# Patient Record
Sex: FEMALE | Race: AMERICAN INDIAN OR ALASKA NATIVE
[De-identification: names, ages, dates, MRNs, and addresses within clinical notes are randomized per-mention and may not be internally consistent; named-entity substitution may affect disease eponyms.]

---

## 2019-02-06 ENCOUNTER — HOSPITAL ENCOUNTER (INPATIENT)
Dept: HOSPITAL 14 - H.ER | Age: 59
LOS: 2 days | Discharge: HOME HEALTH | DRG: 194 | End: 2019-02-08
Attending: INTERNAL MEDICINE | Admitting: INTERNAL MEDICINE
Payer: COMMERCIAL

## 2019-02-06 DIAGNOSIS — Z95.1: ICD-10-CM

## 2019-02-06 DIAGNOSIS — E11.9: ICD-10-CM

## 2019-02-06 DIAGNOSIS — J45.901: ICD-10-CM

## 2019-02-06 DIAGNOSIS — D64.9: ICD-10-CM

## 2019-02-06 DIAGNOSIS — M19.90: ICD-10-CM

## 2019-02-06 DIAGNOSIS — J18.9: Primary | ICD-10-CM

## 2019-02-06 DIAGNOSIS — I69.351: ICD-10-CM

## 2019-02-06 DIAGNOSIS — Z79.82: ICD-10-CM

## 2019-02-06 DIAGNOSIS — G89.29: ICD-10-CM

## 2019-02-06 DIAGNOSIS — I10: ICD-10-CM

## 2019-02-06 DIAGNOSIS — Z79.899: ICD-10-CM

## 2019-02-06 LAB
ALBUMIN SERPL-MCNC: 4 G/DL (ref 3.5–5)
ALBUMIN/GLOB SERPL: 0.9 {RATIO} (ref 1–2.1)
ALT SERPL-CCNC: < 6 U/L (ref 9–52)
ANISOCYTOSIS BLD QL SMEAR: (no result)
AST SERPL-CCNC: 26 U/L (ref 14–36)
BASE EXCESS BLDV CALC-SCNC: -1.4 MMOL/L (ref 0–2)
BASE EXCESS BLDV CALC-SCNC: 0.8 MMOL/L (ref 0–2)
BASOPHILS # BLD AUTO: 0 K/UL (ref 0–0.2)
BASOPHILS NFR BLD: 0.1 % (ref 0–2)
BUN SERPL-MCNC: 12 MG/DL (ref 7–17)
CALCIUM SERPL-MCNC: 8.6 MG/DL (ref 8.4–10.2)
EOSINOPHIL # BLD AUTO: 0 K/UL (ref 0–0.7)
EOSINOPHIL NFR BLD: 0.1 % (ref 0–4)
ERYTHROCYTE [DISTWIDTH] IN BLOOD BY AUTOMATED COUNT: 19.2 % (ref 11.5–14.5)
GFR NON-AFRICAN AMERICAN: > 60
HGB BLD-MCNC: 10.5 G/DL (ref 12–16)
HYPOCHROMIC: SLIGHT
LYMPHOCYTE: 4 % (ref 20–50)
LYMPHOCYTES # BLD AUTO: 0.3 K/UL (ref 1–4.3)
LYMPHOCYTES NFR BLD AUTO: 2 % (ref 20–40)
MCH RBC QN AUTO: 24.1 PG (ref 27–31)
MCHC RBC AUTO-ENTMCNC: 31.5 G/DL (ref 33–37)
MCV RBC AUTO: 76.6 FL (ref 81–99)
MICROCYTES BLD QL SMEAR: (no result)
MONOCYTE: 3 % (ref 0–10)
MONOCYTES # BLD: 0.4 K/UL (ref 0–0.8)
MONOCYTES NFR BLD: 2.2 % (ref 0–10)
NEUTROPHILS # BLD: 15.7 K/UL (ref 1.8–7)
NEUTROPHILS NFR BLD AUTO: 91 % (ref 42–75)
NEUTROPHILS NFR BLD AUTO: 95.6 % (ref 50–75)
NEUTS BAND NFR BLD: 2 % (ref 0–2)
NRBC BLD AUTO-RTO: 0 % (ref 0–0)
OVALOCYTES BLD QL SMEAR: SLIGHT
PCO2 BLDV: 49 MMHG (ref 40–60)
PCO2 BLDV: 58 MMHG (ref 40–60)
PH BLDV: 7.3 [PH] (ref 7.32–7.43)
PH BLDV: 7.32 [PH] (ref 7.32–7.43)
PLATELET # BLD EST: NORMAL 10*3/UL
PLATELET # BLD: 221 K/UL (ref 130–400)
PMV BLD AUTO: 9.5 FL (ref 7.2–11.7)
POIKILOCYTOSIS BLD QL SMEAR: SLIGHT
RBC # BLD AUTO: 4.37 MIL/UL (ref 3.8–5.2)
TOTAL CELLS COUNTED BLD: 100
VENOUS BLOOD FIO2: 21 %
VENOUS BLOOD FIO2: 21 %
VENOUS BLOOD GAS PO2: 47 MM/HG (ref 30–55)
VENOUS BLOOD GAS PO2: 50 MM/HG (ref 30–55)
WBC # BLD AUTO: 16.5 K/UL (ref 4.8–10.8)

## 2019-02-06 NOTE — ED PDOC
HPI: General Adult


Time Seen by Provider: 19 13:59


Chief Complaint (Nursing): Shortness Of Breath


Chief Complaint (Provider): Medication Refill


History Per: EMS


Onset/Duration Of Symptoms: Days (x  1)


Current Symptoms Are (Timing): Still Present


Additional Complaint(s): 


58 year old female with a history of CVA, hypertension and anemia presents to 

the ED via EMS. As per EMS, patient ran out of her albuterol prompting call to 

EMS. In the ED, patient is arousable to verbal stimuli, but is refusing to 

answer any questions. Offers no complaints.  Pt administered nebulizer 

treatments and Solumedrol via EMS en route. 





PMD: none provided


e





Past Medical History


Reviewed: Historical Data, Nursing Documentation, Vital Signs


Vital Signs: 





                                Last Vital Signs











Temp  100.7 F H  19 14:20


 


Pulse  96 H  19 13:53


 


Resp  16   19 14:25


 


BP  129/74   19 13:53


 


Pulse Ox  95   19 14:25














- Medical History


PMH: Anemia, Arthritis, Asthma, HTN


   Denies: Chronic Kidney Disease





- Surgical History


Surgical History: CABG, Cholecystectomy





- Family History


Family History: States: Unknown Family Hx





- Home Medications


Home Medications: 


                                Ambulatory Orders











 Medication  Instructions  Recorded


 


Aspirin [Aspirin Chewable] 81 mg PO DAILY #0 chew 16


 


Baclofen [Lioresal] 20 mg PO TID #0 tab 16


 


Gabapentin [Neurontin] 300 mg PO TID #0 cap 16


 


Losartan [Cozaar] 25 mg PO DAILY #0 tab 16


 


Rosuvastatin Calcium [Crestor] 20 mg PO HS #0 tab 16


 


fentaNYL 75 mcg/hr [Duragesic 1 patch TD Q72H #0 patch 16





Patch 75 mcg/hr]  


 


oxyCODONE/Acetaminophen [Percocet 1 tab PO Q8H PRN #0 tab 16





5/325 mg Tab]  














- Allergies


Allergies/Adverse Reactions: 


                                    Allergies











Allergy/AdvReac Type Severity Reaction Status Date / Time


 


Penicillins Allergy  RASH Verified 19 13:53


 


latex AdvReac  RASH Verified 19 13:53














Review of Systems


Review Of Systems: ROS cannot be obtained secondary to pt's inabilty to answer 

questions. (patient is uncooperative)





Physical Exam





- Reviewed


Nursing Documentation Reviewed: Yes





- Physical Exam


Appears: Positive for: No Acute Distress (febrile)


Head Exam: Positive for: ATRAUMATIC, NORMAL INSPECTION, NORMOCEPHALIC


Skin: Positive for: Normal Color, Warm, Dry.  Negative for: Rash


Eye Exam: Positive for: EOMI, Normal appearance, PERRL


Cardiovascular/Chest: Positive for: Murmur (otherwise regular rate)


Respiratory: Positive for: Wheezing (bilateral).  Negative for: Respiratory 

Distress


Gastrointestinal/Abdominal: Positive for: Normal Exam, Soft.  Negative for: 

Tenderness


Back: Positive for: Normal Inspection.  Negative for: L CVA Tenderness, R CVA 

Tenderness


Extremity: Positive for: Normal ROM (upper and lower extremities).  Negative 

for: Deformity, Swelling


Neurologic/Psych: Positive for: Alert, Oriented.  Negative for: Motor/Sensory 

Deficits





- Laboratory Results


Result Diagrams: 


                                 19 15:10





                                 19 15:10





- ECG


ECG Rhythm: Positive for: Atrial Flutter


Rate: 91


O2 Sat by Pulse Oximetry: 95 (RA)


Pulse Ox Interpretation: Normal





Medical Decision Making


Medical Decision Makin:59 


Impression: fever, possible asthma exacerbation, pneumonia


Initial Plan: 


--VBG 


--CMP 


--CBC 


--CXR


--EKG 


--Urine dip 


--Duoneb 3 ml INH 


--Peak flow pre/post


--Acetaminophen 650 mg PO 


--Blood cx


--Urine cx 


--UA 





Accession No. : [id number]


Patient Name / ID : TRENA MONTANO  / [id number]


Exam Date : 2019 13:52:33 ( Approved )


Study Comment : 


Sex / Age : F  / 58Y





Creator : Noe Serra MD


Dictator : Noe Serra MD


 : 


Approver : Noe Serra MD


Approver2 : 





Report Date : 2019 14:58:29


My Comment : 


 

********************************************************************************


***





Date of service: 





2019





PROCEDURE:  CHEST RADIOGRAPH, 1 VIEW





HISTORY:


SOB, fever





COMPARISON:


None available.





FINDINGS:





LUNGS:


Patchy diffuse bilateral pulmonary opacities suspicious for bilateral pneumonia 

versus pulmonary edema no perihilar distribution.





PLEURA:


Small right pleural effusion.  No evidence of left pleural effusion.





CARDIOVASCULAR:


 Cardiac valvular replacement noted.  Normal heart size.  There is 

atherosclerotic calcification of the thoracic aorta.  Mild congestive change.





OSSEOUS STRUCTURES:


No significant abnormalities.





VISUALIZED UPPER ABDOMEN:


Normal.





OTHER FINDINGS:


None. 





IMPRESSION:


Patchy diffuse bilateral pulmonary opacity.  Possible bilateral pneumonia versus

pulmonary edema.  Small right pleural effusion.  Cardiac valvular replacement.





-----------------------

--------------------------------------------------------------------------


Scribe Attestation:


Documented by Gracie Campos, acting as a scribe for Laura Camacho MD 





Provider Scribe Attestation:


All medical record entries made by the Scribe were at my direction and 

personally dictated by me. I have reviewed the chart and agree that the record 

accurately reflects my personal performance of the history, physical exam, 

medical decision making, and the department course for this patient. I have also

personally directed, reviewed, and agree with the discharge instructions and 

disposition.








Disposition





- Clinical Impression


Clinical Impression: 


 Pneumonia, Asthma exacerbation








- Patient ED Disposition


Is Patient to be Admitted: Yes





- Disposition


Disposition Time: 16:29


Condition: STABLE


Forms:  Sportsy (English)





- Pt Status Changed To:


Hospital Disposition Of: Inpatient





- Admit Certification


Admit to Inpatient:: After my assessment, the patient will require 

hospitalization for at least two midnights.  This is because of the severity of 

symptoms shown, intensity of services needed, and/or the medical risk in this 

patient being treated as an outpatient.





- POA


Present On Arrival: None

## 2019-02-06 NOTE — RAD
Date of service: 



02/06/2019



PROCEDURE:  CHEST RADIOGRAPH, 1 VIEW



HISTORY:

SOB, fever



COMPARISON:

None available.



FINDINGS:



LUNGS:

Patchy diffuse bilateral pulmonary opacities suspicious for bilateral 

pneumonia versus pulmonary edema no perihilar distribution.



PLEURA:

Small right pleural effusion.  No evidence of left pleural effusion.



CARDIOVASCULAR:

 Cardiac valvular replacement noted.  Normal heart size.  There is 

atherosclerotic calcification of the thoracic aorta.  Mild congestive 

change.



OSSEOUS STRUCTURES:

No significant abnormalities.



VISUALIZED UPPER ABDOMEN:

Normal.



OTHER FINDINGS:

None. 



IMPRESSION:

Patchy diffuse bilateral pulmonary opacity.  Possible bilateral 

pneumonia versus pulmonary edema.  Small right pleural effusion.  

Cardiac valvular replacement.

## 2019-02-07 LAB
BACTERIA #/AREA URNS HPF: (no result) /[HPF]
BILIRUB UR-MCNC: NEGATIVE MG/DL
BUN SERPL-MCNC: 13 MG/DL (ref 7–17)
CALCIUM SERPL-MCNC: 9.1 MG/DL (ref 8.4–10.2)
COLOR UR: YELLOW
ERYTHROCYTE [DISTWIDTH] IN BLOOD BY AUTOMATED COUNT: 19.7 % (ref 11.5–14.5)
GFR NON-AFRICAN AMERICAN: > 60
GLUCOSE UR STRIP-MCNC: 50 MG/DL
HGB BLD-MCNC: 10.1 G/DL (ref 12–16)
LEUKOCYTE ESTERASE UR-ACNC: (no result) LEU/UL
MCH RBC QN AUTO: 24.4 PG (ref 27–31)
MCHC RBC AUTO-ENTMCNC: 31.7 G/DL (ref 33–37)
MCV RBC AUTO: 77 FL (ref 81–99)
PH UR STRIP: 5 [PH] (ref 5–8)
PLATELET # BLD: 198 K/UL (ref 130–400)
PROT UR STRIP-MCNC: 30 MG/DL
RBC # BLD AUTO: 4.12 MIL/UL (ref 3.8–5.2)
RBC # UR STRIP: NEGATIVE /UL
SP GR UR STRIP: 1.02 (ref 1–1.03)
SQUAMOUS EPITHIAL: 3 /HPF (ref 0–5)
URINE CLARITY: (no result)
UROBILINOGEN UR-MCNC: 2 MG/DL (ref 0.2–1)
WBC # BLD AUTO: 10.9 K/UL (ref 4.8–10.8)

## 2019-02-07 PROCEDURE — 3E0F7GC INTRODUCTION OF OTHER THERAPEUTIC SUBSTANCE INTO RESPIRATORY TRACT, VIA NATURAL OR ARTIFICIAL OPENING: ICD-10-PCS | Performed by: INTERNAL MEDICINE

## 2019-02-07 RX ADMIN — IPRATROPIUM BROMIDE AND ALBUTEROL SULFATE SCH ML: .5; 3 SOLUTION RESPIRATORY (INHALATION) at 01:00

## 2019-02-07 RX ADMIN — LEVOFLOXACIN SCH MLS/HR: 5 INJECTION, SOLUTION INTRAVENOUS at 10:39

## 2019-02-07 RX ADMIN — ENOXAPARIN SODIUM SCH MG: 40 INJECTION SUBCUTANEOUS at 10:03

## 2019-02-07 RX ADMIN — IPRATROPIUM BROMIDE AND ALBUTEROL SULFATE SCH ML: .5; 3 SOLUTION RESPIRATORY (INHALATION) at 07:40

## 2019-02-07 RX ADMIN — IPRATROPIUM BROMIDE AND ALBUTEROL SULFATE SCH ML: .5; 3 SOLUTION RESPIRATORY (INHALATION) at 13:22

## 2019-02-07 RX ADMIN — IPRATROPIUM BROMIDE AND ALBUTEROL SULFATE SCH ML: .5; 3 SOLUTION RESPIRATORY (INHALATION) at 19:35

## 2019-02-07 RX ADMIN — POTASSIUM CHLORIDE SCH MEQ: 10 TABLET, FILM COATED, EXTENDED RELEASE ORAL at 10:00

## 2019-02-07 NOTE — CARD
--------------- APPROVED REPORT --------------





Date of service: 02/06/2019



EKG Measurement

Heart [id number]

[id number]

[id number]

[id number]



<Conclusion>

Atrial flutter with variable AV block

Rightward axis

Nonspecific ST and T wave abnormality

Abnormal ECG

## 2019-02-07 NOTE — HP
ADMITTING HISTORY AND PHYSICAL



HISTORY OF PRESENT ILLNESS:  Ms. Omalley is a 58-year-old female who was

admitted via the emergency room.  The patient was brought in by EMS.  She

was complaining of some shortness of breath and chest tightness.  She

indicates that she has been sick for few days prior to presentation.  Lives

at home with her son, but did not want to tell her son that she was sick. 

Also had visited her primary care physician recently in [place], but

then was not ill.



PAST MEDICAL HISTORY:  She has a past medical history of anemia, arthritis,

asthma, hypertension, and status post cerebrovascular accident with

right-sided hemiparesis.  She also has history of coronary artery bypass

graft and cholecystectomy.



FAMILY HISTORY:  Unremarkable.



PHYSICAL EXAMINATION:

GENERAL:  The patient is alert, oriented to person, place, and time.

VITAL SIGNS:  Blood pressure 129/74, pulse of 96, respiratory rate 16.  She

is afebrile at present but temperature maximum on admission was 100.7

degrees Fahrenheit.  O2 saturation is 95% on nasal cannula oxygen.

SKIN:  Shows fair turgor.

HEENT:  Pupils are equal and reactive to light and accommodation.  Mouth

shows fair hygiene.

LUNGS:  Bilateral mild wheezing and basilar rales.  There is scar present

of coronary artery bypass graft to the anterior chest wall.

HEART:  Regular.

BREASTS:  Normal.

ABDOMEN:  Soft, nontender.  No organomegaly.

EXTREMITIES:  Show no edema or cyanosis.  Has complete right-sided

hemiparesis.

CENTRAL NERVOUS SYSTEM:  The patient is alert and oriented x3.  Except for

the right hemiparesis, no gross deficits.



LABORATORY DATA:  Remarkable for chest x-ray showing bilateral _____

pulmonary infiltrates.  WBC 16.5, hemoglobin 10.5, platelet count 221,000. 

Sodium 135, potassium 4.2, BUN 12, creatinine 0.6, serum glucose 293. 

Arterial blood gas; pH 7.30, pCO2 of 47, O2 saturation 92.6.  Lactate 2.5,

repeat 2.2.



IMPRESSION:  Bilateral pneumonia, probably community acquired; history of

cerebrovascular accident; history of diabetes mellitus, poorly controlled,

type 2; history of hypertension; history of asthma.



PLAN:  Intravenous antibiotics, septic workup.  We will repeat the lactate

level to evaluate for sepsis.  Physical therapy evaluation for discharge

planning and physical therapy.  We will continue therapy as ordered. 

Monitor blood sugar closely.  Further therapy will depend on findings.







__________________________________________

Librado Romero MD





DD:  02/07/2019 9:45:20

DT:  02/07/2019 13:13:23

[facility] # [id number]

## 2019-02-08 VITALS
OXYGEN SATURATION: 100 % | HEART RATE: 62 BPM | TEMPERATURE: 98.1 F | DIASTOLIC BLOOD PRESSURE: 67 MMHG | SYSTOLIC BLOOD PRESSURE: 107 MMHG

## 2019-02-08 VITALS — RESPIRATION RATE: 18 BRPM

## 2019-02-08 RX ADMIN — ENOXAPARIN SODIUM SCH MG: 40 INJECTION SUBCUTANEOUS at 09:44

## 2019-02-08 RX ADMIN — IPRATROPIUM BROMIDE AND ALBUTEROL SULFATE SCH ML: .5; 3 SOLUTION RESPIRATORY (INHALATION) at 02:09

## 2019-02-08 RX ADMIN — LEVOFLOXACIN SCH MLS/HR: 5 INJECTION, SOLUTION INTRAVENOUS at 09:49

## 2019-02-08 RX ADMIN — POTASSIUM CHLORIDE SCH MEQ: 10 TABLET, FILM COATED, EXTENDED RELEASE ORAL at 09:47

## 2019-02-08 RX ADMIN — IPRATROPIUM BROMIDE AND ALBUTEROL SULFATE SCH ML: .5; 3 SOLUTION RESPIRATORY (INHALATION) at 07:54

## 2019-02-08 RX ADMIN — IPRATROPIUM BROMIDE AND ALBUTEROL SULFATE SCH: .5; 3 SOLUTION RESPIRATORY (INHALATION) at 13:33

## 2019-02-08 NOTE — CP.PCM.DIS
Provider





- Provider


Date of Admission: 


02/06/19 16:24





Attending physician: 


Librado Romero MD





Time Spent in preparation of Discharge (in minutes): 35





Diagnosis





- Discharge Diagnosis


(1) History of CVA (cerebrovascular accident)


Status: Chronic   


Comment: R HEMIPLEGIA PERSISTS.  WILL NEED OUT PT PHYSICAL AND OCCUPATIONAL 

THERAPY.  REFUSES SUBACUTE CARE   





(2) History of CVA with residual deficit


Status: Acute   





(3) Asthma exacerbation


Status: Acute   


Comment: RESOLVED WITH BRONCHODILATOR RX   





(4) Pneumonia


Status: Acute   


Comment: NO COUGH/CHEST PAINS OR SOB.  CXR--IMPROVEMENT OF PULMONARY 

INFILTERATES.  WILL D/C HOME ON PO ANTIBIOTICS X 1 WEEK.  FOLLOW UP WITH PMD AND

TO HAVE CXR REPEATED IN 2 WEEKS AFTER RX   





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


Status: Chronic   Priority: Low   





(6) Chronic pain


Status: Chronic   Priority: Low   





Hospital Course





- Lab Results


Lab Results: 


                                  Micro Results





02/06/19 15:00   Blood-Venous   Blood Culture - Preliminary


                            NO GROWTH AFTER 24 HOURS


02/06/19 14:30   Blood-Venous   Blood Culture - Preliminary


                            NO GROWTH AFTER 24 HOURS





                             Most Recent Lab Values











WBC  10.9 K/uL (4.8-10.8)  H  02/07/19  04:20    


 


RBC  4.12 Mil/uL (3.80-5.20)   02/07/19  04:20    


 


Hgb  10.1 g/dL (12.0-16.0)  L  02/07/19  04:20    


 


Hct  31.8 % (34.0-47.0)  L  02/07/19  04:20    


 


MCV  77.0 fl (81.0-99.0)  L  02/07/19  04:20    


 


MCH  24.4 pg (27.0-31.0)  L  02/07/19  04:20    


 


MCHC  31.7 g/dL (33.0-37.0)  L  02/07/19  04:20    


 


RDW  19.7 % (11.5-14.5)  H  02/07/19  04:20    


 


Plt Count  198 K/uL (130-400)   02/07/19  04:20    


 


MPV  9.5 fl (7.2-11.7)   02/06/19  15:10    


 


Neut % (Auto)  95.6 % (50.0-75.0)  H  02/06/19  15:10    


 


Lymph % (Auto)  2.0 % (20.0-40.0)  L  02/06/19  15:10    


 


Mono % (Auto)  2.2 % (0.0-10.0)   02/06/19  15:10    


 


Eos % (Auto)  0.1 % (0.0-4.0)   02/06/19  15:10    


 


Baso % (Auto)  0.1 % (0.0-2.0)   02/06/19  15:10    


 


Neut # (Auto)  15.7 K/uL (1.8-7.0)  H  02/06/19  15:10    


 


Lymph # (Auto)  0.3 K/uL (1.0-4.3)  L  02/06/19  15:10    


 


Mono # (Auto)  0.4 K/uL (0.0-0.8)   02/06/19  15:10    


 


Eos # (Auto)  0.0 K/uL (0.0-0.7)   02/06/19  15:10    


 


Baso # (Auto)  0.0 K/uL (0.0-0.2)   02/06/19  15:10    


 


Neutrophils % (Manual)  91 % (42-75)  H  02/06/19  15:10    


 


Band Neutrophils %  2 % (0-2)   02/06/19  15:10    


 


Lymphocytes % (Manual)  4 % (20-50)  L  02/06/19  15:10    


 


Monocytes % (Manual)  3 % (0-10)   02/06/19  15:10    


 


Platelet Estimate  Normal  (NORMAL)   02/06/19  15:10    


 


Hypochromasia (manual)  Slight   02/06/19  15:10    


 


Poikilocytosis (manual  Slight   02/06/19  15:10    


 


Anisocytosis (manual)  Moderate   02/06/19  15:10    


 


Microcytosis (manual)  Moderate   02/06/19  15:10    


 


Ovalocytes  Slight   02/06/19  15:10    


 


pO2  50 mm/Hg (30-55)   02/06/19  18:30    


 


VBG pH  7.32  (7.32-7.43)   02/06/19  18:30    


 


VBG pCO2  49 mmHg (40-60)   02/06/19  18:30    


 


VBG HCO3  23.4 mmol/L  02/06/19  18:30    


 


VBG Total CO2  26.7 mmol/L (22-28)   02/06/19  18:30    


 


VBG O2 Sat (Calc)  88.2 % (40-65)  H  02/06/19  18:30    


 


VBG Base Excess  -1.4 mmol/L (0.0-2.0)  L  02/06/19  18:30    


 


VBG Potassium  3.7 mmol/L (3.6-5.2)   02/06/19  18:30    


 


Sodium  125.0 mmol/L (132-148)  L  02/06/19  18:30    


 


Chloride  95.0 mmol/L ()  L  02/06/19  18:30    


 


Glucose  647 mg/dL ()  H* D 02/06/19  18:30    


 


Lactate  2.2 mmol/L (0.7-2.1)  H  02/06/19  18:30    


 


FiO2  21.0 %  02/06/19  18:30    


 


Crit Value Called To  sofi Camacho   02/06/19  18:30    


 


Crit Value Called By  SCAR brown   02/06/19  18:30    


 


Crit Value Read Back  Y   02/06/19  18:30    


 


Blood Gas Notified Time  1840   02/06/19  18:30    


 


Sodium  140 mmol/l (132-148)   02/07/19  04:20    


 


Potassium  4.5 MMOL/L (3.6-5.0)   02/07/19  04:20    


 


Chloride  98 mmol/L ()   02/07/19  04:20    


 


Carbon Dioxide  30 mmol/L (22-30)   02/07/19  04:20    


 


Anion Gap  17  (10-20)   02/07/19  04:20    


 


BUN  13 mg/dl (7-17)   02/07/19  04:20    


 


Creatinine  0.7 mg/dl (0.7-1.2)   02/07/19  04:20    


 


Est GFR ( Amer)  > 60   02/07/19  04:20    


 


Est GFR (Non-Af Amer)  > 60   02/07/19  04:20    


 


POC Glucose (mg/dL)  137 mg/dL ()  H  02/08/19  11:06    


 


Random Glucose  195 mg/dL ()  H  02/07/19  04:20    


 


Calcium  9.1 mg/dL (8.4-10.2)   02/07/19  04:20    


 


Total Bilirubin  1.9 mg/dl (0.2-1.3)  H  02/06/19  15:10    


 


AST  26 U/L (14-36)   02/06/19  15:10    


 


ALT  < 6 U/L (9-52)  L  02/06/19  15:10    


 


Alkaline Phosphatase  102 U/L ()   02/06/19  15:10    


 


Total Protein  8.6 G/DL (6.3-8.2)  H  02/06/19  15:10    


 


Albumin  4.0 g/dL (3.5-5.0)   02/06/19  15:10    


 


Globulin  4.6 gm/dL (2.2-3.9)  H  02/06/19  15:10    


 


Albumin/Globulin Ratio  0.9  (1.0-2.1)  L  02/06/19  15:10    


 


Venous Blood Potassium  3.7 mmol/L (3.6-5.2)   02/06/19  18:30    


 


Urine Color  Yellow  (YELLOW)   02/07/19  14:50    


 


Urine Clarity  Slighty-cloudy  (Clear)   02/07/19  14:50    


 


Urine pH  5.0  (5.0-8.0)   02/07/19  14:50    


 


Ur Specific Gravity  1.018  (1.003-1.030)   02/07/19  14:50    


 


Urine Protein  30 mg/dL (NEGATIVE)   02/07/19  14:50    


 


Urine Glucose (UA)  50 mg/dL (NEGATIVE)   02/07/19  14:50    


 


Urine Ketones  Negative mg/dL (NEGATIVE)   02/07/19  14:50    


 


Urine Blood  Negative  (NEGATIVE)   02/07/19  14:50    


 


Urine Nitrate  Negative  (NEGATIVE)   02/07/19  14:50    


 


Urine Bilirubin  Negative  (NEGATIVE)   02/07/19  14:50    


 


Urine Urobilinogen  2.0 mg/dL (0.2-1.0)  H  02/07/19  14:50    


 


Ur Leukocyte Esterase  Neg Tabitha/uL (Negative)   02/07/19  14:50    


 


Urine RBC (Auto)  2 /hpf (0-3)   02/07/19  14:50    


 


Urine Microscopic WBC  5 /hpf (0-5)   02/07/19  14:50    


 


Ur Squamous Epith Cells  3 /hpf (0-5)   02/07/19  14:50    


 


Urine Bacteria  Few  (<OCC)  H  02/07/19  14:50    














- Hospital Course


Hospital Course: 





FEELS BETTER


SOB/COUGH RESOLVED


WILL D/C HOME TODAY





Discharge Exam





- Head Exam


Head Exam: ATRAUMATIC, NORMAL INSPECTION, NORMOCEPHALIC





- Eye Exam


Eye Exam: EOMI, Normal appearance, PERRL


Pupil Exam: NORMAL ACCOMODATION, PERRL





- GI/Abdominal Exam


GI & Abdominal Exam: Normal Bowel Sounds





- Rectal Exam


Rectal Exam: NORMAL INSPECTION





- Neurological Exam


Neurological exam: Alert, CN II-XII Intact, Oriented x3, Reflexes Normal


Additional comments: 





R HEMIPLEGIA





- Psychiatric Exam


Psychiatric exam: Normal Affect, Normal Mood





- Skin


Skin Exam: Dry, Intact, Normal Color, Warm





Discharge Plan





- Follow Up Plan


Condition: STABLE


Disposition: HOME/ ROUTINE


Additional Instructions: 


D/C HOME TODAY ON ORAL LEVAQUIN X 1 WEEK

## 2019-02-08 NOTE — RAD
Date of service: 



02/08/2019



PROCEDURE:  CHEST RADIOGRAPH, 1 VIEW



HISTORY:

PNEUMONIA



COMPARISON:

2/6/2019



FINDINGS:



LUNGS:

Patchy diffuse right pulmonary opacity unchanged from previous.  

Left-sided opacity appears to have markedly resolved.



PLEURA:

No pneumothorax or pleural fluid seen.



CARDIOVASCULAR:

 No aortic atherosclerotic calcification present. Sternotomy wires.  

Normal heart size.



OSSEOUS STRUCTURES:

No significant abnormalities.



VISUALIZED UPPER ABDOMEN:

Normal.



OTHER FINDINGS:

None. 



IMPRESSION:

Persistent patchy right-sided pulmonary opacity.  Resolved left-sided 

pulmonary opacity.  No pleural effusion.

## 2019-02-23 ENCOUNTER — HOSPITAL ENCOUNTER (INPATIENT)
Dept: HOSPITAL 14 - H.ER | Age: 59
LOS: 25 days | Discharge: NURSING, REHAB OR LONG-TERM CARE | DRG: 870 | End: 2019-03-20
Attending: FAMILY MEDICINE | Admitting: FAMILY MEDICINE
Payer: COMMERCIAL

## 2019-02-23 VITALS — BODY MASS INDEX: 37.3 KG/M2

## 2019-02-23 DIAGNOSIS — Z79.84: ICD-10-CM

## 2019-02-23 DIAGNOSIS — E11.649: ICD-10-CM

## 2019-02-23 DIAGNOSIS — I48.91: ICD-10-CM

## 2019-02-23 DIAGNOSIS — Z79.82: ICD-10-CM

## 2019-02-23 DIAGNOSIS — I69.398: ICD-10-CM

## 2019-02-23 DIAGNOSIS — J45.909: ICD-10-CM

## 2019-02-23 DIAGNOSIS — R19.7: ICD-10-CM

## 2019-02-23 DIAGNOSIS — K56.7: ICD-10-CM

## 2019-02-23 DIAGNOSIS — I69.351: ICD-10-CM

## 2019-02-23 DIAGNOSIS — E83.39: ICD-10-CM

## 2019-02-23 DIAGNOSIS — J32.4: ICD-10-CM

## 2019-02-23 DIAGNOSIS — Z99.11: ICD-10-CM

## 2019-02-23 DIAGNOSIS — E79.0: ICD-10-CM

## 2019-02-23 DIAGNOSIS — E83.42: ICD-10-CM

## 2019-02-23 DIAGNOSIS — G93.1: ICD-10-CM

## 2019-02-23 DIAGNOSIS — E87.0: ICD-10-CM

## 2019-02-23 DIAGNOSIS — A41.9: Primary | ICD-10-CM

## 2019-02-23 DIAGNOSIS — I21.4: ICD-10-CM

## 2019-02-23 DIAGNOSIS — Z23: ICD-10-CM

## 2019-02-23 DIAGNOSIS — J69.0: ICD-10-CM

## 2019-02-23 DIAGNOSIS — I25.10: ICD-10-CM

## 2019-02-23 DIAGNOSIS — G92: ICD-10-CM

## 2019-02-23 DIAGNOSIS — D64.9: ICD-10-CM

## 2019-02-23 DIAGNOSIS — I48.92: ICD-10-CM

## 2019-02-23 DIAGNOSIS — J96.01: ICD-10-CM

## 2019-02-23 DIAGNOSIS — I25.2: ICD-10-CM

## 2019-02-23 DIAGNOSIS — Z88.0: ICD-10-CM

## 2019-02-23 DIAGNOSIS — G47.33: ICD-10-CM

## 2019-02-23 DIAGNOSIS — Z87.891: ICD-10-CM

## 2019-02-23 DIAGNOSIS — G93.89: ICD-10-CM

## 2019-02-23 DIAGNOSIS — Z78.1: ICD-10-CM

## 2019-02-23 DIAGNOSIS — E11.21: ICD-10-CM

## 2019-02-23 DIAGNOSIS — E87.6: ICD-10-CM

## 2019-02-23 DIAGNOSIS — N17.0: ICD-10-CM

## 2019-02-23 DIAGNOSIS — K72.00: ICD-10-CM

## 2019-02-23 DIAGNOSIS — E66.9: ICD-10-CM

## 2019-02-23 DIAGNOSIS — R65.20: ICD-10-CM

## 2019-02-23 DIAGNOSIS — Z91.040: ICD-10-CM

## 2019-02-23 DIAGNOSIS — E11.65: ICD-10-CM

## 2019-02-23 DIAGNOSIS — Z95.1: ICD-10-CM

## 2019-02-23 LAB
ALBUMIN SERPL-MCNC: 3.8 G/DL (ref 3.5–5)
ALBUMIN/GLOB SERPL: 1 {RATIO} (ref 1–2.1)
ALT SERPL-CCNC: 1303 U/L (ref 9–52)
ANISOCYTOSIS BLD QL SMEAR: (no result)
APTT BLD: 44.5 SECONDS (ref 25.6–37.1)
ARTERIAL BLOOD GAS HEMOGLOBIN: 11.8 G/DL (ref 11.7–17.4)
ARTERIAL BLOOD GAS O2 SAT: 100.2 % (ref 95–98)
ARTERIAL BLOOD GAS O2 SAT: 88.8 % (ref 95–98)
ARTERIAL BLOOD GAS PCO2: 39 MM/HG (ref 35–45)
ARTERIAL BLOOD GAS PCO2: 48 MM/HG (ref 35–45)
ARTERIAL BLOOD GAS TCO2: 19.4 MMOL/L (ref 22–28)
ARTERIAL BLOOD GAS TCO2: 20 MMOL/L (ref 22–28)
ARTERIAL PATENCY WRIST A: YES
ARTERIAL PATENCY WRIST A: YES
AST SERPL-CCNC: 1555 U/L (ref 14–36)
BACTERIA #/AREA URNS HPF: (no result) /[HPF]
BASE EXCESS BLDV CALC-SCNC: -6 MMOL/L (ref 0–2)
BASE EXCESS BLDV CALC-SCNC: -8.7 MMOL/L (ref 0–2)
BASOPHILS # BLD AUTO: 0.1 K/UL (ref 0–0.2)
BASOPHILS NFR BLD: 0.2 % (ref 0–2)
BILIRUB UR-MCNC: NEGATIVE MG/DL
BNP SERPL-MCNC: 2820 PG/ML (ref 0–900)
BUN SERPL-MCNC: 22 MG/DL (ref 7–17)
CALCIUM SERPL-MCNC: 8.7 MG/DL (ref 8.4–10.2)
COLOR UR: (no result)
EOSINOPHIL # BLD AUTO: 0 K/UL (ref 0–0.7)
EOSINOPHIL NFR BLD: 0 % (ref 0–4)
ERYTHROCYTE [DISTWIDTH] IN BLOOD BY AUTOMATED COUNT: 20.1 % (ref 11.5–14.5)
GFR NON-AFRICAN AMERICAN: 23
GLUCOSE UR STRIP-MCNC: (no result) MG/DL
HCO3 BLDA-SCNC: 16.9 MMOL/L (ref 21–28)
HCO3 BLDA-SCNC: 18.9 MMOL/L (ref 21–28)
HGB BLD-MCNC: 10.5 G/DL (ref 12–16)
HYPOCHROMIC: SLIGHT
INHALED O2 CONCENTRATION: 21 %
INHALED O2 CONCENTRATION: 28 %
INR PPP: 1.9
LEUKOCYTE ESTERASE UR-ACNC: (no result) LEU/UL
LG PLATELETS BLD QL SMEAR: PRESENT
LYMPHOCYTE: 10 % (ref 20–50)
LYMPHOCYTES # BLD AUTO: 1.1 K/UL (ref 1–4.3)
LYMPHOCYTES NFR BLD AUTO: 4.4 % (ref 20–40)
MACROCYTES BLD QL SMEAR: SLIGHT
MCH RBC QN AUTO: 24.2 PG (ref 27–31)
MCHC RBC AUTO-ENTMCNC: 30.1 G/DL (ref 33–37)
MCV RBC AUTO: 80.4 FL (ref 81–99)
MICROCYTES BLD QL SMEAR: SLIGHT
MONOCYTE: 7 % (ref 0–10)
MONOCYTES # BLD: 1.8 K/UL (ref 0–0.8)
MONOCYTES NFR BLD: 7.4 % (ref 0–10)
NEUTROPHILS # BLD: 21.8 K/UL (ref 1.8–7)
NEUTROPHILS NFR BLD AUTO: 83 % (ref 42–75)
NEUTROPHILS NFR BLD AUTO: 88 % (ref 50–75)
NRBC BLD AUTO-RTO: 0.1 % (ref 0–0)
O2 CAP BLDA-SCNC: 16.3 ML/DL (ref 16–24)
O2 CT BLDA-SCNC: 16.3 ML/DL (ref 15–23)
OVALOCYTES BLD QL SMEAR: (no result)
PCO2 BLDV: 60 MMHG (ref 40–60)
PCO2 BLDV: 80 MMHG (ref 40–60)
PH BLDA: 7.18 [PH] (ref 7.35–7.45)
PH BLDA: 7.29 [PH] (ref 7.35–7.45)
PH BLDV: 7.1 [PH] (ref 7.32–7.43)
PH BLDV: 7.15 [PH] (ref 7.32–7.43)
PH UR STRIP: 6 [PH] (ref 5–8)
PLATELET # BLD EST: (no result) 10*3/UL
PLATELET # BLD: 140 K/UL (ref 130–400)
PMV BLD AUTO: 8.2 FL (ref 7.2–11.7)
PO2 BLDA: 154 MM/HG (ref 80–100)
PO2 BLDA: 50 MM/HG (ref 80–100)
POIKILOCYTOSIS BLD QL SMEAR: SLIGHT
PROT UR STRIP-MCNC: 100 MG/DL
PROTHROMBIN TIME: 21.5 SECONDS (ref 9.8–13.1)
RBC # BLD AUTO: 4.31 MIL/UL (ref 3.8–5.2)
RBC # UR STRIP: (no result) /UL
SP GR UR STRIP: 1.01 (ref 1–1.03)
SQUAMOUS EPITHIAL: 2 /HPF (ref 0–5)
TEARDROP CELLS: SLIGHT
TOTAL CELLS COUNTED BLD: 100
URINE AMORPHOUS SEDIMENT: (no result) /UL
URINE CLARITY: (no result)
UROBILINOGEN UR-MCNC: 4 MG/DL (ref 0.2–1)
VENOUS BLOOD FIO2: 21 %
VENOUS BLOOD FIO2: 28 %
VENOUS BLOOD GAS PO2: 21 MM/HG (ref 30–55)
VENOUS BLOOD GAS PO2: 31 MM/HG (ref 30–55)
WBC # BLD AUTO: 24.8 K/UL (ref 4.8–10.8)

## 2019-02-23 NOTE — RAD
Date of service: 



02/23/2019



HISTORY:

 cough 



COMPARISON:

02/08/2019. 



FINDINGS:



LUNGS:

Interval improvement in right lung infiltrate.



PLEURA:

No significant pleural effusion identified, no pneumothorax apparent.



CARDIOVASCULAR:

No atherosclerotic calcification present



No radiographic findings to suggest acute or significant 

cardiovascular disease. Incidental Finding(s): Postoperative changes 

related to sternotomy. 



OSSEOUS STRUCTURES:

No significant abnormalities.



VISUALIZED UPPER ABDOMEN:

Normal.



OTHER FINDINGS:

None.



IMPRESSION:

No active disease. Resolved right lower lobe infiltrate.

## 2019-02-23 NOTE — ED PDOC
HPI: Altered Mental Status


Time Seen by Provider: 02/23/19 13:36


Chief Complaint (Nursing): Altered Mental Status


Chief Complaint (Provider): Altered Mental Status


History Per: EMS


History/Exam Limitations: Clinical Condition


Onset Of Symptoms: <3 Hours


Additional Complaint(s): 





Savanah Omalley is a 58 year old female with a past medical history of HTN, 

stroke, CABG, MI, diabetes and anemia, who presents to the emergency department 

accompanied with EMS after patient was found unconscious at home. Patient used 

heroine and was found unresponsive by family members, who called 911. En route, 

EMS administered 2mg of Narcan nasally and 0.4 mg intravenously. Patient was 

also given 2 AMPs of D50. Her initial sugar was 29 and her accucheck in the ER 

room was 138. 





Of note, patient was admitted on February 6th for asthma exacerbation. 





PMD: no provider 





Past Medical History


Reviewed: Historical Data, Nursing Documentation, Vital Signs


Vital Signs: 





                                Last Vital Signs











Temp      


 


Pulse  94 H  02/23/19 13:38


 


Resp  22   02/23/19 13:38


 


BP  109/48 L  02/23/19 13:38


 


Pulse Ox      














- Medical History


PMH: Anemia, Arthritis, Asthma, CVA, Diabetes, HTN


   Denies: Chronic Kidney Disease


Other PMH: MI





- Surgical History


Surgical History: CABG, Cholecystectomy





- Family History


Family History: States: Unknown Family Hx





- Home Medications


Home Medications: 


                                Ambulatory Orders











 Medication  Instructions  Recorded


 


Albuterol Sulfate [Proair Hfa] 2 puff IH Q6 PRN 02/06/19


 


Aspirin [Ecotrin] 81 mg PO DAILY 02/06/19


 


Atorvastatin [Lipitor] 40 mg PO DAILY 02/06/19


 


Baclofen [Lioresal] 20 mg PO Q8 02/06/19


 


DULoxetine [Cymbalta] 60 mg PO DAILY 02/06/19


 


Docusate [Colace] 200 mg PO HS 02/06/19


 


Ergocalciferol (Vitamin D2) 50,000 unit PO QWK 02/06/19





[Vitamin D2]  


 


Furosemide [Lasix] 10 mg PO DAILY 02/06/19


 


MetFORMIN ER [Glucophage XR] 500 mg PO DAILY 02/06/19


 


Potassium Chloride [Klor-Con 10] 10 meq PO DAILY 02/06/19


 


Pregabalin [Lyrica] 200 mg PO Q8 02/06/19


 


fentaNYL 100mcg/hr [Duragesic 1 patch TD Q72H 02/06/19





Patch 100mcg/hr]  


 


Levofloxacin [Levaquin] 500 mg PO DAILY #7 tablet 02/08/19














- Allergies


Allergies/Adverse Reactions: 


                                    Allergies











Allergy/AdvReac Type Severity Reaction Status Date / Time


 


Penicillins Allergy  RASH Verified 02/06/19 13:53


 


latex AdvReac  RASH Verified 02/06/19 13:53














Review of Systems


ROS Statement: Except As Marked, All Systems Reviewed And Found Negative


Neurological: Positive for: Altered Mental Status, Other (Unconscious; 

unresponsive )





Physical Exam





- Reviewed


Nursing Documentation Reviewed: Yes


Vital Signs Reviewed: Yes





- Physical Exam


Head Exam: Positive for: ATRAUMATIC, NORMOCEPHALIC


Skin: Positive for: Diaphoresis


Eye Exam: Positive for: PERRL, Other (dilated and equal bilaterally )


ENT: Positive for: Other (yellow frothy sputum discharge from mouth)


Cardiovascular/Chest: Positive for: Other (midline CABG scar)


Respiratory: Positive for: Rhonchi, Respiratory Distress (mild).  Negative for: 

Normal Breath Sounds (coarse breath sounds bilaterally )


Gastrointestinal/Abdominal: Positive for: Normal Exam.  Negative for: Tenderness


Extremity: Negative for: Deformity, Swelling, Other (peripheral edema)


Neurologic/Psych: Positive for: Alert (awake), Other (responsive to stimuli )





- Laboratory Results


Result Diagrams: 


                                 02/23/19 13:48





                                 02/23/19 13:48


Lab Results: 





                                        











pCO2  39 mm/Hg (35-45)   02/23/19  13:36    


 


pO2  50 mm/Hg ()  L  02/23/19  13:36    


 


HCO3  18.9 mmol/L (21-28)  L  02/23/19  13:36    


 


ABG pH  7.29  (7.35-7.45)  L  02/23/19  13:36    


 


ABG Total CO2  20.0 mmol/L (22-28)  L  02/23/19  13:36    


 


ABG O2 Saturation  88.8 % (95-98)  L  02/23/19  13:36    


 


ABG Base Excess  -7.3 mmol/L (-2.0-3.0)  L  02/23/19  13:36    


 


Efrem Test  Yes   02/23/19  13:36    


 


ABG Potassium  4.1 mmol/L (3.6-5.2)   02/23/19  13:36    


 


A-a O2 Difference  51.0 mm/Hg  02/23/19  13:36    


 


Sodium  137.0 mmol/L (132-148)   02/23/19  13:36    


 


Chloride  105.0 mmol/L ()   02/23/19  13:36    


 


Glucose  210 mg/dL ()  H  02/23/19  13:36    


 


Lactate  6.8 mmol/L (0.7-2.1)  H*  02/23/19  13:36    


 


FiO2  21.0 %  02/23/19  13:36    


 


Blood Gas Comments  Ra 21   02/23/19  13:36    


 


Crit Value Called To  Dr sandi barker m.d.   02/23/19  13:36    


 


Crit Value Called By  Iram   02/23/19  13:36    


 


Crit Value Read Back  Y   02/23/19  13:36    


 


Blood Gas Notified Time  1350   02/23/19  13:36    














Medical Decision Making


Medical Decision Making: 





Time: 1334


Impression: Altered mental status due to drug overdose, possible aspiration, 

pneumonia 


Plan: 


--CT head without contrast


--Chest xray portable 


--EKG


--Alcohol serum


--ABG shock panel 


--CMP


--ProBNP 


--Troponin I 


--Urine drug screen 


--CBC with differential


--Narcan 0.4 mg IVP








---------------------------------

----------------------------------------------------------------





Scribe Attestation:


Documented by Todd Jno, acting as a scribe for Sandi Barker MD. 





Provider Scribe Attestation:


All medical record entries made by the Scribe were at my direction and 

personally dictated by me. I have reviewed the chart and agree that the record 

accurately reflects my personal performance of the history, physical exam, 

medical decision making, and the department course for this patient. I have also

 personally directed, reviewed, and agree with the discharge instructions and 

disposition.








case d/w Dr. Montes De Oca for ICU bed consult. Case d/w Dr. SHERYL Duran for medical 

service admission. Patient accepted. Bed disposition pending Dr. Montes De Oca's 

evaluation.





Disposition





- Clinical Impression


Clinical Impression: 


 Sepsis








- Patient ED Disposition


Is Patient to be Admitted: Transfer of Care


Doctor Will See Patient In The: Hospital





- Disposition


Disposition: Transfer of Care


Disposition Time: 14:51


Condition: CRITICAL


Instructions:  Sepsis in Adults


Forms:  [facility] (English)


Patient Signed Over To: Ap Barrett


Present On Arrival: None

## 2019-02-23 NOTE — CT
Date of service: 



02/23/2019



PROCEDURE:  CT HEAD WITHOUT CONTRAST.



HISTORY:

Possible stroke 



COMPARISON:

None available.



TECHNIQUE:

Axial computed tomography images were obtained through the head/brain 

without intravenous contrast.  



Supplemental Coronal and Sagittal projections created and reviewed.



Radiation dose:



Total exam DLP = 914.72 mGy-cm.



This CT exam was performed using one or more of the following dose 

reduction techniques: Automated exposure control, adjustment of the 

mA and/or kV according to patient size, and/or use of iterative 

reconstruction technique.



FINDINGS:



HEMORRHAGE:

No intracranial hemorrhage. 



BRAIN:

No mass effect or edema.  Encephalomalacia changes in particular left 

hemisphere with ipsilateral ventricular dilatation and areas 

encephalomalacia/postoperative change.



VENTRICLES:

Unremarkable. No hydrocephalus. 



CALVARIUM:

Postoperative Mary variable changes bilaterally.



PARANASAL SINUSES:

Incompletely visualized air-fluid level right maxillary sinus 

consistent with acute sinusitis. More chronic and the less extensive 

changes identified in semi noise and ethmoid air cells. 



MASTOID AIR CELLS:

Unremarkable as visualized. No inflammatory changes.



OTHER FINDINGS:

None.



IMPRESSION:

No acute intracranial abnormalities. No significant findings to 

account for the clinical presentation.



Extensive postoperative changes described above.

## 2019-02-23 NOTE — CP.PCM.CON
History of Present Illness





- History of Present Illness


History of Present Illness: 





58 YOF was brought to ER as she was unresponsive at home. Her son called EMT and

pt had BS 29,, she was given D50, blood sugar improved and pt was more awake but

still lethargic. As per ER documents, pt told EMT she used heroin and she was 

given IV narcain also. In Er pt had fever 10o., she had diarrhea , BP was not 

low and she was not tachy and was not in any resp distress. SHe has 

leukocytosis, and Lactic acis was 6.4 and then increased to 8. 


      


This patient has h/o CVA, CAD and asthma and was discharged from [facility] on 2/7 a

fter treated  for PNA and exacerbation of Asthma. In ER pt was sleepy but would 

response by opening her eyes on verbal command. She has two lose BM in ER, but 

as per family hs no diarrhea at home. 





Review of Systems





- Review of Systems


Systems not reviewed;Unavailable: Unstable Vital Signs, Altered Mental Status





- Constitutional


Constitutional: Lethargy, Malaise





- Cardiovascular


Cardiovascular: As Per HPI





- Respiratory


Respiratory: As Per HPI





- Gastrointestinal


Gastrointestinal: Diarrhea





Past Patient History





- Past Medical History & Family History


Past Medical History?: Yes





- Past Social History


Smoking Status: Never Smoked





- CARDIAC


Hx Cardiac Disorders: Yes


Hx Hypertension: Yes





- PULMONARY


Hx Asthma: Yes





- NEUROLOGICAL


Hx Neurological Disorder: Yes


HX Cerebrovascular Accident: Yes





- HEENT


Hx HEENT Problems: Yes


Hx Cataracts: Yes





- RENAL


Hx Chronic Kidney Disease: No





- ENDOCRINE/METABOLIC


Hx Endocrine Disorders: No





- HEMATOLOGICAL/ONCOLOGICAL


Hx Anemia: Yes





- INTEGUMENTARY


Hx Dermatological Problems: No





- MUSCULOSKELETAL/RHEUMATOLOGICAL


Hx Arthritis: Yes





- GASTROINTESTINAL


Hx Constipation: Yes





- GENITOURINARY/GYNECOLOGICAL


Hx Genitourinary Disorders: No





- PSYCHIATRIC


Hx Substance Use: Yes





- SURGICAL HISTORY


Hx Cholecystectomy: Yes


Hx Coronary Artery Bypass Graft: Yes





- ANESTHESIA


Hx Anesthesia: Yes


Hx Anesthesia Reactions: No





Meds


Allergies/Adverse Reactions: 


                                    Allergies











Allergy/AdvReac Type Severity Reaction Status Date / Time


 


Penicillins Allergy  RASH Verified 02/06/19 13:53


 


latex AdvReac  RASH Verified 02/06/19 13:53














Physical Exam





- Constitutional


Appears: Combative





- Head Exam


Head Exam: ATRAUMATIC





- Eye Exam


Pupil Exam: PERRL





- ENT Exam


ENT Exam: Normal Exam





- Neck Exam


Neck exam: Positive for: Normal Inspection





- Respiratory Exam


Respiratory Exam: Rales





- Cardiovascular Exam


Cardiovascular Exam: REGULAR RHYTHM





- GI/Abdominal Exam


GI & Abdominal Exam: Soft





- Extremities Exam


Extremities exam: Positive for: normal inspection





- Back Exam


Back exam: NORMAL INSPECTION





Results





- Vital Signs


Recent Vital Signs: 


                                Last Vital Signs











Temp  101.3 F H  02/23/19 14:50


 


Pulse  83   02/23/19 15:49


 


Resp  22   02/23/19 15:49


 


BP  102/71   02/23/19 15:49


 


Pulse Ox  100   02/23/19 15:49














- Labs


Result Diagrams: 


                                 02/23/19 13:48





                                 02/23/19 13:48


Labs: 


                         Laboratory Results - last 24 hr











  02/23/19 02/23/19 02/23/19





  13:36 13:48 13:48


 


WBC    24.8 H D


 


RBC    4.31


 


Hgb    10.5 L


 


Hct    34.7


 


MCV    80.4 L D


 


MCH    24.2 L


 


MCHC    30.1 L


 


RDW    20.1 H


 


Plt Count    140


 


MPV    8.2


 


Neut % (Auto)    88.0 H


 


Lymph % (Auto)    4.4 L


 


Mono % (Auto)    7.4


 


Eos % (Auto)    0.0


 


Baso % (Auto)    0.2


 


Neut # (Auto)    21.8 H


 


Lymph # (Auto)    1.1


 


Mono # (Auto)    1.8 H


 


Eos # (Auto)    0.0


 


Baso # (Auto)    0.1


 


Neutrophils % (Manual)    83 H


 


Lymphocytes % (Manual)    10 L


 


Monocytes % (Manual)    7


 


Platelet Estimate    Slightly decreased L


 


Large Platelets    Present


 


Hypochromasia (manual)    Slight


 


Poikilocytosis (manual    Slight


 


Anisocytosis (manual)    Moderate


 


Microcytosis (manual)    Slight


 


Macrocytosis (manual)    Slight


 


Tear Drop Cells    Slight


 


Ovalocytes    Moderate


 


PT   


 


INR   


 


APTT   


 


pCO2  39  


 


pO2  50 L  


 


HCO3  18.9 L  


 


ABG pH  7.29 L  


 


ABG Total CO2  20.0 L  


 


ABG O2 Saturation  88.8 L  


 


ABG Base Excess  -7.3 L  


 


Efrem Test  Yes  


 


ABG Potassium  4.1  


 


VBG pH   


 


VBG pCO2   


 


VBG HCO3   


 


VBG Total CO2   


 


VBG O2 Sat (Calc)   


 


VBG Base Excess   


 


VBG Potassium   


 


A-a O2 Difference  51.0  


 


Sodium  137.0  143 


 


Chloride  105.0  101 


 


Glucose  210 H  


 


Lactate  6.8 H*  


 


FiO2  21.0  


 


Blood Gas Comments  Ra 21  


 


Crit Value Called To  Dr sunita barker m.d.  


 


Crit Value Called By  Iram  


 


Crit Value Read Back  Y  


 


Blood Gas Notified Time  1350  


 


Potassium   4.1 


 


Carbon Dioxide   22 


 


Anion Gap   24 H 


 


BUN   22 H 


 


Creatinine   2.2 H 


 


Est GFR ( Amer)   28 


 


Est GFR (Non-Af Amer)   23 


 


Random Glucose   147 H 


 


Calcium   8.7 


 


Phosphorus   


 


Magnesium   


 


Total Bilirubin   2.5 H 


 


AST   1555 H 


 


ALT   1303 H 


 


Alkaline Phosphatase   127 H D 


 


Troponin I   0.2710 H* 


 


NT-Pro-B Natriuret Pep   2820 H 


 


Total Protein   7.5 


 


Albumin   3.8 


 


Globulin   3.8 


 


Albumin/Globulin Ratio   1.0 


 


Arterial Blood Potassium  4.1  


 


Venous Blood Potassium   


 


Urine Color   


 


Urine Clarity   


 


Urine pH   


 


Ur Specific Gravity   


 


Urine Protein   


 


Urine Glucose (UA)   


 


Urine Ketones   


 


Urine Blood   


 


Urine Nitrate   


 


Urine Bilirubin   


 


Urine Urobilinogen   


 


Ur Leukocyte Esterase   


 


Urine RBC (Auto)   


 


Urine Microscopic WBC   


 


Ur Squamous Epith Cells   


 


Amorphous Sediment   


 


Urine Bacteria   


 


Hyaline Casts   


 


Urine Opiates Screen   


 


Urine Methadone Screen   


 


Ur Barbiturates Screen   


 


Ur Phencyclidine Scrn   


 


Ur Amphetamines Screen   


 


U Benzodiazepines Scrn   


 


U Oth Cocaine Metabols   


 


U Cannabinoids Screen   


 


Alcohol, Quantitative   < 10 














  02/23/19 02/23/19 02/23/19





  14:29 14:50 14:50


 


WBC   


 


RBC   


 


Hgb   


 


Hct   


 


MCV   


 


MCH   


 


MCHC   


 


RDW   


 


Plt Count   


 


MPV   


 


Neut % (Auto)   


 


Lymph % (Auto)   


 


Mono % (Auto)   


 


Eos % (Auto)   


 


Baso % (Auto)   


 


Neut # (Auto)   


 


Lymph # (Auto)   


 


Mono # (Auto)   


 


Eos # (Auto)   


 


Baso # (Auto)   


 


Neutrophils % (Manual)   


 


Lymphocytes % (Manual)   


 


Monocytes % (Manual)   


 


Platelet Estimate   


 


Large Platelets   


 


Hypochromasia (manual)   


 


Poikilocytosis (manual   


 


Anisocytosis (manual)   


 


Microcytosis (manual)   


 


Macrocytosis (manual)   


 


Tear Drop Cells   


 


Ovalocytes   


 


PT   


 


INR   


 


APTT   


 


pCO2   


 


pO2  21 L  


 


HCO3   


 


ABG pH   


 


ABG Total CO2   


 


ABG O2 Saturation   


 


ABG Base Excess   


 


Efrem Test   


 


ABG Potassium   


 


VBG pH  7.10 L*  


 


VBG pCO2  80 H*  


 


VBG HCO3  18.6  


 


VBG Total CO2  27.3  


 


VBG O2 Sat (Calc)  26.8 L  


 


VBG Base Excess  -6.0 L  


 


VBG Potassium  4.1  


 


A-a O2 Difference   


 


Sodium  142.0  


 


Chloride  105.0  


 


Glucose  141 H  


 


Lactate  8.5 H*  


 


FiO2  28.0  


 


Blood Gas Comments   


 


Crit Value Called To  Dr sunita barker m.d.  


 


Crit Value Called By  Iram  


 


Crit Value Read Back  Y  


 


Blood Gas Notified Time  1452  


 


Potassium   


 


Carbon Dioxide   


 


Anion Gap   


 


BUN   


 


Creatinine   


 


Est GFR ( Amer)   


 


Est GFR (Non-Af Amer)   


 


Random Glucose   


 


Calcium   


 


Phosphorus   


 


Magnesium   


 


Total Bilirubin   


 


AST   


 


ALT   


 


Alkaline Phosphatase   


 


Troponin I   


 


NT-Pro-B Natriuret Pep   


 


Total Protein   


 


Albumin   


 


Globulin   


 


Albumin/Globulin Ratio   


 


Arterial Blood Potassium   


 


Venous Blood Potassium  4.1  


 


Urine Color    Negrita


 


Urine Clarity    Turbid


 


Urine pH    6.0


 


Ur Specific Gravity    1.013


 


Urine Protein    100


 


Urine Glucose (UA)    Neg


 


Urine Ketones    Negative


 


Urine Blood    Moderate


 


Urine Nitrate    Negative


 


Urine Bilirubin    Negative


 


Urine Urobilinogen    4.0 H


 


Ur Leukocyte Esterase    Neg


 


Urine RBC (Auto)    6 H


 


Urine Microscopic WBC    4


 


Ur Squamous Epith Cells    2


 


Amorphous Sediment    Few H


 


Urine Bacteria    Occ H


 


Hyaline Casts    11-20 H


 


Urine Opiates Screen   Negative 


 


Urine Methadone Screen   Negative 


 


Ur Barbiturates Screen   Negative 


 


Ur Phencyclidine Scrn   Negative 


 


Ur Amphetamines Screen   Negative 


 


U Benzodiazepines Scrn   Negative 


 


U Oth Cocaine Metabols   Negative 


 


U Cannabinoids Screen   Negative 


 


Alcohol, Quantitative   














  02/23/19 02/23/19





  14:50 14:50


 


WBC  


 


RBC  


 


Hgb  


 


Hct  


 


MCV  


 


MCH  


 


MCHC  


 


RDW  


 


Plt Count  


 


MPV  


 


Neut % (Auto)  


 


Lymph % (Auto)  


 


Mono % (Auto)  


 


Eos % (Auto)  


 


Baso % (Auto)  


 


Neut # (Auto)  


 


Lymph # (Auto)  


 


Mono # (Auto)  


 


Eos # (Auto)  


 


Baso # (Auto)  


 


Neutrophils % (Manual)  


 


Lymphocytes % (Manual)  


 


Monocytes % (Manual)  


 


Platelet Estimate  


 


Large Platelets  


 


Hypochromasia (manual)  


 


Poikilocytosis (manual  


 


Anisocytosis (manual)  


 


Microcytosis (manual)  


 


Macrocytosis (manual)  


 


Tear Drop Cells  


 


Ovalocytes  


 


PT   21.5 H


 


INR   1.9


 


APTT   44.5 H


 


pCO2  


 


pO2  


 


HCO3  


 


ABG pH  


 


ABG Total CO2  


 


ABG O2 Saturation  


 


ABG Base Excess  


 


Efrem Test  


 


ABG Potassium  


 


VBG pH  


 


VBG pCO2  


 


VBG HCO3  


 


VBG Total CO2  


 


VBG O2 Sat (Calc)  


 


VBG Base Excess  


 


VBG Potassium  


 


A-a O2 Difference  


 


Sodium  


 


Chloride  


 


Glucose  


 


Lactate  


 


FiO2  


 


Blood Gas Comments  


 


Crit Value Called To  


 


Crit Value Called By  


 


Crit Value Read Back  


 


Blood Gas Notified Time  


 


Potassium  


 


Carbon Dioxide  


 


Anion Gap  


 


BUN  


 


Creatinine  


 


Est GFR ( Amer)  


 


Est GFR (Non-Af Amer)  


 


Random Glucose  


 


Calcium  


 


Phosphorus  9.3 H 


 


Magnesium  2.0 


 


Total Bilirubin  


 


AST  


 


ALT  


 


Alkaline Phosphatase  


 


Troponin I  


 


NT-Pro-B Natriuret Pep  


 


Total Protein  


 


Albumin  


 


Globulin  


 


Albumin/Globulin Ratio  


 


Arterial Blood Potassium  


 


Venous Blood Potassium  


 


Urine Color  


 


Urine Clarity  


 


Urine pH  


 


Ur Specific Gravity  


 


Urine Protein  


 


Urine Glucose (UA)  


 


Urine Ketones  


 


Urine Blood  


 


Urine Nitrate  


 


Urine Bilirubin  


 


Urine Urobilinogen  


 


Ur Leukocyte Esterase  


 


Urine RBC (Auto)  


 


Urine Microscopic WBC  


 


Ur Squamous Epith Cells  


 


Amorphous Sediment  


 


Urine Bacteria  


 


Hyaline Casts  


 


Urine Opiates Screen  


 


Urine Methadone Screen  


 


Ur Barbiturates Screen  


 


Ur Phencyclidine Scrn  


 


Ur Amphetamines Screen  


 


U Benzodiazepines Scrn  


 


U Oth Cocaine Metabols  


 


U Cannabinoids Screen  


 


Alcohol, Quantitative  














Assessment & Plan





- Assessment and Plan (Free Text)


Assessment: 








ASSESSMENT: 





Sepsis: 


SHANITA stage 3: ATN:


AMS


Aspiration PNA


Hypoglycemia 


h/o CVA


Diarrhea : r/o  c-diff


DM








PLAN:





admit to ICU for close monitoring


IVf NS 3000 cc bolus then RL at 100 cc/h


Blood culture, urine culture and stool for c -diff sent from ER


Vancomycin and azactom given in ER, will continue and will add flagyl for 

anerobic coverage , for possible c-dif colitis and aspiration


repeat lactic acid in 6 hours


Serial TNI


Duoneb q 6 H


heparin s/q DVT prophylaxis.

## 2019-02-24 LAB
ALBUMIN SERPL-MCNC: 2.7 G/DL (ref 3.5–5)
ALBUMIN/GLOB SERPL: 0.7 {RATIO} (ref 1–2.1)
ANISOCYTOSIS BLD QL SMEAR: SLIGHT
ARTERIAL BLOOD GAS HEMOGLOBIN: 11 G/DL (ref 11.7–17.4)
ARTERIAL BLOOD GAS O2 SAT: 99.1 % (ref 95–98)
ARTERIAL BLOOD GAS O2 SAT: 99.8 % (ref 95–98)
ARTERIAL BLOOD GAS PCO2: 48 MM/HG (ref 35–45)
ARTERIAL BLOOD GAS PCO2: 49 MM/HG (ref 35–45)
ARTERIAL BLOOD GAS TCO2: 22.1 MMOL/L (ref 22–28)
ARTERIAL BLOOD GAS TCO2: 24.5 MMOL/L (ref 22–28)
ARTERIAL PATENCY WRIST A: YES
ARTERIAL PATENCY WRIST A: YES
BASOPHILS # BLD AUTO: 0.1 K/UL (ref 0–0.2)
BASOPHILS NFR BLD: 0.2 % (ref 0–2)
BUN SERPL-MCNC: 31 MG/DL (ref 7–17)
BURR CELLS BLD QL SMEAR: SLIGHT
CALCIUM SERPL-MCNC: 7.3 MG/DL (ref 8.4–10.2)
EOSINOPHIL # BLD AUTO: 0 K/UL (ref 0–0.7)
EOSINOPHIL NFR BLD: 0 % (ref 0–4)
EOSINOPHIL NFR BLD: 2 % (ref 0–7)
ERYTHROCYTE [DISTWIDTH] IN BLOOD BY AUTOMATED COUNT: 19.8 % (ref 11.5–14.5)
GFR NON-AFRICAN AMERICAN: 19
HCO3 BLDA-SCNC: 19.7 MMOL/L (ref 21–28)
HCO3 BLDA-SCNC: 21.7 MMOL/L (ref 21–28)
HGB BLD-MCNC: 10.4 G/DL (ref 12–16)
HYPOCHROMIC: SLIGHT
INHALED O2 CONCENTRATION: 28 %
INHALED O2 CONCENTRATION: 28 %
INR PPP: 2.6
LYMPHOCYTE: 4 % (ref 20–50)
LYMPHOCYTES # BLD AUTO: 1.1 K/UL (ref 1–4.3)
LYMPHOCYTES NFR BLD AUTO: 3.4 % (ref 20–40)
MCH RBC QN AUTO: 24.1 PG (ref 27–31)
MCHC RBC AUTO-ENTMCNC: 30.6 G/DL (ref 33–37)
MCV RBC AUTO: 78.8 FL (ref 81–99)
MICROCYTES BLD QL SMEAR: SLIGHT
MONOCYTE: 2 % (ref 0–10)
MONOCYTES # BLD: 1.6 K/UL (ref 0–0.8)
MONOCYTES NFR BLD: 5 % (ref 0–10)
NEUTROPHILS # BLD: 28.8 K/UL (ref 1.8–7)
NEUTROPHILS NFR BLD AUTO: 91.4 % (ref 50–75)
NEUTROPHILS NFR BLD AUTO: 92 % (ref 42–75)
NRBC BLD AUTO-RTO: 0.1 % (ref 0–0)
O2 CAP BLDA-SCNC: 15 ML/DL (ref 16–24)
O2 CT BLDA-SCNC: 15 ML/DL (ref 15–23)
PH BLDA: 7.24 [PH] (ref 7.35–7.45)
PH BLDA: 7.28 [PH] (ref 7.35–7.45)
PLATELET # BLD EST: NORMAL 10*3/UL
PLATELET # BLD: 138 K/UL (ref 130–400)
PMV BLD AUTO: 9.2 FL (ref 7.2–11.7)
PO2 BLDA: 105 MM/HG (ref 80–100)
PO2 BLDA: 97 MM/HG (ref 80–100)
PROTHROMBIN TIME: 29.3 SECONDS (ref 9.8–13.1)
RBC # BLD AUTO: 4.31 MIL/UL (ref 3.8–5.2)
TARGETS BLD QL SMEAR: SLIGHT
TOTAL CELLS COUNTED BLD: 100
TROPONIN I SERPL-MCNC: 3.6 NG/ML (ref 0–0.12)
WBC # BLD AUTO: 31.5 K/UL (ref 4.8–10.8)

## 2019-02-24 PROCEDURE — 05HM33Z INSERTION OF INFUSION DEVICE INTO RIGHT INTERNAL JUGULAR VEIN, PERCUTANEOUS APPROACH: ICD-10-PCS

## 2019-02-24 RX ADMIN — ENOXAPARIN SODIUM SCH MG: 100 INJECTION SUBCUTANEOUS at 08:51

## 2019-02-24 RX ADMIN — METRONIDAZOLE SCH MLS/HR: 500 INJECTION, SOLUTION INTRAVENOUS at 00:24

## 2019-02-24 RX ADMIN — METRONIDAZOLE SCH MLS/HR: 500 INJECTION, SOLUTION INTRAVENOUS at 16:15

## 2019-02-24 RX ADMIN — METRONIDAZOLE SCH MLS/HR: 500 INJECTION, SOLUTION INTRAVENOUS at 08:50

## 2019-02-24 RX ADMIN — DEXTROSE AND SODIUM CHLORIDE SCH MLS/HR: 5; 450 INJECTION, SOLUTION INTRAVENOUS at 08:49

## 2019-02-24 NOTE — CT
Date of service: 



02/24/2019



PROCEDURE:  CT HEAD WITHOUT CONTRAST.



HISTORY:

Altered mental status. 



COMPARISON:

02/23/2019



TECHNIQUE:

Axial computed tomography images were obtained through the head/brain 

without intravenous contrast.  



Supplemental Coronal and Sagittal projections created and reviewed.



Radiation dose:



Total exam DLP = <inf_radiation_dlp> mGy-cm.



This CT exam was performed using one or more of the following dose 

reduction techniques: Automated exposure control, adjustment of the 

mA and/or kV according to patient size, and/or use of iterative 

reconstruction technique.



FINDINGS:



HEMORRHAGE:

No intracranial hemorrhage. 



BRAIN:

No mass effect or edema.  Stable postoperative/encephalomalacia 

change particularly left hemispheric at with ipsilateral dilatation 

of the left lateral ventricle.



VENTRICLES:

Unremarkable. No hydrocephalus. 



CALVARIUM:

Stable post craniotomy findings.



PARANASAL SINUSES:

Unremarkable as visualized. No significant inflammatory changes.



MASTOID AIR CELLS:

Unremarkable as visualized. No inflammatory changes.



OTHER FINDINGS:

None.



IMPRESSION:

No acute intracranial abnormalities. No significant findings to 

account for the clinical presentation. No significant interval change 

compared to the prior examination(s).

## 2019-02-24 NOTE — RAD
Date of service: 



02/24/2019



PROCEDURE:  CHEST RADIOGRAPH, 1 VIEW



HISTORY:

TLC position, r/o pneumothorax



COMPARISON:

February 24, 2019 Time of the most recent examination: 06:43.



FINDINGS:



LUNGS:

Stable infiltrates.



PLEURA:

No pneumothorax or pleural fluid seen.



CARDIOVASCULAR:

 No aortic atherosclerotic calcification present. Venous access 

catheter in satisfactory position.



OSSEOUS STRUCTURES:

No significant abnormalities.



VISUALIZED UPPER ABDOMEN:

Normal.



OTHER FINDINGS:

None. 



IMPRESSION:

Satisfactory position of recently placed triple-lumen catheter.  

Right internal jugular approach.  Catheter tip in the SVC.  No 

pneumothorax.

## 2019-02-24 NOTE — CP.PCM.CON
History of Present Illness





- History of Present Illness


History of Present Illness: 





59 yo female with Hx of HTN  Obesity  CVA with right weakness, CAD  s/p CABG  

Asthma  wass admitted via ER with altered mantal status which did not respond to

Narcan and Glucose infusion by EMS





Referred for ID eval because of possible sepsis , source unknown    Was found to

have fever and diarrhea in the ER    Family member is also a poor historian but 

denies any complaints of fever or headache





Neurology was called for evaluation 





Patient was started on empiric IV antibiotics to cover for possible CNS 

infection pending cultures and LP 





Review of Systems





- Review of Systems


Systems not reviewed;Unavailable: Altered Mental Status


All systems: reviewed and no additional remarkable complaints except





- Constitutional


Constitutional: As Per HPI





- EENT


Eyes: absent: As Per HPI, Blind Spots, Blurred Vision, Change in Vision, 

Decreased Night Vision, Diplopia, Discharge, Dry Eye, Exophthalmos, Floaters, 

Irritation, Itchy Eyes, Loss of Peripheral Vision, Pain, Photophobia, Requires 

Corrective Lenses, Sees Flashes, Spots in Vision, Tunnel Vision, Other Visual 

Disturbances, Loss of Vision, Other


Ears: absent: As Per HPI, Decreased Hearing, Ear Discharge, Ear Pain, Tinnitus, 

Abnormal Hearing, Disequilibrium, Dizziness, Other


Nose/Mouth/Throat: absent: As Per HPI, Epistaxis, Nasal Congestion, Nasal 

Discharge, Nasal Obstruction, Nasal Trauma, Nose Pain, Post Nasal Drip, Sinus 

Pain, Sinus Pressure, Bleeding Gums, Change in Voice, Dental Pain, Dry Mouth, 

Dysphagia, Halitosis, Hoarsness, Lip Swelling, Mouth Lesions, Mouth Pain, 

Odynophagia, Sore Throat, Throat Swelling, Tongue Swelling, Facial Pain, Neck 

Pain, Neck Mass, Other





- Breasts


Breasts: absent: As Per HPI, Change in Shape, Mass, Pain, Nipple Discharge, 

Nipple Inversion, Skin Changes, Swelling, Other





- Cardiovascular


Cardiovascular: absent: As Per HPI, Acrocyanosis, Chest Pain, Chest Pain at 

Rest, Chest Pain with Activity, Claudication, Diaphoresis, Dyspnea, Dyspnea on 

Exertion, Edema, Irregular Heart Rhythm, Pain Radiating to Arm/Neck/Jaw, Leg 

Edema, Leg Ulcers, Lightheadedness, Orthopnea, Palpitations, Paroxysmal 

Nocturnal Dyspnea, Pedal Edema, Radiating Pain, Rapid Heart Rate, Slow Heart 

Rate, Syncope, Other





- Respiratory


Respiratory: absent: As Per HPI, Cough, Dyspnea, Hemoptysis, Dyspnea on 

Exertion, Wheezing, Snoring, Stridor, Pain on Inspiration, Chest Congestion, 

Excessive Mucous Production, Change in Mucous Color, Pain with Coughing, Other





- Gastrointestinal


Gastrointestinal: As Per HPI, Diarrhea





- Genitourinary


Genitourinary: absent: As Per HPI, Change in Urinary Stream, Difficulty 

Urinating, Dysuria, Flank Pain, Hematuria, Pyuria, Nocturia, Urinary 

Incontinence, Urinary Frequency, Urinary Hesitance, Urinary Urgency, Voiding 

Freq/Small Amts, Freq UTI, Hx Renal/Bladder Calculi, Hx /Renal Surgery, 

Bladder Distension, Other





- Reproductive: Female


Reproductive:Female: absent: As Per HPI, Amenorrhea, Amenorrhea/Birth Control, 

Currently Menstual, Cycle <21 Days, Cycle >35 Days, Cycle Variable, Menses 1-7 

Days, Menses >/= 8 Days, Menses Variable, Cycle > 4 Weeks Between, No Menses for

6 Months, Heavy Menses, Light Menses, Normal Menses, Spotting Between Cycles, 

S/P Hysterectomy, Menopausal, Post Menopausal, Premenarche, Abnormal Vaginal 

Bleeding, Dysmenorrhea, Dyspareunia, Genital Lesions, Genital Pruritis, Pelvic 

Pain, Prolapse Symptoms, Sexual Dysfunction, Vaginal Discharge, Vaginal Dryness,

Vaginal Odor, Vaginal Pruritis, Other





- Menstruation


Menstruation: absent: As Per HPI, Amenorrhea, Amenorrhea/Birth Control, 

Currently Menstual, Cycle <21 Days, Cycle >35 Days, Cycle Variable, Menses 1-7 

Days, Menses >/= 8 Days, Menses Variable, Cycle > 4 Weeks Between, No Menses for

6 Months, Heavy Menses, Light Menses, Normal Menses, Spotting Between Cycles, 

S/P Hysterectomy, Menopausal, Post Menopausal, Premenarche, Abnormal Vaginal 

Bleeding, Dysmenorrhea, Other





- Musculoskeletal


Musculoskeletal: absent: As Per HPI, Abnormal Gait, Arthralgias, Atrophy, Back 

Pain, Deformity, Joint Swelling, Limited Range of Motion, Loss of Height, Muscle

Cramps, Muscle Weakness, Myalgias, Neck Pain, Numbness, Radiating Pain into 

Limb, Stiffness, Tingling, Other





- Integumentary


Integumentary: absent: As Per HPI, Acne, Alopecia, Bleeding Lesions, Change in 

Hair, Change in Nails, Change in Pigmentation, Changing Lesions, Dry Skin, 

Erythema, Furuncle, Hirsutism, Lesions, New Lesions, Non-Healing Lesions, 

Photosensitivity, Pruritus, Rash, Skin Pain, Skin Ulcer, Sores, Striae, 

Swelling, Unusual Bruising, Wounds, Jaundice, Other





- Neurological


Neurological: absent: As Per HPI, Abnormal Gait, Abnormal Hearing, Abnormal 

Movements, Abnormal Speech, Behavioral Changes, Burning Sensations, Confusion, 

Convulsions, Disequilibrium, Dizziness, Numbness, Focal Weakness, Frequent 

Falls, Headaches, Lack of Coordination, Loss of Vision, Memory Loss, 

Paresthesias, Radicular Pain, Restless Legs, Sensory Deficit, Syncope, Tingling,

Tremor, Vertigo, Weakness, Other Visual Disturbances, Other





- Psychiatric


Psychiatric: absent: As Per HPI, Abnormal Sleep Pattern, Anhedonia, Anxiety, 

Auditory Hallucinations, Behavioral Changes, Change in Appetite, Change in 

Libido, Confusion, Depression, Difficulty Concentrating, Hallucinations, 

Homicidal Ideation, Hopelessness, Irritability, Memory Loss, Mood Swings, Panic 

Attacks, Paranoia, Suicidal Ideation, Visual Hallucinations, Tactile 

Hallucinations, Other





- Endocrine


Endocrine: absent: As Per HPI, Change in Body Appearance, Change in Libido, Cold

Intolorance, Deepening of Voice, Excessive Sweating, Fatigue, Flushing, Heat 

Intolorance, Increase in Ring/Shoe/Hat Size, Palpitations, Polydipsia, 

Polyphagia, Polyuria, Other





- Hematologic/Lymphatic


Hematologic: absent: As Per HPI, Easy Bleeding, Easy Bruising, Lymphadenopathy, 

Other





Past Patient History





- Past Medical History & Family History


Past Medical History?: Yes





- Past Social History


Smoking Status: Former Smoker





- CARDIAC


Hx Cardiac Disorders: Yes


Other/Comment: CABG





- PULMONARY


Hx Respiratory Disorders: Yes


Hx Asthma: Yes


Hx Bronchitis: Yes


Hx Pneumonia: Yes





- NEUROLOGICAL


Hx Neurological Disorder: Yes


HX Cerebrovascular Accident: Yes





- HEENT


Hx HEENT Problems: Yes


Hx Cataracts: Yes





- RENAL


Hx Chronic Kidney Disease: No





- ENDOCRINE/METABOLIC


Hx Endocrine Disorders: No


Hx Diabetes Mellitus Type 2: Yes





- HEMATOLOGICAL/ONCOLOGICAL


Hx Blood Disorders: Yes


Hx Anemia: Yes





- INTEGUMENTARY


Hx Dermatological Problems: No





- MUSCULOSKELETAL/RHEUMATOLOGICAL


Hx Musculoskeletal Disorders: Yes


Hx Arthritis: Yes


Hx Falls: No





- GASTROINTESTINAL


Hx Gastrointestinal Disorders: Yes


Hx Constipation: Yes





- GENITOURINARY/GYNECOLOGICAL


Hx Genitourinary Disorders: No





- PSYCHIATRIC


Hx Psychophysiologic Disorder: No


Hx Substance Use: No





- SURGICAL HISTORY


Hx Surgeries: Yes


Hx Cholecystectomy: Yes


Hx Coronary Artery Bypass Graft: Yes


Other/Comment: Brain hematoma surgery x4





- ANESTHESIA


Hx Anesthesia: Yes


Hx Anesthesia Reactions: No





Meds


Allergies/Adverse Reactions: 


                                    Allergies











Allergy/AdvReac Type Severity Reaction Status Date / Time


 


Penicillins Allergy  RASH Verified 02/06/19 13:53


 


latex AdvReac  RASH Verified 02/06/19 13:53














- Medications


Medications: 


                               Current Medications





Albuterol/Ipratropium (Duoneb 3 Mg/0.5 Mg (3 Ml) Ud)  3 ml INH RQ6 PRN


   PRN Reason: Shortness of Breath


Enoxaparin Sodium (Lovenox)  100 mg SC DAILY DESIRAE; Protocol


   Last Admin: 02/24/19 08:51 Dose:  100 mg


Vancomycin HCl 1 gm/ Sodium (Chloride)  250 mls @ 166.667 mls/hr IVPB DAILY DESIRAE;

Protocol


   Last Admin: 02/24/19 08:51 Dose:  166.667 mls/hr


Aztreonam 1 gm/ Sodium (Chloride)  100 mls @ 100 mls/hr IVPB Q8 DESIRAE; Protocol


   Last Admin: 02/24/19 08:39 Dose:  100 mls/hr


Metronidazole (Flagyl 500mg/100ml Ns)  100 mls @ 100 mls/hr IVPB Q8 DESIRAE; 

Protocol


   Last Admin: 02/24/19 08:50 Dose:  100 mls/hr


Levetiracetam 500 mg/ Sodium (Chloride)  105 mls @ 210 mls/hr IVPB Q12 DESIRAE


   Last Admin: 02/24/19 08:50 Dose:  210 mls/hr


Dextrose/Sodium Chloride (Dextrose 5%/0.45% Ns 1000 Ml)  1,000 mls @ 50 mls/hr 

IV .Q20H DESIRAE


   Stop: 02/25/19 08:30


   Last Admin: 02/24/19 08:49 Dose:  50 mls/hr


Acyclovir 600 mg/ Sodium (Chloride)  100 mls @ 100 mls/hr IV Q24H DESIRAE; Protocol











Physical Exam





- Constitutional


Appears: Confused





- Head Exam


Head Exam: ATRAUMATIC, NORMAL INSPECTION, NORMOCEPHALIC





- Eye Exam


Eye Exam: PERRL.  absent: Scleral icterus





- ENT Exam


ENT Exam: Mucous Membranes Dry, Normal External Ear Exam, Normal Oropharynx





- Neck Exam


Neck exam: Negative for: Lymphadenopathy, Thyromegaly





- Respiratory Exam


Respiratory Exam: Decreased Breath Sounds, Clear to Auscultation Bilateral





- Cardiovascular Exam


Cardiovascular Exam: REGULAR RHYTHM, +S1, +S2





- GI/Abdominal Exam


GI & Abdominal Exam: Diminished Bowel Sounds, Distended, Soft.  absent: 

Tenderness





- Rectal Exam


Rectal Exam: Deferred





-  Exam


 Exam: NORMAL INSPECTION





- Extremities Exam


Extremities exam: Positive for: pedal pulses present.  Negative for: calf 

tenderness, pedal edema, tenderness





- Back Exam


Back exam: absent: CVA tenderness (L), CVA tenderness (R), paraspinal tenderness





- Neurological Exam


Neurological exam: Altered





- Psychiatric Exam


Psychiatric exam: Depressed





- Skin


Skin Exam: Dry





Results





- Vital Signs


Recent Vital Signs: 


                                Last Vital Signs











Temp  98.5 F   02/24/19 11:58


 


Pulse  75   02/24/19 15:00


 


Resp  14   02/24/19 15:00


 


BP  115/61   02/24/19 15:00


 


Pulse Ox  98   02/24/19 15:00














- Labs


Result Diagrams: 


                                 02/24/19 04:50





                                 02/24/19 04:50


Labs: 


                         Laboratory Results - last 24 hr











  02/23/19 02/23/19 02/23/19





  10:00 13:32 14:50


 


WBC   


 


RBC   


 


Hgb   


 


Hct   


 


MCV   


 


MCH   


 


MCHC   


 


RDW   


 


Plt Count   


 


MPV   


 


Neut % (Auto)   


 


Lymph % (Auto)   


 


Mono % (Auto)   


 


Eos % (Auto)   


 


Baso % (Auto)   


 


Neut # (Auto)   


 


Lymph # (Auto)   


 


Mono # (Auto)   


 


Eos # (Auto)   


 


Baso # (Auto)   


 


Neutrophils % (Manual)   


 


Lymphocytes % (Manual)   


 


Monocytes % (Manual)   


 


Eosinophils % (Manual)   


 


Platelet Estimate   


 


Hypochromasia (manual)   


 


Anisocytosis (manual)   


 


Microcytosis (manual)   


 


Target Cells   


 


[place] Cells   


 


PT   


 


INR   


 


pCO2   


 


pO2   


 


HCO3   


 


ABG pH   


 


ABG Total CO2   


 


ABG O2 Saturation   


 


ABG O2 Content   


 


ABG Base Excess   


 


ABG Hemoglobin   


 


ABG Carboxyhemoglobin   


 


POC ABG HHb (Measured)   


 


ABG Methemoglobin   


 


ABG O2 Capacity   


 


Efrem Test   


 


VBG pH   


 


VBG pCO2   


 


VBG HCO3   


 


VBG Total CO2   


 


VBG O2 Sat (Calc)   


 


VBG Base Excess   


 


VBG Potassium   


 


A-a O2 Difference   


 


Hgb O2 Saturation   


 


Sodium   


 


Chloride   


 


Glucose   


 


Lactate   


 


FiO2   


 


Crit Value Called To   


 


Crit Value Called By   


 


Crit Value Read Back   


 


Blood Gas Notified Time   


 


Potassium   


 


Carbon Dioxide   


 


Anion Gap   


 


BUN   


 


Creatinine   


 


Est GFR ( Amer)   


 


Est GFR (Non-Af Amer)   


 


POC Glucose (mg/dL)   138 H 


 


Random Glucose   


 


Hemoglobin A1c   


 


Lactic Acid   


 


Calcium   


 


Phosphorus   


 


Magnesium   


 


Total Bilirubin   


 


AST   


 


ALT   


 


Alkaline Phosphatase   


 


Ammonia   


 


Total Creatine Kinase   


 


Troponin I   


 


Total Protein   


 


Albumin   


 


Globulin   


 


Albumin/Globulin Ratio   


 


Venous Blood Potassium   


 


Urine Opiates Screen    Negative


 


Urine Methadone Screen    Negative


 


Ur Barbiturates Screen    Negative


 


Ur Phencyclidine Scrn    Negative


 


Ur Amphetamines Screen    Negative


 


U Benzodiazepines Scrn    Negative


 


U Oth Cocaine Metabols    Negative


 


U Cannabinoids Screen    Negative


 


C. difficile Ag & Toxin  Negative  














  02/23/19 02/23/19 02/23/19





  14:50 16:48 18:54


 


WBC   


 


RBC   


 


Hgb   


 


Hct   


 


MCV   


 


MCH   


 


MCHC   


 


RDW   


 


Plt Count   


 


MPV   


 


Neut % (Auto)   


 


Lymph % (Auto)   


 


Mono % (Auto)   


 


Eos % (Auto)   


 


Baso % (Auto)   


 


Neut # (Auto)   


 


Lymph # (Auto)   


 


Mono # (Auto)   


 


Eos # (Auto)   


 


Baso # (Auto)   


 


Neutrophils % (Manual)   


 


Lymphocytes % (Manual)   


 


Monocytes % (Manual)   


 


Eosinophils % (Manual)   


 


Platelet Estimate   


 


Hypochromasia (manual)   


 


Anisocytosis (manual)   


 


Microcytosis (manual)   


 


Target Cells   


 


[place] Cells   


 


PT   


 


INR   


 


pCO2   


 


pO2   31 


 


HCO3   


 


ABG pH   


 


ABG Total CO2   


 


ABG O2 Saturation   


 


ABG O2 Content   


 


ABG Base Excess   


 


ABG Hemoglobin   


 


ABG Carboxyhemoglobin   


 


POC ABG HHb (Measured)   


 


ABG Methemoglobin   


 


ABG O2 Capacity   


 


Efrem Test   


 


VBG pH   7.15 L* 


 


VBG pCO2   60 


 


VBG HCO3   16.6 


 


VBG Total CO2   22.7 


 


VBG O2 Sat (Calc)   54.4 


 


VBG Base Excess   -8.7 L 


 


VBG Potassium   4.2 


 


A-a O2 Difference   


 


Hgb O2 Saturation   


 


Sodium   138.0 


 


Chloride   105.0 


 


Glucose   105 


 


Lactate   7.8 H* 


 


FiO2   21.0 


 


Crit Value Called To   Arnold daily md 


 


Crit Value Called By   Mayra casal ry 


 


Crit Value Read Back   Y 


 


Blood Gas Notified Time   1655 


 


Potassium   


 


Carbon Dioxide   


 


Anion Gap   


 


BUN   


 


Creatinine   


 


Est GFR ( Amer)   


 


Est GFR (Non-Af Amer)   


 


POC Glucose (mg/dL)    70


 


Random Glucose   


 


Hemoglobin A1c   


 


Lactic Acid   


 


Calcium   


 


Phosphorus  9.3 H  


 


Magnesium  2.0  


 


Total Bilirubin   


 


AST   


 


ALT   


 


Alkaline Phosphatase   


 


Ammonia   


 


Total Creatine Kinase   


 


Troponin I   


 


Total Protein   


 


Albumin   


 


Globulin   


 


Albumin/Globulin Ratio   


 


Venous Blood Potassium   4.2 


 


Urine Opiates Screen   


 


Urine Methadone Screen   


 


Ur Barbiturates Screen   


 


Ur Phencyclidine Scrn   


 


Ur Amphetamines Screen   


 


U Benzodiazepines Scrn   


 


U Oth Cocaine Metabols   


 


U Cannabinoids Screen   


 


C. difficile Ag & Toxin   














  02/23/19 02/23/19 02/23/19





  19:48 19:48 20:18


 


WBC   


 


RBC   


 


Hgb   


 


Hct   


 


MCV   


 


MCH   


 


MCHC   


 


RDW   


 


Plt Count   


 


MPV   


 


Neut % (Auto)   


 


Lymph % (Auto)   


 


Mono % (Auto)   


 


Eos % (Auto)   


 


Baso % (Auto)   


 


Neut # (Auto)   


 


Lymph # (Auto)   


 


Mono # (Auto)   


 


Eos # (Auto)   


 


Baso # (Auto)   


 


Neutrophils % (Manual)   


 


Lymphocytes % (Manual)   


 


Monocytes % (Manual)   


 


Eosinophils % (Manual)   


 


Platelet Estimate   


 


Hypochromasia (manual)   


 


Anisocytosis (manual)   


 


Microcytosis (manual)   


 


Target Cells   


 


[place] Cells   


 


PT   


 


INR   


 


pCO2   


 


pO2   


 


HCO3   


 


ABG pH   


 


ABG Total CO2   


 


ABG O2 Saturation   


 


ABG O2 Content   


 


ABG Base Excess   


 


ABG Hemoglobin   


 


ABG Carboxyhemoglobin   


 


POC ABG HHb (Measured)   


 


ABG Methemoglobin   


 


ABG O2 Capacity   


 


Efrem Test   


 


VBG pH   


 


VBG pCO2   


 


VBG HCO3   


 


VBG Total CO2   


 


VBG O2 Sat (Calc)   


 


VBG Base Excess   


 


VBG Potassium   


 


A-a O2 Difference   


 


Hgb O2 Saturation   


 


Sodium   


 


Chloride   


 


Glucose   


 


Lactate   


 


FiO2   


 


Crit Value Called To   


 


Crit Value Called By   


 


Crit Value Read Back   


 


Blood Gas Notified Time   


 


Potassium   


 


Carbon Dioxide   


 


Anion Gap   


 


BUN   


 


Creatinine   


 


Est GFR ( Amer)   


 


Est GFR (Non-Af Amer)   


 


POC Glucose (mg/dL)    147 H


 


Random Glucose   


 


Hemoglobin A1c   


 


Lactic Acid   


 


Calcium   


 


Phosphorus   


 


Magnesium   


 


Total Bilirubin   


 


AST   


 


ALT   


 


Alkaline Phosphatase   


 


Ammonia   36 


 


Total Creatine Kinase   


 


Troponin I  1.7800 H*  


 


Total Protein   


 


Albumin   


 


Globulin   


 


Albumin/Globulin Ratio   


 


Venous Blood Potassium   


 


Urine Opiates Screen   


 


Urine Methadone Screen   


 


Ur Barbiturates Screen   


 


Ur Phencyclidine Scrn   


 


Ur Amphetamines Screen   


 


U Benzodiazepines Scrn   


 


U Oth Cocaine Metabols   


 


U Cannabinoids Screen   


 


C. difficile Ag & Toxin   














  02/23/19 02/23/19 02/23/19





  21:00 21:58 23:08


 


WBC   


 


RBC   


 


Hgb   


 


Hct   


 


MCV   


 


MCH   


 


MCHC   


 


RDW   


 


Plt Count   


 


MPV   


 


Neut % (Auto)   


 


Lymph % (Auto)   


 


Mono % (Auto)   


 


Eos % (Auto)   


 


Baso % (Auto)   


 


Neut # (Auto)   


 


Lymph # (Auto)   


 


Mono # (Auto)   


 


Eos # (Auto)   


 


Baso # (Auto)   


 


Neutrophils % (Manual)   


 


Lymphocytes % (Manual)   


 


Monocytes % (Manual)   


 


Eosinophils % (Manual)   


 


Platelet Estimate   


 


Hypochromasia (manual)   


 


Anisocytosis (manual)   


 


Microcytosis (manual)   


 


Target Cells   


 


[place] Cells   


 


PT   


 


INR   


 


pCO2    48 H


 


pO2    154 H


 


HCO3    16.9 L


 


ABG pH    7.18 L*


 


ABG Total CO2    19.4 L


 


ABG O2 Saturation    100.2 H


 


ABG O2 Content    16.3


 


ABG Base Excess    -10.3 L


 


ABG Hemoglobin    11.8


 


ABG Carboxyhemoglobin    1.9 H


 


POC ABG HHb (Measured)    -0.2 L


 


ABG Methemoglobin    1.9


 


ABG O2 Capacity    16.3


 


Efrem Test    Yes


 


VBG pH   


 


VBG pCO2   


 


VBG HCO3   


 


VBG Total CO2   


 


VBG O2 Sat (Calc)   


 


VBG Base Excess   


 


VBG Potassium   


 


A-a O2 Difference    -14.0


 


Hgb O2 Saturation    96.4


 


Sodium   


 


Chloride   


 


Glucose   


 


Lactate   


 


FiO2    28.0


 


Crit Value Called To    Lara nelly rn


 


Crit Value Called By    333


 


Crit Value Read Back    Y


 


Blood Gas Notified Time    2320


 


Potassium   


 


Carbon Dioxide   


 


Anion Gap   


 


BUN   


 


Creatinine   


 


Est GFR ( Amer)   


 


Est GFR (Non-Af Amer)   


 


POC Glucose (mg/dL)   132 H 


 


Random Glucose   


 


Hemoglobin A1c   


 


Lactic Acid  6.0 H*  


 


Calcium   


 


Phosphorus   


 


Magnesium   


 


Total Bilirubin   


 


AST   


 


ALT   


 


Alkaline Phosphatase   


 


Ammonia   


 


Total Creatine Kinase   


 


Troponin I   


 


Total Protein   


 


Albumin   


 


Globulin   


 


Albumin/Globulin Ratio   


 


Venous Blood Potassium   


 


Urine Opiates Screen   


 


Urine Methadone Screen   


 


Ur Barbiturates Screen   


 


Ur Phencyclidine Scrn   


 


Ur Amphetamines Screen   


 


U Benzodiazepines Scrn   


 


U Oth Cocaine Metabols   


 


U Cannabinoids Screen   


 


C. difficile Ag & Toxin   














  02/24/19 02/24/19 02/24/19





  00:19 02:11 04:10


 


WBC   


 


RBC   


 


Hgb   


 


Hct   


 


MCV   


 


MCH   


 


MCHC   


 


RDW   


 


Plt Count   


 


MPV   


 


Neut % (Auto)   


 


Lymph % (Auto)   


 


Mono % (Auto)   


 


Eos % (Auto)   


 


Baso % (Auto)   


 


Neut # (Auto)   


 


Lymph # (Auto)   


 


Mono # (Auto)   


 


Eos # (Auto)   


 


Baso # (Auto)   


 


Neutrophils % (Manual)   


 


Lymphocytes % (Manual)   


 


Monocytes % (Manual)   


 


Eosinophils % (Manual)   


 


Platelet Estimate   


 


Hypochromasia (manual)   


 


Anisocytosis (manual)   


 


Microcytosis (manual)   


 


Target Cells   


 


[place]ster Cells   


 


PT   


 


INR   


 


pCO2   


 


pO2   


 


HCO3   


 


ABG pH   


 


ABG Total CO2   


 


ABG O2 Saturation   


 


ABG O2 Content   


 


ABG Base Excess   


 


ABG Hemoglobin   


 


ABG Carboxyhemoglobin   


 


POC ABG HHb (Measured)   


 


ABG Methemoglobin   


 


ABG O2 Capacity   


 


Efrem Test   


 


VBG pH   


 


VBG pCO2   


 


VBG HCO3   


 


VBG Total CO2   


 


VBG O2 Sat (Calc)   


 


VBG Base Excess   


 


VBG Potassium   


 


A-a O2 Difference   


 


Hgb O2 Saturation   


 


Sodium   


 


Chloride   


 


Glucose   


 


Lactate   


 


FiO2   


 


Crit Value Called To   


 


Crit Value Called By   


 


Crit Value Read Back   


 


Blood Gas Notified Time   


 


Potassium   


 


Carbon Dioxide   


 


Anion Gap   


 


BUN   


 


Creatinine   


 


Est GFR ( Amer)   


 


Est GFR (Non-Af Amer)   


 


POC Glucose (mg/dL)  151 H  188 H  148 H


 


Random Glucose   


 


Hemoglobin A1c   


 


Lactic Acid   


 


Calcium   


 


Phosphorus   


 


Magnesium   


 


Total Bilirubin   


 


AST   


 


ALT   


 


Alkaline Phosphatase   


 


Ammonia   


 


Total Creatine Kinase   


 


Troponin I   


 


Total Protein   


 


Albumin   


 


Globulin   


 


Albumin/Globulin Ratio   


 


Venous Blood Potassium   


 


Urine Opiates Screen   


 


Urine Methadone Screen   


 


Ur Barbiturates Screen   


 


Ur Phencyclidine Scrn   


 


Ur Amphetamines Screen   


 


U Benzodiazepines Scrn   


 


U Oth Cocaine Metabols   


 


U Cannabinoids Screen   


 


C. difficile Ag & Toxin   














  02/24/19 02/24/19 02/24/19





  04:50 04:50 04:50


 


WBC    31.5 H


 


RBC    4.31


 


Hgb    10.4 L


 


Hct    34.0


 


MCV    78.8 L


 


MCH    24.1 L


 


MCHC    30.6 L


 


RDW    19.8 H


 


Plt Count    138


 


MPV    9.2


 


Neut % (Auto)    91.4 H


 


Lymph % (Auto)    3.4 L


 


Mono % (Auto)    5.0


 


Eos % (Auto)    0.0


 


Baso % (Auto)    0.2


 


Neut # (Auto)    28.8 H


 


Lymph # (Auto)    1.1


 


Mono # (Auto)    1.6 H


 


Eos # (Auto)    0.0


 


Baso # (Auto)    0.1


 


Neutrophils % (Manual)    92 H


 


Lymphocytes % (Manual)    4 L


 


Monocytes % (Manual)    2


 


Eosinophils % (Manual)    2


 


Platelet Estimate    Normal


 


Hypochromasia (manual)    Slight


 


Anisocytosis (manual)    Slight


 


Microcytosis (manual)    Slight


 


Target Cells    Slight


 


Ester Cells    Slight


 


PT   


 


INR   


 


pCO2   


 


pO2   


 


HCO3   


 


ABG pH   


 


ABG Total CO2   


 


ABG O2 Saturation   


 


ABG O2 Content   


 


ABG Base Excess   


 


ABG Hemoglobin   


 


ABG Carboxyhemoglobin   


 


POC ABG HHb (Measured)   


 


ABG Methemoglobin   


 


ABG O2 Capacity   


 


Efrem Test   


 


VBG pH   


 


VBG pCO2   


 


VBG HCO3   


 


VBG Total CO2   


 


VBG O2 Sat (Calc)   


 


VBG Base Excess   


 


VBG Potassium   


 


A-a O2 Difference   


 


Hgb O2 Saturation   


 


Sodium   138 


 


Chloride   101 


 


Glucose   


 


Lactate   


 


FiO2   


 


Crit Value Called To   


 


Crit Value Called By   


 


Crit Value Read Back   


 


Blood Gas Notified Time   


 


Potassium   4.8 


 


Carbon Dioxide   21 L 


 


Anion Gap   21 H 


 


BUN   31 H 


 


Creatinine   2.6 H 


 


Est GFR ( Amer)   23 


 


Est GFR (Non-Af Amer)   19 


 


POC Glucose (mg/dL)   


 


Random Glucose   231 H 


 


Hemoglobin A1c   


 


Lactic Acid  3.9 H  


 


Calcium   7.3 L 


 


Phosphorus   


 


Magnesium   


 


Total Bilirubin   2.3 H 


 


AST   6852 H 


 


ALT   3395 H 


 


Alkaline Phosphatase   124 


 


Ammonia   


 


Total Creatine Kinase   


 


Troponin I   3.6000 H* 


 


Total Protein   6.4 


 


Albumin   2.7 L D 


 


Globulin   3.6 


 


Albumin/Globulin Ratio   0.7 L 


 


Venous Blood Potassium   


 


Urine Opiates Screen   


 


Urine Methadone Screen   


 


Ur Barbiturates Screen   


 


Ur Phencyclidine Scrn   


 


Ur Amphetamines Screen   


 


U Benzodiazepines Scrn   


 


U Oth Cocaine Metabols   


 


U Cannabinoids Screen   


 


C. difficile Ag & Toxin   














  02/24/19 02/24/19 02/24/19





  04:50 05:12 05:46


 


WBC   


 


RBC   


 


Hgb   


 


Hct   


 


MCV   


 


MCH   


 


MCHC   


 


RDW   


 


Plt Count   


 


MPV   


 


Neut % (Auto)   


 


Lymph % (Auto)   


 


Mono % (Auto)   


 


Eos % (Auto)   


 


Baso % (Auto)   


 


Neut # (Auto)   


 


Lymph # (Auto)   


 


Mono # (Auto)   


 


Eos # (Auto)   


 


Baso # (Auto)   


 


Neutrophils % (Manual)   


 


Lymphocytes % (Manual)   


 


Monocytes % (Manual)   


 


Eosinophils % (Manual)   


 


Platelet Estimate   


 


Hypochromasia (manual)   


 


Anisocytosis (manual)   


 


Microcytosis (manual)   


 


Target Cells   


 


[place] Cells   


 


PT   


 


INR   


 


pCO2   48 H 


 


pO2   105 H 


 


HCO3   19.7 L 


 


ABG pH   7.24 L 


 


ABG Total CO2   22.1 


 


ABG O2 Saturation   99.8 H 


 


ABG O2 Content   15.0 


 


ABG Base Excess   -6.7 L 


 


ABG Hemoglobin   11.0 L 


 


ABG Carboxyhemoglobin   2.1 H 


 


POC ABG HHb (Measured)   0.2 


 


ABG Methemoglobin   1.7 


 


ABG O2 Capacity   15.0 L 


 


Efrem Test   Yes 


 


VBG pH   


 


VBG pCO2   


 


VBG HCO3   


 


VBG Total CO2   


 


VBG O2 Sat (Calc)   


 


VBG Base Excess   


 


VBG Potassium   


 


A-a O2 Difference   35.0 


 


Hgb O2 Saturation   96.0 


 


Sodium   


 


Chloride   


 


Glucose   


 


Lactate   


 


FiO2   28.0 


 


Crit Value Called To   


 


Crit Value Called By   


 


Crit Value Read Back   


 


Blood Gas Notified Time   


 


Potassium   


 


Carbon Dioxide   


 


Anion Gap   


 


BUN   


 


Creatinine   


 


Est GFR ( Amer)   


 


Est GFR (Non-Af Amer)   


 


POC Glucose (mg/dL)    331 H


 


Random Glucose   


 


Hemoglobin A1c  6.2  


 


Lactic Acid   


 


Calcium   


 


Phosphorus   


 


Magnesium   


 


Total Bilirubin   


 


AST   


 


ALT   


 


Alkaline Phosphatase   


 


Ammonia   


 


Total Creatine Kinase   


 


Troponin I   


 


Total Protein   


 


Albumin   


 


Globulin   


 


Albumin/Globulin Ratio   


 


Venous Blood Potassium   


 


Urine Opiates Screen   


 


Urine Methadone Screen   


 


Ur Barbiturates Screen   


 


Ur Phencyclidine Scrn   


 


Ur Amphetamines Screen   


 


U Benzodiazepines Scrn   


 


U Oth Cocaine Metabols   


 


U Cannabinoids Screen   


 


C. difficile Ag & Toxin   














  02/24/19 02/24/19 02/24/19





  07:53 07:54 09:54


 


WBC   


 


RBC   


 


Hgb   


 


Hct   


 


MCV   


 


MCH   


 


MCHC   


 


RDW   


 


Plt Count   


 


MPV   


 


Neut % (Auto)   


 


Lymph % (Auto)   


 


Mono % (Auto)   


 


Eos % (Auto)   


 


Baso % (Auto)   


 


Neut # (Auto)   


 


Lymph # (Auto)   


 


Mono # (Auto)   


 


Eos # (Auto)   


 


Baso # (Auto)   


 


Neutrophils % (Manual)   


 


Lymphocytes % (Manual)   


 


Monocytes % (Manual)   


 


Eosinophils % (Manual)   


 


Platelet Estimate   


 


Hypochromasia (manual)   


 


Anisocytosis (manual)   


 


Microcytosis (manual)   


 


Target Cells   


 


[place] Cells   


 


PT   


 


INR   


 


pCO2   


 


pO2   


 


HCO3   


 


ABG pH   


 


ABG Total CO2   


 


ABG O2 Saturation   


 


ABG O2 Content   


 


ABG Base Excess   


 


ABG Hemoglobin   


 


ABG Carboxyhemoglobin   


 


POC ABG HHb (Measured)   


 


ABG Methemoglobin   


 


ABG O2 Capacity   


 


Efrem Test   


 


VBG pH   


 


VBG pCO2   


 


VBG HCO3   


 


VBG Total CO2   


 


VBG O2 Sat (Calc)   


 


VBG Base Excess   


 


VBG Potassium   


 


A-a O2 Difference   


 


Hgb O2 Saturation   


 


Sodium   


 


Chloride   


 


Glucose   


 


Lactate   


 


FiO2   


 


Crit Value Called To   


 


Crit Value Called By   


 


Crit Value Read Back   


 


Blood Gas Notified Time   


 


Potassium   


 


Carbon Dioxide   


 


Anion Gap   


 


BUN   


 


Creatinine   


 


Est GFR ( Amer)   


 


Est GFR (Non-Af Amer)   


 


POC Glucose (mg/dL)   160 H  134 H


 


Random Glucose   


 


Hemoglobin A1c   


 


Lactic Acid   


 


Calcium   


 


Phosphorus   


 


Magnesium   


 


Total Bilirubin   


 


AST   


 


ALT   


 


Alkaline Phosphatase   


 


Ammonia   


 


Total Creatine Kinase  1116 H  


 


Troponin I   


 


Total Protein   


 


Albumin   


 


Globulin   


 


Albumin/Globulin Ratio   


 


Venous Blood Potassium   


 


Urine Opiates Screen   


 


Urine Methadone Screen   


 


Ur Barbiturates Screen   


 


Ur Phencyclidine Scrn   


 


Ur Amphetamines Screen   


 


U Benzodiazepines Scrn   


 


U Oth Cocaine Metabols   


 


U Cannabinoids Screen   


 


C. difficile Ag & Toxin   














  02/24/19 02/24/19 02/24/19





  11:11 13:56 14:45


 


WBC   


 


RBC   


 


Hgb   


 


Hct   


 


MCV   


 


MCH   


 


MCHC   


 


RDW   


 


Plt Count   


 


MPV   


 


Neut % (Auto)   


 


Lymph % (Auto)   


 


Mono % (Auto)   


 


Eos % (Auto)   


 


Baso % (Auto)   


 


Neut # (Auto)   


 


Lymph # (Auto)   


 


Mono # (Auto)   


 


Eos # (Auto)   


 


Baso # (Auto)   


 


Neutrophils % (Manual)   


 


Lymphocytes % (Manual)   


 


Monocytes % (Manual)   


 


Eosinophils % (Manual)   


 


Platelet Estimate   


 


Hypochromasia (manual)   


 


Anisocytosis (manual)   


 


Microcytosis (manual)   


 


Target Cells   


 


[place] Cells   


 


PT    29.3 H D


 


INR    2.6


 


pCO2   


 


pO2   


 


HCO3   


 


ABG pH   


 


ABG Total CO2   


 


ABG O2 Saturation   


 


ABG O2 Content   


 


ABG Base Excess   


 


ABG Hemoglobin   


 


ABG Carboxyhemoglobin   


 


POC ABG HHb (Measured)   


 


ABG Methemoglobin   


 


ABG O2 Capacity   


 


Efrem Test   


 


VBG pH   


 


VBG pCO2   


 


VBG HCO3   


 


VBG Total CO2   


 


VBG O2 Sat (Calc)   


 


VBG Base Excess   


 


VBG Potassium   


 


A-a O2 Difference   


 


Hgb O2 Saturation   


 


Sodium   


 


Chloride   


 


Glucose   


 


Lactate   


 


FiO2   


 


Crit Value Called To   


 


Crit Value Called By   


 


Crit Value Read Back   


 


Blood Gas Notified Time   


 


Potassium   


 


Carbon Dioxide   


 


Anion Gap   


 


BUN   


 


Creatinine   


 


Est GFR ( Amer)   


 


Est GFR (Non-Af Amer)   


 


POC Glucose (mg/dL)  137 H  118 H 


 


Random Glucose   


 


Hemoglobin A1c   


 


Lactic Acid   


 


Calcium   


 


Phosphorus   


 


Magnesium   


 


Total Bilirubin   


 


AST   


 


ALT   


 


Alkaline Phosphatase   


 


Ammonia   


 


Total Creatine Kinase   


 


Troponin I   


 


Total Protein   


 


Albumin   


 


Globulin   


 


Albumin/Globulin Ratio   


 


Venous Blood Potassium   


 


Urine Opiates Screen   


 


Urine Methadone Screen   


 


Ur Barbiturates Screen   


 


Ur Phencyclidine Scrn   


 


Ur Amphetamines Screen   


 


U Benzodiazepines Scrn   


 


U Oth Cocaine Metabols   


 


U Cannabinoids Screen   


 


C. difficile Ag & Toxin   














Assessment & Plan


(1) Sepsis


Status: Acute   





(2) Change in mental state


Status: Acute   





(3) History of CVA with residual deficit


Status: Acute   





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


Status: Chronic   Priority: Low   





(5) History of CVA (cerebrovascular accident)


Status: Chronic   





(6) NSTEMI (non-ST elevated myocardial infarction)


Status: Acute   





(7) SHANITA (acute kidney injury)


Status: Acute   





- Assessment and Plan (Free Text)


Assessment: 





59 yo female with Hx of HTN  Obesity  CVA with right weakness, CAD  s/p CABG  

Asthma  wass admitted via ER with altered mental status which did not respond to

Narcan and Glucose infusion by EMS 





Referred for ID eval because of possible sepsis ,  source unknown    Was found 

to have fever and diarrhea in the ER    Troponins significantly elevated  as 

well 


Family member is also a poor historian but denies any complaints of fever or 

headache  or seizures at home 








Patient was started on empiric IV antibiotics to cover for possible CNS 

infection pending cultures and LP

## 2019-02-24 NOTE — CP.CCUPN
CCU Subjective





- Physician Review


Events Since Last Encounter (Free Text): 





02/24/19 08:23





Was admitted yesterday, found unresponsive at home and had hypoglycemia, she was

awake briefly in ER, there was resport of her used heroin and was given narcan 

by EMT and in ER. Over night she remained unresponsive most of the time. Neuro 

ordered EEG, bieng done, suspecting she might be in seizure. Has received almost

5 liters of fluid since admission and has been oliguric. Now liver enzymes are 

high , could be ischemic liver or is these AST and ALT of muscles origin and 

whether she had rhabdomyolysis cause by seizures, CKK just ordered. ABG did not 

show hypercapnea as the cause of AMS. She has SHANITA and AMS but no signs of 

hemolysis and thromocytopenia, so not a case of TTP. Again had lose BM last nigh

and stool for c-diff sent from ER, report still pending and all cultures result 

is pending. Has not been hypotensive and Lactic acid is improving. TNI is 

rising, she is in a fib, cardiology consult has been called for. 





CCU Objective





- Vital Signs / Intake & Output


Vital Signs (Last 4 hours): 


Vital Signs











  Pulse Resp BP Pulse Ox


 


 02/24/19 06:00  67  13  94/53 L  99


 


 02/24/19 05:00  65  8 L  98/53 L  100











Intake and Output (Last 8hrs): 


                                 Intake & Output











 02/23/19 02/24/19 02/24/19





 22:59 06:59 14:59


 


Intake Total 320 2950 


 


Output Total 325 109 


 


Balance -5 2841 


 


Weight  217 lb 


 


Intake:   


 


   2140 


 


  Intake, Piggyback  810 


 


Output:   


 


  Urine 325 109 


 


    Urethral (Segura)  50 


 


Other:   


 


  # Bowel Movements 1  














- Physical Exam


Narrative Physical Exam (Free Text): 





02/24/19 08:33


P/E


Neck: No JVD


Lungs: No ronchi, crackles


Abdomen: soft. BS +ve


Ex: NO edema


heart: s1 s2 irregular, a fib





- Medications


Active Medications: 


Active Medications











Generic Name Dose Route Start Last Admin





  Trade Name Freq  PRN Reason Stop Dose Admin


 


Albuterol/Ipratropium  3 ml  02/23/19 19:39  





  Duoneb 3 Mg/0.5 Mg (3 Ml) Ud  INH   





  RQ6 PRN   





  Shortness of Breath   





     





     





     


 


Enoxaparin Sodium  100 mg  02/24/19 09:00  





  Lovenox  SC   





  DAILY DESIRAE   





     





     





  Protocol   





     


 


Vancomycin HCl 1 gm/ Sodium  250 mls @ 166.667 mls/hr  02/24/19 09:00  





  Chloride  IVPB   





  DAILY DESIRAE   





     





     





  Protocol   





     


 


Dextrose/Sodium Chloride  1,000 mls @ 80 mls/hr  02/23/19 19:00  02/23/19 19:30





  Dextrose 5%/0.45% Ns 1000 Ml  IV  02/24/19 18:57  80 mls/hr





  .[id number] DESIRAE   Administration





     





     





     





     


 


Aztreonam 1 gm/ Sodium  100 mls @ 100 mls/hr  02/24/19 01:00  02/24/19 00:25





  Chloride  IVPB   100 mls/hr





  Q8 DESIRAE   Administration





     





     





  Protocol   





     


 


Metronidazole  100 mls @ 100 mls/hr  02/24/19 01:00  02/24/19 00:24





  Flagyl 500mg/100ml Ns  IVPB   100 mls/hr





  Q8 DESIRAE   Administration





     





     





  Protocol   





     


 


Levetiracetam 500 mg/ Sodium  105 mls @ 210 mls/hr  02/24/19 09:00  





  Chloride  IVPB   





  Q12 DESIRAE   





     





     





     





     


 


Sodium Chloride  500 mls @ 1,000 mls/hr  02/23/19 21:45  02/23/19 21:45





  Sodium Chloride 0.45%  IV  02/24/19 21:43  1,000 mls/hr





  .Q30M DESIRAE   Administration





     





     





     





     


 


Sodium Chloride  500 mls @ 1,000 mls/hr  02/23/19 23:45  02/24/19 00:23





  Sodium Chloride 0.45%  IV  02/24/19 23:56  1,000 mls/hr





  .Q30M DESIRAE   Administration





     





     





     





     


 


Sodium Chloride  500 mls @ 1,000 mls/hr  02/24/19 01:30  02/24/19 01:30





  Sodium Chloride 0.45%  IV  02/25/19 01:30  1,000 mls/hr





  .Q30M DESIRAE   Administration





     





     





     





     


 


Sodium Chloride  500 mls @ 1,000 mls/hr  02/24/19 02:45  02/24/19 02:55





  Sodium Chloride 0.45%  IV  02/25/19 02:42  1,000 mls/hr





  .Q30M DESIRAE   Administration





     





     





     





     














- Patient Studies


Lab Studies: 


                                   Lab Studies











  02/24/19 02/24/19 02/24/19 Range/Units





  07:54 05:46 05:12 


 


WBC     (4.8-10.8)  K/uL


 


RBC     (3.80-5.20)  Mil/uL


 


Hgb     (12.0-16.0)  g/dL


 


Hct     (34.0-47.0)  %


 


MCV     (81.0-99.0)  fl


 


MCH     (27.0-31.0)  pg


 


MCHC     (33.0-37.0)  g/dL


 


RDW     (11.5-14.5)  %


 


Plt Count     (130-400)  K/uL


 


MPV     (7.2-11.7)  fl


 


Neut % (Auto)     (50.0-75.0)  %


 


Lymph % (Auto)     (20.0-40.0)  %


 


Mono % (Auto)     (0.0-10.0)  %


 


Eos % (Auto)     (0.0-4.0)  %


 


Baso % (Auto)     (0.0-2.0)  %


 


Neut # (Auto)     (1.8-7.0)  K/uL


 


Lymph # (Auto)     (1.0-4.3)  K/uL


 


Mono # (Auto)     (0.0-0.8)  K/uL


 


Eos # (Auto)     (0.0-0.7)  K/uL


 


Baso # (Auto)     (0.0-0.2)  K/uL


 


Neutrophils % (Manual)     (42-75)  %


 


Lymphocytes % (Manual)     (20-50)  %


 


Monocytes % (Manual)     (0-10)  %


 


Eosinophils % (Manual)     (0-7)  %


 


Platelet Estimate     (NORMAL)  


 


Large Platelets     


 


Hypochromasia (manual)     


 


Poikilocytosis (manual     


 


Anisocytosis (manual)     


 


Microcytosis (manual)     


 


Macrocytosis (manual)     


 


Target Cells     


 


Tear Drop Cells     


 


Ovalocytes     


 


Ester Cells     


 


PT     (9.8-13.1)  Seconds


 


INR     


 


APTT     (25.6-37.1)  Seconds


 


pCO2    48 H  (35-45)  mm/Hg


 


pO2    105 H  ()  mm/Hg


 


HCO3    19.7 L  (21-28)  mmol/L


 


ABG pH    7.24 L  (7.35-7.45)  


 


ABG Total CO2    22.1  (22-28)  mmol/L


 


ABG O2 Saturation    99.8 H  (95-98)  %


 


ABG O2 Content    15.0  (15-23)  ML/dL


 


ABG Base Excess    -6.7 L  (-2.0-3.0)  mmol/L


 


ABG Hemoglobin    11.0 L  (11.7-17.4)  g/dL


 


ABG Carboxyhemoglobin    2.1 H  (0.5-1.5)  %


 


POC ABG HHb (Measured)    0.2  (0.0-5.0)  %


 


ABG Methemoglobin    1.7  (0.0-3.0)  %


 


ABG O2 Capacity    15.0 L  (16-24)  mL/dL


 


Efrem Test    Yes  


 


ABG Potassium     (3.6-5.2)  mmol/L


 


VBG pH     (7.32-7.43)  


 


VBG pCO2     (40-60)  mmHg


 


VBG HCO3     mmol/L


 


VBG Total CO2     (22-28)  mmol/L


 


VBG O2 Sat (Calc)     (40-65)  %


 


VBG Base Excess     (0.0-2.0)  mmol/L


 


VBG Potassium     (3.6-5.2)  mmol/L


 


A-a O2 Difference    35.0  mm/Hg


 


Hgb O2 Saturation    96.0  (95.0-98.0)  %


 


Sodium     (132-148)  mmol/L


 


Chloride     ()  mmol/L


 


Glucose     ()  mg/dL


 


Lactate     (0.7-2.1)  mmol/L


 


FiO2    28.0  %


 


Blood Gas Comments     


 


Crit Value Called To     


 


Crit Value Called By     


 


Crit Value Read Back     


 


Blood Gas Notified Time     


 


Potassium     (3.6-5.0)  MMOL/L


 


Carbon Dioxide     (22-30)  mmol/L


 


Anion Gap     (10-20)  


 


BUN     (7-17)  mg/dl


 


Creatinine     (0.7-1.2)  mg/dl


 


Est GFR (African Amer)     


 


Est GFR (Non-Af Amer)     


 


POC Glucose (mg/dL)  160 H  331 H   ()  mg/dL


 


Random Glucose     ()  mg/dL


 


Lactic Acid     (0.7-2.1)  mmol/L


 


Calcium     (8.4-10.2)  mg/dL


 


Phosphorus     (2.5-4.5)  mg/dl


 


Magnesium     (1.6-2.3)  MG/DL


 


Total Bilirubin     (0.2-1.3)  mg/dl


 


AST     (14-36)  U/L


 


ALT     (9-52)  U/L


 


Alkaline Phosphatase     ()  U/L


 


Ammonia     (11-51)  umo/L


 


Troponin I     (0.00-0.120)  ng/mL


 


NT-Pro-B Natriuret Pep     (0-900)  pg/ml


 


Total Protein     (6.3-8.2)  G/DL


 


Albumin     (3.5-5.0)  g/dL


 


Globulin     (2.2-3.9)  gm/dL


 


Albumin/Globulin Ratio     (1.0-2.1)  


 


Arterial Blood Potassium     (3.6-5.2)  mmol/L


 


Venous Blood Potassium     (3.6-5.2)  mmol/L


 


Urine Color     (YELLOW)  


 


Urine Clarity     (Clear)  


 


Urine pH     (5.0-8.0)  


 


Ur Specific Gravity     (1.003-1.030)  


 


Urine Protein     (NEGATIVE)  mg/dL


 


Urine Glucose (UA)     (NEGATIVE)  mg/dL


 


Urine Ketones     (NEGATIVE)  mg/dL


 


Urine Blood     (NEGATIVE)  


 


Urine Nitrate     (NEGATIVE)  


 


Urine Bilirubin     (NEGATIVE)  


 


Urine Urobilinogen     (0.2-1.0)  mg/dL


 


Ur Leukocyte Esterase     (Negative)  Tabitha/uL


 


Urine RBC (Auto)     (0-3)  /hpf


 


Urine Microscopic WBC     (0-5)  /hpf


 


Ur Squamous Epith Cells     (0-5)  /hpf


 


Amorphous Sediment     (<OCC)  /ul


 


Urine Bacteria     (<OCC)  


 


Hyaline Casts     (0-2)  /hpf


 


Urine Opiates Screen     (NEGATIVE)  


 


Urine Methadone Screen     (NEGATIVE)  


 


Ur Barbiturates Screen     (NEGATIVE)  


 


Ur Phencyclidine Scrn     (NEGATIVE)  


 


Ur Amphetamines Screen     (NEGATIVE)  


 


U Benzodiazepines Scrn     (NEGATIVE)  


 


U Oth Cocaine Metabols     (NEGATIVE)  


 


U Cannabinoids Screen     (NEGATIVE)  


 


Alcohol, Quantitative     (0-10)  mg/dl














  02/24/19 02/24/19 02/24/19 Range/Units





  04:50 04:50 04:50 


 


WBC  31.5 H    (4.8-10.8)  K/uL


 


RBC  4.31    (3.80-5.20)  Mil/uL


 


Hgb  10.4 L    (12.0-16.0)  g/dL


 


Hct  34.0    (34.0-47.0)  %


 


MCV  78.8 L    (81.0-99.0)  fl


 


MCH  24.1 L    (27.0-31.0)  pg


 


MCHC  30.6 L    (33.0-37.0)  g/dL


 


RDW  19.8 H    (11.5-14.5)  %


 


Plt Count  138    (130-400)  K/uL


 


MPV  9.2    (7.2-11.7)  fl


 


Neut % (Auto)  91.4 H    (50.0-75.0)  %


 


Lymph % (Auto)  3.4 L    (20.0-40.0)  %


 


Mono % (Auto)  5.0    (0.0-10.0)  %


 


Eos % (Auto)  0.0    (0.0-4.0)  %


 


Baso % (Auto)  0.2    (0.0-2.0)  %


 


Neut # (Auto)  28.8 H    (1.8-7.0)  K/uL


 


Lymph # (Auto)  1.1    (1.0-4.3)  K/uL


 


Mono # (Auto)  1.6 H    (0.0-0.8)  K/uL


 


Eos # (Auto)  0.0    (0.0-0.7)  K/uL


 


Baso # (Auto)  0.1    (0.0-0.2)  K/uL


 


Neutrophils % (Manual)  92 H    (42-75)  %


 


Lymphocytes % (Manual)  4 L    (20-50)  %


 


Monocytes % (Manual)  2    (0-10)  %


 


Eosinophils % (Manual)  2    (0-7)  %


 


Platelet Estimate  Normal    (NORMAL)  


 


Large Platelets     


 


Hypochromasia (manual)  Slight    


 


Poikilocytosis (manual     


 


Anisocytosis (manual)  Slight    


 


Microcytosis (manual)  Slight    


 


Macrocytosis (manual)     


 


Target Cells  Slight    


 


Tear Drop Cells     


 


Ovalocytes     


 


Fillmore Cells  Slight    


 


PT     (9.8-13.1)  Seconds


 


INR     


 


APTT     (25.6-37.1)  Seconds


 


pCO2     (35-45)  mm/Hg


 


pO2     ()  mm/Hg


 


HCO3     (21-28)  mmol/L


 


ABG pH     (7.35-7.45)  


 


ABG Total CO2     (22-28)  mmol/L


 


ABG O2 Saturation     (95-98)  %


 


ABG O2 Content     (15-23)  ML/dL


 


ABG Base Excess     (-2.0-3.0)  mmol/L


 


ABG Hemoglobin     (11.7-17.4)  g/dL


 


ABG Carboxyhemoglobin     (0.5-1.5)  %


 


POC ABG HHb (Measured)     (0.0-5.0)  %


 


ABG Methemoglobin     (0.0-3.0)  %


 


ABG O2 Capacity     (16-24)  mL/dL


 


Efrem Test     


 


ABG Potassium     (3.6-5.2)  mmol/L


 


VBG pH     (7.32-7.43)  


 


VBG pCO2     (40-60)  mmHg


 


VBG HCO3     mmol/L


 


VBG Total CO2     (22-28)  mmol/L


 


VBG O2 Sat (Calc)     (40-65)  %


 


VBG Base Excess     (0.0-2.0)  mmol/L


 


VBG Potassium     (3.6-5.2)  mmol/L


 


A-a O2 Difference     mm/Hg


 


Hgb O2 Saturation     (95.0-98.0)  %


 


Sodium   138   (132-148)  mmol/L


 


Chloride   101   ()  mmol/L


 


Glucose     ()  mg/dL


 


Lactate     (0.7-2.1)  mmol/L


 


FiO2     %


 


Blood Gas Comments     


 


Crit Value Called To     


 


Crit Value Called By     


 


Crit Value Read Back     


 


Blood Gas Notified Time     


 


Potassium   4.8   (3.6-5.0)  MMOL/L


 


Carbon Dioxide   21 L   (22-30)  mmol/L


 


Anion Gap   21 H   (10-20)  


 


BUN   31 H   (7-17)  mg/dl


 


Creatinine   2.6 H   (0.7-1.2)  mg/dl


 


Est GFR (African Amer)   23   


 


Est GFR (Non-Af Amer)   19   


 


POC Glucose (mg/dL)     ()  mg/dL


 


Random Glucose   231 H   ()  mg/dL


 


Lactic Acid    3.9 H  (0.7-2.1)  mmol/L


 


Calcium   7.3 L   (8.4-10.2)  mg/dL


 


Phosphorus     (2.5-4.5)  mg/dl


 


Magnesium     (1.6-2.3)  MG/DL


 


Total Bilirubin   2.3 H   (0.2-1.3)  mg/dl


 


AST   6852 H   (14-36)  U/L


 


ALT   3395 H   (9-52)  U/L


 


Alkaline Phosphatase   124   ()  U/L


 


Ammonia     (11-51)  umo/L


 


Troponin I   3.6000 H*   (0.00-0.120)  ng/mL


 


NT-Pro-B Natriuret Pep     (0-900)  pg/ml


 


Total Protein   6.4   (6.3-8.2)  G/DL


 


Albumin   2.7 L D   (3.5-5.0)  g/dL


 


Globulin   3.6   (2.2-3.9)  gm/dL


 


Albumin/Globulin Ratio   0.7 L   (1.0-2.1)  


 


Arterial Blood Potassium     (3.6-5.2)  mmol/L


 


Venous Blood Potassium     (3.6-5.2)  mmol/L


 


Urine Color     (YELLOW)  


 


Urine Clarity     (Clear)  


 


Urine pH     (5.0-8.0)  


 


Ur Specific Gravity     (1.003-1.030)  


 


Urine Protein     (NEGATIVE)  mg/dL


 


Urine Glucose (UA)     (NEGATIVE)  mg/dL


 


Urine Ketones     (NEGATIVE)  mg/dL


 


Urine Blood     (NEGATIVE)  


 


Urine Nitrate     (NEGATIVE)  


 


Urine Bilirubin     (NEGATIVE)  


 


Urine Urobilinogen     (0.2-1.0)  mg/dL


 


Ur Leukocyte Esterase     (Negative)  Tabitha/uL


 


Urine RBC (Auto)     (0-3)  /hpf


 


Urine Microscopic WBC     (0-5)  /hpf


 


Ur Squamous Epith Cells     (0-5)  /hpf


 


Amorphous Sediment     (<OCC)  /ul


 


Urine Bacteria     (<OCC)  


 


Hyaline Casts     (0-2)  /hpf


 


Urine Opiates Screen     (NEGATIVE)  


 


Urine Methadone Screen     (NEGATIVE)  


 


Ur Barbiturates Screen     (NEGATIVE)  


 


Ur Phencyclidine Scrn     (NEGATIVE)  


 


Ur Amphetamines Screen     (NEGATIVE)  


 


U Benzodiazepines Scrn     (NEGATIVE)  


 


U Oth Cocaine Metabols     (NEGATIVE)  


 


U Cannabinoids Screen     (NEGATIVE)  


 


Alcohol, Quantitative     (0-10)  mg/dl














  02/24/19 02/24/19 02/24/19 Range/Units





  04:10 02:11 00:19 


 


WBC     (4.8-10.8)  K/uL


 


RBC     (3.80-5.20)  Mil/uL


 


Hgb     (12.0-16.0)  g/dL


 


Hct     (34.0-47.0)  %


 


MCV     (81.0-99.0)  fl


 


MCH     (27.0-31.0)  pg


 


MCHC     (33.0-37.0)  g/dL


 


RDW     (11.5-14.5)  %


 


Plt Count     (130-400)  K/uL


 


MPV     (7.2-11.7)  fl


 


Neut % (Auto)     (50.0-75.0)  %


 


Lymph % (Auto)     (20.0-40.0)  %


 


Mono % (Auto)     (0.0-10.0)  %


 


Eos % (Auto)     (0.0-4.0)  %


 


Baso % (Auto)     (0.0-2.0)  %


 


Neut # (Auto)     (1.8-7.0)  K/uL


 


Lymph # (Auto)     (1.0-4.3)  K/uL


 


Mono # (Auto)     (0.0-0.8)  K/uL


 


Eos # (Auto)     (0.0-0.7)  K/uL


 


Baso # (Auto)     (0.0-0.2)  K/uL


 


Neutrophils % (Manual)     (42-75)  %


 


Lymphocytes % (Manual)     (20-50)  %


 


Monocytes % (Manual)     (0-10)  %


 


Eosinophils % (Manual)     (0-7)  %


 


Platelet Estimate     (NORMAL)  


 


Large Platelets     


 


Hypochromasia (manual)     


 


Poikilocytosis (manual     


 


Anisocytosis (manual)     


 


Microcytosis (manual)     


 


Macrocytosis (manual)     


 


Target Cells     


 


Tear Drop Cells     


 


Ovalocytes     


 


Fillmore Cells     


 


PT     (9.8-13.1)  Seconds


 


INR     


 


APTT     (25.6-37.1)  Seconds


 


pCO2     (35-45)  mm/Hg


 


pO2     ()  mm/Hg


 


HCO3     (21-28)  mmol/L


 


ABG pH     (7.35-7.45)  


 


ABG Total CO2     (22-28)  mmol/L


 


ABG O2 Saturation     (95-98)  %


 


ABG O2 Content     (15-23)  ML/dL


 


ABG Base Excess     (-2.0-3.0)  mmol/L


 


ABG Hemoglobin     (11.7-17.4)  g/dL


 


ABG Carboxyhemoglobin     (0.5-1.5)  %


 


POC ABG HHb (Measured)     (0.0-5.0)  %


 


ABG Methemoglobin     (0.0-3.0)  %


 


ABG O2 Capacity     (16-24)  mL/dL


 


Efrem Test     


 


ABG Potassium     (3.6-5.2)  mmol/L


 


VBG pH     (7.32-7.43)  


 


VBG pCO2     (40-60)  mmHg


 


VBG HCO3     mmol/L


 


VBG Total CO2     (22-28)  mmol/L


 


VBG O2 Sat (Calc)     (40-65)  %


 


VBG Base Excess     (0.0-2.0)  mmol/L


 


VBG Potassium     (3.6-5.2)  mmol/L


 


A-a O2 Difference     mm/Hg


 


Hgb O2 Saturation     (95.0-98.0)  %


 


Sodium     (132-148)  mmol/L


 


Chloride     ()  mmol/L


 


Glucose     ()  mg/dL


 


Lactate     (0.7-2.1)  mmol/L


 


FiO2     %


 


Blood Gas Comments     


 


Crit Value Called To     


 


Crit Value Called By     


 


Crit Value Read Back     


 


Blood Gas Notified Time     


 


Potassium     (3.6-5.0)  MMOL/L


 


Carbon Dioxide     (22-30)  mmol/L


 


Anion Gap     (10-20)  


 


BUN     (7-17)  mg/dl


 


Creatinine     (0.7-1.2)  mg/dl


 


Est GFR (African Amer)     


 


Est GFR (Non-Af Amer)     


 


POC Glucose (mg/dL)  148 H  188 H  151 H  ()  mg/dL


 


Random Glucose     ()  mg/dL


 


Lactic Acid     (0.7-2.1)  mmol/L


 


Calcium     (8.4-10.2)  mg/dL


 


Phosphorus     (2.5-4.5)  mg/dl


 


Magnesium     (1.6-2.3)  MG/DL


 


Total Bilirubin     (0.2-1.3)  mg/dl


 


AST     (14-36)  U/L


 


ALT     (9-52)  U/L


 


Alkaline Phosphatase     ()  U/L


 


Ammonia     (11-51)  umo/L


 


Troponin I     (0.00-0.120)  ng/mL


 


NT-Pro-B Natriuret Pep     (0-900)  pg/ml


 


Total Protein     (6.3-8.2)  G/DL


 


Albumin     (3.5-5.0)  g/dL


 


Globulin     (2.2-3.9)  gm/dL


 


Albumin/Globulin Ratio     (1.0-2.1)  


 


Arterial Blood Potassium     (3.6-5.2)  mmol/L


 


Venous Blood Potassium     (3.6-5.2)  mmol/L


 


Urine Color     (YELLOW)  


 


Urine Clarity     (Clear)  


 


Urine pH     (5.0-8.0)  


 


Ur Specific Gravity     (1.003-1.030)  


 


Urine Protein     (NEGATIVE)  mg/dL


 


Urine Glucose (UA)     (NEGATIVE)  mg/dL


 


Urine Ketones     (NEGATIVE)  mg/dL


 


Urine Blood     (NEGATIVE)  


 


Urine Nitrate     (NEGATIVE)  


 


Urine Bilirubin     (NEGATIVE)  


 


Urine Urobilinogen     (0.2-1.0)  mg/dL


 


Ur Leukocyte Esterase     (Negative)  Tabitha/uL


 


Urine RBC (Auto)     (0-3)  /hpf


 


Urine Microscopic WBC     (0-5)  /hpf


 


Ur Squamous Epith Cells     (0-5)  /hpf


 


Amorphous Sediment     (<OCC)  /ul


 


Urine Bacteria     (<OCC)  


 


Hyaline Casts     (0-2)  /hpf


 


Urine Opiates Screen     (NEGATIVE)  


 


Urine Methadone Screen     (NEGATIVE)  


 


Ur Barbiturates Screen     (NEGATIVE)  


 


Ur Phencyclidine Scrn     (NEGATIVE)  


 


Ur Amphetamines Screen     (NEGATIVE)  


 


U Benzodiazepines Scrn     (NEGATIVE)  


 


U Oth Cocaine Metabols     (NEGATIVE)  


 


U Cannabinoids Screen     (NEGATIVE)  


 


Alcohol, Quantitative     (0-10)  mg/dl














  02/23/19 02/23/19 02/23/19 Range/Units





  23:08 21:58 21:00 


 


WBC     (4.8-10.8)  K/uL


 


RBC     (3.80-5.20)  Mil/uL


 


Hgb     (12.0-16.0)  g/dL


 


Hct     (34.0-47.0)  %


 


MCV     (81.0-99.0)  fl


 


MCH     (27.0-31.0)  pg


 


MCHC     (33.0-37.0)  g/dL


 


RDW     (11.5-14.5)  %


 


Plt Count     (130-400)  K/uL


 


MPV     (7.2-11.7)  fl


 


Neut % (Auto)     (50.0-75.0)  %


 


Lymph % (Auto)     (20.0-40.0)  %


 


Mono % (Auto)     (0.0-10.0)  %


 


Eos % (Auto)     (0.0-4.0)  %


 


Baso % (Auto)     (0.0-2.0)  %


 


Neut # (Auto)     (1.8-7.0)  K/uL


 


Lymph # (Auto)     (1.0-4.3)  K/uL


 


Mono # (Auto)     (0.0-0.8)  K/uL


 


Eos # (Auto)     (0.0-0.7)  K/uL


 


Baso # (Auto)     (0.0-0.2)  K/uL


 


Neutrophils % (Manual)     (42-75)  %


 


Lymphocytes % (Manual)     (20-50)  %


 


Monocytes % (Manual)     (0-10)  %


 


Eosinophils % (Manual)     (0-7)  %


 


Platelet Estimate     (NORMAL)  


 


Large Platelets     


 


Hypochromasia (manual)     


 


Poikilocytosis (manual     


 


Anisocytosis (manual)     


 


Microcytosis (manual)     


 


Macrocytosis (manual)     


 


Target Cells     


 


Tear Drop Cells     


 


Ovalocytes     


 


Fillmore Cells     


 


PT     (9.8-13.1)  Seconds


 


INR     


 


APTT     (25.6-37.1)  Seconds


 


pCO2  48 H    (35-45)  mm/Hg


 


pO2  154 H    ()  mm/Hg


 


HCO3  16.9 L    (21-28)  mmol/L


 


ABG pH  7.18 L*    (7.35-7.45)  


 


ABG Total CO2  19.4 L    (22-28)  mmol/L


 


ABG O2 Saturation  100.2 H    (95-98)  %


 


ABG O2 Content  16.3    (15-23)  ML/dL


 


ABG Base Excess  -10.3 L    (-2.0-3.0)  mmol/L


 


ABG Hemoglobin  11.8    (11.7-17.4)  g/dL


 


ABG Carboxyhemoglobin  1.9 H    (0.5-1.5)  %


 


POC ABG HHb (Measured)  -0.2 L    (0.0-5.0)  %


 


ABG Methemoglobin  1.9    (0.0-3.0)  %


 


ABG O2 Capacity  16.3    (16-24)  mL/dL


 


Efrem Test  Yes    


 


ABG Potassium     (3.6-5.2)  mmol/L


 


VBG pH     (7.32-7.43)  


 


VBG pCO2     (40-60)  mmHg


 


VBG HCO3     mmol/L


 


VBG Total CO2     (22-28)  mmol/L


 


VBG O2 Sat (Calc)     (40-65)  %


 


VBG Base Excess     (0.0-2.0)  mmol/L


 


VBG Potassium     (3.6-5.2)  mmol/L


 


A-a O2 Difference  -14.0    mm/Hg


 


Hgb O2 Saturation  96.4    (95.0-98.0)  %


 


Sodium     (132-148)  mmol/L


 


Chloride     ()  mmol/L


 


Glucose     ()  mg/dL


 


Lactate     (0.7-2.1)  mmol/L


 


FiO2  28.0    %


 


Blood Gas Comments     


 


Crit Value Called To  Laradeepali villegas rn    


 


Crit Value Called By  333    


 


Crit Value Read Back  Y    


 


Blood Gas Notified Time  2320    


 


Potassium     (3.6-5.0)  MMOL/L


 


Carbon Dioxide     (22-30)  mmol/L


 


Anion Gap     (10-20)  


 


BUN     (7-17)  mg/dl


 


Creatinine     (0.7-1.2)  mg/dl


 


Est GFR (African Amer)     


 


Est GFR (Non-Af Amer)     


 


POC Glucose (mg/dL)   132 H   ()  mg/dL


 


Random Glucose     ()  mg/dL


 


Lactic Acid    6.0 H*  (0.7-2.1)  mmol/L


 


Calcium     (8.4-10.2)  mg/dL


 


Phosphorus     (2.5-4.5)  mg/dl


 


Magnesium     (1.6-2.3)  MG/DL


 


Total Bilirubin     (0.2-1.3)  mg/dl


 


AST     (14-36)  U/L


 


ALT     (9-52)  U/L


 


Alkaline Phosphatase     ()  U/L


 


Ammonia     (11-51)  umo/L


 


Troponin I     (0.00-0.120)  ng/mL


 


NT-Pro-B Natriuret Pep     (0-900)  pg/ml


 


Total Protein     (6.3-8.2)  G/DL


 


Albumin     (3.5-5.0)  g/dL


 


Globulin     (2.2-3.9)  gm/dL


 


Albumin/Globulin Ratio     (1.0-2.1)  


 


Arterial Blood Potassium     (3.6-5.2)  mmol/L


 


Venous Blood Potassium     (3.6-5.2)  mmol/L


 


Urine Color     (YELLOW)  


 


Urine Clarity     (Clear)  


 


Urine pH     (5.0-8.0)  


 


Ur Specific Gravity     (1.003-1.030)  


 


Urine Protein     (NEGATIVE)  mg/dL


 


Urine Glucose (UA)     (NEGATIVE)  mg/dL


 


Urine Ketones     (NEGATIVE)  mg/dL


 


Urine Blood     (NEGATIVE)  


 


Urine Nitrate     (NEGATIVE)  


 


Urine Bilirubin     (NEGATIVE)  


 


Urine Urobilinogen     (0.2-1.0)  mg/dL


 


Ur Leukocyte Esterase     (Negative)  Tabitha/uL


 


Urine RBC (Auto)     (0-3)  /hpf


 


Urine Microscopic WBC     (0-5)  /hpf


 


Ur Squamous Epith Cells     (0-5)  /hpf


 


Amorphous Sediment     (<OCC)  /ul


 


Urine Bacteria     (<OCC)  


 


Hyaline Casts     (0-2)  /hpf


 


Urine Opiates Screen     (NEGATIVE)  


 


Urine Methadone Screen     (NEGATIVE)  


 


Ur Barbiturates Screen     (NEGATIVE)  


 


Ur Phencyclidine Scrn     (NEGATIVE)  


 


Ur Amphetamines Screen     (NEGATIVE)  


 


U Benzodiazepines Scrn     (NEGATIVE)  


 


U Oth Cocaine Metabols     (NEGATIVE)  


 


U Cannabinoids Screen     (NEGATIVE)  


 


Alcohol, Quantitative     (0-10)  mg/dl














  02/23/19 02/23/19 02/23/19 Range/Units





  20:18 19:48 19:48 


 


WBC     (4.8-10.8)  K/uL


 


RBC     (3.80-5.20)  Mil/uL


 


Hgb     (12.0-16.0)  g/dL


 


Hct     (34.0-47.0)  %


 


MCV     (81.0-99.0)  fl


 


MCH     (27.0-31.0)  pg


 


MCHC     (33.0-37.0)  g/dL


 


RDW     (11.5-14.5)  %


 


Plt Count     (130-400)  K/uL


 


MPV     (7.2-11.7)  fl


 


Neut % (Auto)     (50.0-75.0)  %


 


Lymph % (Auto)     (20.0-40.0)  %


 


Mono % (Auto)     (0.0-10.0)  %


 


Eos % (Auto)     (0.0-4.0)  %


 


Baso % (Auto)     (0.0-2.0)  %


 


Neut # (Auto)     (1.8-7.0)  K/uL


 


Lymph # (Auto)     (1.0-4.3)  K/uL


 


Mono # (Auto)     (0.0-0.8)  K/uL


 


Eos # (Auto)     (0.0-0.7)  K/uL


 


Baso # (Auto)     (0.0-0.2)  K/uL


 


Neutrophils % (Manual)     (42-75)  %


 


Lymphocytes % (Manual)     (20-50)  %


 


Monocytes % (Manual)     (0-10)  %


 


Eosinophils % (Manual)     (0-7)  %


 


Platelet Estimate     (NORMAL)  


 


Large Platelets     


 


Hypochromasia (manual)     


 


Poikilocytosis (manual     


 


Anisocytosis (manual)     


 


Microcytosis (manual)     


 


Macrocytosis (manual)     


 


Target Cells     


 


Tear Drop Cells     


 


Ovalocytes     


 


Fillmore Cells     


 


PT     (9.8-13.1)  Seconds


 


INR     


 


APTT     (25.6-37.1)  Seconds


 


pCO2     (35-45)  mm/Hg


 


pO2     ()  mm/Hg


 


HCO3     (21-28)  mmol/L


 


ABG pH     (7.35-7.45)  


 


ABG Total CO2     (22-28)  mmol/L


 


ABG O2 Saturation     (95-98)  %


 


ABG O2 Content     (15-23)  ML/dL


 


ABG Base Excess     (-2.0-3.0)  mmol/L


 


ABG Hemoglobin     (11.7-17.4)  g/dL


 


ABG Carboxyhemoglobin     (0.5-1.5)  %


 


POC ABG HHb (Measured)     (0.0-5.0)  %


 


ABG Methemoglobin     (0.0-3.0)  %


 


ABG O2 Capacity     (16-24)  mL/dL


 


Efrem Test     


 


ABG Potassium     (3.6-5.2)  mmol/L


 


VBG pH     (7.32-7.43)  


 


VBG pCO2     (40-60)  mmHg


 


VBG HCO3     mmol/L


 


VBG Total CO2     (22-28)  mmol/L


 


VBG O2 Sat (Calc)     (40-65)  %


 


VBG Base Excess     (0.0-2.0)  mmol/L


 


VBG Potassium     (3.6-5.2)  mmol/L


 


A-a O2 Difference     mm/Hg


 


Hgb O2 Saturation     (95.0-98.0)  %


 


Sodium     (132-148)  mmol/L


 


Chloride     ()  mmol/L


 


Glucose     ()  mg/dL


 


Lactate     (0.7-2.1)  mmol/L


 


FiO2     %


 


Blood Gas Comments     


 


Crit Value Called To     


 


Crit Value Called By     


 


Crit Value Read Back     


 


Blood Gas Notified Time     


 


Potassium     (3.6-5.0)  MMOL/L


 


Carbon Dioxide     (22-30)  mmol/L


 


Anion Gap     (10-20)  


 


BUN     (7-17)  mg/dl


 


Creatinine     (0.7-1.2)  mg/dl


 


Est GFR (African Amer)     


 


Est GFR (Non-Af Amer)     


 


POC Glucose (mg/dL)  147 H    ()  mg/dL


 


Random Glucose     ()  mg/dL


 


Lactic Acid     (0.7-2.1)  mmol/L


 


Calcium     (8.4-10.2)  mg/dL


 


Phosphorus     (2.5-4.5)  mg/dl


 


Magnesium     (1.6-2.3)  MG/DL


 


Total Bilirubin     (0.2-1.3)  mg/dl


 


AST     (14-36)  U/L


 


ALT     (9-52)  U/L


 


Alkaline Phosphatase     ()  U/L


 


Ammonia   36   (11-51)  umo/L


 


Troponin I    1.7800 H*  (0.00-0.120)  ng/mL


 


NT-Pro-B Natriuret Pep     (0-900)  pg/ml


 


Total Protein     (6.3-8.2)  G/DL


 


Albumin     (3.5-5.0)  g/dL


 


Globulin     (2.2-3.9)  gm/dL


 


Albumin/Globulin Ratio     (1.0-2.1)  


 


Arterial Blood Potassium     (3.6-5.2)  mmol/L


 


Venous Blood Potassium     (3.6-5.2)  mmol/L


 


Urine Color     (YELLOW)  


 


Urine Clarity     (Clear)  


 


Urine pH     (5.0-8.0)  


 


Ur Specific Gravity     (1.003-1.030)  


 


Urine Protein     (NEGATIVE)  mg/dL


 


Urine Glucose (UA)     (NEGATIVE)  mg/dL


 


Urine Ketones     (NEGATIVE)  mg/dL


 


Urine Blood     (NEGATIVE)  


 


Urine Nitrate     (NEGATIVE)  


 


Urine Bilirubin     (NEGATIVE)  


 


Urine Urobilinogen     (0.2-1.0)  mg/dL


 


Ur Leukocyte Esterase     (Negative)  Tabitha/uL


 


Urine RBC (Auto)     (0-3)  /hpf


 


Urine Microscopic WBC     (0-5)  /hpf


 


Ur Squamous Epith Cells     (0-5)  /hpf


 


Amorphous Sediment     (<OCC)  /ul


 


Urine Bacteria     (<OCC)  


 


Hyaline Casts     (0-2)  /hpf


 


Urine Opiates Screen     (NEGATIVE)  


 


Urine Methadone Screen     (NEGATIVE)  


 


Ur Barbiturates Screen     (NEGATIVE)  


 


Ur Phencyclidine Scrn     (NEGATIVE)  


 


Ur Amphetamines Screen     (NEGATIVE)  


 


U Benzodiazepines Scrn     (NEGATIVE)  


 


U Oth Cocaine Metabols     (NEGATIVE)  


 


U Cannabinoids Screen     (NEGATIVE)  


 


Alcohol, Quantitative     (0-10)  mg/dl














  02/23/19 02/23/19 02/23/19 Range/Units





  18:54 16:48 14:50 


 


WBC     (4.8-10.8)  K/uL


 


RBC     (3.80-5.20)  Mil/uL


 


Hgb     (12.0-16.0)  g/dL


 


Hct     (34.0-47.0)  %


 


MCV     (81.0-99.0)  fl


 


MCH     (27.0-31.0)  pg


 


MCHC     (33.0-37.0)  g/dL


 


RDW     (11.5-14.5)  %


 


Plt Count     (130-400)  K/uL


 


MPV     (7.2-11.7)  fl


 


Neut % (Auto)     (50.0-75.0)  %


 


Lymph % (Auto)     (20.0-40.0)  %


 


Mono % (Auto)     (0.0-10.0)  %


 


Eos % (Auto)     (0.0-4.0)  %


 


Baso % (Auto)     (0.0-2.0)  %


 


Neut # (Auto)     (1.8-7.0)  K/uL


 


Lymph # (Auto)     (1.0-4.3)  K/uL


 


Mono # (Auto)     (0.0-0.8)  K/uL


 


Eos # (Auto)     (0.0-0.7)  K/uL


 


Baso # (Auto)     (0.0-0.2)  K/uL


 


Neutrophils % (Manual)     (42-75)  %


 


Lymphocytes % (Manual)     (20-50)  %


 


Monocytes % (Manual)     (0-10)  %


 


Eosinophils % (Manual)     (0-7)  %


 


Platelet Estimate     (NORMAL)  


 


Large Platelets     


 


Hypochromasia (manual)     


 


Poikilocytosis (manual     


 


Anisocytosis (manual)     


 


Microcytosis (manual)     


 


Macrocytosis (manual)     


 


Target Cells     


 


Tear Drop Cells     


 


Ovalocytes     


 


Ester Cells     


 


PT    21.5 H  (9.8-13.1)  Seconds


 


INR    1.9  


 


APTT    44.5 H  (25.6-37.1)  Seconds


 


pCO2     (35-45)  mm/Hg


 


pO2   31   ()  mm/Hg


 


HCO3     (21-28)  mmol/L


 


ABG pH     (7.35-7.45)  


 


ABG Total CO2     (22-28)  mmol/L


 


ABG O2 Saturation     (95-98)  %


 


ABG O2 Content     (15-23)  ML/dL


 


ABG Base Excess     (-2.0-3.0)  mmol/L


 


ABG Hemoglobin     (11.7-17.4)  g/dL


 


ABG Carboxyhemoglobin     (0.5-1.5)  %


 


POC ABG HHb (Measured)     (0.0-5.0)  %


 


ABG Methemoglobin     (0.0-3.0)  %


 


ABG O2 Capacity     (16-24)  mL/dL


 


Efrem Test     


 


ABG Potassium     (3.6-5.2)  mmol/L


 


VBG pH   7.15 L*   (7.32-7.43)  


 


VBG pCO2   60   (40-60)  mmHg


 


VBG HCO3   16.6   mmol/L


 


VBG Total CO2   22.7   (22-28)  mmol/L


 


VBG O2 Sat (Calc)   54.4   (40-65)  %


 


VBG Base Excess   -8.7 L   (0.0-2.0)  mmol/L


 


VBG Potassium   4.2   (3.6-5.2)  mmol/L


 


A-a O2 Difference     mm/Hg


 


Hgb O2 Saturation     (95.0-98.0)  %


 


Sodium   138.0   (132-148)  mmol/L


 


Chloride   105.0   ()  mmol/L


 


Glucose   105   ()  mg/dL


 


Lactate   7.8 H*   (0.7-2.1)  mmol/L


 


FiO2   21.0   %


 


Blood Gas Comments     


 


Crit Value Called To   Arnold daily md   


 


Crit Value Called By   Mayra casal ry   


 


Crit Value Read Back   Y   


 


Blood Gas Notified Time   1655   


 


Potassium     (3.6-5.0)  MMOL/L


 


Carbon Dioxide     (22-30)  mmol/L


 


Anion Gap     (10-20)  


 


BUN     (7-17)  mg/dl


 


Creatinine     (0.7-1.2)  mg/dl


 


Est GFR (African Amer)     


 


Est GFR (Non-Af Amer)     


 


POC Glucose (mg/dL)  70    ()  mg/dL


 


Random Glucose     ()  mg/dL


 


Lactic Acid     (0.7-2.1)  mmol/L


 


Calcium     (8.4-10.2)  mg/dL


 


Phosphorus     (2.5-4.5)  mg/dl


 


Magnesium     (1.6-2.3)  MG/DL


 


Total Bilirubin     (0.2-1.3)  mg/dl


 


AST     (14-36)  U/L


 


ALT     (9-52)  U/L


 


Alkaline Phosphatase     ()  U/L


 


Ammonia     (11-51)  umo/L


 


Troponin I     (0.00-0.120)  ng/mL


 


NT-Pro-B Natriuret Pep     (0-900)  pg/ml


 


Total Protein     (6.3-8.2)  G/DL


 


Albumin     (3.5-5.0)  g/dL


 


Globulin     (2.2-3.9)  gm/dL


 


Albumin/Globulin Ratio     (1.0-2.1)  


 


Arterial Blood Potassium     (3.6-5.2)  mmol/L


 


Venous Blood Potassium   4.2   (3.6-5.2)  mmol/L


 


Urine Color     (YELLOW)  


 


Urine Clarity     (Clear)  


 


Urine pH     (5.0-8.0)  


 


Ur Specific Gravity     (1.003-1.030)  


 


Urine Protein     (NEGATIVE)  mg/dL


 


Urine Glucose (UA)     (NEGATIVE)  mg/dL


 


Urine Ketones     (NEGATIVE)  mg/dL


 


Urine Blood     (NEGATIVE)  


 


Urine Nitrate     (NEGATIVE)  


 


Urine Bilirubin     (NEGATIVE)  


 


Urine Urobilinogen     (0.2-1.0)  mg/dL


 


Ur Leukocyte Esterase     (Negative)  Tabitha/uL


 


Urine RBC (Auto)     (0-3)  /hpf


 


Urine Microscopic WBC     (0-5)  /hpf


 


Ur Squamous Epith Cells     (0-5)  /hpf


 


Amorphous Sediment     (<OCC)  /ul


 


Urine Bacteria     (<OCC)  


 


Hyaline Casts     (0-2)  /hpf


 


Urine Opiates Screen     (NEGATIVE)  


 


Urine Methadone Screen     (NEGATIVE)  


 


Ur Barbiturates Screen     (NEGATIVE)  


 


Ur Phencyclidine Scrn     (NEGATIVE)  


 


Ur Amphetamines Screen     (NEGATIVE)  


 


U Benzodiazepines Scrn     (NEGATIVE)  


 


U Oth Cocaine Metabols     (NEGATIVE)  


 


U Cannabinoids Screen     (NEGATIVE)  


 


Alcohol, Quantitative     (0-10)  mg/dl














  02/23/19 02/23/19 02/23/19 Range/Units





  14:50 14:50 14:50 


 


WBC     (4.8-10.8)  K/uL


 


RBC     (3.80-5.20)  Mil/uL


 


Hgb     (12.0-16.0)  g/dL


 


Hct     (34.0-47.0)  %


 


MCV     (81.0-99.0)  fl


 


MCH     (27.0-31.0)  pg


 


MCHC     (33.0-37.0)  g/dL


 


RDW     (11.5-14.5)  %


 


Plt Count     (130-400)  K/uL


 


MPV     (7.2-11.7)  fl


 


Neut % (Auto)     (50.0-75.0)  %


 


Lymph % (Auto)     (20.0-40.0)  %


 


Mono % (Auto)     (0.0-10.0)  %


 


Eos % (Auto)     (0.0-4.0)  %


 


Baso % (Auto)     (0.0-2.0)  %


 


Neut # (Auto)     (1.8-7.0)  K/uL


 


Lymph # (Auto)     (1.0-4.3)  K/uL


 


Mono # (Auto)     (0.0-0.8)  K/uL


 


Eos # (Auto)     (0.0-0.7)  K/uL


 


Baso # (Auto)     (0.0-0.2)  K/uL


 


Neutrophils % (Manual)     (42-75)  %


 


Lymphocytes % (Manual)     (20-50)  %


 


Monocytes % (Manual)     (0-10)  %


 


Eosinophils % (Manual)     (0-7)  %


 


Platelet Estimate     (NORMAL)  


 


Large Platelets     


 


Hypochromasia (manual)     


 


Poikilocytosis (manual     


 


Anisocytosis (manual)     


 


Microcytosis (manual)     


 


Macrocytosis (manual)     


 


Target Cells     


 


Tear Drop Cells     


 


Ovalocytes     


 


Ester Cells     


 


PT     (9.8-13.1)  Seconds


 


INR     


 


APTT     (25.6-37.1)  Seconds


 


pCO2     (35-45)  mm/Hg


 


pO2     ()  mm/Hg


 


HCO3     (21-28)  mmol/L


 


ABG pH     (7.35-7.45)  


 


ABG Total CO2     (22-28)  mmol/L


 


ABG O2 Saturation     (95-98)  %


 


ABG O2 Content     (15-23)  ML/dL


 


ABG Base Excess     (-2.0-3.0)  mmol/L


 


ABG Hemoglobin     (11.7-17.4)  g/dL


 


ABG Carboxyhemoglobin     (0.5-1.5)  %


 


POC ABG HHb (Measured)     (0.0-5.0)  %


 


ABG Methemoglobin     (0.0-3.0)  %


 


ABG O2 Capacity     (16-24)  mL/dL


 


Efrem Test     


 


ABG Potassium     (3.6-5.2)  mmol/L


 


VBG pH     (7.32-7.43)  


 


VBG pCO2     (40-60)  mmHg


 


VBG HCO3     mmol/L


 


VBG Total CO2     (22-28)  mmol/L


 


VBG O2 Sat (Calc)     (40-65)  %


 


VBG Base Excess     (0.0-2.0)  mmol/L


 


VBG Potassium     (3.6-5.2)  mmol/L


 


A-a O2 Difference     mm/Hg


 


Hgb O2 Saturation     (95.0-98.0)  %


 


Sodium     (132-148)  mmol/L


 


Chloride     ()  mmol/L


 


Glucose     ()  mg/dL


 


Lactate     (0.7-2.1)  mmol/L


 


FiO2     %


 


Blood Gas Comments     


 


Crit Value Called To     


 


Crit Value Called By     


 


Crit Value Read Back     


 


Blood Gas Notified Time     


 


Potassium     (3.6-5.0)  MMOL/L


 


Carbon Dioxide     (22-30)  mmol/L


 


Anion Gap     (10-20)  


 


BUN     (7-17)  mg/dl


 


Creatinine     (0.7-1.2)  mg/dl


 


Est GFR (African Amer)     


 


Est GFR (Non-Af Amer)     


 


POC Glucose (mg/dL)     ()  mg/dL


 


Random Glucose     ()  mg/dL


 


Lactic Acid     (0.7-2.1)  mmol/L


 


Calcium     (8.4-10.2)  mg/dL


 


Phosphorus  9.3 H    (2.5-4.5)  mg/dl


 


Magnesium  2.0    (1.6-2.3)  MG/DL


 


Total Bilirubin     (0.2-1.3)  mg/dl


 


AST     (14-36)  U/L


 


ALT     (9-52)  U/L


 


Alkaline Phosphatase     ()  U/L


 


Ammonia     (11-51)  umo/L


 


Troponin I     (0.00-0.120)  ng/mL


 


NT-Pro-B Natriuret Pep     (0-900)  pg/ml


 


Total Protein     (6.3-8.2)  G/DL


 


Albumin     (3.5-5.0)  g/dL


 


Globulin     (2.2-3.9)  gm/dL


 


Albumin/Globulin Ratio     (1.0-2.1)  


 


Arterial Blood Potassium     (3.6-5.2)  mmol/L


 


Venous Blood Potassium     (3.6-5.2)  mmol/L


 


Urine Color   Negrita   (YELLOW)  


 


Urine Clarity   Turbid   (Clear)  


 


Urine pH   6.0   (5.0-8.0)  


 


Ur Specific Gravity   1.013   (1.003-1.030)  


 


Urine Protein   100   (NEGATIVE)  mg/dL


 


Urine Glucose (UA)   Neg   (NEGATIVE)  mg/dL


 


Urine Ketones   Negative   (NEGATIVE)  mg/dL


 


Urine Blood   Moderate   (NEGATIVE)  


 


Urine Nitrate   Negative   (NEGATIVE)  


 


Urine Bilirubin   Negative   (NEGATIVE)  


 


Urine Urobilinogen   4.0 H   (0.2-1.0)  mg/dL


 


Ur Leukocyte Esterase   Neg   (Negative)  Tabitha/uL


 


Urine RBC (Auto)   6 H   (0-3)  /hpf


 


Urine Microscopic WBC   4   (0-5)  /hpf


 


Ur Squamous Epith Cells   2   (0-5)  /hpf


 


Amorphous Sediment   Few H   (<OCC)  /ul


 


Urine Bacteria   Occ H   (<OCC)  


 


Hyaline Casts   11-20 H   (0-2)  /hpf


 


Urine Opiates Screen    Negative  (NEGATIVE)  


 


Urine Methadone Screen    Negative  (NEGATIVE)  


 


Ur Barbiturates Screen    Negative  (NEGATIVE)  


 


Ur Phencyclidine Scrn    Negative  (NEGATIVE)  


 


Ur Amphetamines Screen    Negative  (NEGATIVE)  


 


U Benzodiazepines Scrn    Negative  (NEGATIVE)  


 


U Oth Cocaine Metabols    Negative  (NEGATIVE)  


 


U Cannabinoids Screen    Negative  (NEGATIVE)  


 


Alcohol, Quantitative     (0-10)  mg/dl














  02/23/19 02/23/19 02/23/19 Range/Units





  14:29 13:48 13:48 


 


WBC   24.8 H D   (4.8-10.8)  K/uL


 


RBC   4.31   (3.80-5.20)  Mil/uL


 


Hgb   10.5 L   (12.0-16.0)  g/dL


 


Hct   34.7   (34.0-47.0)  %


 


MCV   80.4 L D   (81.0-99.0)  fl


 


MCH   24.2 L   (27.0-31.0)  pg


 


MCHC   30.1 L   (33.0-37.0)  g/dL


 


RDW   20.1 H   (11.5-14.5)  %


 


Plt Count   140   (130-400)  K/uL


 


MPV   8.2   (7.2-11.7)  fl


 


Neut % (Auto)   88.0 H   (50.0-75.0)  %


 


Lymph % (Auto)   4.4 L   (20.0-40.0)  %


 


Mono % (Auto)   7.4   (0.0-10.0)  %


 


Eos % (Auto)   0.0   (0.0-4.0)  %


 


Baso % (Auto)   0.2   (0.0-2.0)  %


 


Neut # (Auto)   21.8 H   (1.8-7.0)  K/uL


 


Lymph # (Auto)   1.1   (1.0-4.3)  K/uL


 


Mono # (Auto)   1.8 H   (0.0-0.8)  K/uL


 


Eos # (Auto)   0.0   (0.0-0.7)  K/uL


 


Baso # (Auto)   0.1   (0.0-0.2)  K/uL


 


Neutrophils % (Manual)   83 H   (42-75)  %


 


Lymphocytes % (Manual)   10 L   (20-50)  %


 


Monocytes % (Manual)   7   (0-10)  %


 


Eosinophils % (Manual)     (0-7)  %


 


Platelet Estimate   Slightly decreased L   (NORMAL)  


 


Large Platelets   Present   


 


Hypochromasia (manual)   Slight   


 


Poikilocytosis (manual   Slight   


 


Anisocytosis (manual)   Moderate   


 


Microcytosis (manual)   Slight   


 


Macrocytosis (manual)   Slight   


 


Target Cells     


 


Tear Drop Cells   Slight   


 


Ovalocytes   Moderate   


 


Ester Cells     


 


PT     (9.8-13.1)  Seconds


 


INR     


 


APTT     (25.6-37.1)  Seconds


 


pCO2     (35-45)  mm/Hg


 


pO2  21 L    ()  mm/Hg


 


HCO3     (21-28)  mmol/L


 


ABG pH     (7.35-7.45)  


 


ABG Total CO2     (22-28)  mmol/L


 


ABG O2 Saturation     (95-98)  %


 


ABG O2 Content     (15-23)  ML/dL


 


ABG Base Excess     (-2.0-3.0)  mmol/L


 


ABG Hemoglobin     (11.7-17.4)  g/dL


 


ABG Carboxyhemoglobin     (0.5-1.5)  %


 


POC ABG HHb (Measured)     (0.0-5.0)  %


 


ABG Methemoglobin     (0.0-3.0)  %


 


ABG O2 Capacity     (16-24)  mL/dL


 


Efrem Test     


 


ABG Potassium     (3.6-5.2)  mmol/L


 


VBG pH  7.10 L*    (7.32-7.43)  


 


VBG pCO2  80 H*    (40-60)  mmHg


 


VBG HCO3  18.6    mmol/L


 


VBG Total CO2  27.3    (22-28)  mmol/L


 


VBG O2 Sat (Calc)  26.8 L    (40-65)  %


 


VBG Base Excess  -6.0 L    (0.0-2.0)  mmol/L


 


VBG Potassium  4.1    (3.6-5.2)  mmol/L


 


A-a O2 Difference     mm/Hg


 


Hgb O2 Saturation     (95.0-98.0)  %


 


Sodium  142.0   143  (132-148)  mmol/L


 


Chloride  105.0   101  ()  mmol/L


 


Glucose  141 H    ()  mg/dL


 


Lactate  8.5 H*    (0.7-2.1)  mmol/L


 


FiO2  28.0    %


 


Blood Gas Comments     


 


Crit Value Called To  Dr sunita barker m.d.    


 


Crit Value Called By  Iram    


 


Crit Value Read Back  Y    


 


Blood Gas Notified Time  1452    


 


Potassium    4.1  (3.6-5.0)  MMOL/L


 


Carbon Dioxide    22  (22-30)  mmol/L


 


Anion Gap    24 H  (10-20)  


 


BUN    22 H  (7-17)  mg/dl


 


Creatinine    2.2 H  (0.7-1.2)  mg/dl


 


Est GFR (African Amer)    28  


 


Est GFR (Non-Af Amer)    23  


 


POC Glucose (mg/dL)     ()  mg/dL


 


Random Glucose    147 H  ()  mg/dL


 


Lactic Acid     (0.7-2.1)  mmol/L


 


Calcium    8.7  (8.4-10.2)  mg/dL


 


Phosphorus     (2.5-4.5)  mg/dl


 


Magnesium     (1.6-2.3)  MG/DL


 


Total Bilirubin    2.5 H  (0.2-1.3)  mg/dl


 


AST    1555 H  (14-36)  U/L


 


ALT    1303 H  (9-52)  U/L


 


Alkaline Phosphatase    127 H D  ()  U/L


 


Ammonia     (11-51)  umo/L


 


Troponin I    0.2710 H*  (0.00-0.120)  ng/mL


 


NT-Pro-B Natriuret Pep    2820 H  (0-900)  pg/ml


 


Total Protein    7.5  (6.3-8.2)  G/DL


 


Albumin    3.8  (3.5-5.0)  g/dL


 


Globulin    3.8  (2.2-3.9)  gm/dL


 


Albumin/Globulin Ratio    1.0  (1.0-2.1)  


 


Arterial Blood Potassium     (3.6-5.2)  mmol/L


 


Venous Blood Potassium  4.1    (3.6-5.2)  mmol/L


 


Urine Color     (YELLOW)  


 


Urine Clarity     (Clear)  


 


Urine pH     (5.0-8.0)  


 


Ur Specific Gravity     (1.003-1.030)  


 


Urine Protein     (NEGATIVE)  mg/dL


 


Urine Glucose (UA)     (NEGATIVE)  mg/dL


 


Urine Ketones     (NEGATIVE)  mg/dL


 


Urine Blood     (NEGATIVE)  


 


Urine Nitrate     (NEGATIVE)  


 


Urine Bilirubin     (NEGATIVE)  


 


Urine Urobilinogen     (0.2-1.0)  mg/dL


 


Ur Leukocyte Esterase     (Negative)  Tabitha/uL


 


Urine RBC (Auto)     (0-3)  /hpf


 


Urine Microscopic WBC     (0-5)  /hpf


 


Ur Squamous Epith Cells     (0-5)  /hpf


 


Amorphous Sediment     (<OCC)  /ul


 


Urine Bacteria     (<OCC)  


 


Hyaline Casts     (0-2)  /hpf


 


Urine Opiates Screen     (NEGATIVE)  


 


Urine Methadone Screen     (NEGATIVE)  


 


Ur Barbiturates Screen     (NEGATIVE)  


 


Ur Phencyclidine Scrn     (NEGATIVE)  


 


Ur Amphetamines Screen     (NEGATIVE)  


 


U Benzodiazepines Scrn     (NEGATIVE)  


 


U Oth Cocaine Metabols     (NEGATIVE)  


 


U Cannabinoids Screen     (NEGATIVE)  


 


Alcohol, Quantitative    < 10  (0-10)  mg/dl














  02/23/19 02/23/19 Range/Units





  13:36 13:32 


 


WBC    (4.8-10.8)  K/uL


 


RBC    (3.80-5.20)  Mil/uL


 


Hgb    (12.0-16.0)  g/dL


 


Hct    (34.0-47.0)  %


 


MCV    (81.0-99.0)  fl


 


MCH    (27.0-31.0)  pg


 


MCHC    (33.0-37.0)  g/dL


 


RDW    (11.5-14.5)  %


 


Plt Count    (130-400)  K/uL


 


MPV    (7.2-11.7)  fl


 


Neut % (Auto)    (50.0-75.0)  %


 


Lymph % (Auto)    (20.0-40.0)  %


 


Mono % (Auto)    (0.0-10.0)  %


 


Eos % (Auto)    (0.0-4.0)  %


 


Baso % (Auto)    (0.0-2.0)  %


 


Neut # (Auto)    (1.8-7.0)  K/uL


 


Lymph # (Auto)    (1.0-4.3)  K/uL


 


Mono # (Auto)    (0.0-0.8)  K/uL


 


Eos # (Auto)    (0.0-0.7)  K/uL


 


Baso # (Auto)    (0.0-0.2)  K/uL


 


Neutrophils % (Manual)    (42-75)  %


 


Lymphocytes % (Manual)    (20-50)  %


 


Monocytes % (Manual)    (0-10)  %


 


Eosinophils % (Manual)    (0-7)  %


 


Platelet Estimate    (NORMAL)  


 


Large Platelets    


 


Hypochromasia (manual)    


 


Poikilocytosis (manual    


 


Anisocytosis (manual)    


 


Microcytosis (manual)    


 


Macrocytosis (manual)    


 


Target Cells    


 


Tear Drop Cells    


 


Ovalocytes    


 


Ester Cells    


 


PT    (9.8-13.1)  Seconds


 


INR    


 


APTT    (25.6-37.1)  Seconds


 


pCO2  39   (35-45)  mm/Hg


 


pO2  50 L   ()  mm/Hg


 


HCO3  18.9 L   (21-28)  mmol/L


 


ABG pH  7.29 L   (7.35-7.45)  


 


ABG Total CO2  20.0 L   (22-28)  mmol/L


 


ABG O2 Saturation  88.8 L   (95-98)  %


 


ABG O2 Content    (15-23)  ML/dL


 


ABG Base Excess  -7.3 L   (-2.0-3.0)  mmol/L


 


ABG Hemoglobin    (11.7-17.4)  g/dL


 


ABG Carboxyhemoglobin    (0.5-1.5)  %


 


POC ABG HHb (Measured)    (0.0-5.0)  %


 


ABG Methemoglobin    (0.0-3.0)  %


 


ABG O2 Capacity    (16-24)  mL/dL


 


Efrem Test  Yes   


 


ABG Potassium  4.1   (3.6-5.2)  mmol/L


 


VBG pH    (7.32-7.43)  


 


VBG pCO2    (40-60)  mmHg


 


VBG HCO3    mmol/L


 


VBG Total CO2    (22-28)  mmol/L


 


VBG O2 Sat (Calc)    (40-65)  %


 


VBG Base Excess    (0.0-2.0)  mmol/L


 


VBG Potassium    (3.6-5.2)  mmol/L


 


A-a O2 Difference  51.0   mm/Hg


 


Hgb O2 Saturation    (95.0-98.0)  %


 


Sodium  137.0   (132-148)  mmol/L


 


Chloride  105.0   ()  mmol/L


 


Glucose  210 H   ()  mg/dL


 


Lactate  6.8 H*   (0.7-2.1)  mmol/L


 


FiO2  21.0   %


 


Blood Gas Comments  Ra 21   


 


Crit Value Called To  Dr sunita barker m.d.   


 


Crit Value Called By  Iram   


 


Crit Value Read Back  Y   


 


Blood Gas Notified Time  1350   


 


Potassium    (3.6-5.0)  MMOL/L


 


Carbon Dioxide    (22-30)  mmol/L


 


Anion Gap    (10-20)  


 


BUN    (7-17)  mg/dl


 


Creatinine    (0.7-1.2)  mg/dl


 


Est GFR (African Amer)    


 


Est GFR (Non-Af Amer)    


 


POC Glucose (mg/dL)   138 H  ()  mg/dL


 


Random Glucose    ()  mg/dL


 


Lactic Acid    (0.7-2.1)  mmol/L


 


Calcium    (8.4-10.2)  mg/dL


 


Phosphorus    (2.5-4.5)  mg/dl


 


Magnesium    (1.6-2.3)  MG/DL


 


Total Bilirubin    (0.2-1.3)  mg/dl


 


AST    (14-36)  U/L


 


ALT    (9-52)  U/L


 


Alkaline Phosphatase    ()  U/L


 


Ammonia    (11-51)  umo/L


 


Troponin I    (0.00-0.120)  ng/mL


 


NT-Pro-B Natriuret Pep    (0-900)  pg/ml


 


Total Protein    (6.3-8.2)  G/DL


 


Albumin    (3.5-5.0)  g/dL


 


Globulin    (2.2-3.9)  gm/dL


 


Albumin/Globulin Ratio    (1.0-2.1)  


 


Arterial Blood Potassium  4.1   (3.6-5.2)  mmol/L


 


Venous Blood Potassium    (3.6-5.2)  mmol/L


 


Urine Color    (YELLOW)  


 


Urine Clarity    (Clear)  


 


Urine pH    (5.0-8.0)  


 


Ur Specific Gravity    (1.003-1.030)  


 


Urine Protein    (NEGATIVE)  mg/dL


 


Urine Glucose (UA)    (NEGATIVE)  mg/dL


 


Urine Ketones    (NEGATIVE)  mg/dL


 


Urine Blood    (NEGATIVE)  


 


Urine Nitrate    (NEGATIVE)  


 


Urine Bilirubin    (NEGATIVE)  


 


Urine Urobilinogen    (0.2-1.0)  mg/dL


 


Ur Leukocyte Esterase    (Negative)  Tabitha/uL


 


Urine RBC (Auto)    (0-3)  /hpf


 


Urine Microscopic WBC    (0-5)  /hpf


 


Ur Squamous Epith Cells    (0-5)  /hpf


 


Amorphous Sediment    (<OCC)  /ul


 


Urine Bacteria    (<OCC)  


 


Hyaline Casts    (0-2)  /hpf


 


Urine Opiates Screen    (NEGATIVE)  


 


Urine Methadone Screen    (NEGATIVE)  


 


Ur Barbiturates Screen    (NEGATIVE)  


 


Ur Phencyclidine Scrn    (NEGATIVE)  


 


Ur Amphetamines Screen    (NEGATIVE)  


 


U Benzodiazepines Scrn    (NEGATIVE)  


 


U Oth Cocaine Metabols    (NEGATIVE)  


 


U Cannabinoids Screen    (NEGATIVE)  


 


Alcohol, Quantitative    (0-10)  mg/dl








                         Laboratory Results - last 24 hr











  02/23/19 02/23/19 02/23/19





  13:32 13:36 13:48


 


WBC   


 


RBC   


 


Hgb   


 


Hct   


 


MCV   


 


MCH   


 


MCHC   


 


RDW   


 


Plt Count   


 


MPV   


 


Neut % (Auto)   


 


Lymph % (Auto)   


 


Mono % (Auto)   


 


Eos % (Auto)   


 


Baso % (Auto)   


 


Neut # (Auto)   


 


Lymph # (Auto)   


 


Mono # (Auto)   


 


Eos # (Auto)   


 


Baso # (Auto)   


 


Neutrophils % (Manual)   


 


Lymphocytes % (Manual)   


 


Monocytes % (Manual)   


 


Eosinophils % (Manual)   


 


Platelet Estimate   


 


Large Platelets   


 


Hypochromasia (manual)   


 


Poikilocytosis (manual   


 


Anisocytosis (manual)   


 


Microcytosis (manual)   


 


Macrocytosis (manual)   


 


Target Cells   


 


Tear Drop Cells   


 


Ovalocytes   


 


Fillmore Cells   


 


PT   


 


INR   


 


APTT   


 


pCO2   39 


 


pO2   50 L 


 


HCO3   18.9 L 


 


ABG pH   7.29 L 


 


ABG Total CO2   20.0 L 


 


ABG O2 Saturation   88.8 L 


 


ABG O2 Content   


 


ABG Base Excess   -7.3 L 


 


ABG Hemoglobin   


 


ABG Carboxyhemoglobin   


 


POC ABG HHb (Measured)   


 


ABG Methemoglobin   


 


ABG O2 Capacity   


 


Efrem Test   Yes 


 


ABG Potassium   4.1 


 


VBG pH   


 


VBG pCO2   


 


VBG HCO3   


 


VBG Total CO2   


 


VBG O2 Sat (Calc)   


 


VBG Base Excess   


 


VBG Potassium   


 


A-a O2 Difference   51.0 


 


Hgb O2 Saturation   


 


Sodium   137.0  143


 


Chloride   105.0  101


 


Glucose   210 H 


 


Lactate   6.8 H* 


 


FiO2   21.0 


 


Blood Gas Comments   Ra 21 


 


Crit Value Called To   Dr sunita barker m.d. 


 


Crit Value Called By   Iram 


 


Crimagda Value Read Back   Y 


 


Blood Gas Notified Time   1350 


 


Potassium    4.1


 


Carbon Dioxide    22


 


Anion Gap    24 H


 


BUN    22 H


 


Creatinine    2.2 H


 


Est GFR ( Amer)    28


 


Est GFR (Non-Af Amer)    23


 


POC Glucose (mg/dL)  138 H  


 


Random Glucose    147 H


 


Lactic Acid   


 


Calcium    8.7


 


Phosphorus   


 


Magnesium   


 


Total Bilirubin    2.5 H


 


AST    1555 H


 


ALT    1303 H


 


Alkaline Phosphatase    127 H D


 


Ammonia   


 


Troponin I    0.2710 H*


 


NT-Pro-B Natriuret Pep    2820 H


 


Total Protein    7.5


 


Albumin    3.8


 


Globulin    3.8


 


Albumin/Globulin Ratio    1.0


 


Arterial Blood Potassium   4.1 


 


Venous Blood Potassium   


 


Urine Color   


 


Urine Clarity   


 


Urine pH   


 


Ur Specific Gravity   


 


Urine Protein   


 


Urine Glucose (UA)   


 


Urine Ketones   


 


Urine Blood   


 


Urine Nitrate   


 


Urine Bilirubin   


 


Urine Urobilinogen   


 


Ur Leukocyte Esterase   


 


Urine RBC (Auto)   


 


Urine Microscopic WBC   


 


Ur Squamous Epith Cells   


 


Amorphous Sediment   


 


Urine Bacteria   


 


Hyaline Casts   


 


Urine Opiates Screen   


 


Urine Methadone Screen   


 


Ur Barbiturates Screen   


 


Ur Phencyclidine Scrn   


 


Ur Amphetamines Screen   


 


U Benzodiazepines Scrn   


 


U Oth Cocaine Metabols   


 


U Cannabinoids Screen   


 


Alcohol, Quantitative    < 10














  02/23/19 02/23/19 02/23/19





  13:48 14:29 14:50


 


WBC  24.8 H D  


 


RBC  4.31  


 


Hgb  10.5 L  


 


Hct  34.7  


 


MCV  80.4 L D  


 


MCH  24.2 L  


 


MCHC  30.1 L  


 


RDW  20.1 H  


 


Plt Count  140  


 


MPV  8.2  


 


Neut % (Auto)  88.0 H  


 


Lymph % (Auto)  4.4 L  


 


Mono % (Auto)  7.4  


 


Eos % (Auto)  0.0  


 


Baso % (Auto)  0.2  


 


Neut # (Auto)  21.8 H  


 


Lymph # (Auto)  1.1  


 


Mono # (Auto)  1.8 H  


 


Eos # (Auto)  0.0  


 


Baso # (Auto)  0.1  


 


Neutrophils % (Manual)  83 H  


 


Lymphocytes % (Manual)  10 L  


 


Monocytes % (Manual)  7  


 


Eosinophils % (Manual)   


 


Platelet Estimate  Slightly decreased L  


 


Large Platelets  Present  


 


Hypochromasia (manual)  Slight  


 


Poikilocytosis (manual  Slight  


 


Anisocytosis (manual)  Moderate  


 


Microcytosis (manual)  Slight  


 


Macrocytosis (manual)  Slight  


 


Target Cells   


 


Tear Drop Cells  Slight  


 


Ovalocytes  Moderate  


 


Ester Cells   


 


PT   


 


INR   


 


APTT   


 


pCO2   


 


pO2   21 L 


 


HCO3   


 


ABG pH   


 


ABG Total CO2   


 


ABG O2 Saturation   


 


ABG O2 Content   


 


ABG Base Excess   


 


ABG Hemoglobin   


 


ABG Carboxyhemoglobin   


 


POC ABG HHb (Measured)   


 


ABG Methemoglobin   


 


ABG O2 Capacity   


 


Efrem Test   


 


ABG Potassium   


 


VBG pH   7.10 L* 


 


VBG pCO2   80 H* 


 


VBG HCO3   18.6 


 


VBG Total CO2   27.3 


 


VBG O2 Sat (Calc)   26.8 L 


 


VBG Base Excess   -6.0 L 


 


VBG Potassium   4.1 


 


A-a O2 Difference   


 


Hgb O2 Saturation   


 


Sodium   142.0 


 


Chloride   105.0 


 


Glucose   141 H 


 


Lactate   8.5 H* 


 


FiO2   28.0 


 


Blood Gas Comments   


 


Crit Value Called To   Dr sunita barker m.d. 


 


Crit Value Called By   Iram 


 


Crit Value Read Back   Y 


 


Blood Gas Notified Time   1452 


 


Potassium   


 


Carbon Dioxide   


 


Anion Gap   


 


BUN   


 


Creatinine   


 


Est GFR ( Amer)   


 


Est GFR (Non-Af Amer)   


 


POC Glucose (mg/dL)   


 


Random Glucose   


 


Lactic Acid   


 


Calcium   


 


Phosphorus   


 


Magnesium   


 


Total Bilirubin   


 


AST   


 


ALT   


 


Alkaline Phosphatase   


 


Ammonia   


 


Troponin I   


 


NT-Pro-B Natriuret Pep   


 


Total Protein   


 


Albumin   


 


Globulin   


 


Albumin/Globulin Ratio   


 


Arterial Blood Potassium   


 


Venous Blood Potassium   4.1 


 


Urine Color   


 


Urine Clarity   


 


Urine pH   


 


Ur Specific Gravity   


 


Urine Protein   


 


Urine Glucose (UA)   


 


Urine Ketones   


 


Urine Blood   


 


Urine Nitrate   


 


Urine Bilirubin   


 


Urine Urobilinogen   


 


Ur Leukocyte Esterase   


 


Urine RBC (Auto)   


 


Urine Microscopic WBC   


 


Ur Squamous Epith Cells   


 


Amorphous Sediment   


 


Urine Bacteria   


 


Hyaline Casts   


 


Urine Opiates Screen    Negative


 


Urine Methadone Screen    Negative


 


Ur Barbiturates Screen    Negative


 


Ur Phencyclidine Scrn    Negative


 


Ur Amphetamines Screen    Negative


 


U Benzodiazepines Scrn    Negative


 


U Oth Cocaine Metabols    Negative


 


U Cannabinoids Screen    Negative


 


Alcohol, Quantitative   














  02/23/19 02/23/19 02/23/19





  14:50 14:50 14:50


 


WBC   


 


RBC   


 


Hgb   


 


Hct   


 


MCV   


 


MCH   


 


MCHC   


 


RDW   


 


Plt Count   


 


MPV   


 


Neut % (Auto)   


 


Lymph % (Auto)   


 


Mono % (Auto)   


 


Eos % (Auto)   


 


Baso % (Auto)   


 


Neut # (Auto)   


 


Lymph # (Auto)   


 


Mono # (Auto)   


 


Eos # (Auto)   


 


Baso # (Auto)   


 


Neutrophils % (Manual)   


 


Lymphocytes % (Manual)   


 


Monocytes % (Manual)   


 


Eosinophils % (Manual)   


 


Platelet Estimate   


 


Large Platelets   


 


Hypochromasia (manual)   


 


Poikilocytosis (manual   


 


Anisocytosis (manual)   


 


Microcytosis (manual)   


 


Macrocytosis (manual)   


 


Target Cells   


 


Tear Drop Cells   


 


Ovalocytes   


 


Ester Cells   


 


PT    21.5 H


 


INR    1.9


 


APTT    44.5 H


 


pCO2   


 


pO2   


 


HCO3   


 


ABG pH   


 


ABG Total CO2   


 


ABG O2 Saturation   


 


ABG O2 Content   


 


ABG Base Excess   


 


ABG Hemoglobin   


 


ABG Carboxyhemoglobin   


 


POC ABG HHb (Measured)   


 


ABG Methemoglobin   


 


ABG O2 Capacity   


 


Efrem Test   


 


ABG Potassium   


 


VBG pH   


 


VBG pCO2   


 


VBG HCO3   


 


VBG Total CO2   


 


VBG O2 Sat (Calc)   


 


VBG Base Excess   


 


VBG Potassium   


 


A-a O2 Difference   


 


Hgb O2 Saturation   


 


Sodium   


 


Chloride   


 


Glucose   


 


Lactate   


 


FiO2   


 


Blood Gas Comments   


 


Crit Value Called To   


 


Crit Value Called By   


 


Crit Value Read Back   


 


Blood Gas Notified Time   


 


Potassium   


 


Carbon Dioxide   


 


Anion Gap   


 


BUN   


 


Creatinine   


 


Est GFR ( Amer)   


 


Est GFR (Non-Af Amer)   


 


POC Glucose (mg/dL)   


 


Random Glucose   


 


Lactic Acid   


 


Calcium   


 


Phosphorus   9.3 H 


 


Magnesium   2.0 


 


Total Bilirubin   


 


AST   


 


ALT   


 


Alkaline Phosphatase   


 


Ammonia   


 


Troponin I   


 


NT-Pro-B Natriuret Pep   


 


Total Protein   


 


Albumin   


 


Globulin   


 


Albumin/Globulin Ratio   


 


Arterial Blood Potassium   


 


Venous Blood Potassium   


 


Urine Color  Negrita  


 


Urine Clarity  Turbid  


 


Urine pH  6.0  


 


Ur Specific Gravity  1.013  


 


Urine Protein  100  


 


Urine Glucose (UA)  Neg  


 


Urine Ketones  Negative  


 


Urine Blood  Moderate  


 


Urine Nitrate  Negative  


 


Urine Bilirubin  Negative  


 


Urine Urobilinogen  4.0 H  


 


Ur Leukocyte Esterase  Neg  


 


Urine RBC (Auto)  6 H  


 


Urine Microscopic WBC  4  


 


Ur Squamous Epith Cells  2  


 


Amorphous Sediment  Few H  


 


Urine Bacteria  Occ H  


 


Hyaline Casts  11-20 H  


 


Urine Opiates Screen   


 


Urine Methadone Screen   


 


Ur Barbiturates Screen   


 


Ur Phencyclidine Scrn   


 


Ur Amphetamines Screen   


 


U Benzodiazepines Scrn   


 


U Oth Cocaine Metabols   


 


U Cannabinoids Screen   


 


Alcohol, Quantitative   














  02/23/19 02/23/19 02/23/19





  16:48 18:54 19:48


 


WBC   


 


RBC   


 


Hgb   


 


Hct   


 


MCV   


 


MCH   


 


MCHC   


 


RDW   


 


Plt Count   


 


MPV   


 


Neut % (Auto)   


 


Lymph % (Auto)   


 


Mono % (Auto)   


 


Eos % (Auto)   


 


Baso % (Auto)   


 


Neut # (Auto)   


 


Lymph # (Auto)   


 


Mono # (Auto)   


 


Eos # (Auto)   


 


Baso # (Auto)   


 


Neutrophils % (Manual)   


 


Lymphocytes % (Manual)   


 


Monocytes % (Manual)   


 


Eosinophils % (Manual)   


 


Platelet Estimate   


 


Large Platelets   


 


Hypochromasia (manual)   


 


Poikilocytosis (manual   


 


Anisocytosis (manual)   


 


Microcytosis (manual)   


 


Macrocytosis (manual)   


 


Target Cells   


 


Tear Drop Cells   


 


Ovalocytes   


 


Ester Cells   


 


PT   


 


INR   


 


APTT   


 


pCO2   


 


pO2  31  


 


HCO3   


 


ABG pH   


 


ABG Total CO2   


 


ABG O2 Saturation   


 


ABG O2 Content   


 


ABG Base Excess   


 


ABG Hemoglobin   


 


ABG Carboxyhemoglobin   


 


POC ABG HHb (Measured)   


 


ABG Methemoglobin   


 


ABG O2 Capacity   


 


Efrem Test   


 


ABG Potassium   


 


VBG pH  7.15 L*  


 


VBG pCO2  60  


 


VBG HCO3  16.6  


 


VBG Total CO2  22.7  


 


VBG O2 Sat (Calc)  54.4  


 


VBG Base Excess  -8.7 L  


 


VBG Potassium  4.2  


 


A-a O2 Difference   


 


Hgb O2 Saturation   


 


Sodium  138.0  


 


Chloride  105.0  


 


Glucose  105  


 


Lactate  7.8 H*  


 


FiO2  21.0  


 


Blood Gas Comments   


 


Crit Value Called To  Arnold daily md  


 


Crit Value Called By  Mayra casal ry  


 


Crit Value Read Back  Y  


 


Blood Gas Notified Time  1655  


 


Potassium   


 


Carbon Dioxide   


 


Anion Gap   


 


BUN   


 


Creatinine   


 


Est GFR ( Amer)   


 


Est GFR (Non-Af Amer)   


 


POC Glucose (mg/dL)   70 


 


Random Glucose   


 


Lactic Acid   


 


Calcium   


 


Phosphorus   


 


Magnesium   


 


Total Bilirubin   


 


AST   


 


ALT   


 


Alkaline Phosphatase   


 


Ammonia   


 


Troponin I    1.7800 H*


 


NT-Pro-B Natriuret Pep   


 


Total Protein   


 


Albumin   


 


Globulin   


 


Albumin/Globulin Ratio   


 


Arterial Blood Potassium   


 


Venous Blood Potassium  4.2  


 


Urine Color   


 


Urine Clarity   


 


Urine pH   


 


Ur Specific Gravity   


 


Urine Protein   


 


Urine Glucose (UA)   


 


Urine Ketones   


 


Urine Blood   


 


Urine Nitrate   


 


Urine Bilirubin   


 


Urine Urobilinogen   


 


Ur Leukocyte Esterase   


 


Urine RBC (Auto)   


 


Urine Microscopic WBC   


 


Ur Squamous Epith Cells   


 


Amorphous Sediment   


 


Urine Bacteria   


 


Hyaline Casts   


 


Urine Opiates Screen   


 


Urine Methadone Screen   


 


Ur Barbiturates Screen   


 


Ur Phencyclidine Scrn   


 


Ur Amphetamines Screen   


 


U Benzodiazepines Scrn   


 


U Oth Cocaine Metabols   


 


U Cannabinoids Screen   


 


Alcohol, Quantitative   














  02/23/19 02/23/19 02/23/19





  19:48 20:18 21:00


 


WBC   


 


RBC   


 


Hgb   


 


Hct   


 


MCV   


 


MCH   


 


MCHC   


 


RDW   


 


Plt Count   


 


MPV   


 


Neut % (Auto)   


 


Lymph % (Auto)   


 


Mono % (Auto)   


 


Eos % (Auto)   


 


Baso % (Auto)   


 


Neut # (Auto)   


 


Lymph # (Auto)   


 


Mono # (Auto)   


 


Eos # (Auto)   


 


Baso # (Auto)   


 


Neutrophils % (Manual)   


 


Lymphocytes % (Manual)   


 


Monocytes % (Manual)   


 


Eosinophils % (Manual)   


 


Platelet Estimate   


 


Large Platelets   


 


Hypochromasia (manual)   


 


Poikilocytosis (manual   


 


Anisocytosis (manual)   


 


Microcytosis (manual)   


 


Macrocytosis (manual)   


 


Target Cells   


 


Tear Drop Cells   


 


Ovalocytes   


 


Ester Cells   


 


PT   


 


INR   


 


APTT   


 


pCO2   


 


pO2   


 


HCO3   


 


ABG pH   


 


ABG Total CO2   


 


ABG O2 Saturation   


 


ABG O2 Content   


 


ABG Base Excess   


 


ABG Hemoglobin   


 


ABG Carboxyhemoglobin   


 


POC ABG HHb (Measured)   


 


ABG Methemoglobin   


 


ABG O2 Capacity   


 


Efrem Test   


 


ABG Potassium   


 


VBG pH   


 


VBG pCO2   


 


VBG HCO3   


 


VBG Total CO2   


 


VBG O2 Sat (Calc)   


 


VBG Base Excess   


 


VBG Potassium   


 


A-a O2 Difference   


 


Hgb O2 Saturation   


 


Sodium   


 


Chloride   


 


Glucose   


 


Lactate   


 


FiO2   


 


Blood Gas Comments   


 


Crit Value Called To   


 


Crit Value Called By   


 


Crit Value Read Back   


 


Blood Gas Notified Time   


 


Potassium   


 


Carbon Dioxide   


 


Anion Gap   


 


BUN   


 


Creatinine   


 


Est GFR ( Amer)   


 


Est GFR (Non-Af Amer)   


 


POC Glucose (mg/dL)   147 H 


 


Random Glucose   


 


Lactic Acid    6.0 H*


 


Calcium   


 


Phosphorus   


 


Magnesium   


 


Total Bilirubin   


 


AST   


 


ALT   


 


Alkaline Phosphatase   


 


Ammonia  36  


 


Troponin I   


 


NT-Pro-B Natriuret Pep   


 


Total Protein   


 


Albumin   


 


Globulin   


 


Albumin/Globulin Ratio   


 


Arterial Blood Potassium   


 


Venous Blood Potassium   


 


Urine Color   


 


Urine Clarity   


 


Urine pH   


 


Ur Specific Gravity   


 


Urine Protein   


 


Urine Glucose (UA)   


 


Urine Ketones   


 


Urine Blood   


 


Urine Nitrate   


 


Urine Bilirubin   


 


Urine Urobilinogen   


 


Ur Leukocyte Esterase   


 


Urine RBC (Auto)   


 


Urine Microscopic WBC   


 


Ur Squamous Epith Cells   


 


Amorphous Sediment   


 


Urine Bacteria   


 


Hyaline Casts   


 


Urine Opiates Screen   


 


Urine Methadone Screen   


 


Ur Barbiturates Screen   


 


Ur Phencyclidine Scrn   


 


Ur Amphetamines Screen   


 


U Benzodiazepines Scrn   


 


U Oth Cocaine Metabols   


 


U Cannabinoids Screen   


 


Alcohol, Quantitative   














  02/23/19 02/23/19 02/24/19





  21:58 23:08 00:19


 


WBC   


 


RBC   


 


Hgb   


 


Hct   


 


MCV   


 


MCH   


 


MCHC   


 


RDW   


 


Plt Count   


 


MPV   


 


Neut % (Auto)   


 


Lymph % (Auto)   


 


Mono % (Auto)   


 


Eos % (Auto)   


 


Baso % (Auto)   


 


Neut # (Auto)   


 


Lymph # (Auto)   


 


Mono # (Auto)   


 


Eos # (Auto)   


 


Baso # (Auto)   


 


Neutrophils % (Manual)   


 


Lymphocytes % (Manual)   


 


Monocytes % (Manual)   


 


Eosinophils % (Manual)   


 


Platelet Estimate   


 


Large Platelets   


 


Hypochromasia (manual)   


 


Poikilocytosis (manual   


 


Anisocytosis (manual)   


 


Microcytosis (manual)   


 


Macrocytosis (manual)   


 


Target Cells   


 


Tear Drop Cells   


 


Ovalocytes   


 


Ester Cells   


 


PT   


 


INR   


 


APTT   


 


pCO2   48 H 


 


pO2   154 H 


 


HCO3   16.9 L 


 


ABG pH   7.18 L* 


 


ABG Total CO2   19.4 L 


 


ABG O2 Saturation   100.2 H 


 


ABG O2 Content   16.3 


 


ABG Base Excess   -10.3 L 


 


ABG Hemoglobin   11.8 


 


ABG Carboxyhemoglobin   1.9 H 


 


POC ABG HHb (Measured)   -0.2 L 


 


ABG Methemoglobin   1.9 


 


ABG O2 Capacity   16.3 


 


Efrem Test   Yes 


 


ABG Potassium   


 


VBG pH   


 


VBG pCO2   


 


VBG HCO3   


 


VBG Total CO2   


 


VBG O2 Sat (Calc)   


 


VBG Base Excess   


 


VBG Potassium   


 


A-a O2 Difference   -14.0 


 


Hgb O2 Saturation   96.4 


 


Sodium   


 


Chloride   


 


Glucose   


 


Lactate   


 


FiO2   28.0 


 


Blood Gas Comments   


 


Crit Value Called To   Lara nelly rn 


 


Crit Value Called By   333 


 


Crit Value Read Back   Y 


 


Blood Gas Notified Time   2320 


 


Potassium   


 


Carbon Dioxide   


 


Anion Gap   


 


BUN   


 


Creatinine   


 


Est GFR ( Amer)   


 


Est GFR (Non-Af Amer)   


 


POC Glucose (mg/dL)  132 H   151 H


 


Random Glucose   


 


Lactic Acid   


 


Calcium   


 


Phosphorus   


 


Magnesium   


 


Total Bilirubin   


 


AST   


 


ALT   


 


Alkaline Phosphatase   


 


Ammonia   


 


Troponin I   


 


NT-Pro-B Natriuret Pep   


 


Total Protein   


 


Albumin   


 


Globulin   


 


Albumin/Globulin Ratio   


 


Arterial Blood Potassium   


 


Venous Blood Potassium   


 


Urine Color   


 


Urine Clarity   


 


Urine pH   


 


Ur Specific Gravity   


 


Urine Protein   


 


Urine Glucose (UA)   


 


Urine Ketones   


 


Urine Blood   


 


Urine Nitrate   


 


Urine Bilirubin   


 


Urine Urobilinogen   


 


Ur Leukocyte Esterase   


 


Urine RBC (Auto)   


 


Urine Microscopic WBC   


 


Ur Squamous Epith Cells   


 


Amorphous Sediment   


 


Urine Bacteria   


 


Hyaline Casts   


 


Urine Opiates Screen   


 


Urine Methadone Screen   


 


Ur Barbiturates Screen   


 


Ur Phencyclidine Scrn   


 


Ur Amphetamines Screen   


 


U Benzodiazepines Scrn   


 


U Oth Cocaine Metabols   


 


U Cannabinoids Screen   


 


Alcohol, Quantitative   














  02/24/19 02/24/19 02/24/19





  02:11 04:10 04:50


 


WBC   


 


RBC   


 


Hgb   


 


Hct   


 


MCV   


 


MCH   


 


MCHC   


 


RDW   


 


Plt Count   


 


MPV   


 


Neut % (Auto)   


 


Lymph % (Auto)   


 


Mono % (Auto)   


 


Eos % (Auto)   


 


Baso % (Auto)   


 


Neut # (Auto)   


 


Lymph # (Auto)   


 


Mono # (Auto)   


 


Eos # (Auto)   


 


Baso # (Auto)   


 


Neutrophils % (Manual)   


 


Lymphocytes % (Manual)   


 


Monocytes % (Manual)   


 


Eosinophils % (Manual)   


 


Platelet Estimate   


 


Large Platelets   


 


Hypochromasia (manual)   


 


Poikilocytosis (manual   


 


Anisocytosis (manual)   


 


Microcytosis (manual)   


 


Macrocytosis (manual)   


 


Target Cells   


 


Tear Drop Cells   


 


Ovalocytes   


 


Ester Cells   


 


PT   


 


INR   


 


APTT   


 


pCO2   


 


pO2   


 


HCO3   


 


ABG pH   


 


ABG Total CO2   


 


ABG O2 Saturation   


 


ABG O2 Content   


 


ABG Base Excess   


 


ABG Hemoglobin   


 


ABG Carboxyhemoglobin   


 


POC ABG HHb (Measured)   


 


ABG Methemoglobin   


 


ABG O2 Capacity   


 


Efrem Test   


 


ABG Potassium   


 


VBG pH   


 


VBG pCO2   


 


VBG HCO3   


 


VBG Total CO2   


 


VBG O2 Sat (Calc)   


 


VBG Base Excess   


 


VBG Potassium   


 


A-a O2 Difference   


 


Hgb O2 Saturation   


 


Sodium   


 


Chloride   


 


Glucose   


 


Lactate   


 


FiO2   


 


Blood Gas Comments   


 


Crit Value Called To   


 


Crit Value Called By   


 


Crit Value Read Back   


 


Blood Gas Notified Time   


 


Potassium   


 


Carbon Dioxide   


 


Anion Gap   


 


BUN   


 


Creatinine   


 


Est GFR ( Amer)   


 


Est GFR (Non-Af Amer)   


 


POC Glucose (mg/dL)  188 H  148 H 


 


Random Glucose   


 


Lactic Acid    3.9 H


 


Calcium   


 


Phosphorus   


 


Magnesium   


 


Total Bilirubin   


 


AST   


 


ALT   


 


Alkaline Phosphatase   


 


Ammonia   


 


Troponin I   


 


NT-Pro-B Natriuret Pep   


 


Total Protein   


 


Albumin   


 


Globulin   


 


Albumin/Globulin Ratio   


 


Arterial Blood Potassium   


 


Venous Blood Potassium   


 


Urine Color   


 


Urine Clarity   


 


Urine pH   


 


Ur Specific Gravity   


 


Urine Protein   


 


Urine Glucose (UA)   


 


Urine Ketones   


 


Urine Blood   


 


Urine Nitrate   


 


Urine Bilirubin   


 


Urine Urobilinogen   


 


Ur Leukocyte Esterase   


 


Urine RBC (Auto)   


 


Urine Microscopic WBC   


 


Ur Squamous Epith Cells   


 


Amorphous Sediment   


 


Urine Bacteria   


 


Hyaline Casts   


 


Urine Opiates Screen   


 


Urine Methadone Screen   


 


Ur Barbiturates Screen   


 


Ur Phencyclidine Scrn   


 


Ur Amphetamines Screen   


 


U Benzodiazepines Scrn   


 


U Oth Cocaine Metabols   


 


U Cannabinoids Screen   


 


Alcohol, Quantitative   














  02/24/19 02/24/19 02/24/19





  04:50 04:50 05:12


 


WBC   31.5 H 


 


RBC   4.31 


 


Hgb   10.4 L 


 


Hct   34.0 


 


MCV   78.8 L 


 


MCH   24.1 L 


 


MCHC   30.6 L 


 


RDW   19.8 H 


 


Plt Count   138 


 


MPV   9.2 


 


Neut % (Auto)   91.4 H 


 


Lymph % (Auto)   3.4 L 


 


Mono % (Auto)   5.0 


 


Eos % (Auto)   0.0 


 


Baso % (Auto)   0.2 


 


Neut # (Auto)   28.8 H 


 


Lymph # (Auto)   1.1 


 


Mono # (Auto)   1.6 H 


 


Eos # (Auto)   0.0 


 


Baso # (Auto)   0.1 


 


Neutrophils % (Manual)   92 H 


 


Lymphocytes % (Manual)   4 L 


 


Monocytes % (Manual)   2 


 


Eosinophils % (Manual)   2 


 


Platelet Estimate   Normal 


 


Large Platelets   


 


Hypochromasia (manual)   Slight 


 


Poikilocytosis (manual   


 


Anisocytosis (manual)   Slight 


 


Microcytosis (manual)   Slight 


 


Macrocytosis (manual)   


 


Target Cells   Slight 


 


Tear Drop Cells   


 


Ovalocytes   


 


Ester Cells   Slight 


 


PT   


 


INR   


 


APTT   


 


pCO2    48 H


 


pO2    105 H


 


HCO3    19.7 L


 


ABG pH    7.24 L


 


ABG Total CO2    22.1


 


ABG O2 Saturation    99.8 H


 


ABG O2 Content    15.0


 


ABG Base Excess    -6.7 L


 


ABG Hemoglobin    11.0 L


 


ABG Carboxyhemoglobin    2.1 H


 


POC ABG HHb (Measured)    0.2


 


ABG Methemoglobin    1.7


 


ABG O2 Capacity    15.0 L


 


Efrem Test    Yes


 


ABG Potassium   


 


VBG pH   


 


VBG pCO2   


 


VBG HCO3   


 


VBG Total CO2   


 


VBG O2 Sat (Calc)   


 


VBG Base Excess   


 


VBG Potassium   


 


A-a O2 Difference    35.0


 


Hgb O2 Saturation    96.0


 


Sodium  138  


 


Chloride  101  


 


Glucose   


 


Lactate   


 


FiO2    28.0


 


Blood Gas Comments   


 


Crit Value Called To   


 


Crit Value Called By   


 


Crit Value Read Back   


 


Blood Gas Notified Time   


 


Potassium  4.8  


 


Carbon Dioxide  21 L  


 


Anion Gap  21 H  


 


BUN  31 H  


 


Creatinine  2.6 H  


 


Est GFR ( Amer)  23  


 


Est GFR (Non-Af Amer)  19  


 


POC Glucose (mg/dL)   


 


Random Glucose  231 H  


 


Lactic Acid   


 


Calcium  7.3 L  


 


Phosphorus   


 


Magnesium   


 


Total Bilirubin  2.3 H  


 


AST  6852 H  


 


ALT  3395 H  


 


Alkaline Phosphatase  124  


 


Ammonia   


 


Troponin I  3.6000 H*  


 


NT-Pro-B Natriuret Pep   


 


Total Protein  6.4  


 


Albumin  2.7 L D  


 


Globulin  3.6  


 


Albumin/Globulin Ratio  0.7 L  


 


Arterial Blood Potassium   


 


Venous Blood Potassium   


 


Urine Color   


 


Urine Clarity   


 


Urine pH   


 


Ur Specific Gravity   


 


Urine Protein   


 


Urine Glucose (UA)   


 


Urine Ketones   


 


Urine Blood   


 


Urine Nitrate   


 


Urine Bilirubin   


 


Urine Urobilinogen   


 


Ur Leukocyte Esterase   


 


Urine RBC (Auto)   


 


Urine Microscopic WBC   


 


Ur Squamous Epith Cells   


 


Amorphous Sediment   


 


Urine Bacteria   


 


Hyaline Casts   


 


Urine Opiates Screen   


 


Urine Methadone Screen   


 


Ur Barbiturates Screen   


 


Ur Phencyclidine Scrn   


 


Ur Amphetamines Screen   


 


U Benzodiazepines Scrn   


 


U Oth Cocaine Metabols   


 


U Cannabinoids Screen   


 


Alcohol, Quantitative   














  02/24/19 02/24/19





  05:46 07:54


 


WBC  


 


RBC  


 


Hgb  


 


Hct  


 


MCV  


 


MCH  


 


MCHC  


 


RDW  


 


Plt Count  


 


MPV  


 


Neut % (Auto)  


 


Lymph % (Auto)  


 


Mono % (Auto)  


 


Eos % (Auto)  


 


Baso % (Auto)  


 


Neut # (Auto)  


 


Lymph # (Auto)  


 


Mono # (Auto)  


 


Eos # (Auto)  


 


Baso # (Auto)  


 


Neutrophils % (Manual)  


 


Lymphocytes % (Manual)  


 


Monocytes % (Manual)  


 


Eosinophils % (Manual)  


 


Platelet Estimate  


 


Large Platelets  


 


Hypochromasia (manual)  


 


Poikilocytosis (manual  


 


Anisocytosis (manual)  


 


Microcytosis (manual)  


 


Macrocytosis (manual)  


 


Target Cells  


 


Tear Drop Cells  


 


Ovalocytes  


 


Ester Cells  


 


PT  


 


INR  


 


APTT  


 


pCO2  


 


pO2  


 


HCO3  


 


ABG pH  


 


ABG Total CO2  


 


ABG O2 Saturation  


 


ABG O2 Content  


 


ABG Base Excess  


 


ABG Hemoglobin  


 


ABG Carboxyhemoglobin  


 


POC ABG HHb (Measured)  


 


ABG Methemoglobin  


 


ABG O2 Capacity  


 


Efrem Test  


 


ABG Potassium  


 


VBG pH  


 


VBG pCO2  


 


VBG HCO3  


 


VBG Total CO2  


 


VBG O2 Sat (Calc)  


 


VBG Base Excess  


 


VBG Potassium  


 


A-a O2 Difference  


 


Hgb O2 Saturation  


 


Sodium  


 


Chloride  


 


Glucose  


 


Lactate  


 


FiO2  


 


Blood Gas Comments  


 


Crit Value Called To  


 


Crit Value Called By  


 


Crit Value Read Back  


 


Blood Gas Notified Time  


 


Potassium  


 


Carbon Dioxide  


 


Anion Gap  


 


BUN  


 


Creatinine  


 


Est GFR ( Amer)  


 


Est GFR (Non-Af Amer)  


 


POC Glucose (mg/dL)  331 H  160 H


 


Random Glucose  


 


Lactic Acid  


 


Calcium  


 


Phosphorus  


 


Magnesium  


 


Total Bilirubin  


 


AST  


 


ALT  


 


Alkaline Phosphatase  


 


Ammonia  


 


Troponin I  


 


NT-Pro-B Natriuret Pep  


 


Total Protein  


 


Albumin  


 


Globulin  


 


Albumin/Globulin Ratio  


 


Arterial Blood Potassium  


 


Venous Blood Potassium  


 


Urine Color  


 


Urine Clarity  


 


Urine pH  


 


Ur Specific Gravity  


 


Urine Protein  


 


Urine Glucose (UA)  


 


Urine Ketones  


 


Urine Blood  


 


Urine Nitrate  


 


Urine Bilirubin  


 


Urine Urobilinogen  


 


Ur Leukocyte Esterase  


 


Urine RBC (Auto)  


 


Urine Microscopic WBC  


 


Ur Squamous Epith Cells  


 


Amorphous Sediment  


 


Urine Bacteria  


 


Hyaline Casts  


 


Urine Opiates Screen  


 


Urine Methadone Screen  


 


Ur Barbiturates Screen  


 


Ur Phencyclidine Scrn  


 


Ur Amphetamines Screen  


 


U Benzodiazepines Scrn  


 


U Oth Cocaine Metabols  


 


U Cannabinoids Screen  


 


Alcohol, Quantitative  











Radiology Impressions: 


                              Radiology Impressions





Chest X-Ray  02/23/19 13:34


IMPRESSION:


No active disease. Resolved right lower lobe infiltrate.


 


 








Head CT  02/23/19 13:34


IMPRESSION:


No acute intracranial abnormalities. No significant findings to 


account for the clinical presentation.


 


Extensive postoperative changes described above. 


 


 











EKG/Cardiology Studies: 


Cardiology / EKG Studies





02/23/19 13:34


ELECTROCARDIOGRAM Stat 


   Comment: 


   Mode Of Transportation: 


   Reason For Exam: SOB





02/24/19


EKG [ELECTROCARDIOGRAM] Stat 


   Comment: 


   Mode Of Transportation: PORTABLE


   Reason For Exam: elevated trop's











Fingerstick Blood Sugar Results: 331





Assessment/Plan





- Assessment and Plan (Free Text)


Assessment: 





ASSESSMENT:





Altered Mental status 





    Possible causes are seizure, CVA, sepsis: work up being done. No hyercapnea,

 ammonia level is not high, drug screen is negative, had hypoglycemia, improved 

now 





Sepsis : So far no obvious source of infection. Possible aspiration PNA. C-diff 

colitis is another possibility,she has diarrhea, leukocytosis and was on 

antibiotics recently during her hospitalization. Cultures pending. 





SHANITA stage 3: Sepsis induced SHANITA/ATN : r/o obstruction, still oliguric after 5 

liters of IVF, r/o rhabdomylysis as seizure is a possibility and AST, ALT hare 

high too





ACS: Positive TNI and rising





A fib: Rate controlled, on Lovenox





Hypoglycemia : h/o DM , low BS , could be from sepsis or acute liver injury





h/o CVA





Drug abuse heroin ? As per ER record , pt told EMT she had consumed heroin but 

family does not know about it. 





Acute Liver Injury: Ischemic liver. 





Plan: 








PLAN:





1-Check CPK to r/o rhabdomyolysis


2-Blood culture, urine culture and stooll for  c-diff, result pending


3-EEG being done, neurology on board


4-Continue Vancomycin, Azactam and Flagyl, ID consult


5-On Lovenox, treatment dose, due to a fib and rising TNI


6- Cardiology consult


7-CT abdomen, to r/o hydronephrosis as cause of SHANITA and to r/o any other obvious

 abdominal pathology, in view of high liver enzymes and leukocytosi. GI         

 consult.


8-Continue slow IVF D5 1/2 NS

## 2019-02-24 NOTE — CP.PCM.CON
History of Present Illness





- History of Present Illness


History of Present Illness: 





58 year old female with a past medical history of HTN, stroke, CABG, MI, 

diabetes and anemia, who presents to the emergency department accompanied with 

EMS after patient was found unconscious at home. Patient used heroine and was 

found unresponsive by family members, who called 911. En route, EMS administered

2mg of Narcan nasally and 0.4 mg intravenously. Patient was also given 2 AMPs of

D50. Her initial sugar was 29 and her accucheck in the ER room was 138. 





Of note, patient was admitted on February 6th for asthma exacerbation. 





Pt remains obtunded ??








EKG: Atrial Flutter with varying block


no change from prior EKG's





Troponin: Positive


? secondary to ischemia


? secondary to total medical problems





PMH:


Asthma


CVA


CAD





?there is a midline sternal scar


suggestive of Cardiac surgery ??


? Brain surgery ??


Family does not know why???





family claims they know nothing about Heroin use














Review of Systems





- Review of Systems


Systems not reviewed;Unavailable: Altered Mental Status





Past Patient History





- Past Medical History & Family History


Past Medical History?: Yes





- Past Social History


Smoking Status: Former Smoker





- CARDIAC


Hx Cardiac Disorders: Yes


Other/Comment: CABG





- PULMONARY


Hx Respiratory Disorders: Yes


Hx Asthma: Yes


Hx Bronchitis: Yes


Hx Pneumonia: Yes





- NEUROLOGICAL


Hx Neurological Disorder: Yes


HX Cerebrovascular Accident: Yes





- HEENT


Hx HEENT Problems: Yes


Hx Cataracts: Yes





- RENAL


Hx Chronic Kidney Disease: No





- ENDOCRINE/METABOLIC


Hx Endocrine Disorders: No


Hx Diabetes Mellitus Type 2: Yes





- HEMATOLOGICAL/ONCOLOGICAL


Hx Blood Disorders: Yes


Hx Anemia: Yes





- INTEGUMENTARY


Hx Dermatological Problems: No





- MUSCULOSKELETAL/RHEUMATOLOGICAL


Hx Musculoskeletal Disorders: Yes


Hx Arthritis: Yes


Hx Falls: No





- GASTROINTESTINAL


Hx Gastrointestinal Disorders: Yes


Hx Constipation: Yes





- GENITOURINARY/GYNECOLOGICAL


Hx Genitourinary Disorders: No





- PSYCHIATRIC


Hx Psychophysiologic Disorder: No


Hx Substance Use: No





- SURGICAL HISTORY


Hx Surgeries: Yes


Hx Cholecystectomy: Yes


Hx Coronary Artery Bypass Graft: Yes


Other/Comment: Brain hematoma surgery x4





- ANESTHESIA


Hx Anesthesia: Yes


Hx Anesthesia Reactions: No





Meds


Allergies/Adverse Reactions: 


                                    Allergies











Allergy/AdvReac Type Severity Reaction Status Date / Time


 


Penicillins Allergy  RASH Verified 02/06/19 13:53


 


latex AdvReac  RASH Verified 02/06/19 13:53














- Medications


Medications: 


                               Current Medications





Albuterol/Ipratropium (Duoneb 3 Mg/0.5 Mg (3 Ml) Ud)  3 ml INH RQ6 PRN


   PRN Reason: Shortness of Breath


Enoxaparin Sodium (Lovenox)  100 mg SC DAILY DESIRAE; Protocol


Vancomycin HCl 1 gm/ Sodium (Chloride)  250 mls @ 166.667 mls/hr IVPB DAILY DESIRAE;

Protocol


Aztreonam 1 gm/ Sodium (Chloride)  100 mls @ 100 mls/hr IVPB Q8 DESIRAE; Protocol


   Last Admin: 02/24/19 00:25 Dose:  100 mls/hr


Metronidazole (Flagyl 500mg/100ml Ns)  100 mls @ 100 mls/hr IVPB Q8 DESIRAE; 

Protocol


   Last Admin: 02/24/19 00:24 Dose:  100 mls/hr


Levetiracetam 500 mg/ Sodium (Chloride)  105 mls @ 210 mls/hr IVPB Q12 DESIRAE


Dextrose/Sodium Chloride (Dextrose 5%/0.45% Ns 1000 Ml)  1,000 mls @ 50 mls/hr 

IV .Q20H DESIRAE


   Stop: 02/25/19 08:30











Physical Exam





- Constitutional


Appears: Toxic, Confused


Additional comments: 





Obtunded





- Respiratory Exam


Respiratory Exam: Decreased Breath Sounds





- Cardiovascular Exam


Cardiovascular Exam: Irregular Rhythm


Additional comments: 





Midline sternal scar 


suggestive of prior cardiac surgery





Results





- Vital Signs


Recent Vital Signs: 


                                Last Vital Signs











Temp  98.8 F   02/24/19 04:00


 


Pulse  67   02/24/19 06:00


 


Resp  13   02/24/19 06:00


 


BP  94/53 L  02/24/19 06:00


 


Pulse Ox  99   02/24/19 06:00














- Labs


Result Diagrams: 


                                 02/24/19 04:50





                                 02/24/19 04:50


Labs: 


                         Laboratory Results - last 24 hr











  02/23/19 02/23/19 02/23/19





  13:32 13:36 13:48


 


WBC   


 


RBC   


 


Hgb   


 


Hct   


 


MCV   


 


MCH   


 


MCHC   


 


RDW   


 


Plt Count   


 


MPV   


 


Neut % (Auto)   


 


Lymph % (Auto)   


 


Mono % (Auto)   


 


Eos % (Auto)   


 


Baso % (Auto)   


 


Neut # (Auto)   


 


Lymph # (Auto)   


 


Mono # (Auto)   


 


Eos # (Auto)   


 


Baso # (Auto)   


 


Neutrophils % (Manual)   


 


Lymphocytes % (Manual)   


 


Monocytes % (Manual)   


 


Eosinophils % (Manual)   


 


Platelet Estimate   


 


Large Platelets   


 


Hypochromasia (manual)   


 


Poikilocytosis (manual   


 


Anisocytosis (manual)   


 


Microcytosis (manual)   


 


Macrocytosis (manual)   


 


Target Cells   


 


Tear Drop Cells   


 


Ovalocytes   


 


Alto Cells   


 


PT   


 


INR   


 


APTT   


 


pCO2   39 


 


pO2   50 L 


 


HCO3   18.9 L 


 


ABG pH   7.29 L 


 


ABG Total CO2   20.0 L 


 


ABG O2 Saturation   88.8 L 


 


ABG O2 Content   


 


ABG Base Excess   -7.3 L 


 


ABG Hemoglobin   


 


ABG Carboxyhemoglobin   


 


POC ABG HHb (Measured)   


 


ABG Methemoglobin   


 


ABG O2 Capacity   


 


Efrem Test   Yes 


 


ABG Potassium   4.1 


 


VBG pH   


 


VBG pCO2   


 


VBG HCO3   


 


VBG Total CO2   


 


VBG O2 Sat (Calc)   


 


VBG Base Excess   


 


VBG Potassium   


 


A-a O2 Difference   51.0 


 


Hgb O2 Saturation   


 


Sodium   137.0  143


 


Chloride   105.0  101


 


Glucose   210 H 


 


Lactate   6.8 H* 


 


FiO2   21.0 


 


Blood Gas Comments   Ra 21 


 


Crit Value Called To   Dr sunita barker m.d. 


 


Crit Value Called By   Iram 


 


Crimagda Value Read Back   Y 


 


Blood Gas Notified Time   1350 


 


Potassium    4.1


 


Carbon Dioxide    22


 


Anion Gap    24 H


 


BUN    22 H


 


Creatinine    2.2 H


 


Est GFR ( Amer)    28


 


Est GFR (Non-Af Amer)    23


 


POC Glucose (mg/dL)  138 H  


 


Random Glucose    147 H


 


Lactic Acid   


 


Calcium    8.7


 


Phosphorus   


 


Magnesium   


 


Total Bilirubin    2.5 H


 


AST    1555 H


 


ALT    1303 H


 


Alkaline Phosphatase    127 H D


 


Ammonia   


 


Total Creatine Kinase   


 


Troponin I    0.2710 H*


 


NT-Pro-B Natriuret Pep    2820 H


 


Total Protein    7.5


 


Albumin    3.8


 


Globulin    3.8


 


Albumin/Globulin Ratio    1.0


 


Arterial Blood Potassium   4.1 


 


Venous Blood Potassium   


 


Urine Color   


 


Urine Clarity   


 


Urine pH   


 


Ur Specific Gravity   


 


Urine Protein   


 


Urine Glucose (UA)   


 


Urine Ketones   


 


Urine Blood   


 


Urine Nitrate   


 


Urine Bilirubin   


 


Urine Urobilinogen   


 


Ur Leukocyte Esterase   


 


Urine RBC (Auto)   


 


Urine Microscopic WBC   


 


Ur Squamous Epith Cells   


 


Amorphous Sediment   


 


Urine Bacteria   


 


Hyaline Casts   


 


Urine Opiates Screen   


 


Urine Methadone Screen   


 


Ur Barbiturates Screen   


 


Ur Phencyclidine Scrn   


 


Ur Amphetamines Screen   


 


U Benzodiazepines Scrn   


 


U Oth Cocaine Metabols   


 


U Cannabinoids Screen   


 


Alcohol, Quantitative    < 10














  02/23/19 02/23/19 02/23/19





  13:48 14:29 14:50


 


WBC  24.8 H D  


 


RBC  4.31  


 


Hgb  10.5 L  


 


Hct  34.7  


 


MCV  80.4 L D  


 


MCH  24.2 L  


 


MCHC  30.1 L  


 


RDW  20.1 H  


 


Plt Count  140  


 


MPV  8.2  


 


Neut % (Auto)  88.0 H  


 


Lymph % (Auto)  4.4 L  


 


Mono % (Auto)  7.4  


 


Eos % (Auto)  0.0  


 


Baso % (Auto)  0.2  


 


Neut # (Auto)  21.8 H  


 


Lymph # (Auto)  1.1  


 


Mono # (Auto)  1.8 H  


 


Eos # (Auto)  0.0  


 


Baso # (Auto)  0.1  


 


Neutrophils % (Manual)  83 H  


 


Lymphocytes % (Manual)  10 L  


 


Monocytes % (Manual)  7  


 


Eosinophils % (Manual)   


 


Platelet Estimate  Slightly decreased L  


 


Large Platelets  Present  


 


Hypochromasia (manual)  Slight  


 


Poikilocytosis (manual  Slight  


 


Anisocytosis (manual)  Moderate  


 


Microcytosis (manual)  Slight  


 


Macrocytosis (manual)  Slight  


 


Target Cells   


 


Tear Drop Cells  Slight  


 


Ovalocytes  Moderate  


 


Ester Cells   


 


PT   


 


INR   


 


APTT   


 


pCO2   


 


pO2   21 L 


 


HCO3   


 


ABG pH   


 


ABG Total CO2   


 


ABG O2 Saturation   


 


ABG O2 Content   


 


ABG Base Excess   


 


ABG Hemoglobin   


 


ABG Carboxyhemoglobin   


 


POC ABG HHb (Measured)   


 


ABG Methemoglobin   


 


ABG O2 Capacity   


 


Efrem Test   


 


ABG Potassium   


 


VBG pH   7.10 L* 


 


VBG pCO2   80 H* 


 


VBG HCO3   18.6 


 


VBG Total CO2   27.3 


 


VBG O2 Sat (Calc)   26.8 L 


 


VBG Base Excess   -6.0 L 


 


VBG Potassium   4.1 


 


A-a O2 Difference   


 


Hgb O2 Saturation   


 


Sodium   142.0 


 


Chloride   105.0 


 


Glucose   141 H 


 


Lactate   8.5 H* 


 


FiO2   28.0 


 


Blood Gas Comments   


 


Crit Value Called To   Dr sunita barker m.d. 


 


Crit Value Called By   Iram 


 


Crit Value Read Back   Y 


 


Blood Gas Notified Time   1452 


 


Potassium   


 


Carbon Dioxide   


 


Anion Gap   


 


BUN   


 


Creatinine   


 


Est GFR ( Amer)   


 


Est GFR (Non-Af Amer)   


 


POC Glucose (mg/dL)   


 


Random Glucose   


 


Lactic Acid   


 


Calcium   


 


Phosphorus   


 


Magnesium   


 


Total Bilirubin   


 


AST   


 


ALT   


 


Alkaline Phosphatase   


 


Ammonia   


 


Total Creatine Kinase   


 


Troponin I   


 


NT-Pro-B Natriuret Pep   


 


Total Protein   


 


Albumin   


 


Globulin   


 


Albumin/Globulin Ratio   


 


Arterial Blood Potassium   


 


Venous Blood Potassium   4.1 


 


Urine Color   


 


Urine Clarity   


 


Urine pH   


 


Ur Specific Gravity   


 


Urine Protein   


 


Urine Glucose (UA)   


 


Urine Ketones   


 


Urine Blood   


 


Urine Nitrate   


 


Urine Bilirubin   


 


Urine Urobilinogen   


 


Ur Leukocyte Esterase   


 


Urine RBC (Auto)   


 


Urine Microscopic WBC   


 


Ur Squamous Epith Cells   


 


Amorphous Sediment   


 


Urine Bacteria   


 


Hyaline Casts   


 


Urine Opiates Screen    Negative


 


Urine Methadone Screen    Negative


 


Ur Barbiturates Screen    Negative


 


Ur Phencyclidine Scrn    Negative


 


Ur Amphetamines Screen    Negative


 


U Benzodiazepines Scrn    Negative


 


U Oth Cocaine Metabols    Negative


 


U Cannabinoids Screen    Negative


 


Alcohol, Quantitative   














  02/23/19 02/23/19 02/23/19





  14:50 14:50 14:50


 


WBC   


 


RBC   


 


Hgb   


 


Hct   


 


MCV   


 


MCH   


 


MCHC   


 


RDW   


 


Plt Count   


 


MPV   


 


Neut % (Auto)   


 


Lymph % (Auto)   


 


Mono % (Auto)   


 


Eos % (Auto)   


 


Baso % (Auto)   


 


Neut # (Auto)   


 


Lymph # (Auto)   


 


Mono # (Auto)   


 


Eos # (Auto)   


 


Baso # (Auto)   


 


Neutrophils % (Manual)   


 


Lymphocytes % (Manual)   


 


Monocytes % (Manual)   


 


Eosinophils % (Manual)   


 


Platelet Estimate   


 


Large Platelets   


 


Hypochromasia (manual)   


 


Poikilocytosis (manual   


 


Anisocytosis (manual)   


 


Microcytosis (manual)   


 


Macrocytosis (manual)   


 


Target Cells   


 


Tear Drop Cells   


 


Ovalocytes   


 


Alto Cells   


 


PT    21.5 H


 


INR    1.9


 


APTT    44.5 H


 


pCO2   


 


pO2   


 


HCO3   


 


ABG pH   


 


ABG Total CO2   


 


ABG O2 Saturation   


 


ABG O2 Content   


 


ABG Base Excess   


 


ABG Hemoglobin   


 


ABG Carboxyhemoglobin   


 


POC ABG HHb (Measured)   


 


ABG Methemoglobin   


 


ABG O2 Capacity   


 


Efrem Test   


 


ABG Potassium   


 


VBG pH   


 


VBG pCO2   


 


VBG HCO3   


 


VBG Total CO2   


 


VBG O2 Sat (Calc)   


 


VBG Base Excess   


 


VBG Potassium   


 


A-a O2 Difference   


 


Hgb O2 Saturation   


 


Sodium   


 


Chloride   


 


Glucose   


 


Lactate   


 


FiO2   


 


Blood Gas Comments   


 


Crit Value Called To   


 


Crit Value Called By   


 


Crit Value Read Back   


 


Blood Gas Notified Time   


 


Potassium   


 


Carbon Dioxide   


 


Anion Gap   


 


BUN   


 


Creatinine   


 


Est GFR ( Amer)   


 


Est GFR (Non-Af Amer)   


 


POC Glucose (mg/dL)   


 


Random Glucose   


 


Lactic Acid   


 


Calcium   


 


Phosphorus   9.3 H 


 


Magnesium   2.0 


 


Total Bilirubin   


 


AST   


 


ALT   


 


Alkaline Phosphatase   


 


Ammonia   


 


Total Creatine Kinase   


 


Troponin I   


 


NT-Pro-B Natriuret Pep   


 


Total Protein   


 


Albumin   


 


Globulin   


 


Albumin/Globulin Ratio   


 


Arterial Blood Potassium   


 


Venous Blood Potassium   


 


Urine Color  Negrita  


 


Urine Clarity  Turbid  


 


Urine pH  6.0  


 


Ur Specific Gravity  1.013  


 


Urine Protein  100  


 


Urine Glucose (UA)  Neg  


 


Urine Ketones  Negative  


 


Urine Blood  Moderate  


 


Urine Nitrate  Negative  


 


Urine Bilirubin  Negative  


 


Urine Urobilinogen  4.0 H  


 


Ur Leukocyte Esterase  Neg  


 


Urine RBC (Auto)  6 H  


 


Urine Microscopic WBC  4  


 


Ur Squamous Epith Cells  2  


 


Amorphous Sediment  Few H  


 


Urine Bacteria  Occ H  


 


Hyaline Casts  11-20 H  


 


Urine Opiates Screen   


 


Urine Methadone Screen   


 


Ur Barbiturates Screen   


 


Ur Phencyclidine Scrn   


 


Ur Amphetamines Screen   


 


U Benzodiazepines Scrn   


 


U Oth Cocaine Metabols   


 


U Cannabinoids Screen   


 


Alcohol, Quantitative   














  02/23/19 02/23/19 02/23/19





  16:48 18:54 19:48


 


WBC   


 


RBC   


 


Hgb   


 


Hct   


 


MCV   


 


MCH   


 


MCHC   


 


RDW   


 


Plt Count   


 


MPV   


 


Neut % (Auto)   


 


Lymph % (Auto)   


 


Mono % (Auto)   


 


Eos % (Auto)   


 


Baso % (Auto)   


 


Neut # (Auto)   


 


Lymph # (Auto)   


 


Mono # (Auto)   


 


Eos # (Auto)   


 


Baso # (Auto)   


 


Neutrophils % (Manual)   


 


Lymphocytes % (Manual)   


 


Monocytes % (Manual)   


 


Eosinophils % (Manual)   


 


Platelet Estimate   


 


Large Platelets   


 


Hypochromasia (manual)   


 


Poikilocytosis (manual   


 


Anisocytosis (manual)   


 


Microcytosis (manual)   


 


Macrocytosis (manual)   


 


Target Cells   


 


Tear Drop Cells   


 


Ovalocytes   


 


Ester Cells   


 


PT   


 


INR   


 


APTT   


 


pCO2   


 


pO2  31  


 


HCO3   


 


ABG pH   


 


ABG Total CO2   


 


ABG O2 Saturation   


 


ABG O2 Content   


 


ABG Base Excess   


 


ABG Hemoglobin   


 


ABG Carboxyhemoglobin   


 


POC ABG HHb (Measured)   


 


ABG Methemoglobin   


 


ABG O2 Capacity   


 


Efrem Test   


 


ABG Potassium   


 


VBG pH  7.15 L*  


 


VBG pCO2  60  


 


VBG HCO3  16.6  


 


VBG Total CO2  22.7  


 


VBG O2 Sat (Calc)  54.4  


 


VBG Base Excess  -8.7 L  


 


VBG Potassium  4.2  


 


A-a O2 Difference   


 


Hgb O2 Saturation   


 


Sodium  138.0  


 


Chloride  105.0  


 


Glucose  105  


 


Lactate  7.8 H*  


 


FiO2  21.0  


 


Blood Gas Comments   


 


Crit Value Called To  Arnold daily md  


 


Crit Value Called By  Mayra casal ry  


 


Crit Value Read Back  Y  


 


Blood Gas Notified Time  1655  


 


Potassium   


 


Carbon Dioxide   


 


Anion Gap   


 


BUN   


 


Creatinine   


 


Est GFR ( Amer)   


 


Est GFR (Non-Af Amer)   


 


POC Glucose (mg/dL)   70 


 


Random Glucose   


 


Lactic Acid   


 


Calcium   


 


Phosphorus   


 


Magnesium   


 


Total Bilirubin   


 


AST   


 


ALT   


 


Alkaline Phosphatase   


 


Ammonia   


 


Total Creatine Kinase   


 


Troponin I    1.7800 H*


 


NT-Pro-B Natriuret Pep   


 


Total Protein   


 


Albumin   


 


Globulin   


 


Albumin/Globulin Ratio   


 


Arterial Blood Potassium   


 


Venous Blood Potassium  4.2  


 


Urine Color   


 


Urine Clarity   


 


Urine pH   


 


Ur Specific Gravity   


 


Urine Protein   


 


Urine Glucose (UA)   


 


Urine Ketones   


 


Urine Blood   


 


Urine Nitrate   


 


Urine Bilirubin   


 


Urine Urobilinogen   


 


Ur Leukocyte Esterase   


 


Urine RBC (Auto)   


 


Urine Microscopic WBC   


 


Ur Squamous Epith Cells   


 


Amorphous Sediment   


 


Urine Bacteria   


 


Hyaline Casts   


 


Urine Opiates Screen   


 


Urine Methadone Screen   


 


Ur Barbiturates Screen   


 


Ur Phencyclidine Scrn   


 


Ur Amphetamines Screen   


 


U Benzodiazepines Scrn   


 


U Oth Cocaine Metabols   


 


U Cannabinoids Screen   


 


Alcohol, Quantitative   














  02/23/19 02/23/19 02/23/19





  19:48 20:18 21:00


 


WBC   


 


RBC   


 


Hgb   


 


Hct   


 


MCV   


 


MCH   


 


MCHC   


 


RDW   


 


Plt Count   


 


MPV   


 


Neut % (Auto)   


 


Lymph % (Auto)   


 


Mono % (Auto)   


 


Eos % (Auto)   


 


Baso % (Auto)   


 


Neut # (Auto)   


 


Lymph # (Auto)   


 


Mono # (Auto)   


 


Eos # (Auto)   


 


Baso # (Auto)   


 


Neutrophils % (Manual)   


 


Lymphocytes % (Manual)   


 


Monocytes % (Manual)   


 


Eosinophils % (Manual)   


 


Platelet Estimate   


 


Large Platelets   


 


Hypochromasia (manual)   


 


Poikilocytosis (manual   


 


Anisocytosis (manual)   


 


Microcytosis (manual)   


 


Macrocytosis (manual)   


 


Target Cells   


 


Tear Drop Cells   


 


Ovalocytes   


 


Ester Cells   


 


PT   


 


INR   


 


APTT   


 


pCO2   


 


pO2   


 


HCO3   


 


ABG pH   


 


ABG Total CO2   


 


ABG O2 Saturation   


 


ABG O2 Content   


 


ABG Base Excess   


 


ABG Hemoglobin   


 


ABG Carboxyhemoglobin   


 


POC ABG HHb (Measured)   


 


ABG Methemoglobin   


 


ABG O2 Capacity   


 


Efrem Test   


 


ABG Potassium   


 


VBG pH   


 


VBG pCO2   


 


VBG HCO3   


 


VBG Total CO2   


 


VBG O2 Sat (Calc)   


 


VBG Base Excess   


 


VBG Potassium   


 


A-a O2 Difference   


 


Hgb O2 Saturation   


 


Sodium   


 


Chloride   


 


Glucose   


 


Lactate   


 


FiO2   


 


Blood Gas Comments   


 


Crit Value Called To   


 


Crit Value Called By   


 


Crit Value Read Back   


 


Blood Gas Notified Time   


 


Potassium   


 


Carbon Dioxide   


 


Anion Gap   


 


BUN   


 


Creatinine   


 


Est GFR ( Amer)   


 


Est GFR (Non-Af Amer)   


 


POC Glucose (mg/dL)   147 H 


 


Random Glucose   


 


Lactic Acid    6.0 H*


 


Calcium   


 


Phosphorus   


 


Magnesium   


 


Total Bilirubin   


 


AST   


 


ALT   


 


Alkaline Phosphatase   


 


Ammonia  36  


 


Total Creatine Kinase   


 


Troponin I   


 


NT-Pro-B Natriuret Pep   


 


Total Protein   


 


Albumin   


 


Globulin   


 


Albumin/Globulin Ratio   


 


Arterial Blood Potassium   


 


Venous Blood Potassium   


 


Urine Color   


 


Urine Clarity   


 


Urine pH   


 


Ur Specific Gravity   


 


Urine Protein   


 


Urine Glucose (UA)   


 


Urine Ketones   


 


Urine Blood   


 


Urine Nitrate   


 


Urine Bilirubin   


 


Urine Urobilinogen   


 


Ur Leukocyte Esterase   


 


Urine RBC (Auto)   


 


Urine Microscopic WBC   


 


Ur Squamous Epith Cells   


 


Amorphous Sediment   


 


Urine Bacteria   


 


Hyaline Casts   


 


Urine Opiates Screen   


 


Urine Methadone Screen   


 


Ur Barbiturates Screen   


 


Ur Phencyclidine Scrn   


 


Ur Amphetamines Screen   


 


U Benzodiazepines Scrn   


 


U Oth Cocaine Metabols   


 


U Cannabinoids Screen   


 


Alcohol, Quantitative   














  02/23/19 02/23/19 02/24/19





  21:58 23:08 00:19


 


WBC   


 


RBC   


 


Hgb   


 


Hct   


 


MCV   


 


MCH   


 


MCHC   


 


RDW   


 


Plt Count   


 


MPV   


 


Neut % (Auto)   


 


Lymph % (Auto)   


 


Mono % (Auto)   


 


Eos % (Auto)   


 


Baso % (Auto)   


 


Neut # (Auto)   


 


Lymph # (Auto)   


 


Mono # (Auto)   


 


Eos # (Auto)   


 


Baso # (Auto)   


 


Neutrophils % (Manual)   


 


Lymphocytes % (Manual)   


 


Monocytes % (Manual)   


 


Eosinophils % (Manual)   


 


Platelet Estimate   


 


Large Platelets   


 


Hypochromasia (manual)   


 


Poikilocytosis (manual   


 


Anisocytosis (manual)   


 


Microcytosis (manual)   


 


Macrocytosis (manual)   


 


Target Cells   


 


Tear Drop Cells   


 


Ovalocytes   


 


Ester Cells   


 


PT   


 


INR   


 


APTT   


 


pCO2   48 H 


 


pO2   154 H 


 


HCO3   16.9 L 


 


ABG pH   7.18 L* 


 


ABG Total CO2   19.4 L 


 


ABG O2 Saturation   100.2 H 


 


ABG O2 Content   16.3 


 


ABG Base Excess   -10.3 L 


 


ABG Hemoglobin   11.8 


 


ABG Carboxyhemoglobin   1.9 H 


 


POC ABG HHb (Measured)   -0.2 L 


 


ABG Methemoglobin   1.9 


 


ABG O2 Capacity   16.3 


 


Efrem Test   Yes 


 


ABG Potassium   


 


VBG pH   


 


VBG pCO2   


 


VBG HCO3   


 


VBG Total CO2   


 


VBG O2 Sat (Calc)   


 


VBG Base Excess   


 


VBG Potassium   


 


A-a O2 Difference   -14.0 


 


Hgb O2 Saturation   96.4 


 


Sodium   


 


Chloride   


 


Glucose   


 


Lactate   


 


FiO2   28.0 


 


Blood Gas Comments   


 


Crit Value Called To   Lara nelly rn 


 


Crit Value Called By   333 


 


Crit Value Read Back   Y 


 


Blood Gas Notified Time   2320 


 


Potassium   


 


Carbon Dioxide   


 


Anion Gap   


 


BUN   


 


Creatinine   


 


Est GFR ( Amer)   


 


Est GFR (Non-Af Amer)   


 


POC Glucose (mg/dL)  132 H   151 H


 


Random Glucose   


 


Lactic Acid   


 


Calcium   


 


Phosphorus   


 


Magnesium   


 


Total Bilirubin   


 


AST   


 


ALT   


 


Alkaline Phosphatase   


 


Ammonia   


 


Total Creatine Kinase   


 


Troponin I   


 


NT-Pro-B Natriuret Pep   


 


Total Protein   


 


Albumin   


 


Globulin   


 


Albumin/Globulin Ratio   


 


Arterial Blood Potassium   


 


Venous Blood Potassium   


 


Urine Color   


 


Urine Clarity   


 


Urine pH   


 


Ur Specific Gravity   


 


Urine Protein   


 


Urine Glucose (UA)   


 


Urine Ketones   


 


Urine Blood   


 


Urine Nitrate   


 


Urine Bilirubin   


 


Urine Urobilinogen   


 


Ur Leukocyte Esterase   


 


Urine RBC (Auto)   


 


Urine Microscopic WBC   


 


Ur Squamous Epith Cells   


 


Amorphous Sediment   


 


Urine Bacteria   


 


Hyaline Casts   


 


Urine Opiates Screen   


 


Urine Methadone Screen   


 


Ur Barbiturates Screen   


 


Ur Phencyclidine Scrn   


 


Ur Amphetamines Screen   


 


U Benzodiazepines Scrn   


 


U Oth Cocaine Metabols   


 


U Cannabinoids Screen   


 


Alcohol, Quantitative   














  02/24/19 02/24/19 02/24/19





  02:11 04:10 04:50


 


WBC   


 


RBC   


 


Hgb   


 


Hct   


 


MCV   


 


MCH   


 


MCHC   


 


RDW   


 


Plt Count   


 


MPV   


 


Neut % (Auto)   


 


Lymph % (Auto)   


 


Mono % (Auto)   


 


Eos % (Auto)   


 


Baso % (Auto)   


 


Neut # (Auto)   


 


Lymph # (Auto)   


 


Mono # (Auto)   


 


Eos # (Auto)   


 


Baso # (Auto)   


 


Neutrophils % (Manual)   


 


Lymphocytes % (Manual)   


 


Monocytes % (Manual)   


 


Eosinophils % (Manual)   


 


Platelet Estimate   


 


Large Platelets   


 


Hypochromasia (manual)   


 


Poikilocytosis (manual   


 


Anisocytosis (manual)   


 


Microcytosis (manual)   


 


Macrocytosis (manual)   


 


Target Cells   


 


Tear Drop Cells   


 


Ovalocytes   


 


Alto Cells   


 


PT   


 


INR   


 


APTT   


 


pCO2   


 


pO2   


 


HCO3   


 


ABG pH   


 


ABG Total CO2   


 


ABG O2 Saturation   


 


ABG O2 Content   


 


ABG Base Excess   


 


ABG Hemoglobin   


 


ABG Carboxyhemoglobin   


 


POC ABG HHb (Measured)   


 


ABG Methemoglobin   


 


ABG O2 Capacity   


 


Efrem Test   


 


ABG Potassium   


 


VBG pH   


 


VBG pCO2   


 


VBG HCO3   


 


VBG Total CO2   


 


VBG O2 Sat (Calc)   


 


VBG Base Excess   


 


VBG Potassium   


 


A-a O2 Difference   


 


Hgb O2 Saturation   


 


Sodium   


 


Chloride   


 


Glucose   


 


Lactate   


 


FiO2   


 


Blood Gas Comments   


 


Crit Value Called To   


 


Crit Value Called By   


 


Crit Value Read Back   


 


Blood Gas Notified Time   


 


Potassium   


 


Carbon Dioxide   


 


Anion Gap   


 


BUN   


 


Creatinine   


 


Est GFR ( Amer)   


 


Est GFR (Non-Af Amer)   


 


POC Glucose (mg/dL)  188 H  148 H 


 


Random Glucose   


 


Lactic Acid    3.9 H


 


Calcium   


 


Phosphorus   


 


Magnesium   


 


Total Bilirubin   


 


AST   


 


ALT   


 


Alkaline Phosphatase   


 


Ammonia   


 


Total Creatine Kinase   


 


Troponin I   


 


NT-Pro-B Natriuret Pep   


 


Total Protein   


 


Albumin   


 


Globulin   


 


Albumin/Globulin Ratio   


 


Arterial Blood Potassium   


 


Venous Blood Potassium   


 


Urine Color   


 


Urine Clarity   


 


Urine pH   


 


Ur Specific Gravity   


 


Urine Protein   


 


Urine Glucose (UA)   


 


Urine Ketones   


 


Urine Blood   


 


Urine Nitrate   


 


Urine Bilirubin   


 


Urine Urobilinogen   


 


Ur Leukocyte Esterase   


 


Urine RBC (Auto)   


 


Urine Microscopic WBC   


 


Ur Squamous Epith Cells   


 


Amorphous Sediment   


 


Urine Bacteria   


 


Hyaline Casts   


 


Urine Opiates Screen   


 


Urine Methadone Screen   


 


Ur Barbiturates Screen   


 


Ur Phencyclidine Scrn   


 


Ur Amphetamines Screen   


 


U Benzodiazepines Scrn   


 


U Oth Cocaine Metabols   


 


U Cannabinoids Screen   


 


Alcohol, Quantitative   














  02/24/19 02/24/19 02/24/19





  04:50 04:50 05:12


 


WBC   31.5 H 


 


RBC   4.31 


 


Hgb   10.4 L 


 


Hct   34.0 


 


MCV   78.8 L 


 


MCH   24.1 L 


 


MCHC   30.6 L 


 


RDW   19.8 H 


 


Plt Count   138 


 


MPV   9.2 


 


Neut % (Auto)   91.4 H 


 


Lymph % (Auto)   3.4 L 


 


Mono % (Auto)   5.0 


 


Eos % (Auto)   0.0 


 


Baso % (Auto)   0.2 


 


Neut # (Auto)   28.8 H 


 


Lymph # (Auto)   1.1 


 


Mono # (Auto)   1.6 H 


 


Eos # (Auto)   0.0 


 


Baso # (Auto)   0.1 


 


Neutrophils % (Manual)   92 H 


 


Lymphocytes % (Manual)   4 L 


 


Monocytes % (Manual)   2 


 


Eosinophils % (Manual)   2 


 


Platelet Estimate   Normal 


 


Large Platelets   


 


Hypochromasia (manual)   Slight 


 


Poikilocytosis (manual   


 


Anisocytosis (manual)   Slight 


 


Microcytosis (manual)   Slight 


 


Macrocytosis (manual)   


 


Target Cells   Slight 


 


Tear Drop Cells   


 


Ovalocytes   


 


Alto Cells   Slight 


 


PT   


 


INR   


 


APTT   


 


pCO2    48 H


 


pO2    105 H


 


HCO3    19.7 L


 


ABG pH    7.24 L


 


ABG Total CO2    22.1


 


ABG O2 Saturation    99.8 H


 


ABG O2 Content    15.0


 


ABG Base Excess    -6.7 L


 


ABG Hemoglobin    11.0 L


 


ABG Carboxyhemoglobin    2.1 H


 


POC ABG HHb (Measured)    0.2


 


ABG Methemoglobin    1.7


 


ABG O2 Capacity    15.0 L


 


Efrem Test    Yes


 


ABG Potassium   


 


VBG pH   


 


VBG pCO2   


 


VBG HCO3   


 


VBG Total CO2   


 


VBG O2 Sat (Calc)   


 


VBG Base Excess   


 


VBG Potassium   


 


A-a O2 Difference    35.0


 


Hgb O2 Saturation    96.0


 


Sodium  138  


 


Chloride  101  


 


Glucose   


 


Lactate   


 


FiO2    28.0


 


Blood Gas Comments   


 


Crit Value Called To   


 


Crit Value Called By   


 


Crit Value Read Back   


 


Blood Gas Notified Time   


 


Potassium  4.8  


 


Carbon Dioxide  21 L  


 


Anion Gap  21 H  


 


BUN  31 H  


 


Creatinine  2.6 H  


 


Est GFR ( Amer)  23  


 


Est GFR (Non-Af Amer)  19  


 


POC Glucose (mg/dL)   


 


Random Glucose  231 H  


 


Lactic Acid   


 


Calcium  7.3 L  


 


Phosphorus   


 


Magnesium   


 


Total Bilirubin  2.3 H  


 


AST  6852 H  


 


ALT  3395 H  


 


Alkaline Phosphatase  124  


 


Ammonia   


 


Total Creatine Kinase   


 


Troponin I  3.6000 H*  


 


NT-Pro-B Natriuret Pep   


 


Total Protein  6.4  


 


Albumin  2.7 L D  


 


Globulin  3.6  


 


Albumin/Globulin Ratio  0.7 L  


 


Arterial Blood Potassium   


 


Venous Blood Potassium   


 


Urine Color   


 


Urine Clarity   


 


Urine pH   


 


Ur Specific Gravity   


 


Urine Protein   


 


Urine Glucose (UA)   


 


Urine Ketones   


 


Urine Blood   


 


Urine Nitrate   


 


Urine Bilirubin   


 


Urine Urobilinogen   


 


Ur Leukocyte Esterase   


 


Urine RBC (Auto)   


 


Urine Microscopic WBC   


 


Ur Squamous Epith Cells   


 


Amorphous Sediment   


 


Urine Bacteria   


 


Hyaline Casts   


 


Urine Opiates Screen   


 


Urine Methadone Screen   


 


Ur Barbiturates Screen   


 


Ur Phencyclidine Scrn   


 


Ur Amphetamines Screen   


 


U Benzodiazepines Scrn   


 


U Oth Cocaine Metabols   


 


U Cannabinoids Screen   


 


Alcohol, Quantitative   














  02/24/19 02/24/19 02/24/19





  05:46 07:53 07:54


 


WBC   


 


RBC   


 


Hgb   


 


Hct   


 


MCV   


 


MCH   


 


MCHC   


 


RDW   


 


Plt Count   


 


MPV   


 


Neut % (Auto)   


 


Lymph % (Auto)   


 


Mono % (Auto)   


 


Eos % (Auto)   


 


Baso % (Auto)   


 


Neut # (Auto)   


 


Lymph # (Auto)   


 


Mono # (Auto)   


 


Eos # (Auto)   


 


Baso # (Auto)   


 


Neutrophils % (Manual)   


 


Lymphocytes % (Manual)   


 


Monocytes % (Manual)   


 


Eosinophils % (Manual)   


 


Platelet Estimate   


 


Large Platelets   


 


Hypochromasia (manual)   


 


Poikilocytosis (manual   


 


Anisocytosis (manual)   


 


Microcytosis (manual)   


 


Macrocytosis (manual)   


 


Target Cells   


 


Tear Drop Cells   


 


Ovalocytes   


 


Ester Cells   


 


PT   


 


INR   


 


APTT   


 


pCO2   


 


pO2   


 


HCO3   


 


ABG pH   


 


ABG Total CO2   


 


ABG O2 Saturation   


 


ABG O2 Content   


 


ABG Base Excess   


 


ABG Hemoglobin   


 


ABG Carboxyhemoglobin   


 


POC ABG HHb (Measured)   


 


ABG Methemoglobin   


 


ABG O2 Capacity   


 


Efrem Test   


 


ABG Potassium   


 


VBG pH   


 


VBG pCO2   


 


VBG HCO3   


 


VBG Total CO2   


 


VBG O2 Sat (Calc)   


 


VBG Base Excess   


 


VBG Potassium   


 


A-a O2 Difference   


 


Hgb O2 Saturation   


 


Sodium   


 


Chloride   


 


Glucose   


 


Lactate   


 


FiO2   


 


Blood Gas Comments   


 


Crit Value Called To   


 


Crit Value Called By   


 


Crit Value Read Back   


 


Blood Gas Notified Time   


 


Potassium   


 


Carbon Dioxide   


 


Anion Gap   


 


BUN   


 


Creatinine   


 


Est GFR ( Amer)   


 


Est GFR (Non-Af Amer)   


 


POC Glucose (mg/dL)  331 H   160 H


 


Random Glucose   


 


Lactic Acid   


 


Calcium   


 


Phosphorus   


 


Magnesium   


 


Total Bilirubin   


 


AST   


 


ALT   


 


Alkaline Phosphatase   


 


Ammonia   


 


Total Creatine Kinase   1116 H 


 


Troponin I   


 


NT-Pro-B Natriuret Pep   


 


Total Protein   


 


Albumin   


 


Globulin   


 


Albumin/Globulin Ratio   


 


Arterial Blood Potassium   


 


Venous Blood Potassium   


 


Urine Color   


 


Urine Clarity   


 


Urine pH   


 


Ur Specific Gravity   


 


Urine Protein   


 


Urine Glucose (UA)   


 


Urine Ketones   


 


Urine Blood   


 


Urine Nitrate   


 


Urine Bilirubin   


 


Urine Urobilinogen   


 


Ur Leukocyte Esterase   


 


Urine RBC (Auto)   


 


Urine Microscopic WBC   


 


Ur Squamous Epith Cells   


 


Amorphous Sediment   


 


Urine Bacteria   


 


Hyaline Casts   


 


Urine Opiates Screen   


 


Urine Methadone Screen   


 


Ur Barbiturates Screen   


 


Ur Phencyclidine Scrn   


 


Ur Amphetamines Screen   


 


U Benzodiazepines Scrn   


 


U Oth Cocaine Metabols   


 


U Cannabinoids Screen   


 


Alcohol, Quantitative   














Assessment & Plan


(1) Sepsis


Status: Acute   





(2) Change in mental state


Status: Acute   





(3) History of CVA with residual deficit


Status: Acute   





(4) History of CVA (cerebrovascular accident)


Status: Chronic   





(5) Elevated troponin level


Assessment and Plan: 


etiology undetermined as yet


Status: Acute   





(6) Elevated liver enzymes


Status: Acute   





(7) Leukocytosis


Status: Acute   





(8) Creatinine elevation


Status: Acute

## 2019-02-24 NOTE — CP.PCM.CON
History of Present Illness





- History of Present Illness


History of Present Illness: 





59 yo female with h/o CVA, DM, hypertension, and pneumonia found unresponsive at

home.She had been verbal and coherent prior to that. Was visited by her son 12 

hours hours earlier. Was found by EMS to be very hypoglycemic and given D50.





Review of Systems





- Review of Systems


Systems not reviewed;Unavailable: Altered Mental Status





Past Patient History





- Past Medical History & Family History


Past Medical History?: Yes





- Past Social History


Smoking Status: Former Smoker





- CARDIAC


Hx Cardiac Disorders: Yes


Other/Comment: CABG





- PULMONARY


Hx Respiratory Disorders: Yes


Hx Asthma: Yes


Hx Bronchitis: Yes


Hx Pneumonia: Yes





- NEUROLOGICAL


Hx Neurological Disorder: Yes


HX Cerebrovascular Accident: Yes





- HEENT


Hx HEENT Problems: Yes


Hx Cataracts: Yes





- RENAL


Hx Chronic Kidney Disease: No





- ENDOCRINE/METABOLIC


Hx Endocrine Disorders: No


Hx Diabetes Mellitus Type 2: Yes





- HEMATOLOGICAL/ONCOLOGICAL


Hx Blood Disorders: Yes


Hx Anemia: Yes





- INTEGUMENTARY


Hx Dermatological Problems: No





- MUSCULOSKELETAL/RHEUMATOLOGICAL


Hx Musculoskeletal Disorders: Yes


Hx Arthritis: Yes


Hx Falls: No





- GASTROINTESTINAL


Hx Gastrointestinal Disorders: Yes


Hx Constipation: Yes





- GENITOURINARY/GYNECOLOGICAL


Hx Genitourinary Disorders: No





- PSYCHIATRIC


Hx Psychophysiologic Disorder: No


Hx Substance Use: No





- SURGICAL HISTORY


Hx Surgeries: Yes


Hx Cholecystectomy: Yes


Hx Coronary Artery Bypass Graft: Yes


Other/Comment: Brain hematoma surgery x4





- ANESTHESIA


Hx Anesthesia: Yes


Hx Anesthesia Reactions: No





Meds


Allergies/Adverse Reactions: 


                                    Allergies











Allergy/AdvReac Type Severity Reaction Status Date / Time


 


Penicillins Allergy  RASH Verified 02/06/19 13:53


 


latex AdvReac  RASH Verified 02/06/19 13:53














- Medications


Medications: 


                               Current Medications





Albuterol/Ipratropium (Duoneb 3 Mg/0.5 Mg (3 Ml) Ud)  3 ml INH RQ6 PRN


   PRN Reason: Shortness of Breath


Enoxaparin Sodium (Lovenox)  100 mg SC DAILY DESIRAE; Protocol


   Last Admin: 02/24/19 08:51 Dose:  100 mg


Vancomycin HCl 1 gm/ Sodium (Chloride)  250 mls @ 166.667 mls/hr IVPB DAILY DESIRAE;

Protocol


   Last Admin: 02/24/19 08:51 Dose:  166.667 mls/hr


Metronidazole (Flagyl 500mg/100ml Ns)  100 mls @ 100 mls/hr IVPB Q8 DESIRAE; 

Protocol


   Last Admin: 02/24/19 16:15 Dose:  100 mls/hr


Levetiracetam 500 mg/ Sodium (Chloride)  105 mls @ 210 mls/hr IVPB Q12 DESIRAE


   Last Admin: 02/24/19 20:27 Dose:  210 mls/hr


Dextrose/Sodium Chloride (Dextrose 5%/0.45% Ns 1000 Ml)  1,000 mls @ 50 mls/hr 

IV .Q20H DESIRAE


   Stop: 02/25/19 08:30


   Last Admin: 02/24/19 08:49 Dose:  50 mls/hr


Acyclovir 600 mg/ Sodium (Chloride)  100 mls @ 100 mls/hr IV Q24H DESIRAE; Protocol


   Last Admin: 02/24/19 17:08 Dose:  100 mls/hr


Ceftriaxone Sodium 2 gm/ (Sodium Chloride)  100 mls @ 100 mls/hr IVPB DAILY DESIRAE;

Protocol


   Last Admin: 02/24/19 17:08 Dose:  100 mls/hr











Physical Exam





- Head Exam


Head Exam: ATRAUMATIC





- Eye Exam


Eye Exam: PERRL





- ENT Exam


ENT Exam: Normal Exam





- Neck Exam


Neck exam: Positive for: Normal Inspection





- Respiratory Exam


Respiratory Exam: Clear to Auscultation Bilateral





- Cardiovascular Exam


Cardiovascular Exam: REGULAR RHYTHM, +S1, +S2





- GI/Abdominal Exam


GI & Abdominal Exam: Distended, Hypoactive Bowel Sounds, Soft





- Rectal Exam


Rectal Exam: NORMAL INSPECTION





Results





- Vital Signs


Recent Vital Signs: 


                                Last Vital Signs











Temp  99 F   02/24/19 20:00


 


Pulse  77   02/24/19 20:00


 


Resp  13   02/24/19 20:00


 


BP  110/56 L  02/24/19 21:08


 


Pulse Ox  95   02/24/19 20:00














- Labs


Result Diagrams: 


                                 02/24/19 04:50





                                 02/24/19 04:50


Labs: 


                         Laboratory Results - last 24 hr











  02/23/19 02/23/19 02/24/19





  10:00 23:08 00:19


 


WBC   


 


RBC   


 


Hgb   


 


Hct   


 


MCV   


 


MCH   


 


MCHC   


 


RDW   


 


Plt Count   


 


MPV   


 


Neut % (Auto)   


 


Lymph % (Auto)   


 


Mono % (Auto)   


 


Eos % (Auto)   


 


Baso % (Auto)   


 


Neut # (Auto)   


 


Lymph # (Auto)   


 


Mono # (Auto)   


 


Eos # (Auto)   


 


Baso # (Auto)   


 


Neutrophils % (Manual)   


 


Lymphocytes % (Manual)   


 


Monocytes % (Manual)   


 


Eosinophils % (Manual)   


 


Platelet Estimate   


 


Hypochromasia (manual)   


 


Anisocytosis (manual)   


 


Microcytosis (manual)   


 


Target Cells   


 


[place] Cells   


 


PT   


 


INR   


 


pCO2   48 H 


 


pO2   154 H 


 


HCO3   16.9 L 


 


ABG pH   7.18 L* 


 


ABG Total CO2   19.4 L 


 


ABG O2 Saturation   100.2 H 


 


ABG O2 Content   16.3 


 


ABG Base Excess   -10.3 L 


 


ABG Hemoglobin   11.8 


 


ABG Carboxyhemoglobin   1.9 H 


 


POC ABG HHb (Measured)   -0.2 L 


 


ABG Methemoglobin   1.9 


 


ABG O2 Capacity   16.3 


 


Efrem Test   Yes 


 


ABG Potassium   


 


A-a O2 Difference   -14.0 


 


Hgb O2 Saturation   96.4 


 


Glucose   


 


Lactate   


 


FiO2   28.0 


 


Crit Value Called To   Lara nelly shanks 


 


Crit Value Called By   333 


 


Crit Value Read Back   Y 


 


Blood Gas Notified Time   2320 


 


Sodium   


 


Potassium   


 


Chloride   


 


Carbon Dioxide   


 


Anion Gap   


 


BUN   


 


Creatinine   


 


Est GFR ( Amer)   


 


Est GFR (Non-Af Amer)   


 


POC Glucose (mg/dL)    151 H


 


Random Glucose   


 


Hemoglobin A1c   


 


Lactic Acid   


 


Calcium   


 


Total Bilirubin   


 


AST   


 


ALT   


 


Alkaline Phosphatase   


 


Ammonia   


 


Total Creatine Kinase   


 


Troponin I   


 


Total Protein   


 


Albumin   


 


Globulin   


 


Albumin/Globulin Ratio   


 


Arterial Blood Potassium   


 


C. difficile Ag & Toxin  Negative  


 


Influenza Typ A,B (EIA)   














  02/24/19 02/24/19 02/24/19





  02:11 04:10 04:50


 


WBC   


 


RBC   


 


Hgb   


 


Hct   


 


MCV   


 


MCH   


 


MCHC   


 


RDW   


 


Plt Count   


 


MPV   


 


Neut % (Auto)   


 


Lymph % (Auto)   


 


Mono % (Auto)   


 


Eos % (Auto)   


 


Baso % (Auto)   


 


Neut # (Auto)   


 


Lymph # (Auto)   


 


Mono # (Auto)   


 


Eos # (Auto)   


 


Baso # (Auto)   


 


Neutrophils % (Manual)   


 


Lymphocytes % (Manual)   


 


Monocytes % (Manual)   


 


Eosinophils % (Manual)   


 


Platelet Estimate   


 


Hypochromasia (manual)   


 


Anisocytosis (manual)   


 


Microcytosis (manual)   


 


Target Cells   


 


[place] Cells   


 


PT   


 


INR   


 


pCO2   


 


pO2   


 


HCO3   


 


ABG pH   


 


ABG Total CO2   


 


ABG O2 Saturation   


 


ABG O2 Content   


 


ABG Base Excess   


 


ABG Hemoglobin   


 


ABG Carboxyhemoglobin   


 


POC ABG HHb (Measured)   


 


ABG Methemoglobin   


 


ABG O2 Capacity   


 


Efrem Test   


 


ABG Potassium   


 


A-a O2 Difference   


 


Hgb O2 Saturation   


 


Glucose   


 


Lactate   


 


FiO2   


 


Crit Value Called To   


 


Crit Value Called By   


 


Crit Value Read Back   


 


Blood Gas Notified Time   


 


Sodium   


 


Potassium   


 


Chloride   


 


Carbon Dioxide   


 


Anion Gap   


 


BUN   


 


Creatinine   


 


Est GFR ( Amer)   


 


Est GFR (Non-Af Amer)   


 


POC Glucose (mg/dL)  188 H  148 H 


 


Random Glucose   


 


Hemoglobin A1c   


 


Lactic Acid    3.9 H


 


Calcium   


 


Total Bilirubin   


 


AST   


 


ALT   


 


Alkaline Phosphatase   


 


Ammonia   


 


Total Creatine Kinase   


 


Troponin I   


 


Total Protein   


 


Albumin   


 


Globulin   


 


Albumin/Globulin Ratio   


 


Arterial Blood Potassium   


 


C. difficile Ag & Toxin   


 


Influenza Typ A,B (EIA)   














  02/24/19 02/24/19 02/24/19





  04:50 04:50 04:50


 


WBC   31.5 H 


 


RBC   4.31 


 


Hgb   10.4 L 


 


Hct   34.0 


 


MCV   78.8 L 


 


MCH   24.1 L 


 


MCHC   30.6 L 


 


RDW   19.8 H 


 


Plt Count   138 


 


MPV   9.2 


 


Neut % (Auto)   91.4 H 


 


Lymph % (Auto)   3.4 L 


 


Mono % (Auto)   5.0 


 


Eos % (Auto)   0.0 


 


Baso % (Auto)   0.2 


 


Neut # (Auto)   28.8 H 


 


Lymph # (Auto)   1.1 


 


Mono # (Auto)   1.6 H 


 


Eos # (Auto)   0.0 


 


Baso # (Auto)   0.1 


 


Neutrophils % (Manual)   92 H 


 


Lymphocytes % (Manual)   4 L 


 


Monocytes % (Manual)   2 


 


Eosinophils % (Manual)   2 


 


Platelet Estimate   Normal 


 


Hypochromasia (manual)   Slight 


 


Anisocytosis (manual)   Slight 


 


Microcytosis (manual)   Slight 


 


Target Cells   Slight 


 


[place] Cells   Slight 


 


PT   


 


INR   


 


pCO2   


 


pO2   


 


HCO3   


 


ABG pH   


 


ABG Total CO2   


 


ABG O2 Saturation   


 


ABG O2 Content   


 


ABG Base Excess   


 


ABG Hemoglobin   


 


ABG Carboxyhemoglobin   


 


POC ABG HHb (Measured)   


 


ABG Methemoglobin   


 


ABG O2 Capacity   


 


Efrem Test   


 


ABG Potassium   


 


A-a O2 Difference   


 


Hgb O2 Saturation   


 


Glucose   


 


Lactate   


 


FiO2   


 


Crit Value Called To   


 


Crit Value Called By   


 


Crit Value Read Back   


 


Blood Gas Notified Time   


 


Sodium  138  


 


Potassium  4.8  


 


Chloride  101  


 


Carbon Dioxide  21 L  


 


Anion Gap  21 H  


 


BUN  31 H  


 


Creatinine  2.6 H  


 


Est GFR ( Amer)  23  


 


Est GFR (Non-Af Amer)  19  


 


POC Glucose (mg/dL)   


 


Random Glucose  231 H  


 


Hemoglobin A1c    6.2


 


Lactic Acid   


 


Calcium  7.3 L  


 


Total Bilirubin  2.3 H  


 


AST  6852 H  


 


ALT  3395 H  


 


Alkaline Phosphatase  124  


 


Ammonia   


 


Total Creatine Kinase   


 


Troponin I  3.6000 H*  


 


Total Protein  6.4  


 


Albumin  2.7 L D  


 


Globulin  3.6  


 


Albumin/Globulin Ratio  0.7 L  


 


Arterial Blood Potassium   


 


C. difficile Ag & Toxin   


 


Influenza Typ A,B (EIA)   














  02/24/19 02/24/19 02/24/19





  05:12 05:46 07:53


 


WBC   


 


RBC   


 


Hgb   


 


Hct   


 


MCV   


 


MCH   


 


MCHC   


 


RDW   


 


Plt Count   


 


MPV   


 


Neut % (Auto)   


 


Lymph % (Auto)   


 


Mono % (Auto)   


 


Eos % (Auto)   


 


Baso % (Auto)   


 


Neut # (Auto)   


 


Lymph # (Auto)   


 


Mono # (Auto)   


 


Eos # (Auto)   


 


Baso # (Auto)   


 


Neutrophils % (Manual)   


 


Lymphocytes % (Manual)   


 


Monocytes % (Manual)   


 


Eosinophils % (Manual)   


 


Platelet Estimate   


 


Hypochromasia (manual)   


 


Anisocytosis (manual)   


 


Microcytosis (manual)   


 


Target Cells   


 


[place] Cells   


 


PT   


 


INR   


 


pCO2  48 H  


 


pO2  105 H  


 


HCO3  19.7 L  


 


ABG pH  7.24 L  


 


ABG Total CO2  22.1  


 


ABG O2 Saturation  99.8 H  


 


ABG O2 Content  15.0  


 


ABG Base Excess  -6.7 L  


 


ABG Hemoglobin  11.0 L  


 


ABG Carboxyhemoglobin  2.1 H  


 


POC ABG HHb (Measured)  0.2  


 


ABG Methemoglobin  1.7  


 


ABG O2 Capacity  15.0 L  


 


Efrem Test  Yes  


 


ABG Potassium   


 


A-a O2 Difference  35.0  


 


Hgb O2 Saturation  96.0  


 


Glucose   


 


Lactate   


 


FiO2  28.0  


 


Crit Value Called To   


 


Crit Value Called By   


 


Crit Value Read Back   


 


Blood Gas Notified Time   


 


Sodium   


 


Potassium   


 


Chloride   


 


Carbon Dioxide   


 


Anion Gap   


 


BUN   


 


Creatinine   


 


Est GFR ( Amer)   


 


Est GFR (Non-Af Amer)   


 


POC Glucose (mg/dL)   331 H 


 


Random Glucose   


 


Hemoglobin A1c   


 


Lactic Acid   


 


Calcium   


 


Total Bilirubin   


 


AST   


 


ALT   


 


Alkaline Phosphatase   


 


Ammonia   


 


Total Creatine Kinase    1116 H


 


Troponin I   


 


Total Protein   


 


Albumin   


 


Globulin   


 


Albumin/Globulin Ratio   


 


Arterial Blood Potassium   


 


C. difficile Ag & Toxin   


 


Influenza Typ A,B (EIA)   














  02/24/19 02/24/19 02/24/19





  07:54 09:54 11:11


 


WBC   


 


RBC   


 


Hgb   


 


Hct   


 


MCV   


 


MCH   


 


MCHC   


 


RDW   


 


Plt Count   


 


MPV   


 


Neut % (Auto)   


 


Lymph % (Auto)   


 


Mono % (Auto)   


 


Eos % (Auto)   


 


Baso % (Auto)   


 


Neut # (Auto)   


 


Lymph # (Auto)   


 


Mono # (Auto)   


 


Eos # (Auto)   


 


Baso # (Auto)   


 


Neutrophils % (Manual)   


 


Lymphocytes % (Manual)   


 


Monocytes % (Manual)   


 


Eosinophils % (Manual)   


 


Platelet Estimate   


 


Hypochromasia (manual)   


 


Anisocytosis (manual)   


 


Microcytosis (manual)   


 


Target Cells   


 


[place] Cells   


 


PT   


 


INR   


 


pCO2   


 


pO2   


 


HCO3   


 


ABG pH   


 


ABG Total CO2   


 


ABG O2 Saturation   


 


ABG O2 Content   


 


ABG Base Excess   


 


ABG Hemoglobin   


 


ABG Carboxyhemoglobin   


 


POC ABG HHb (Measured)   


 


ABG Methemoglobin   


 


ABG O2 Capacity   


 


Efrem Test   


 


ABG Potassium   


 


A-a O2 Difference   


 


Hgb O2 Saturation   


 


Glucose   


 


Lactate   


 


FiO2   


 


Crit Value Called To   


 


Crit Value Called By   


 


Crit Value Read Back   


 


Blood Gas Notified Time   


 


Sodium   


 


Potassium   


 


Chloride   


 


Carbon Dioxide   


 


Anion Gap   


 


BUN   


 


Creatinine   


 


Est GFR ( Amer)   


 


Est GFR (Non-Af Amer)   


 


POC Glucose (mg/dL)  160 H  134 H  137 H


 


Random Glucose   


 


Hemoglobin A1c   


 


Lactic Acid   


 


Calcium   


 


Total Bilirubin   


 


AST   


 


ALT   


 


Alkaline Phosphatase   


 


Ammonia   


 


Total Creatine Kinase   


 


Troponin I   


 


Total Protein   


 


Albumin   


 


Globulin   


 


Albumin/Globulin Ratio   


 


Arterial Blood Potassium   


 


C. difficile Ag & Toxin   


 


Influenza Typ A,B (EIA)   














  02/24/19 02/24/19 02/24/19





  13:56 14:45 15:45


 


WBC   


 


RBC   


 


Hgb   


 


Hct   


 


MCV   


 


MCH   


 


MCHC   


 


RDW   


 


Plt Count   


 


MPV   


 


Neut % (Auto)   


 


Lymph % (Auto)   


 


Mono % (Auto)   


 


Eos % (Auto)   


 


Baso % (Auto)   


 


Neut # (Auto)   


 


Lymph # (Auto)   


 


Mono # (Auto)   


 


Eos # (Auto)   


 


Baso # (Auto)   


 


Neutrophils % (Manual)   


 


Lymphocytes % (Manual)   


 


Monocytes % (Manual)   


 


Eosinophils % (Manual)   


 


Platelet Estimate   


 


Hypochromasia (manual)   


 


Anisocytosis (manual)   


 


Microcytosis (manual)   


 


Target Cells   


 


[place] Cells   


 


PT   29.3 H D 


 


INR   2.6 


 


pCO2   


 


pO2   


 


HCO3   


 


ABG pH   


 


ABG Total CO2   


 


ABG O2 Saturation   


 


ABG O2 Content   


 


ABG Base Excess   


 


ABG Hemoglobin   


 


ABG Carboxyhemoglobin   


 


POC ABG HHb (Measured)   


 


ABG Methemoglobin   


 


ABG O2 Capacity   


 


Efrem Test   


 


ABG Potassium   


 


A-a O2 Difference   


 


Hgb O2 Saturation   


 


Glucose   


 


Lactate   


 


FiO2   


 


Crit Value Called To   


 


Crit Value Called By   


 


Crit Value Read Back   


 


Blood Gas Notified Time   


 


Sodium   


 


Potassium   


 


Chloride   


 


Carbon Dioxide   


 


Anion Gap   


 


BUN   


 


Creatinine   


 


Est GFR ( Amer)   


 


Est GFR (Non-Af Amer)   


 


POC Glucose (mg/dL)  118 H   112 H


 


Random Glucose   


 


Hemoglobin A1c   


 


Lactic Acid   


 


Calcium   


 


Total Bilirubin   


 


AST   


 


ALT   


 


Alkaline Phosphatase   


 


Ammonia   


 


Total Creatine Kinase   


 


Troponin I   


 


Total Protein   


 


Albumin   


 


Globulin   


 


Albumin/Globulin Ratio   


 


Arterial Blood Potassium   


 


C. difficile Ag & Toxin   


 


Influenza Typ A,B (EIA)   














  02/24/19 02/24/19 02/24/19





  15:50 16:23 18:10


 


WBC   


 


RBC   


 


Hgb   


 


Hct   


 


MCV   


 


MCH   


 


MCHC   


 


RDW   


 


Plt Count   


 


MPV   


 


Neut % (Auto)   


 


Lymph % (Auto)   


 


Mono % (Auto)   


 


Eos % (Auto)   


 


Baso % (Auto)   


 


Neut # (Auto)   


 


Lymph # (Auto)   


 


Mono # (Auto)   


 


Eos # (Auto)   


 


Baso # (Auto)   


 


Neutrophils % (Manual)   


 


Lymphocytes % (Manual)   


 


Monocytes % (Manual)   


 


Eosinophils % (Manual)   


 


Platelet Estimate   


 


Hypochromasia (manual)   


 


Anisocytosis (manual)   


 


Microcytosis (manual)   


 


Target Cells   


 


[place]ster Cells   


 


PT   


 


INR   


 


pCO2   


 


pO2   


 


HCO3   


 


ABG pH   


 


ABG Total CO2   


 


ABG O2 Saturation   


 


ABG O2 Content   


 


ABG Base Excess   


 


ABG Hemoglobin   


 


ABG Carboxyhemoglobin   


 


POC ABG HHb (Measured)   


 


ABG Methemoglobin   


 


ABG O2 Capacity   


 


Efrem Test   


 


ABG Potassium   


 


A-a O2 Difference   


 


Hgb O2 Saturation   


 


Glucose   


 


Lactate   


 


FiO2   


 


Crit Value Called To   


 


Crit Value Called By   


 


Crit Value Read Back   


 


Blood Gas Notified Time   


 


Sodium   


 


Potassium   


 


Chloride   


 


Carbon Dioxide   


 


Anion Gap   


 


BUN   


 


Creatinine   


 


Est GFR ( Amer)   


 


Est GFR (Non-Af Amer)   


 


POC Glucose (mg/dL)    101


 


Random Glucose   


 


Hemoglobin A1c   


 


Lactic Acid   


 


Calcium   


 


Total Bilirubin   


 


AST   


 


ALT   


 


Alkaline Phosphatase   


 


Ammonia   30 


 


Total Creatine Kinase   


 


Troponin I   


 


Total Protein   


 


Albumin   


 


Globulin   


 


Albumin/Globulin Ratio   


 


Arterial Blood Potassium   


 


C. difficile Ag & Toxin   


 


Influenza Typ A,B (EIA)  Negative for flu a/b  














  02/24/19 02/24/19 02/24/19





  20:13 22:01 22:05


 


WBC   


 


RBC   


 


Hgb   


 


Hct   


 


MCV   


 


MCH   


 


MCHC   


 


RDW   


 


Plt Count   


 


MPV   


 


Neut % (Auto)   


 


Lymph % (Auto)   


 


Mono % (Auto)   


 


Eos % (Auto)   


 


Baso % (Auto)   


 


Neut # (Auto)   


 


Lymph # (Auto)   


 


Mono # (Auto)   


 


Eos # (Auto)   


 


Baso # (Auto)   


 


Neutrophils % (Manual)   


 


Lymphocytes % (Manual)   


 


Monocytes % (Manual)   


 


Eosinophils % (Manual)   


 


Platelet Estimate   


 


Hypochromasia (manual)   


 


Anisocytosis (manual)   


 


Microcytosis (manual)   


 


Target Cells   


 


[place] Cells   


 


PT   


 


INR   


 


pCO2   49 H 


 


pO2   97 


 


HCO3   21.7 


 


ABG pH   7.28 L 


 


ABG Total CO2   24.5 


 


ABG O2 Saturation   99.1 H 


 


ABG O2 Content   


 


ABG Base Excess   -4.1 L 


 


ABG Hemoglobin   


 


ABG Carboxyhemoglobin   


 


POC ABG HHb (Measured)   


 


ABG Methemoglobin   


 


ABG O2 Capacity   


 


Efrem Test   Yes 


 


ABG Potassium   4.4 


 


A-a O2 Difference   41.0 


 


Hgb O2 Saturation   


 


Glucose   120 H 


 


Lactate   2.0 


 


FiO2   28.0 


 


Crit Value Called To   


 


Crit Value Called By   


 


Crit Value Read Back   


 


Blood Gas Notified Time   


 


Sodium   137.0 


 


Potassium   


 


Chloride   105.0 


 


Carbon Dioxide   


 


Anion Gap   


 


BUN   


 


Creatinine   


 


Est GFR ( Amer)   


 


Est GFR (Non-Af Amer)   


 


POC Glucose (mg/dL)  116 H   131 H


 


Random Glucose   


 


Hemoglobin A1c   


 


Lactic Acid   


 


Calcium   


 


Total Bilirubin   


 


AST   


 


ALT   


 


Alkaline Phosphatase   


 


Ammonia   


 


Total Creatine Kinase   


 


Troponin I   


 


Total Protein   


 


Albumin   


 


Globulin   


 


Albumin/Globulin Ratio   


 


Arterial Blood Potassium   4.4 


 


C. difficile Ag & Toxin   


 


Influenza Typ A,B (EIA)   














- Imaging and Cardiology


  ** CT scan - abdomen


Status: Report reviewed by me





Assessment & Plan


(1) Elevated liver enzymes


Assessment and Plan: 


Likely due to shock liver related to several hours of unresponsiveness prior to 

being found. Also has very high CPK.  IV hydration Continue IV antibiotics as 

per ID.


Status: Acute

## 2019-02-24 NOTE — PCM.PROC
Procedures


Attestation:: I certify that I have explained the specified Operation(s) or 

Procedure(s), risks, benefits and reasonable alternatives to the Patient and/or 

other person responsible. The opportunity was given to ask questions and all 

questions answered





- Central Line Placement


  ** Right Internal Jugular Triple Lumen Catheter


Aseptic technique was employed throughout the procedure: Full sterile barriers 

(mask, hair cover, sterile gown, sterile gloves), Chloraprep Antiseptic: 30 sec

ond prep for IJ or SC sites


CVP Time Out Performed: Yes


Pt. Placed on Pulse Ox Monitor: Yes


Central Line Prep: Chlorhexidine-Alcohol Combination


Local Anesthesia Used: Lidocaine 2%


Amount of Anesthesia Used (mls): 1


Ultrasound Used for Placement: Yes


Central Line Lumen Inserted: triple


Central Line Length: 16 cm


Post Procedure: Sutured in Place, Good Blood Return, All Ports Aspirated, 

Flushed, Capped, Sterile Dressing Applied


Secured by: Suture


Post procedure dressing: Chlorhexidine disc (Biopatch)


Post Procedure X-Ray: Yes


Patient Tolerated Procedure: Well


Immediate Complications: None

## 2019-02-24 NOTE — RAD
Date of service: 



02/24/2019



HISTORY:

 Audible crackles 



COMPARISON:

Febraury 23, 2019.  



FINDINGS:



LUNGS:

Resolution right lower lobe infiltrate.



New left lower lobe infiltrate.



PLEURA:

No significant pleural effusion identified, no pneumothorax apparent.



CARDIOVASCULAR:

No atherosclerotic calcification present



No radiographic findings to suggest acute or significant 

cardiovascular disease. Incidental Finding(s): Postoperative changes 

related to sternotomy. 



OSSEOUS STRUCTURES:

No significant abnormalities.



VISUALIZED UPPER ABDOMEN:

Normal.



OTHER FINDINGS:

None.



IMPRESSION:

Resolution previously identified right lower lobe infiltrate.



New perihilar primarily left lower lobe infiltrate.

## 2019-02-24 NOTE — CARD
--------------- APPROVED REPORT --------------





Date of service: 02/23/2019



EKG Measurement

Heart [id number]

[id number]

[id number]

[id number]



<Conclusion>

Atrial flutter with variable AV block with premature ventricular or 

aberrantly conducted complexes

Rightward axis

Nonspecific ST and T wave abnormality

Abnormal ECG

## 2019-02-24 NOTE — CP.PCM.HP
History of Present Illness





- History of Present Illness


History of Present Illness: 





58 year old female with a past medical history of HTN, stroke, CABG, MI, 

diabetes and anemia, who presented to the emergency department accompanied with 

EMS after patient was found unconscious at home.





History limited due to patients obtunded status.





Patient seen and examined at bedside.





Spoke with brother. States patient was in normal health until episode of not 

waking up. Denies recent travel, illness, symptoms.





meds: as per chart


allergies: as per chart


fam hx: noncontributory





Present on Admission





- Present on Admission


Any Indicators Present on Admission: Yes


Decubitus Ulcer Present: Yes





Review of Systems





- Review of Systems


Systems not reviewed;Unavailable: Altered Mental Status





Past Patient History





- Past Medical History & Family History


Past Medical History?: Yes





- Past Social History


Smoking Status: Former Smoker





- CARDIAC


Hx Cardiac Disorders: Yes


Other/Comment: CABG





- PULMONARY


Hx Respiratory Disorders: Yes


Hx Asthma: Yes


Hx Bronchitis: Yes


Hx Pneumonia: Yes





- NEUROLOGICAL


Hx Neurological Disorder: Yes


HX Cerebrovascular Accident: Yes





- HEENT


Hx HEENT Problems: Yes


Hx Cataracts: Yes





- RENAL


Hx Chronic Kidney Disease: No





- ENDOCRINE/METABOLIC


Hx Endocrine Disorders: No


Hx Diabetes Mellitus Type 2: Yes





- HEMATOLOGICAL/ONCOLOGICAL


Hx Blood Disorders: Yes


Hx Anemia: Yes





- INTEGUMENTARY


Hx Dermatological Problems: No





- MUSCULOSKELETAL/RHEUMATOLOGICAL


Hx Musculoskeletal Disorders: Yes


Hx Arthritis: Yes


Hx Falls: No





- GASTROINTESTINAL


Hx Gastrointestinal Disorders: Yes


Hx Constipation: Yes





- GENITOURINARY/GYNECOLOGICAL


Hx Genitourinary Disorders: No





- PSYCHIATRIC


Hx Psychophysiologic Disorder: No


Hx Substance Use: No





- SURGICAL HISTORY


Hx Surgeries: Yes


Hx Cholecystectomy: Yes


Hx Coronary Artery Bypass Graft: Yes


Other/Comment: Brain hematoma surgery x4





- ANESTHESIA


Hx Anesthesia: Yes


Hx Anesthesia Reactions: No





Meds


Allergies/Adverse Reactions: 


                                    Allergies











Allergy/AdvReac Type Severity Reaction Status Date / Time


 


Penicillins Allergy  RASH Verified 02/06/19 13:53


 


latex AdvReac  RASH Verified 02/06/19 13:53














Physical Exam





- Constitutional


Appears: Chronically Ill





- Head Exam


Head Exam: NORMAL INSPECTION





- Eye Exam


Eye Exam: Normal appearance





- Respiratory Exam


Respiratory Exam: Decreased Breath Sounds, Rales





- Cardiovascular Exam


Cardiovascular Exam: +S1, +S2, Systolic Murmur





- GI/Abdominal Exam


GI & Abdominal Exam: Soft





- Extremities Exam


Additional comments: 





edema note of right arm





- Skin


Skin Exam: Normal Color, Warm





Results





- Vital Signs


Recent Vital Signs: 





                                Last Vital Signs











Temp  98.5 F   02/24/19 11:58


 


Pulse  74   02/24/19 11:58


 


Resp  11 L  02/24/19 11:58


 


BP  112/60   02/24/19 11:58


 


Pulse Ox  100   02/24/19 11:58














- Labs


Result Diagrams: 


                                 02/24/19 04:50





                                 02/24/19 04:50


Labs: 





                         Laboratory Results - last 24 hr











  02/23/19 02/23/19 02/23/19





  10:00 13:32 13:36


 


WBC   


 


RBC   


 


Hgb   


 


Hct   


 


MCV   


 


MCH   


 


MCHC   


 


RDW   


 


Plt Count   


 


MPV   


 


Neut % (Auto)   


 


Lymph % (Auto)   


 


Mono % (Auto)   


 


Eos % (Auto)   


 


Baso % (Auto)   


 


Neut # (Auto)   


 


Lymph # (Auto)   


 


Mono # (Auto)   


 


Eos # (Auto)   


 


Baso # (Auto)   


 


Neutrophils % (Manual)   


 


Lymphocytes % (Manual)   


 


Monocytes % (Manual)   


 


Eosinophils % (Manual)   


 


Platelet Estimate   


 


Large Platelets   


 


Hypochromasia (manual)   


 


Poikilocytosis (manual   


 


Anisocytosis (manual)   


 


Microcytosis (manual)   


 


Macrocytosis (manual)   


 


Target Cells   


 


Tear Drop Cells   


 


Ovalocytes   


 


Ester Cells   


 


PT   


 


INR   


 


APTT   


 


pCO2    39


 


pO2    50 L


 


HCO3    18.9 L


 


ABG pH    7.29 L


 


ABG Total CO2    20.0 L


 


ABG O2 Saturation    88.8 L


 


ABG O2 Content   


 


ABG Base Excess    -7.3 L


 


ABG Hemoglobin   


 


ABG Carboxyhemoglobin   


 


POC ABG HHb (Measured)   


 


ABG Methemoglobin   


 


ABG O2 Capacity   


 


Efrem Test    Yes


 


ABG Potassium    4.1


 


VBG pH   


 


VBG pCO2   


 


VBG HCO3   


 


VBG Total CO2   


 


VBG O2 Sat (Calc)   


 


VBG Base Excess   


 


VBG Potassium   


 


A-a O2 Difference    51.0


 


Hgb O2 Saturation   


 


Sodium    137.0


 


Chloride    105.0


 


Glucose    210 H


 


Lactate    6.8 H*


 


FiO2    21.0


 


Blood Gas Comments    Ra 21


 


Crit Value Called To    Dr sunita barker m.d.


 


Crit Value Called By    Iram


 


Crit Value Read Back    Y


 


Blood Gas Notified Time    1350


 


Potassium   


 


Carbon Dioxide   


 


Anion Gap   


 


BUN   


 


Creatinine   


 


Est GFR ( Amer)   


 


Est GFR (Non-Af Amer)   


 


POC Glucose (mg/dL)   138 H 


 


Random Glucose   


 


Lactic Acid   


 


Calcium   


 


Phosphorus   


 


Magnesium   


 


Total Bilirubin   


 


AST   


 


ALT   


 


Alkaline Phosphatase   


 


Ammonia   


 


Total Creatine Kinase   


 


Troponin I   


 


NT-Pro-B Natriuret Pep   


 


Total Protein   


 


Albumin   


 


Globulin   


 


Albumin/Globulin Ratio   


 


Arterial Blood Potassium    4.1


 


Venous Blood Potassium   


 


Urine Color   


 


Urine Clarity   


 


Urine pH   


 


Ur Specific Gravity   


 


Urine Protein   


 


Urine Glucose (UA)   


 


Urine Ketones   


 


Urine Blood   


 


Urine Nitrate   


 


Urine Bilirubin   


 


Urine Urobilinogen   


 


Ur Leukocyte Esterase   


 


Urine RBC (Auto)   


 


Urine Microscopic WBC   


 


Ur Squamous Epith Cells   


 


Amorphous Sediment   


 


Urine Bacteria   


 


Hyaline Casts   


 


Urine Opiates Screen   


 


Urine Methadone Screen   


 


Ur Barbiturates Screen   


 


Ur Phencyclidine Scrn   


 


Ur Amphetamines Screen   


 


U Benzodiazepines Scrn   


 


U Oth Cocaine Metabols   


 


U Cannabinoids Screen   


 


Alcohol, Quantitative   


 


C. difficile Ag & Toxin  Negative  














  02/23/19 02/23/19 02/23/19





  13:48 13:48 14:29


 


WBC   24.8 H D 


 


RBC   4.31 


 


Hgb   10.5 L 


 


Hct   34.7 


 


MCV   80.4 L D 


 


MCH   24.2 L 


 


MCHC   30.1 L 


 


RDW   20.1 H 


 


Plt Count   140 


 


MPV   8.2 


 


Neut % (Auto)   88.0 H 


 


Lymph % (Auto)   4.4 L 


 


Mono % (Auto)   7.4 


 


Eos % (Auto)   0.0 


 


Baso % (Auto)   0.2 


 


Neut # (Auto)   21.8 H 


 


Lymph # (Auto)   1.1 


 


Mono # (Auto)   1.8 H 


 


Eos # (Auto)   0.0 


 


Baso # (Auto)   0.1 


 


Neutrophils % (Manual)   83 H 


 


Lymphocytes % (Manual)   10 L 


 


Monocytes % (Manual)   7 


 


Eosinophils % (Manual)   


 


Platelet Estimate   Slightly decreased L 


 


Large Platelets   Present 


 


Hypochromasia (manual)   Slight 


 


Poikilocytosis (manual   Slight 


 


Anisocytosis (manual)   Moderate 


 


Microcytosis (manual)   Slight 


 


Macrocytosis (manual)   Slight 


 


Target Cells   


 


Tear Drop Cells   Slight 


 


Ovalocytes   Moderate 


 


Ester Cells   


 


PT   


 


INR   


 


APTT   


 


pCO2   


 


pO2    21 L


 


HCO3   


 


ABG pH   


 


ABG Total CO2   


 


ABG O2 Saturation   


 


ABG O2 Content   


 


ABG Base Excess   


 


ABG Hemoglobin   


 


ABG Carboxyhemoglobin   


 


POC ABG HHb (Measured)   


 


ABG Methemoglobin   


 


ABG O2 Capacity   


 


Efrem Test   


 


ABG Potassium   


 


VBG pH    7.10 L*


 


VBG pCO2    80 H*


 


VBG HCO3    18.6


 


VBG Total CO2    27.3


 


VBG O2 Sat (Calc)    26.8 L


 


VBG Base Excess    -6.0 L


 


VBG Potassium    4.1


 


A-a O2 Difference   


 


Hgb O2 Saturation   


 


Sodium  143   142.0


 


Chloride  101   105.0


 


Glucose    141 H


 


Lactate    8.5 H*


 


FiO2    28.0


 


Blood Gas Comments   


 


Crit Value Called To    Dr sunita barker m.d.


 


Crit Value Called By    Iram


 


Crit Value Read Back    Y


 


Blood Gas Notified Time    1452


 


Potassium  4.1  


 


Carbon Dioxide  22  


 


Anion Gap  24 H  


 


BUN  22 H  


 


Creatinine  2.2 H  


 


Est GFR ( Amer)  28  


 


Est GFR (Non-Af Amer)  23  


 


POC Glucose (mg/dL)   


 


Random Glucose  147 H  


 


Lactic Acid   


 


Calcium  8.7  


 


Phosphorus   


 


Magnesium   


 


Total Bilirubin  2.5 H  


 


AST  1555 H  


 


ALT  1303 H  


 


Alkaline Phosphatase  127 H D  


 


Ammonia   


 


Total Creatine Kinase   


 


Troponin I  0.2710 H*  


 


NT-Pro-B Natriuret Pep  2820 H  


 


Total Protein  7.5  


 


Albumin  3.8  


 


Globulin  3.8  


 


Albumin/Globulin Ratio  1.0  


 


Arterial Blood Potassium   


 


Venous Blood Potassium    4.1


 


Urine Color   


 


Urine Clarity   


 


Urine pH   


 


Ur Specific Gravity   


 


Urine Protein   


 


Urine Glucose (UA)   


 


Urine Ketones   


 


Urine Blood   


 


Urine Nitrate   


 


Urine Bilirubin   


 


Urine Urobilinogen   


 


Ur Leukocyte Esterase   


 


Urine RBC (Auto)   


 


Urine Microscopic WBC   


 


Ur Squamous Epith Cells   


 


Amorphous Sediment   


 


Urine Bacteria   


 


Hyaline Casts   


 


Urine Opiates Screen   


 


Urine Methadone Screen   


 


Ur Barbiturates Screen   


 


Ur Phencyclidine Scrn   


 


Ur Amphetamines Screen   


 


U Benzodiazepines Scrn   


 


U Oth Cocaine Metabols   


 


U Cannabinoids Screen   


 


Alcohol, Quantitative  < 10  


 


C. difficile Ag & Toxin   














  02/23/19 02/23/19 02/23/19





  14:50 14:50 14:50


 


WBC   


 


RBC   


 


Hgb   


 


Hct   


 


MCV   


 


MCH   


 


MCHC   


 


RDW   


 


Plt Count   


 


MPV   


 


Neut % (Auto)   


 


Lymph % (Auto)   


 


Mono % (Auto)   


 


Eos % (Auto)   


 


Baso % (Auto)   


 


Neut # (Auto)   


 


Lymph # (Auto)   


 


Mono # (Auto)   


 


Eos # (Auto)   


 


Baso # (Auto)   


 


Neutrophils % (Manual)   


 


Lymphocytes % (Manual)   


 


Monocytes % (Manual)   


 


Eosinophils % (Manual)   


 


Platelet Estimate   


 


Large Platelets   


 


Hypochromasia (manual)   


 


Poikilocytosis (manual   


 


Anisocytosis (manual)   


 


Microcytosis (manual)   


 


Macrocytosis (manual)   


 


Target Cells   


 


Tear Drop Cells   


 


Ovalocytes   


 


Argyle Cells   


 


PT   


 


INR   


 


APTT   


 


pCO2   


 


pO2   


 


HCO3   


 


ABG pH   


 


ABG Total CO2   


 


ABG O2 Saturation   


 


ABG O2 Content   


 


ABG Base Excess   


 


ABG Hemoglobin   


 


ABG Carboxyhemoglobin   


 


POC ABG HHb (Measured)   


 


ABG Methemoglobin   


 


ABG O2 Capacity   


 


Efrem Test   


 


ABG Potassium   


 


VBG pH   


 


VBG pCO2   


 


VBG HCO3   


 


VBG Total CO2   


 


VBG O2 Sat (Calc)   


 


VBG Base Excess   


 


VBG Potassium   


 


A-a O2 Difference   


 


Hgb O2 Saturation   


 


Sodium   


 


Chloride   


 


Glucose   


 


Lactate   


 


FiO2   


 


Blood Gas Comments   


 


Crit Value Called To   


 


Crit Value Called By   


 


Crit Value Read Back   


 


Blood Gas Notified Time   


 


Potassium   


 


Carbon Dioxide   


 


Anion Gap   


 


BUN   


 


Creatinine   


 


Est GFR ( Amer)   


 


Est GFR (Non-Af Amer)   


 


POC Glucose (mg/dL)   


 


Random Glucose   


 


Lactic Acid   


 


Calcium   


 


Phosphorus    9.3 H


 


Magnesium    2.0


 


Total Bilirubin   


 


AST   


 


ALT   


 


Alkaline Phosphatase   


 


Ammonia   


 


Total Creatine Kinase   


 


Troponin I   


 


NT-Pro-B Natriuret Pep   


 


Total Protein   


 


Albumin   


 


Globulin   


 


Albumin/Globulin Ratio   


 


Arterial Blood Potassium   


 


Venous Blood Potassium   


 


Urine Color   Negrita 


 


Urine Clarity   Turbid 


 


Urine pH   6.0 


 


Ur Specific Gravity   1.013 


 


Urine Protein   100 


 


Urine Glucose (UA)   Neg 


 


Urine Ketones   Negative 


 


Urine Blood   Moderate 


 


Urine Nitrate   Negative 


 


Urine Bilirubin   Negative 


 


Urine Urobilinogen   4.0 H 


 


Ur Leukocyte Esterase   Neg 


 


Urine RBC (Auto)   6 H 


 


Urine Microscopic WBC   4 


 


Ur Squamous Epith Cells   2 


 


Amorphous Sediment   Few H 


 


Urine Bacteria   Occ H 


 


Hyaline Casts   11-20 H 


 


Urine Opiates Screen  Negative  


 


Urine Methadone Screen  Negative  


 


Ur Barbiturates Screen  Negative  


 


Ur Phencyclidine Scrn  Negative  


 


Ur Amphetamines Screen  Negative  


 


U Benzodiazepines Scrn  Negative  


 


U Oth Cocaine Metabols  Negative  


 


U Cannabinoids Screen  Negative  


 


Alcohol, Quantitative   


 


C. difficile Ag & Toxin   














  02/23/19 02/23/19 02/23/19





  14:50 16:48 18:54


 


WBC   


 


RBC   


 


Hgb   


 


Hct   


 


MCV   


 


MCH   


 


MCHC   


 


RDW   


 


Plt Count   


 


MPV   


 


Neut % (Auto)   


 


Lymph % (Auto)   


 


Mono % (Auto)   


 


Eos % (Auto)   


 


Baso % (Auto)   


 


Neut # (Auto)   


 


Lymph # (Auto)   


 


Mono # (Auto)   


 


Eos # (Auto)   


 


Baso # (Auto)   


 


Neutrophils % (Manual)   


 


Lymphocytes % (Manual)   


 


Monocytes % (Manual)   


 


Eosinophils % (Manual)   


 


Platelet Estimate   


 


Large Platelets   


 


Hypochromasia (manual)   


 


Poikilocytosis (manual   


 


Anisocytosis (manual)   


 


Microcytosis (manual)   


 


Macrocytosis (manual)   


 


Target Cells   


 


Tear Drop Cells   


 


Ovalocytes   


 


Ester Cells   


 


PT  21.5 H  


 


INR  1.9  


 


APTT  44.5 H  


 


pCO2   


 


pO2   31 


 


HCO3   


 


ABG pH   


 


ABG Total CO2   


 


ABG O2 Saturation   


 


ABG O2 Content   


 


ABG Base Excess   


 


ABG Hemoglobin   


 


ABG Carboxyhemoglobin   


 


POC ABG HHb (Measured)   


 


ABG Methemoglobin   


 


ABG O2 Capacity   


 


Efrem Test   


 


ABG Potassium   


 


VBG pH   7.15 L* 


 


VBG pCO2   60 


 


VBG HCO3   16.6 


 


VBG Total CO2   22.7 


 


VBG O2 Sat (Calc)   54.4 


 


VBG Base Excess   -8.7 L 


 


VBG Potassium   4.2 


 


A-a O2 Difference   


 


Hgb O2 Saturation   


 


Sodium   138.0 


 


Chloride   105.0 


 


Glucose   105 


 


Lactate   7.8 H* 


 


FiO2   21.0 


 


Blood Gas Comments   


 


Crit Value Called To   Arnold daily md 


 


Crit Value Called By   Mayra casal ry 


 


Crit Value Read Back   Y 


 


Blood Gas Notified Time   1655 


 


Potassium   


 


Carbon Dioxide   


 


Anion Gap   


 


BUN   


 


Creatinine   


 


Est GFR ( Amer)   


 


Est GFR (Non-Af Amer)   


 


POC Glucose (mg/dL)    70


 


Random Glucose   


 


Lactic Acid   


 


Calcium   


 


Phosphorus   


 


Magnesium   


 


Total Bilirubin   


 


AST   


 


ALT   


 


Alkaline Phosphatase   


 


Ammonia   


 


Total Creatine Kinase   


 


Troponin I   


 


NT-Pro-B Natriuret Pep   


 


Total Protein   


 


Albumin   


 


Globulin   


 


Albumin/Globulin Ratio   


 


Arterial Blood Potassium   


 


Venous Blood Potassium   4.2 


 


Urine Color   


 


Urine Clarity   


 


Urine pH   


 


Ur Specific Gravity   


 


Urine Protein   


 


Urine Glucose (UA)   


 


Urine Ketones   


 


Urine Blood   


 


Urine Nitrate   


 


Urine Bilirubin   


 


Urine Urobilinogen   


 


Ur Leukocyte Esterase   


 


Urine RBC (Auto)   


 


Urine Microscopic WBC   


 


Ur Squamous Epith Cells   


 


Amorphous Sediment   


 


Urine Bacteria   


 


Hyaline Casts   


 


Urine Opiates Screen   


 


Urine Methadone Screen   


 


Ur Barbiturates Screen   


 


Ur Phencyclidine Scrn   


 


Ur Amphetamines Screen   


 


U Benzodiazepines Scrn   


 


U Oth Cocaine Metabols   


 


U Cannabinoids Screen   


 


Alcohol, Quantitative   


 


C. difficile Ag & Toxin   














  02/23/19 02/23/19 02/23/19





  19:48 19:48 20:18


 


WBC   


 


RBC   


 


Hgb   


 


Hct   


 


MCV   


 


MCH   


 


MCHC   


 


RDW   


 


Plt Count   


 


MPV   


 


Neut % (Auto)   


 


Lymph % (Auto)   


 


Mono % (Auto)   


 


Eos % (Auto)   


 


Baso % (Auto)   


 


Neut # (Auto)   


 


Lymph # (Auto)   


 


Mono # (Auto)   


 


Eos # (Auto)   


 


Baso # (Auto)   


 


Neutrophils % (Manual)   


 


Lymphocytes % (Manual)   


 


Monocytes % (Manual)   


 


Eosinophils % (Manual)   


 


Platelet Estimate   


 


Large Platelets   


 


Hypochromasia (manual)   


 


Poikilocytosis (manual   


 


Anisocytosis (manual)   


 


Microcytosis (manual)   


 


Macrocytosis (manual)   


 


Target Cells   


 


Tear Drop Cells   


 


Ovalocytes   


 


Argyle Cells   


 


PT   


 


INR   


 


APTT   


 


pCO2   


 


pO2   


 


HCO3   


 


ABG pH   


 


ABG Total CO2   


 


ABG O2 Saturation   


 


ABG O2 Content   


 


ABG Base Excess   


 


ABG Hemoglobin   


 


ABG Carboxyhemoglobin   


 


POC ABG HHb (Measured)   


 


ABG Methemoglobin   


 


ABG O2 Capacity   


 


Efrem Test   


 


ABG Potassium   


 


VBG pH   


 


VBG pCO2   


 


VBG HCO3   


 


VBG Total CO2   


 


VBG O2 Sat (Calc)   


 


VBG Base Excess   


 


VBG Potassium   


 


A-a O2 Difference   


 


Hgb O2 Saturation   


 


Sodium   


 


Chloride   


 


Glucose   


 


Lactate   


 


FiO2   


 


Blood Gas Comments   


 


Crit Value Called To   


 


Crit Value Called By   


 


Crit Value Read Back   


 


Blood Gas Notified Time   


 


Potassium   


 


Carbon Dioxide   


 


Anion Gap   


 


BUN   


 


Creatinine   


 


Est GFR ( Amer)   


 


Est GFR (Non-Af Amer)   


 


POC Glucose (mg/dL)    147 H


 


Random Glucose   


 


Lactic Acid   


 


Calcium   


 


Phosphorus   


 


Magnesium   


 


Total Bilirubin   


 


AST   


 


ALT   


 


Alkaline Phosphatase   


 


Ammonia   36 


 


Total Creatine Kinase   


 


Troponin I  1.7800 H*  


 


NT-Pro-B Natriuret Pep   


 


Total Protein   


 


Albumin   


 


Globulin   


 


Albumin/Globulin Ratio   


 


Arterial Blood Potassium   


 


Venous Blood Potassium   


 


Urine Color   


 


Urine Clarity   


 


Urine pH   


 


Ur Specific Gravity   


 


Urine Protein   


 


Urine Glucose (UA)   


 


Urine Ketones   


 


Urine Blood   


 


Urine Nitrate   


 


Urine Bilirubin   


 


Urine Urobilinogen   


 


Ur Leukocyte Esterase   


 


Urine RBC (Auto)   


 


Urine Microscopic WBC   


 


Ur Squamous Epith Cells   


 


Amorphous Sediment   


 


Urine Bacteria   


 


Hyaline Casts   


 


Urine Opiates Screen   


 


Urine Methadone Screen   


 


Ur Barbiturates Screen   


 


Ur Phencyclidine Scrn   


 


Ur Amphetamines Screen   


 


U Benzodiazepines Scrn   


 


U Oth Cocaine Metabols   


 


U Cannabinoids Screen   


 


Alcohol, Quantitative   


 


C. difficile Ag & Toxin   














  02/23/19 02/23/19 02/23/19





  21:00 21:58 23:08


 


WBC   


 


RBC   


 


Hgb   


 


Hct   


 


MCV   


 


MCH   


 


MCHC   


 


RDW   


 


Plt Count   


 


MPV   


 


Neut % (Auto)   


 


Lymph % (Auto)   


 


Mono % (Auto)   


 


Eos % (Auto)   


 


Baso % (Auto)   


 


Neut # (Auto)   


 


Lymph # (Auto)   


 


Mono # (Auto)   


 


Eos # (Auto)   


 


Baso # (Auto)   


 


Neutrophils % (Manual)   


 


Lymphocytes % (Manual)   


 


Monocytes % (Manual)   


 


Eosinophils % (Manual)   


 


Platelet Estimate   


 


Large Platelets   


 


Hypochromasia (manual)   


 


Poikilocytosis (manual   


 


Anisocytosis (manual)   


 


Microcytosis (manual)   


 


Macrocytosis (manual)   


 


Target Cells   


 


Tear Drop Cells   


 


Ovalocytes   


 


Argyle Cells   


 


PT   


 


INR   


 


APTT   


 


pCO2    48 H


 


pO2    154 H


 


HCO3    16.9 L


 


ABG pH    7.18 L*


 


ABG Total CO2    19.4 L


 


ABG O2 Saturation    100.2 H


 


ABG O2 Content    16.3


 


ABG Base Excess    -10.3 L


 


ABG Hemoglobin    11.8


 


ABG Carboxyhemoglobin    1.9 H


 


POC ABG HHb (Measured)    -0.2 L


 


ABG Methemoglobin    1.9


 


ABG O2 Capacity    16.3


 


Efrem Test    Yes


 


ABG Potassium   


 


VBG pH   


 


VBG pCO2   


 


VBG HCO3   


 


VBG Total CO2   


 


VBG O2 Sat (Calc)   


 


VBG Base Excess   


 


VBG Potassium   


 


A-a O2 Difference    -14.0


 


Hgb O2 Saturation    96.4


 


Sodium   


 


Chloride   


 


Glucose   


 


Lactate   


 


FiO2    28.0


 


Blood Gas Comments   


 


Crit Value Called To    Lara nelly shanks


 


Crit Value Called By    333


 


Crit Value Read Back    Y


 


Blood Gas Notified Time    2320


 


Potassium   


 


Carbon Dioxide   


 


Anion Gap   


 


BUN   


 


Creatinine   


 


Est GFR ( Amer)   


 


Est GFR (Non-Af Amer)   


 


POC Glucose (mg/dL)   132 H 


 


Random Glucose   


 


Lactic Acid  6.0 H*  


 


Calcium   


 


Phosphorus   


 


Magnesium   


 


Total Bilirubin   


 


AST   


 


ALT   


 


Alkaline Phosphatase   


 


Ammonia   


 


Total Creatine Kinase   


 


Troponin I   


 


NT-Pro-B Natriuret Pep   


 


Total Protein   


 


Albumin   


 


Globulin   


 


Albumin/Globulin Ratio   


 


Arterial Blood Potassium   


 


Venous Blood Potassium   


 


Urine Color   


 


Urine Clarity   


 


Urine pH   


 


Ur Specific Gravity   


 


Urine Protein   


 


Urine Glucose (UA)   


 


Urine Ketones   


 


Urine Blood   


 


Urine Nitrate   


 


Urine Bilirubin   


 


Urine Urobilinogen   


 


Ur Leukocyte Esterase   


 


Urine RBC (Auto)   


 


Urine Microscopic WBC   


 


Ur Squamous Epith Cells   


 


Amorphous Sediment   


 


Urine Bacteria   


 


Hyaline Casts   


 


Urine Opiates Screen   


 


Urine Methadone Screen   


 


Ur Barbiturates Screen   


 


Ur Phencyclidine Scrn   


 


Ur Amphetamines Screen   


 


U Benzodiazepines Scrn   


 


U Oth Cocaine Metabols   


 


U Cannabinoids Screen   


 


Alcohol, Quantitative   


 


C. difficile Ag & Toxin   














  02/24/19 02/24/19 02/24/19





  00:19 02:11 04:10


 


WBC   


 


RBC   


 


Hgb   


 


Hct   


 


MCV   


 


MCH   


 


MCHC   


 


RDW   


 


Plt Count   


 


MPV   


 


Neut % (Auto)   


 


Lymph % (Auto)   


 


Mono % (Auto)   


 


Eos % (Auto)   


 


Baso % (Auto)   


 


Neut # (Auto)   


 


Lymph # (Auto)   


 


Mono # (Auto)   


 


Eos # (Auto)   


 


Baso # (Auto)   


 


Neutrophils % (Manual)   


 


Lymphocytes % (Manual)   


 


Monocytes % (Manual)   


 


Eosinophils % (Manual)   


 


Platelet Estimate   


 


Large Platelets   


 


Hypochromasia (manual)   


 


Poikilocytosis (manual   


 


Anisocytosis (manual)   


 


Microcytosis (manual)   


 


Macrocytosis (manual)   


 


Target Cells   


 


Tear Drop Cells   


 


Ovalocytes   


 


Ester Cells   


 


PT   


 


INR   


 


APTT   


 


pCO2   


 


pO2   


 


HCO3   


 


ABG pH   


 


ABG Total CO2   


 


ABG O2 Saturation   


 


ABG O2 Content   


 


ABG Base Excess   


 


ABG Hemoglobin   


 


ABG Carboxyhemoglobin   


 


POC ABG HHb (Measured)   


 


ABG Methemoglobin   


 


ABG O2 Capacity   


 


Efrem Test   


 


ABG Potassium   


 


VBG pH   


 


VBG pCO2   


 


VBG HCO3   


 


VBG Total CO2   


 


VBG O2 Sat (Calc)   


 


VBG Base Excess   


 


VBG Potassium   


 


A-a O2 Difference   


 


Hgb O2 Saturation   


 


Sodium   


 


Chloride   


 


Glucose   


 


Lactate   


 


FiO2   


 


Blood Gas Comments   


 


Crit Value Called To   


 


Crit Value Called By   


 


Crit Value Read Back   


 


Blood Gas Notified Time   


 


Potassium   


 


Carbon Dioxide   


 


Anion Gap   


 


BUN   


 


Creatinine   


 


Est GFR ( Amer)   


 


Est GFR (Non-Af Amer)   


 


POC Glucose (mg/dL)  151 H  188 H  148 H


 


Random Glucose   


 


Lactic Acid   


 


Calcium   


 


Phosphorus   


 


Magnesium   


 


Total Bilirubin   


 


AST   


 


ALT   


 


Alkaline Phosphatase   


 


Ammonia   


 


Total Creatine Kinase   


 


Troponin I   


 


NT-Pro-B Natriuret Pep   


 


Total Protein   


 


Albumin   


 


Globulin   


 


Albumin/Globulin Ratio   


 


Arterial Blood Potassium   


 


Venous Blood Potassium   


 


Urine Color   


 


Urine Clarity   


 


Urine pH   


 


Ur Specific Gravity   


 


Urine Protein   


 


Urine Glucose (UA)   


 


Urine Ketones   


 


Urine Blood   


 


Urine Nitrate   


 


Urine Bilirubin   


 


Urine Urobilinogen   


 


Ur Leukocyte Esterase   


 


Urine RBC (Auto)   


 


Urine Microscopic WBC   


 


Ur Squamous Epith Cells   


 


Amorphous Sediment   


 


Urine Bacteria   


 


Hyaline Casts   


 


Urine Opiates Screen   


 


Urine Methadone Screen   


 


Ur Barbiturates Screen   


 


Ur Phencyclidine Scrn   


 


Ur Amphetamines Screen   


 


U Benzodiazepines Scrn   


 


U Oth Cocaine Metabols   


 


U Cannabinoids Screen   


 


Alcohol, Quantitative   


 


C. difficile Ag & Toxin   














  02/24/19 02/24/19 02/24/19





  04:50 04:50 04:50


 


WBC    31.5 H


 


RBC    4.31


 


Hgb    10.4 L


 


Hct    34.0


 


MCV    78.8 L


 


MCH    24.1 L


 


MCHC    30.6 L


 


RDW    19.8 H


 


Plt Count    138


 


MPV    9.2


 


Neut % (Auto)    91.4 H


 


Lymph % (Auto)    3.4 L


 


Mono % (Auto)    5.0


 


Eos % (Auto)    0.0


 


Baso % (Auto)    0.2


 


Neut # (Auto)    28.8 H


 


Lymph # (Auto)    1.1


 


Mono # (Auto)    1.6 H


 


Eos # (Auto)    0.0


 


Baso # (Auto)    0.1


 


Neutrophils % (Manual)    92 H


 


Lymphocytes % (Manual)    4 L


 


Monocytes % (Manual)    2


 


Eosinophils % (Manual)    2


 


Platelet Estimate    Normal


 


Large Platelets   


 


Hypochromasia (manual)    Slight


 


Poikilocytosis (manual   


 


Anisocytosis (manual)    Slight


 


Microcytosis (manual)    Slight


 


Macrocytosis (manual)   


 


Target Cells    Slight


 


Tear Drop Cells   


 


Ovalocytes   


 


Ester Cells    Slight


 


PT   


 


INR   


 


APTT   


 


pCO2   


 


pO2   


 


HCO3   


 


ABG pH   


 


ABG Total CO2   


 


ABG O2 Saturation   


 


ABG O2 Content   


 


ABG Base Excess   


 


ABG Hemoglobin   


 


ABG Carboxyhemoglobin   


 


POC ABG HHb (Measured)   


 


ABG Methemoglobin   


 


ABG O2 Capacity   


 


Efrem Test   


 


ABG Potassium   


 


VBG pH   


 


VBG pCO2   


 


VBG HCO3   


 


VBG Total CO2   


 


VBG O2 Sat (Calc)   


 


VBG Base Excess   


 


VBG Potassium   


 


A-a O2 Difference   


 


Hgb O2 Saturation   


 


Sodium   138 


 


Chloride   101 


 


Glucose   


 


Lactate   


 


FiO2   


 


Blood Gas Comments   


 


Crit Value Called To   


 


Crit Value Called By   


 


Crit Value Read Back   


 


Blood Gas Notified Time   


 


Potassium   4.8 


 


Carbon Dioxide   21 L 


 


Anion Gap   21 H 


 


BUN   31 H 


 


Creatinine   2.6 H 


 


Est GFR ( Amer)   23 


 


Est GFR (Non-Af Amer)   19 


 


POC Glucose (mg/dL)   


 


Random Glucose   231 H 


 


Lactic Acid  3.9 H  


 


Calcium   7.3 L 


 


Phosphorus   


 


Magnesium   


 


Total Bilirubin   2.3 H 


 


AST   6852 H 


 


ALT   3395 H 


 


Alkaline Phosphatase   124 


 


Ammonia   


 


Total Creatine Kinase   


 


Troponin I   3.6000 H* 


 


NT-Pro-B Natriuret Pep   


 


Total Protein   6.4 


 


Albumin   2.7 L D 


 


Globulin   3.6 


 


Albumin/Globulin Ratio   0.7 L 


 


Arterial Blood Potassium   


 


Venous Blood Potassium   


 


Urine Color   


 


Urine Clarity   


 


Urine pH   


 


Ur Specific Gravity   


 


Urine Protein   


 


Urine Glucose (UA)   


 


Urine Ketones   


 


Urine Blood   


 


Urine Nitrate   


 


Urine Bilirubin   


 


Urine Urobilinogen   


 


Ur Leukocyte Esterase   


 


Urine RBC (Auto)   


 


Urine Microscopic WBC   


 


Ur Squamous Epith Cells   


 


Amorphous Sediment   


 


Urine Bacteria   


 


Hyaline Casts   


 


Urine Opiates Screen   


 


Urine Methadone Screen   


 


Ur Barbiturates Screen   


 


Ur Phencyclidine Scrn   


 


Ur Amphetamines Screen   


 


U Benzodiazepines Scrn   


 


U Oth Cocaine Metabols   


 


U Cannabinoids Screen   


 


Alcohol, Quantitative   


 


C. difficile Ag & Toxin   














  02/24/19 02/24/19 02/24/19





  05:12 05:46 07:53


 


WBC   


 


RBC   


 


Hgb   


 


Hct   


 


MCV   


 


MCH   


 


MCHC   


 


RDW   


 


Plt Count   


 


MPV   


 


Neut % (Auto)   


 


Lymph % (Auto)   


 


Mono % (Auto)   


 


Eos % (Auto)   


 


Baso % (Auto)   


 


Neut # (Auto)   


 


Lymph # (Auto)   


 


Mono # (Auto)   


 


Eos # (Auto)   


 


Baso # (Auto)   


 


Neutrophils % (Manual)   


 


Lymphocytes % (Manual)   


 


Monocytes % (Manual)   


 


Eosinophils % (Manual)   


 


Platelet Estimate   


 


Large Platelets   


 


Hypochromasia (manual)   


 


Poikilocytosis (manual   


 


Anisocytosis (manual)   


 


Microcytosis (manual)   


 


Macrocytosis (manual)   


 


Target Cells   


 


Tear Drop Cells   


 


Ovalocytes   


 


Ester Cells   


 


PT   


 


INR   


 


APTT   


 


pCO2  48 H  


 


pO2  105 H  


 


HCO3  19.7 L  


 


ABG pH  7.24 L  


 


ABG Total CO2  22.1  


 


ABG O2 Saturation  99.8 H  


 


ABG O2 Content  15.0  


 


ABG Base Excess  -6.7 L  


 


ABG Hemoglobin  11.0 L  


 


ABG Carboxyhemoglobin  2.1 H  


 


POC ABG HHb (Measured)  0.2  


 


ABG Methemoglobin  1.7  


 


ABG O2 Capacity  15.0 L  


 


Efrem Test  Yes  


 


ABG Potassium   


 


VBG pH   


 


VBG pCO2   


 


VBG HCO3   


 


VBG Total CO2   


 


VBG O2 Sat (Calc)   


 


VBG Base Excess   


 


VBG Potassium   


 


A-a O2 Difference  35.0  


 


Hgb O2 Saturation  96.0  


 


Sodium   


 


Chloride   


 


Glucose   


 


Lactate   


 


FiO2  28.0  


 


Blood Gas Comments   


 


Crit Value Called To   


 


Crit Value Called By   


 


Crit Value Read Back   


 


Blood Gas Notified Time   


 


Potassium   


 


Carbon Dioxide   


 


Anion Gap   


 


BUN   


 


Creatinine   


 


Est GFR ( Amer)   


 


Est GFR (Non-Af Amer)   


 


POC Glucose (mg/dL)   331 H 


 


Random Glucose   


 


Lactic Acid   


 


Calcium   


 


Phosphorus   


 


Magnesium   


 


Total Bilirubin   


 


AST   


 


ALT   


 


Alkaline Phosphatase   


 


Ammonia   


 


Total Creatine Kinase    1116 H


 


Troponin I   


 


NT-Pro-B Natriuret Pep   


 


Total Protein   


 


Albumin   


 


Globulin   


 


Albumin/Globulin Ratio   


 


Arterial Blood Potassium   


 


Venous Blood Potassium   


 


Urine Color   


 


Urine Clarity   


 


Urine pH   


 


Ur Specific Gravity   


 


Urine Protein   


 


Urine Glucose (UA)   


 


Urine Ketones   


 


Urine Blood   


 


Urine Nitrate   


 


Urine Bilirubin   


 


Urine Urobilinogen   


 


Ur Leukocyte Esterase   


 


Urine RBC (Auto)   


 


Urine Microscopic WBC   


 


Ur Squamous Epith Cells   


 


Amorphous Sediment   


 


Urine Bacteria   


 


Hyaline Casts   


 


Urine Opiates Screen   


 


Urine Methadone Screen   


 


Ur Barbiturates Screen   


 


Ur Phencyclidine Scrn   


 


Ur Amphetamines Screen   


 


U Benzodiazepines Scrn   


 


U Oth Cocaine Metabols   


 


U Cannabinoids Screen   


 


Alcohol, Quantitative   


 


C. difficile Ag & Toxin   














  02/24/19 02/24/19 02/24/19





  07:54 09:54 11:11


 


WBC   


 


RBC   


 


Hgb   


 


Hct   


 


MCV   


 


MCH   


 


MCHC   


 


RDW   


 


Plt Count   


 


MPV   


 


Neut % (Auto)   


 


Lymph % (Auto)   


 


Mono % (Auto)   


 


Eos % (Auto)   


 


Baso % (Auto)   


 


Neut # (Auto)   


 


Lymph # (Auto)   


 


Mono # (Auto)   


 


Eos # (Auto)   


 


Baso # (Auto)   


 


Neutrophils % (Manual)   


 


Lymphocytes % (Manual)   


 


Monocytes % (Manual)   


 


Eosinophils % (Manual)   


 


Platelet Estimate   


 


Large Platelets   


 


Hypochromasia (manual)   


 


Poikilocytosis (manual   


 


Anisocytosis (manual)   


 


Microcytosis (manual)   


 


Macrocytosis (manual)   


 


Target Cells   


 


Tear Drop Cells   


 


Ovalocytes   


 


Argyle Cells   


 


PT   


 


INR   


 


APTT   


 


pCO2   


 


pO2   


 


HCO3   


 


ABG pH   


 


ABG Total CO2   


 


ABG O2 Saturation   


 


ABG O2 Content   


 


ABG Base Excess   


 


ABG Hemoglobin   


 


ABG Carboxyhemoglobin   


 


POC ABG HHb (Measured)   


 


ABG Methemoglobin   


 


ABG O2 Capacity   


 


Efrem Test   


 


ABG Potassium   


 


VBG pH   


 


VBG pCO2   


 


VBG HCO3   


 


VBG Total CO2   


 


VBG O2 Sat (Calc)   


 


VBG Base Excess   


 


VBG Potassium   


 


A-a O2 Difference   


 


Hgb O2 Saturation   


 


Sodium   


 


Chloride   


 


Glucose   


 


Lactate   


 


FiO2   


 


Blood Gas Comments   


 


Crit Value Called To   


 


Crit Value Called By   


 


Crit Value Read Back   


 


Blood Gas Notified Time   


 


Potassium   


 


Carbon Dioxide   


 


Anion Gap   


 


BUN   


 


Creatinine   


 


Est GFR ( Amer)   


 


Est GFR (Non-Af Amer)   


 


POC Glucose (mg/dL)  160 H  134 H  137 H


 


Random Glucose   


 


Lactic Acid   


 


Calcium   


 


Phosphorus   


 


Magnesium   


 


Total Bilirubin   


 


AST   


 


ALT   


 


Alkaline Phosphatase   


 


Ammonia   


 


Total Creatine Kinase   


 


Troponin I   


 


NT-Pro-B Natriuret Pep   


 


Total Protein   


 


Albumin   


 


Globulin   


 


Albumin/Globulin Ratio   


 


Arterial Blood Potassium   


 


Venous Blood Potassium   


 


Urine Color   


 


Urine Clarity   


 


Urine pH   


 


Ur Specific Gravity   


 


Urine Protein   


 


Urine Glucose (UA)   


 


Urine Ketones   


 


Urine Blood   


 


Urine Nitrate   


 


Urine Bilirubin   


 


Urine Urobilinogen   


 


Ur Leukocyte Esterase   


 


Urine RBC (Auto)   


 


Urine Microscopic WBC   


 


Ur Squamous Epith Cells   


 


Amorphous Sediment   


 


Urine Bacteria   


 


Hyaline Casts   


 


Urine Opiates Screen   


 


Urine Methadone Screen   


 


Ur Barbiturates Screen   


 


Ur Phencyclidine Scrn   


 


Ur Amphetamines Screen   


 


U Benzodiazepines Scrn   


 


U Oth Cocaine Metabols   


 


U Cannabinoids Screen   


 


Alcohol, Quantitative   


 


C. difficile Ag & Toxin   














Assessment & Plan


(1) Sepsis


Status: Acute   





(2) Creatinine elevation


Status: Acute   





(3) Elevated liver enzymes


Status: Acute   





(4) Elevated troponin level


Status: Acute   





- Assessment and Plan (Free Text)


Plan: 





monitor labs


monitor vitals


f/u cultures


consultants input appreciated


ICU care


follow up imaging/EEG


cont IV abx


rest of plan as ordered

## 2019-02-24 NOTE — CT
Date of service: 



02/24/2019



PROCEDURE:  CT Abdomen and Pelvis without intravenous contrast



HISTORY:

Sepsis.  R/o gallstone, hydronephrosis



COMPARISON:

None.



TECHNIQUE:

Unenhanced.  Neither IV nor oral contrast administered



Radiation dose:



Total exam DLP = 1004.09 mGy-cm.



This CT exam was performed using one or more of the following dose 

reduction techniques: Automated exposure control, adjustment of the 

mA and/or kV according to patient size, and/or use of iterative 

reconstruction technique.



FINDINGS:



LOWER THORAX:

Trace pleural effusions.



Incompletely visualize cardiomegaly.  Post sternotomy findings are 

also incompletely visualized. 



LIVER:

Unremarkable. No gross lesion or ductal dilatation.  



GALLBLADDER AND BILE DUCTS:

Status post cholecystectomy. No abnormality is seen in the 

gallbladder fossa.  



PANCREAS:

Unremarkable. No gross lesion or ductal dilatation.



SPLEEN:

Unremarkable. 



ADRENALS:

Unremarkable. No mass. 



KIDNEYS AND URETERS:

Unremarkable. No hydronephrosis. No solid mass. 



VASCULATURE:

Unremarkable. No aortic aneurysm. Atherosclerotic calcification and 

mural plaque present.  Findings are seen throughout the aorta 

extending into common iliac arteries.



BOWEL:

Low left rectus diastasis/postoperative changes containing an 

isolated loop small bowel.  The opening in the anterolateral 

abdominal wall on the left is 2.6 cm.  There is no evidence of 

incarceration.  There is disproportionate dilatation of proximal 

small bowel.  



Incidental finding in the ascending colon fatty deposits likely 

lipoma based on Hounsfield units.  The area of interest measures 1.7 

x 2.4 cm.  This is not associated with the ileocecal valve.



APPENDIX:

No abnormalities to suggest acute appendicitis. No right lower 

quadrant inflammatory processes identified. 



PERITONEUM:

Low volume pelvic ascites.  This is primarily anterior and superior 

to the bladder.  No free air. 



LYMPH NODES:

Unremarkable. No enlarged lymph nodes. 



BLADDER:

Segura catheter satisfactorily positioned within a decompressed 

urinary bladder. 



REPRODUCTIVE:

Unremarkable. 



BONES:

No acute fracture. 



OTHER FINDINGS:

Mild, symmetrical anasarca.



IMPRESSION:

Proximal small bowel dilatation likely related to lower abdominal 

wall hernia/rectus disruption on the left.  No evidence of 

incarceration or strangulation.  Normal luminal caliber beyond this 

finding..



Additional benign and/or incidental findings described above.

## 2019-02-25 LAB
ALBUMIN SERPL-MCNC: 3.2 G/DL (ref 3.5–5)
ALBUMIN/GLOB SERPL: 0.8 {RATIO} (ref 1–2.1)
BUN SERPL-MCNC: 49 MG/DL (ref 7–17)
CALCIUM SERPL-MCNC: 7.9 MG/DL (ref 8.4–10.2)
ERYTHROCYTE [DISTWIDTH] IN BLOOD BY AUTOMATED COUNT: 20.1 % (ref 11.5–14.5)
GFR NON-AFRICAN AMERICAN: 10
HEPATITIS A IGM: NEGATIVE
HEPATITIS B CORE AB: NEGATIVE
HEPATITIS C ANTIBODY: NEGATIVE
HGB BLD-MCNC: 10.4 G/DL (ref 12–16)
INR PPP: 2.4
MCH RBC QN AUTO: 24.4 PG (ref 27–31)
MCHC RBC AUTO-ENTMCNC: 31.3 G/DL (ref 33–37)
MCV RBC AUTO: 77.8 FL (ref 81–99)
N MEN SG B+E COLI K1 AG SPEC QL: NEGATIVE
N MEN SG W135 AG SPEC QL: NEGATIVE
OSMOLALITY,URINE: 308 MOSM/KG (ref 300–1000)
PLATELET # BLD: 133 K/UL (ref 130–400)
PROTHROMBIN TIME: 27.5 SECONDS (ref 9.8–13.1)
RBC # BLD AUTO: 4.26 MIL/UL (ref 3.8–5.2)
STREP PNEUMONIAE: NEGATIVE
STREPTOCOCCUS B: NEGATIVE
URATE SERPL-MCNC: 11.7 MG/DL (ref 2.2–7.5)
WBC # BLD AUTO: 18.8 K/UL (ref 4.8–10.8)

## 2019-02-25 RX ADMIN — METRONIDAZOLE SCH MLS/HR: 500 INJECTION, SOLUTION INTRAVENOUS at 17:30

## 2019-02-25 RX ADMIN — DEXTROSE AND SODIUM CHLORIDE SCH: 5; 450 INJECTION, SOLUTION INTRAVENOUS at 05:22

## 2019-02-25 RX ADMIN — ENOXAPARIN SODIUM SCH MG: 100 INJECTION SUBCUTANEOUS at 08:43

## 2019-02-25 RX ADMIN — METRONIDAZOLE SCH MLS/HR: 500 INJECTION, SOLUTION INTRAVENOUS at 08:42

## 2019-02-25 RX ADMIN — DEXTROSE AND SODIUM CHLORIDE SCH MLS/HR: 5; 450 INJECTION, SOLUTION INTRAVENOUS at 12:00

## 2019-02-25 RX ADMIN — METRONIDAZOLE SCH MLS/HR: 500 INJECTION, SOLUTION INTRAVENOUS at 00:19

## 2019-02-25 NOTE — CP.PCM.CON
History of Present Illness





- History of Present Illness


History of Present Illness: 





This 58 years of age female , I was called to see her for abnormal kidney 

function.  The history was taken from the medical record because the patient is 

not given history and not communicating.


58 year old female with a past medical history of HTN, stroke, CABG, MI, 

diabetes and anemia, who presents to the emergency department accompanied with 

EMS after patient was found unconscious at home. Patient used heroine and was 

found unresponsive by family members, who called 911. En route, EMS administered

2mg of Narcan nasally and 0.4 mg intravenously. Patient was also given 2 AMPs of

D50. Her initial sugar was 29


past medical history of HTN, stroke, CABG, MI, diabetes and anemia





Review of Systems





- Review of Systems


Systems not reviewed;Unavailable: Altered Mental Status





- Constitutional


Constitutional: Anorexia.  absent: Chills





- Cardiovascular


Cardiovascular: Edema, Pedal Edema.  absent: Chest Pain, Dyspnea





- Respiratory


Respiratory: absent: Cough, Dyspnea





- Gastrointestinal


Gastrointestinal: absent: Abdominal Pain





- Genitourinary


Genitourinary: As Per HPI





- Musculoskeletal


Musculoskeletal: absent: Atrophy





- Neurological


Neurological: As Per HPI





- Endocrine


Endocrine: As Per HPI





- Hematologic/Lymphatic


Hematologic: absent: Easy Bleeding





Past Patient History





- Past Medical History & Family History


Past Medical History?: Yes





- Past Social History


Smoking Status: Former Smoker





- CARDIAC


Hx Cardiac Disorders: Yes


Other/Comment: CABG





- PULMONARY


Hx Respiratory Disorders: Yes


Hx Asthma: Yes


Hx Bronchitis: Yes


Hx Pneumonia: Yes





- NEUROLOGICAL


Hx Neurological Disorder: Yes


HX Cerebrovascular Accident: Yes





- HEENT


Hx HEENT Problems: Yes


Hx Cataracts: Yes





- RENAL


Hx Chronic Kidney Disease: No





- ENDOCRINE/METABOLIC


Hx Endocrine Disorders: No


Hx Diabetes Mellitus Type 2: Yes





- HEMATOLOGICAL/ONCOLOGICAL


Hx Blood Disorders: Yes


Hx Anemia: Yes





- INTEGUMENTARY


Hx Dermatological Problems: No





- MUSCULOSKELETAL/RHEUMATOLOGICAL


Hx Musculoskeletal Disorders: Yes


Hx Arthritis: Yes


Hx Falls: No





- GASTROINTESTINAL


Hx Gastrointestinal Disorders: Yes


Hx Constipation: Yes





- GENITOURINARY/GYNECOLOGICAL


Hx Genitourinary Disorders: No





- PSYCHIATRIC


Hx Psychophysiologic Disorder: No


Hx Substance Use: No





- SURGICAL HISTORY


Hx Surgeries: Yes


Hx Cholecystectomy: Yes


Hx Coronary Artery Bypass Graft: Yes


Other/Comment: Brain hematoma surgery x4





- ANESTHESIA


Hx Anesthesia: Yes


Hx Anesthesia Reactions: No





Meds


Allergies/Adverse Reactions: 


                                    Allergies











Allergy/AdvReac Type Severity Reaction Status Date / Time


 


Penicillins Allergy  RASH Verified 02/06/19 13:53


 


latex AdvReac  RASH Verified 02/06/19 13:53














- Medications


Medications: 


                               Current Medications





Albuterol/Ipratropium (Duoneb 3 Mg/0.5 Mg (3 Ml) Ud)  3 ml INH RQ6 PRN


   PRN Reason: Shortness of Breath


Calcium Acetate (Phoslo)  667 mg PO TID DESIRAE


Enoxaparin Sodium (Lovenox)  100 mg SC DAILY DESIRAE; Protocol


   Last Admin: 02/24/19 08:51 Dose:  100 mg


Vancomycin HCl 1 gm/ Sodium (Chloride)  250 mls @ 166.667 mls/hr IVPB DAILY DESIRAE;

Protocol


   Last Admin: 02/24/19 08:51 Dose:  166.667 mls/hr


Metronidazole (Flagyl 500mg/100ml Ns)  100 mls @ 100 mls/hr IVPB Q8 DESIRAE; 

Protocol


   Last Admin: 02/25/19 00:19 Dose:  100 mls/hr


Levetiracetam 500 mg/ Sodium (Chloride)  105 mls @ 210 mls/hr IVPB Q12 DESIRAE


   Last Admin: 02/24/19 20:27 Dose:  210 mls/hr


Acyclovir 600 mg/ Sodium (Chloride)  100 mls @ 100 mls/hr IV Q24H DESIRAE; Protocol


   Last Admin: 02/24/19 17:08 Dose:  100 mls/hr


Ceftriaxone Sodium 2 gm/ (Sodium Chloride)  100 mls @ 100 mls/hr IVPB DAILY DEISRAE;

Protocol


   Last Admin: 02/24/19 17:08 Dose:  100 mls/hr











Physical Exam





- Constitutional


Appears: No Acute Distress, Confused





- Eye Exam


Eye Exam: Conjunctival injection





- ENT Exam


ENT Exam: Mucous Membranes Dry





- Neck Exam


Neck exam: Negative for: Lymphadenopathy





- Respiratory Exam


Respiratory Exam: NORMAL BREATHING PATTERN.  absent: Rales, Rhonchi





- Cardiovascular Exam


Cardiovascular Exam: absent: Gallop, JVD, Rubs





- GI/Abdominal Exam


GI & Abdominal Exam: Normal Bowel Sounds.  absent: Guarding





- Extremities Exam


Extremities exam: Negative for: calf tenderness





- Back Exam


Back exam: absent: CVA tenderness (L), CVA tenderness (R)





- Neurological Exam


Neurological exam: Altered





- Psychiatric Exam


Psychiatric exam: Normal Affect





Results





- Vital Signs


Recent Vital Signs: 


                                Last Vital Signs











Temp  97.5 F L  02/25/19 08:00


 


Pulse  69   02/25/19 08:00


 


Resp  10 L  02/25/19 08:00


 


BP  112/64   02/25/19 08:00


 


Pulse Ox  96   02/25/19 08:00














- Labs


Result Diagrams: 


                                 02/25/19 05:14





                                 02/25/19 05:14


Labs: 


                         Laboratory Results - last 24 hr











  02/23/19 02/24/19 02/24/19





  10:00 04:50 09:54


 


WBC   


 


RBC   


 


Hgb   


 


Hct   


 


MCV   


 


MCH   


 


MCHC   


 


RDW   


 


Plt Count   


 


PT   


 


INR   


 


pCO2   


 


pO2   


 


HCO3   


 


ABG pH   


 


ABG Total CO2   


 


ABG O2 Saturation   


 


ABG Base Excess   


 


Efrem Test   


 


ABG Potassium   


 


A-a O2 Difference   


 


Sodium   


 


Chloride   


 


Glucose   


 


Lactate   


 


FiO2   


 


Potassium   


 


Carbon Dioxide   


 


Anion Gap   


 


BUN   


 


Creatinine   


 


Est GFR ( Amer)   


 


Est GFR (Non-Af Amer)   


 


POC Glucose (mg/dL)    134 H


 


Random Glucose   


 


Hemoglobin A1c   6.2 


 


Calcium   


 


Phosphorus   


 


Magnesium   


 


Total Bilirubin   


 


AST   


 


ALT   


 


Alkaline Phosphatase   


 


Ammonia   


 


Total Protein   


 


Albumin   


 


Globulin   


 


Albumin/Globulin Ratio   


 


Arterial Blood Potassium   


 


Random Vancomycin   


 


C. difficile Ag & Toxin  Negative  


 


Influenza Typ A,B (EIA)   














  02/24/19 02/24/19 02/24/19





  11:11 13:56 14:45


 


WBC   


 


RBC   


 


Hgb   


 


Hct   


 


MCV   


 


MCH   


 


MCHC   


 


RDW   


 


Plt Count   


 


PT    29.3 H D


 


INR    2.6


 


pCO2   


 


pO2   


 


HCO3   


 


ABG pH   


 


ABG Total CO2   


 


ABG O2 Saturation   


 


ABG Base Excess   


 


Efrem Test   


 


ABG Potassium   


 


A-a O2 Difference   


 


Sodium   


 


Chloride   


 


Glucose   


 


Lactate   


 


FiO2   


 


Potassium   


 


Carbon Dioxide   


 


Anion Gap   


 


BUN   


 


Creatinine   


 


Est GFR ( Amer)   


 


Est GFR (Non-Af Amer)   


 


POC Glucose (mg/dL)  137 H  118 H 


 


Random Glucose   


 


Hemoglobin A1c   


 


Calcium   


 


Phosphorus   


 


Magnesium   


 


Total Bilirubin   


 


AST   


 


ALT   


 


Alkaline Phosphatase   


 


Ammonia   


 


Total Protein   


 


Albumin   


 


Globulin   


 


Albumin/Globulin Ratio   


 


Arterial Blood Potassium   


 


Random Vancomycin   


 


C. difficile Ag & Toxin   


 


Influenza Typ A,B (EIA)   














  02/24/19 02/24/19 02/24/19





  15:45 15:50 16:23


 


WBC   


 


RBC   


 


Hgb   


 


Hct   


 


MCV   


 


MCH   


 


MCHC   


 


RDW   


 


Plt Count   


 


PT   


 


INR   


 


pCO2   


 


pO2   


 


HCO3   


 


ABG pH   


 


ABG Total CO2   


 


ABG O2 Saturation   


 


ABG Base Excess   


 


Efrem Test   


 


ABG Potassium   


 


A-a O2 Difference   


 


Sodium   


 


Chloride   


 


Glucose   


 


Lactate   


 


FiO2   


 


Potassium   


 


Carbon Dioxide   


 


Anion Gap   


 


BUN   


 


Creatinine   


 


Est GFR ( Amer)   


 


Est GFR (Non-Af Amer)   


 


POC Glucose (mg/dL)  112 H  


 


Random Glucose   


 


Hemoglobin A1c   


 


Calcium   


 


Phosphorus   


 


Magnesium   


 


Total Bilirubin   


 


AST   


 


ALT   


 


Alkaline Phosphatase   


 


Ammonia    30


 


Total Protein   


 


Albumin   


 


Globulin   


 


Albumin/Globulin Ratio   


 


Arterial Blood Potassium   


 


Random Vancomycin   


 


C. difficile Ag & Toxin   


 


Influenza Typ A,B (EIA)   Negative for flu a/b 














  02/24/19 02/24/19 02/24/19





  18:10 20:13 22:01


 


WBC   


 


RBC   


 


Hgb   


 


Hct   


 


MCV   


 


MCH   


 


MCHC   


 


RDW   


 


Plt Count   


 


PT   


 


INR   


 


pCO2    49 H


 


pO2    97


 


HCO3    21.7


 


ABG pH    7.28 L


 


ABG Total CO2    24.5


 


ABG O2 Saturation    99.1 H


 


ABG Base Excess    -4.1 L


 


Efrem Test    Yes


 


ABG Potassium    4.4


 


A-a O2 Difference    41.0


 


Sodium    137.0


 


Chloride    105.0


 


Glucose    120 H


 


Lactate    2.0


 


FiO2    28.0


 


Potassium   


 


Carbon Dioxide   


 


Anion Gap   


 


BUN   


 


Creatinine   


 


Est GFR ( Amer)   


 


Est GFR (Non-Af Amer)   


 


POC Glucose (mg/dL)  101  116 H 


 


Random Glucose   


 


Hemoglobin A1c   


 


Calcium   


 


Phosphorus   


 


Magnesium   


 


Total Bilirubin   


 


AST   


 


ALT   


 


Alkaline Phosphatase   


 


Ammonia   


 


Total Protein   


 


Albumin   


 


Globulin   


 


Albumin/Globulin Ratio   


 


Arterial Blood Potassium    4.4


 


Random Vancomycin   


 


C. difficile Ag & Toxin   


 


Influenza Typ A,B (EIA)   














  02/24/19 02/25/19 02/25/19





  22:05 00:15 02:08


 


WBC   


 


RBC   


 


Hgb   


 


Hct   


 


MCV   


 


MCH   


 


MCHC   


 


RDW   


 


Plt Count   


 


PT   


 


INR   


 


pCO2   


 


pO2   


 


HCO3   


 


ABG pH   


 


ABG Total CO2   


 


ABG O2 Saturation   


 


ABG Base Excess   


 


Efrem Test   


 


ABG Potassium   


 


A-a O2 Difference   


 


Sodium   


 


Chloride   


 


Glucose   


 


Lactate   


 


FiO2   


 


Potassium   


 


Carbon Dioxide   


 


Anion Gap   


 


BUN   


 


Creatinine   


 


Est GFR ( Amer)   


 


Est GFR (Non-Af Amer)   


 


POC Glucose (mg/dL)  131 H  123 H  126 H


 


Random Glucose   


 


Hemoglobin A1c   


 


Calcium   


 


Phosphorus   


 


Magnesium   


 


Total Bilirubin   


 


AST   


 


ALT   


 


Alkaline Phosphatase   


 


Ammonia   


 


Total Protein   


 


Albumin   


 


Globulin   


 


Albumin/Globulin Ratio   


 


Arterial Blood Potassium   


 


Random Vancomycin   


 


C. difficile Ag & Toxin   


 


Influenza Typ A,B (EIA)   














  02/25/19 02/25/19 02/25/19





  04:03 05:14 05:14


 


WBC   18.8 H 


 


RBC   4.26 


 


Hgb   10.4 L 


 


Hct   33.1 L 


 


MCV   77.8 L 


 


MCH   24.4 L 


 


MCHC   31.3 L 


 


RDW   20.1 H 


 


Plt Count   133 


 


PT   


 


INR   


 


pCO2   


 


pO2   


 


HCO3   


 


ABG pH   


 


ABG Total CO2   


 


ABG O2 Saturation   


 


ABG Base Excess   


 


Efrem Test   


 


ABG Potassium   


 


A-a O2 Difference   


 


Sodium    140


 


Chloride    100


 


Glucose   


 


Lactate   


 


FiO2   


 


Potassium    4.5


 


Carbon Dioxide    24


 


Anion Gap    21 H


 


BUN    49 H


 


Creatinine    4.5 H


 


Est GFR ( Amer)    12


 


Est GFR (Non-Af Amer)    10


 


POC Glucose (mg/dL)  127 H  


 


Random Glucose    125 H


 


Hemoglobin A1c   


 


Calcium    7.9 L


 


Phosphorus    7.2 H


 


Magnesium    1.7


 


Total Bilirubin    2.0 H


 


AST    3479 H


 


ALT    3369 H


 


Alkaline Phosphatase    146 H


 


Ammonia   


 


Total Protein    7.1


 


Albumin    3.2 L


 


Globulin    3.9


 


Albumin/Globulin Ratio    0.8 L


 


Arterial Blood Potassium   


 


Random Vancomycin   


 


C. difficile Ag & Toxin   


 


Influenza Typ A,B (EIA)   














  02/25/19 02/25/19





  05:14 06:35


 


WBC  


 


RBC  


 


Hgb  


 


Hct  


 


MCV  


 


MCH  


 


MCHC  


 


RDW  


 


Plt Count  


 


PT  


 


INR  


 


pCO2  


 


pO2  


 


HCO3  


 


ABG pH  


 


ABG Total CO2  


 


ABG O2 Saturation  


 


ABG Base Excess  


 


Efrem Test  


 


ABG Potassium  


 


A-a O2 Difference  


 


Sodium  


 


Chloride  


 


Glucose  


 


Lactate  


 


FiO2  


 


Potassium  


 


Carbon Dioxide  


 


Anion Gap  


 


BUN  


 


Creatinine  


 


Est GFR ( Amer)  


 


Est GFR (Non-Af Amer)  


 


POC Glucose (mg/dL)   133 H


 


Random Glucose  


 


Hemoglobin A1c  


 


Calcium  


 


Phosphorus  


 


Magnesium  


 


Total Bilirubin  


 


AST  


 


ALT  


 


Alkaline Phosphatase  


 


Ammonia  


 


Total Protein  


 


Albumin  


 


Globulin  


 


Albumin/Globulin Ratio  


 


Arterial Blood Potassium  


 


Random Vancomycin  17.5 


 


C. difficile Ag & Toxin  


 


Influenza Typ A,B (EIA)  














Assessment & Plan


(1) SHANITA (acute kidney injury)


Assessment and Plan: 


Patient appears to have acute kidney injury/acute renal failure the differential

diagnosis could be related to rhabdomyolysis we do not know how long the patient

has been laying down in the floor or whether she was taking drugs?


In addition patient has coronary artery disease history diabetes mellitus 

hypertension CVA


Altered mental status patient is communicating


Hyperphosphatemia


Rule out shock liver


Leukocytosis rule out sepsis may have contributing to the acute kidney injury


Recommendation





Appears to be oliguric acute renal failure patient was given Lasix last night 

120 mg and very little urine output and we will give additional 80 mg stat dose 

now.


If serum creatinine continued to rise patient will need dialysis


IV fluid carefully 0.9 normal saline 60 cc/h however need to monitor urine 

output


Stat CPK


Phosphorus binder patient was given calcium acetate


Stat spot urine for sodium osmolarity and creatinine


Monitor liver function test


Antibiotics considering GFR patient receiving ordered vancomycin 1 g daily that 

should be changed immediately please DC Vanco and changed to 500 mg daily if not

every other day if creatinine continued to rise


Status: Acute   





(2) Elevated liver enzymes


Status: Acute   





(3) Elevated troponin level


Status: Acute   





(4) Leukocytosis


Status: Acute   





(5) Sepsis


Status: Acute

## 2019-02-25 NOTE — CARD
--------------- APPROVED REPORT --------------





Date of service: 02/24/2019



EKG Measurement

Heart [id number]

[id number]

[id number]

[id number]



<Conclusion>

Atrial fibrillation

Nonspecific ST-T changes

Prolonged QT

Abnormal ECG

## 2019-02-25 NOTE — CP.PCM.PN
Subjective





- Date & Time of Evaluation


Date of Evaluation: 02/25/19


Time of Evaluation: 14:00





- Subjective


Subjective: 





Patient remains unresponsive except to deep pain.





Objective





- Vital Signs/Intake and Output


Vital Signs (last 24 hours): 


                                        











Temp Pulse Resp BP Pulse Ox


 


 97.5 F L  75   10 L  114/62   100 


 


 02/25/19 16:00  02/25/19 18:00  02/25/19 18:00  02/25/19 18:00  02/25/19 18:00








Intake and Output: 


                                        











 02/25/19 02/26/19





 18:59 06:59


 


Intake Total 1150 


 


Output Total 150 


 


Balance 1000 














- Medications


Medications: 


                               Current Medications





Albuterol/Ipratropium (Duoneb 3 Mg/0.5 Mg (3 Ml) Ud)  3 ml INH RQ6 PRN


   PRN Reason: Shortness of Breath


Calcium Acetate (Phoslo)  667 mg PO TID DESIRAE


   Last Admin: 02/25/19 17:30 Dose:  Not Given


Enoxaparin Sodium (Lovenox)  100 mg SC DAILY DESIRAE; Protocol


   Last Admin: 02/25/19 08:43 Dose:  100 mg


Metronidazole (Flagyl 500mg/100ml Ns)  100 mls @ 100 mls/hr IVPB Q8 DESIRAE; 

Protocol


   Last Admin: 02/25/19 17:30 Dose:  100 mls/hr


Levetiracetam 500 mg/ Sodium (Chloride)  105 mls @ 210 mls/hr IVPB Q12 DESIRAE


   Last Admin: 02/25/19 08:42 Dose:  210 mls/hr


Acyclovir 600 mg/ Sodium (Chloride)  100 mls @ 100 mls/hr IV Q24H DESIRAE; Protocol


   Last Admin: 02/25/19 19:18 Dose:  100 mls/hr


Ceftriaxone Sodium 2 gm/ (Sodium Chloride)  100 mls @ 100 mls/hr IVPB DAILY DESIRAE;

Protocol


   Last Admin: 02/25/19 10:01 Dose:  100 mls/hr


Dextrose/Sodium Chloride (Dextrose 5%/0.45% Ns 1000 Ml)  1,000 mls @ 75 mls/hr 

IV .[id number] DESIRAE


   Stop: 02/26/19 11:27


   Last Admin: 02/25/19 12:00 Dose:  75 mls/hr











- Labs


Labs: 


                                        





                                 02/25/19 05:14 





                                 02/25/19 05:14 





                                        











PT  27.5 Seconds (9.8-13.1)  H  02/25/19  09:30    


 


INR  2.4   02/25/19  09:30    


 


APTT  44.5 Seconds (25.6-37.1)  H  02/23/19  14:50    














- Head Exam


Head Exam: ATRAUMATIC





- Eye Exam


Eye Exam: Normal appearance





- ENT Exam


ENT Exam: Normal Exam





- Respiratory Exam


Respiratory Exam: Clear to Ausculation Bilateral





- Cardiovascular Exam


Cardiovascular Exam: REGULAR RHYTHM





- GI/Abdominal Exam


GI & Abdominal Exam: Distended, Hypoactive Bowel Sounds





Assessment and Plan


(1) Elevated liver enzymes


Assessment & Plan: 


LFTs including Bili appear to be improving. Patient remains obtunded. 


Status: Acute

## 2019-02-25 NOTE — CP.PCM.PN
Subjective





- Date & Time of Evaluation


Date of Evaluation: 02/25/19


Time of Evaluation: 14:40





- Subjective


Subjective: 





Neuro Follow-Up Note:





Mrs. Omalley was evaluated this afternoon in the ICU.  Daughter and son at 

bedside.  ROS unobtainable from the pt 2/2 her current mental status. Chart 

reviewed; discussed with primary RN.





Objective





- Vital Signs/Intake and Output


Vital Signs (last 24 hours): 


                                        











Temp Pulse Resp BP Pulse Ox


 


 98.6 F   73   11 L  114/64   99 


 


 02/25/19 12:00  02/25/19 12:00  02/25/19 12:00  02/25/19 12:00  02/25/19 12:00








Intake and Output: 


                                        











 02/25/19 02/25/19





 06:59 18:59


 


Intake Total 805 600


 


Output Total 75 


 


Balance 730 600














- Medications


Medications: 


                               Current Medications





Albuterol/Ipratropium (Duoneb 3 Mg/0.5 Mg (3 Ml) Ud)  3 ml INH RQ6 PRN


   PRN Reason: Shortness of Breath


Calcium Acetate (Phoslo)  667 mg PO TID DESIRAE


   Last Admin: 02/25/19 13:14 Dose:  Not Given


Enoxaparin Sodium (Lovenox)  100 mg SC DAILY DESIRAE; Protocol


   Last Admin: 02/25/19 08:43 Dose:  100 mg


Metronidazole (Flagyl 500mg/100ml Ns)  100 mls @ 100 mls/hr IVPB Q8 DESIRAE; 

Protocol


   Last Admin: 02/25/19 08:42 Dose:  100 mls/hr


Levetiracetam 500 mg/ Sodium (Chloride)  105 mls @ 210 mls/hr IVPB Q12 DESIRAE


   Last Admin: 02/25/19 08:42 Dose:  210 mls/hr


Acyclovir 600 mg/ Sodium (Chloride)  100 mls @ 100 mls/hr IV Q24H DESIRAE; Protocol


   Last Admin: 02/24/19 17:08 Dose:  100 mls/hr


Ceftriaxone Sodium 2 gm/ (Sodium Chloride)  100 mls @ 100 mls/hr IVPB DAILY DESIRAE;

Protocol


   Last Admin: 02/25/19 10:01 Dose:  100 mls/hr


Dextrose/Sodium Chloride (Dextrose 5%/0.45% Ns 1000 Ml)  1,000 mls @ 75 mls/hr 

IV .[id number] DESIRAE


   Stop: 02/26/19 11:27


   Last Admin: 02/25/19 12:00 Dose:  75 mls/hr











- Labs


Labs: 


                                        





                                 02/25/19 05:14 





                                 02/25/19 05:14 





                                        











PT  27.5 Seconds (9.8-13.1)  H  02/25/19  09:30    


 


INR  2.4   02/25/19  09:30    


 


APTT  44.5 Seconds (25.6-37.1)  H  02/23/19  14:50    














- Constitutional


Appears: Other (appears somnolent; arousable to painful stimuli; opens eyes, 

moans; does not follow commands)





- Head Exam


Head Exam: NORMAL INSPECTION, NORMOCEPHALIC





- Eye Exam


Eye Exam: Normal appearance, PERRL


Pupil Exam: NORMAL ACCOMODATION, PERRL


Additional comments: 





Opens eyes spontaneously and to painful stimuli.


Pupils equal in size.


+ corneals; + dolls eyes.





- ENT Exam


ENT Exam: Mucous Membranes Moist





- Neck Exam


Neck Exam: Normal Inspection





- Respiratory Exam


Respiratory Exam: NORMAL BREATHING PATTERN





- Cardiovascular Exam


Cardiovascular Exam: REGULAR RHYTHM





- GI/Abdominal Exam


GI & Abdominal Exam: Soft





- Extremities Exam


Extremities Exam: absent: Calf Tenderness, Pedal Edema


Additional comments: 





Pt has residual right sided weakness from previous stroke (in 2004); + tone to 

RUE noted.


LUE and LLE flaccid.


All extremities fall immediately to the bed after being raised.








- Neurological Exam


Neurological Exam: Altered.  absent: Alert, Awake, Reflexes Normal


Additional comments: 


Appears somnolent; arousable to painful stimuli and sternal rub. 


Does not follow commands.


Opens eyes spontaneously and to sternal rub.


Pupils equal in size.


+ corneals; + dolls eyes.


Pt has residual right sided weakness from previous stroke (in 2004); + tone to 

RUE noted.


LUE and LLE flaccid.


All extremities fall immediately to the bed after being raised.


Unable to assess sensation.


No tremors, no clonus, no involuntary or abnormal jerky movements.


Toes downgoing on the left; unable to elicit plantar response on the right





- Psychiatric Exam


Additional comments: 


Appears somnolent; arousable to painful stimuli; opens eyes, moans; does not 

follow commands





- Skin


Skin Exam: Normal Color





Assessment and Plan





- Assessment and Plan (Free Text)


Assessment: 





A/P:





Mrs. Omalley is a 57 y/o F who was admitted over the weekend after being found 

unresponsive at home.  Per her son, Navin, who lives with her, he last saw her

at her normal state on Friday night at approximately 10:30pm.  On Saturday 

morning at approx  11:30 am him and a cousin found the pt still "asleep" but 

"she was foaming at the mouth."  Per family she was also "gasping" for air 

during this time.  Pt was noted to be hypoglycemic when EMS arrived; was treated

accordingly and brought to [facility].  She remains in a state of somnolence today; is

arousable to painful stimuli; does not follow commands.  Given the pt's medical 

history and unknown exact time of change in mental status, we will r/o any acute

stroke.  She may also have anoxic brain encephalopathy.  She is also septic at 

this time.  I had an extensive conversation with the pt's daughter and son at 

bedside regarding diagnostic findings so far and what the neuro is going forth. 

All concerns and question addressed.





Imaging reviewed:





-CT Head (2/24/19): No acute intracranial abnormalities. No significant findings

to account for the clinical presentation. No significant interval change 

compared to the prior examination(s).


-CT Head (2/22/19): No acute intracranial abnormalities. No significant findings

to account for the clinical presentation.


Extensive postoperative changes described above.


-EEG (2/24/19): shows burst suppression per Dr. Patrick.


-ECHO (2/24/19) done, results pending.





-MRI Brain without contrast to r/o acute or subacute stroke.


-MRA Head/Neck also ordered.


-ID is recommending LP---we will first have the MRI done and evaluate need for 

LP after.


-Continue current medications as ordered.


-Continue ICU management.


-Full time in reviewing chart, examining pt, and discussing with family members:

40 min.


-Notify neuro team of any acute changes to pt's condition.





Chrystal Dean, AMBAR, APN


Case discussed with Dr. Hassan

## 2019-02-25 NOTE — CP.PCM.PN
Subjective





- Date & Time of Evaluation


Date of Evaluation: 02/25/19


Time of Evaluation: 09:00





- Subjective


Subjective: 





obtunded in ICU


awaiting LP


family at bedside 





Objective





- Vital Signs/Intake and Output


Vital Signs (last 24 hours): 


                                        











Temp Pulse Resp BP Pulse Ox


 


 97.5 F L  85   11 L  108/71   99 


 


 02/25/19 08:00  02/25/19 10:00  02/25/19 10:00  02/25/19 10:00  02/25/19 10:00








Intake and Output: 


                                        











 02/25/19 02/25/19





 06:59 18:59


 


Intake Total 805 500


 


Output Total 75 


 


Balance 730 500














- Medications


Medications: 


                               Current Medications





Albuterol/Ipratropium (Duoneb 3 Mg/0.5 Mg (3 Ml) Ud)  3 ml INH RQ6 PRN


   PRN Reason: Shortness of Breath


Calcium Acetate (Phoslo)  667 mg PO TID [facility]


   Last Admin: 02/25/19 10:00 Dose:  Not Given


Enoxaparin Sodium (Lovenox)  100 mg SC DAILY [facility]; Protocol


   Last Admin: 02/25/19 08:43 Dose:  100 mg


Metronidazole (Flagyl 500mg/100ml Ns)  100 mls @ 100 mls/hr IVPB Q8 [facility]; 

Protocol


   Last Admin: 02/25/19 08:42 Dose:  100 mls/hr


Levetiracetam 500 mg/ Sodium (Chloride)  105 mls @ 210 mls/hr IVPB Q12 [facility]


   Last Admin: 02/25/19 08:42 Dose:  210 mls/hr


Acyclovir 600 mg/ Sodium (Chloride)  100 mls @ 100 mls/hr IV Q24H [facility]; Protocol


   Last Admin: 02/24/19 17:08 Dose:  100 mls/hr


Ceftriaxone Sodium 2 gm/ (Sodium Chloride)  100 mls @ 100 mls/hr IVPB DAILY [facility];

Protocol


   Last Admin: 02/25/19 10:01 Dose:  100 mls/hr











- Labs


Labs: 


                                        





                                 02/25/19 05:14 





                                 02/25/19 05:14 





                                        











PT  27.5 Seconds (9.8-13.1)  H  02/25/19  09:30    


 


INR  2.4   02/25/19  09:30    


 


APTT  44.5 Seconds (25.6-37.1)  H  02/23/19  14:50    














- Constitutional


Appears: No Acute Distress, Confused, Chronically Ill





- Head Exam


Head Exam: ATRAUMATIC, NORMOCEPHALIC





- Eye Exam


Eye Exam: absent: Scleral icterus





- ENT Exam


ENT Exam: Mucous Membranes Dry





- Neck Exam


Neck Exam: absent: Lymphadenopathy





- Respiratory Exam


Respiratory Exam: Decreased Breath Sounds, Rhonchi





- Cardiovascular Exam


Cardiovascular Exam: REGULAR RHYTHM, +S2





- GI/Abdominal Exam


GI & Abdominal Exam: Distended, Soft.  absent: Tenderness





- Rectal Exam


Rectal Exam: Deferred





-  Exam


 Exam: NORMAL INSPECTION





- Extremities Exam


Extremities Exam: Pedal Edema





- Back Exam


Back Exam: absent: CVA tenderness (L), CVA tenderness (R), paraspinal tenderness





- Neurological Exam


Neurological Exam: Altered





- Psychiatric Exam


Psychiatric exam: Depressed





- Skin


Skin Exam: Dry





Assessment and Plan


(1) Sepsis


Status: Acute   





(2) Change in mental state


Status: Acute   





(3) History of CVA with residual deficit


Status: Acute   





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


Status: Chronic   





(5) History of CVA (cerebrovascular accident)


Status: Chronic   





(6) NSTEMI (non-ST elevated myocardial infarction)


Status: Acute   





(7) SHANITA (acute kidney injury)


Status: Acute   





- Assessment and Plan (Free Text)


Assessment: 





57 yo female with Hx of HTN  Obesity  CVA with right weakness, CAD  s/p CABG  

Asthma  was admitted via ER with altered mental status which did not respond to 

Narcan and Glucose infusion by EMS 





Referred for ID eval because of possible sepsis ,  source unknown    Was found 

to have fever and diarrhea in the ER    Troponins significantly elevated  as 

well 








Patient was started on empiric IV antibiotics to cover for possible CNS 

infection pending cultures and LP 





May require dyalysis Rx   Renal on board

## 2019-02-25 NOTE — RAD
Date of service: 



02/25/2019



HISTORY:

 pulmonary congestion 



COMPARISON:

Frontal chest radiograph 02/24/2019.



FINDINGS:



LUNGS:

Right central venous catheter unchanged in position.  



Mild left perihilar/infrahilar infiltrate with none identified at the 

right once again.



PLEURA:

No pneumothorax bilaterally.  Trace left pleural effusion identified. 

 None identified at the right.



CARDIOVASCULAR:

No aortic atherosclerotic calcification present.



 Prosthetic cardiac valve and sternotomy wires reiterated.  No 

pulmonary vascular congestion.  Borderline cardiomegaly again 

evident. 



OSSEOUS STRUCTURES:

No significant abnormalities.



VISUALIZED UPPER ABDOMEN:

Normal.



OTHER FINDINGS:

None.



IMPRESSION:

Mild left infrahilar infiltrate with none identified at the right.  

Trace left pleural effusion present.  Borderline cardiomegaly.  No 

pulmonary vascular congestion.

## 2019-02-25 NOTE — CP.PCM.CON
History of Present Illness





- History of Present Illness


History of Present Illness: 





  Neurology consult called by Dr. Cordova. 





  58 yr old woman who was found unresponsive at home, by family, thought to have

taken heroine, given narcan in ER. There is no history of prior stroke or 

seizure. She was thought to be septic, was admitted to ICU and had VEEG started.




VEEG shows burst suprression off sedation. 


Neurology consult dictated. 


Report pending. Poor prognosis. 


In light of exam showing: left corneals intact, gag producing left arm and leg 

movement, as well as burst suppression, I suspect anoxic encephalopathy. 











Past Patient History





- Past Medical History & Family History


Past Medical History?: Yes





- Past Social History


Smoking Status: Former Smoker





- CARDIAC


Hx Cardiac Disorders: Yes


Other/Comment: CABG





- PULMONARY


Hx Respiratory Disorders: Yes


Hx Asthma: Yes


Hx Bronchitis: Yes


Hx Pneumonia: Yes





- NEUROLOGICAL


Hx Neurological Disorder: Yes


HX Cerebrovascular Accident: Yes





- HEENT


Hx HEENT Problems: Yes


Hx Cataracts: Yes





- RENAL


Hx Chronic Kidney Disease: No





- ENDOCRINE/METABOLIC


Hx Endocrine Disorders: No


Hx Diabetes Mellitus Type 2: Yes





- HEMATOLOGICAL/ONCOLOGICAL


Hx Blood Disorders: Yes


Hx Anemia: Yes





- INTEGUMENTARY


Hx Dermatological Problems: No





- MUSCULOSKELETAL/RHEUMATOLOGICAL


Hx Musculoskeletal Disorders: Yes


Hx Arthritis: Yes


Hx Falls: No





- GASTROINTESTINAL


Hx Gastrointestinal Disorders: Yes


Hx Constipation: Yes





- GENITOURINARY/GYNECOLOGICAL


Hx Genitourinary Disorders: No





- PSYCHIATRIC


Hx Psychophysiologic Disorder: No


Hx Substance Use: No





- SURGICAL HISTORY


Hx Surgeries: Yes


Hx Cholecystectomy: Yes


Hx Coronary Artery Bypass Graft: Yes


Other/Comment: Brain hematoma surgery x4





- ANESTHESIA


Hx Anesthesia: Yes


Hx Anesthesia Reactions: No





Meds


Allergies/Adverse Reactions: 


                                    Allergies











Allergy/AdvReac Type Severity Reaction Status Date / Time


 


Penicillins Allergy  RASH Verified 02/06/19 13:53


 


latex AdvReac  RASH Verified 02/06/19 13:53














- Medications


Medications: 


                               Current Medications





Albuterol/Ipratropium (Duoneb 3 Mg/0.5 Mg (3 Ml) Ud)  3 ml INH RQ6 PRN


   PRN Reason: Shortness of Breath


Calcium Acetate (Phoslo)  667 mg PO TID [facility]


   Last Admin: 02/25/19 10:00 Dose:  Not Given


Enoxaparin Sodium (Lovenox)  100 mg SC DAILY [facility]; Protocol


   Last Admin: 02/25/19 08:43 Dose:  100 mg


Metronidazole (Flagyl 500mg/100ml Ns)  100 mls @ 100 mls/hr IVPB Q8 [facility]; 

Protocol


   Last Admin: 02/25/19 08:42 Dose:  100 mls/hr


Levetiracetam 500 mg/ Sodium (Chloride)  105 mls @ 210 mls/hr IVPB Q12 [facility]


   Last Admin: 02/25/19 08:42 Dose:  210 mls/hr


Acyclovir 600 mg/ Sodium (Chloride)  100 mls @ 100 mls/hr IV Q24H [facility]; Protocol


   Last Admin: 02/24/19 17:08 Dose:  100 mls/hr


Ceftriaxone Sodium 2 gm/ (Sodium Chloride)  100 mls @ 100 mls/hr IVPB DAILY [facility];

Protocol


   Last Admin: 02/25/19 10:01 Dose:  100 mls/hr


Dextrose/Sodium Chloride (Dextrose 5%/0.45% Ns 1000 Ml)  1,000 mls @ 75 mls/hr 

IV .[id number] [facility]


   Stop: 02/26/19 11:27











Results





- Vital Signs


Recent Vital Signs: 


                                Last Vital Signs











Temp  97.5 F L  02/25/19 08:00


 


Pulse  85   02/25/19 10:00


 


Resp  11 L  02/25/19 10:00


 


BP  108/71   02/25/19 10:00


 


Pulse Ox  99   02/25/19 10:00














- Labs


Result Diagrams: 


                                 02/25/19 05:14





                                 02/25/19 05:14


Labs: 


                         Laboratory Results - last 24 hr











  02/24/19 02/24/19 02/24/19





  04:50 13:56 14:45


 


WBC   


 


RBC   


 


Hgb   


 


Hct   


 


MCV   


 


MCH   


 


MCHC   


 


RDW   


 


Plt Count   


 


PT    29.3 H D


 


INR    2.6


 


pCO2   


 


pO2   


 


HCO3   


 


ABG pH   


 


ABG Total CO2   


 


ABG O2 Saturation   


 


ABG Base Excess   


 


Efrem Test   


 


ABG Potassium   


 


A-a O2 Difference   


 


Sodium   


 


Chloride   


 


Glucose   


 


Lactate   


 


FiO2   


 


Potassium   


 


Carbon Dioxide   


 


Anion Gap   


 


BUN   


 


Creatinine   


 


Est GFR ( Amer)   


 


Est GFR (Non-Af Amer)   


 


POC Glucose (mg/dL)   118 H 


 


Random Glucose   


 


Hemoglobin A1c  6.2  


 


Uric Acid   


 


Calcium   


 


Phosphorus   


 


Magnesium   


 


Total Bilirubin   


 


AST   


 


ALT   


 


Alkaline Phosphatase   


 


Ammonia   


 


Total Creatine Kinase   


 


Total Protein   


 


Albumin   


 


Globulin   


 


Albumin/Globulin Ratio   


 


Arterial Blood Potassium   


 


Urine Osmolality   


 


Ur Random Creatinine   


 


Ur Random Sodium   


 


Random Vancomycin   


 


Influenza Typ A,B (EIA)   














  02/24/19 02/24/19 02/24/19





  15:45 15:50 16:23


 


WBC   


 


RBC   


 


Hgb   


 


Hct   


 


MCV   


 


MCH   


 


MCHC   


 


RDW   


 


Plt Count   


 


PT   


 


INR   


 


pCO2   


 


pO2   


 


HCO3   


 


ABG pH   


 


ABG Total CO2   


 


ABG O2 Saturation   


 


ABG Base Excess   


 


Efrem Test   


 


ABG Potassium   


 


A-a O2 Difference   


 


Sodium   


 


Chloride   


 


Glucose   


 


Lactate   


 


FiO2   


 


Potassium   


 


Carbon Dioxide   


 


Anion Gap   


 


BUN   


 


Creatinine   


 


Est GFR ( Amer)   


 


Est GFR (Non-Af Amer)   


 


POC Glucose (mg/dL)  112 H  


 


Random Glucose   


 


Hemoglobin A1c   


 


Uric Acid   


 


Calcium   


 


Phosphorus   


 


Magnesium   


 


Total Bilirubin   


 


AST   


 


ALT   


 


Alkaline Phosphatase   


 


Ammonia    30


 


Total Creatine Kinase   


 


Total Protein   


 


Albumin   


 


Globulin   


 


Albumin/Globulin Ratio   


 


Arterial Blood Potassium   


 


Urine Osmolality   


 


Ur Random Creatinine   


 


Ur Random Sodium   


 


Random Vancomycin   


 


Influenza Typ A,B (EIA)   Negative for flu a/b 














  02/24/19 02/24/19 02/24/19





  18:10 20:13 22:01


 


WBC   


 


RBC   


 


Hgb   


 


Hct   


 


MCV   


 


MCH   


 


MCHC   


 


RDW   


 


Plt Count   


 


PT   


 


INR   


 


pCO2    49 H


 


pO2    97


 


HCO3    21.7


 


ABG pH    7.28 L


 


ABG Total CO2    24.5


 


ABG O2 Saturation    99.1 H


 


ABG Base Excess    -4.1 L


 


Efrem Test    Yes


 


ABG Potassium    4.4


 


A-a O2 Difference    41.0


 


Sodium    137.0


 


Chloride    105.0


 


Glucose    120 H


 


Lactate    2.0


 


FiO2    28.0


 


Potassium   


 


Carbon Dioxide   


 


Anion Gap   


 


BUN   


 


Creatinine   


 


Est GFR ( Amer)   


 


Est GFR (Non-Af Amer)   


 


POC Glucose (mg/dL)  101  116 H 


 


Random Glucose   


 


Hemoglobin A1c   


 


Uric Acid   


 


Calcium   


 


Phosphorus   


 


Magnesium   


 


Total Bilirubin   


 


AST   


 


ALT   


 


Alkaline Phosphatase   


 


Ammonia   


 


Total Creatine Kinase   


 


Total Protein   


 


Albumin   


 


Globulin   


 


Albumin/Globulin Ratio   


 


Arterial Blood Potassium    4.4


 


Urine Osmolality   


 


Ur Random Creatinine   


 


Ur Random Sodium   


 


Random Vancomycin   


 


Influenza Typ A,B (EIA)   














  02/24/19 02/25/19 02/25/19





  22:05 00:15 02:08


 


WBC   


 


RBC   


 


Hgb   


 


Hct   


 


MCV   


 


MCH   


 


MCHC   


 


RDW   


 


Plt Count   


 


PT   


 


INR   


 


pCO2   


 


pO2   


 


HCO3   


 


ABG pH   


 


ABG Total CO2   


 


ABG O2 Saturation   


 


ABG Base Excess   


 


Efrem Test   


 


ABG Potassium   


 


A-a O2 Difference   


 


Sodium   


 


Chloride   


 


Glucose   


 


Lactate   


 


FiO2   


 


Potassium   


 


Carbon Dioxide   


 


Anion Gap   


 


BUN   


 


Creatinine   


 


Est GFR ( Amer)   


 


Est GFR (Non-Af Amer)   


 


POC Glucose (mg/dL)  131 H  123 H  126 H


 


Random Glucose   


 


Hemoglobin A1c   


 


Uric Acid   


 


Calcium   


 


Phosphorus   


 


Magnesium   


 


Total Bilirubin   


 


AST   


 


ALT   


 


Alkaline Phosphatase   


 


Ammonia   


 


Total Creatine Kinase   


 


Total Protein   


 


Albumin   


 


Globulin   


 


Albumin/Globulin Ratio   


 


Arterial Blood Potassium   


 


Urine Osmolality   


 


Ur Random Creatinine   


 


Ur Random Sodium   


 


Random Vancomycin   


 


Influenza Typ A,B (EIA)   














  02/25/19 02/25/19 02/25/19





  04:03 05:14 05:14


 


WBC   18.8 H 


 


RBC   4.26 


 


Hgb   10.4 L 


 


Hct   33.1 L 


 


MCV   77.8 L 


 


MCH   24.4 L 


 


MCHC   31.3 L 


 


RDW   20.1 H 


 


Plt Count   133 


 


PT   


 


INR   


 


pCO2   


 


pO2   


 


HCO3   


 


ABG pH   


 


ABG Total CO2   


 


ABG O2 Saturation   


 


ABG Base Excess   


 


Efrem Test   


 


ABG Potassium   


 


A-a O2 Difference   


 


Sodium    140


 


Chloride    100


 


Glucose   


 


Lactate   


 


FiO2   


 


Potassium    4.5


 


Carbon Dioxide    24


 


Anion Gap    21 H


 


BUN    49 H


 


Creatinine    4.5 H


 


Est GFR ( Amer)    12


 


Est GFR (Non-Af Amer)    10


 


POC Glucose (mg/dL)  127 H  


 


Random Glucose    125 H


 


Hemoglobin A1c   


 


Uric Acid   


 


Calcium    7.9 L


 


Phosphorus    7.2 H


 


Magnesium    1.7


 


Total Bilirubin    2.0 H


 


AST    3479 H


 


ALT    3369 H


 


Alkaline Phosphatase    146 H


 


Ammonia   


 


Total Creatine Kinase   


 


Total Protein    7.1


 


Albumin    3.2 L


 


Globulin    3.9


 


Albumin/Globulin Ratio    0.8 L


 


Arterial Blood Potassium   


 


Urine Osmolality   


 


Ur Random Creatinine   


 


Ur Random Sodium   


 


Random Vancomycin   


 


Influenza Typ A,B (EIA)   














  02/25/19 02/25/19 02/25/19





  05:14 06:35 08:38


 


WBC   


 


RBC   


 


Hgb   


 


Hct   


 


MCV   


 


MCH   


 


MCHC   


 


RDW   


 


Plt Count   


 


PT   


 


INR   


 


pCO2   


 


pO2   


 


HCO3   


 


ABG pH   


 


ABG Total CO2   


 


ABG O2 Saturation   


 


ABG Base Excess   


 


Efrem Test   


 


ABG Potassium   


 


A-a O2 Difference   


 


Sodium   


 


Chloride   


 


Glucose   


 


Lactate   


 


FiO2   


 


Potassium   


 


Carbon Dioxide   


 


Anion Gap   


 


BUN   


 


Creatinine   


 


Est GFR ( Amer)   


 


Est GFR (Non-Af Amer)   


 


POC Glucose (mg/dL)   133 H  130 H


 


Random Glucose   


 


Hemoglobin A1c   


 


Uric Acid   


 


Calcium   


 


Phosphorus   


 


Magnesium   


 


Total Bilirubin   


 


AST   


 


ALT   


 


Alkaline Phosphatase   


 


Ammonia   


 


Total Creatine Kinase   


 


Total Protein   


 


Albumin   


 


Globulin   


 


Albumin/Globulin Ratio   


 


Arterial Blood Potassium   


 


Urine Osmolality   


 


Ur Random Creatinine   


 


Ur Random Sodium   


 


Random Vancomycin  17.5  


 


Influenza Typ A,B (EIA)   














  02/25/19 02/25/19 02/25/19





  08:54 09:30 09:51


 


WBC   


 


RBC   


 


Hgb   


 


Hct   


 


MCV   


 


MCH   


 


MCHC   


 


RDW   


 


Plt Count   


 


PT   27.5 H 


 


INR   2.4 


 


pCO2   


 


pO2   


 


HCO3   


 


ABG pH   


 


ABG Total CO2   


 


ABG O2 Saturation   


 


ABG Base Excess   


 


Efrem Test   


 


ABG Potassium   


 


A-a O2 Difference   


 


Sodium   


 


Chloride   


 


Glucose   


 


Lactate   


 


FiO2   


 


Potassium   


 


Carbon Dioxide   


 


Anion Gap   


 


BUN   


 


Creatinine   


 


Est GFR ( Amer)   


 


Est GFR (Non-Af Amer)   


 


POC Glucose (mg/dL)   


 


Random Glucose   


 


Hemoglobin A1c   


 


Uric Acid  11.7 H  


 


Calcium   


 


Phosphorus   


 


Magnesium   


 


Total Bilirubin   


 


AST   


 


ALT   


 


Alkaline Phosphatase   


 


Ammonia   


 


Total Creatine Kinase  647 H  


 


Total Protein   


 


Albumin   


 


Globulin   


 


Albumin/Globulin Ratio   


 


Arterial Blood Potassium   


 


Urine Osmolality    308


 


Ur Random Creatinine    98.6


 


Ur Random Sodium    58


 


Random Vancomycin   


 


Influenza Typ A,B (EIA)   














  02/25/19





  10:06


 


WBC 


 


RBC 


 


Hgb 


 


Hct 


 


MCV 


 


MCH 


 


MCHC 


 


RDW 


 


Plt Count 


 


PT 


 


INR 


 


pCO2 


 


pO2 


 


HCO3 


 


ABG pH 


 


ABG Total CO2 


 


ABG O2 Saturation 


 


ABG Base Excess 


 


Efrem Test 


 


ABG Potassium 


 


A-a O2 Difference 


 


Sodium 


 


Chloride 


 


Glucose 


 


Lactate 


 


FiO2 


 


Potassium 


 


Carbon Dioxide 


 


Anion Gap 


 


BUN 


 


Creatinine 


 


Est GFR ( Amer) 


 


Est GFR (Non-Af Amer) 


 


POC Glucose (mg/dL) 


 


Random Glucose 


 


Hemoglobin A1c 


 


Uric Acid 


 


Calcium 


 


Phosphorus 


 


Magnesium 


 


Total Bilirubin 


 


AST 


 


ALT 


 


Alkaline Phosphatase 


 


Ammonia 


 


Total Creatine Kinase 


 


Total Protein 


 


Albumin 


 


Globulin 


 


Albumin/Globulin Ratio 


 


Arterial Blood Potassium 


 


Urine Osmolality 


 


Ur Random Creatinine 


 


Ur Random Sodium 


 


Random Vancomycin  16.6


 


Influenza Typ A,B (EIA)

## 2019-02-25 NOTE — CP.PCM.PN
Subjective





- Date & Time of Evaluation


Date of Evaluation: 02/25/19


Time of Evaluation: 10:00





- Subjective


Subjective: 





patient seen and examined at bedside


obtunded


ros unavailable


available diagnostic data reviewed





Objective





- Vital Signs/Intake and Output


Vital Signs (last 24 hours): 


                                        











Temp Pulse Resp BP Pulse Ox


 


 98.6 F   85   13   126/50 L  100 


 


 02/25/19 12:00  02/25/19 14:00  02/25/19 14:00  02/25/19 14:00  02/25/19 14:00








Intake and Output: 


                                        











 02/25/19 02/25/19





 06:59 18:59


 


Intake Total 805 750


 


Output Total 75 


 


Balance 730 750














- Medications


Medications: 


                               Current Medications





Albuterol/Ipratropium (Duoneb 3 Mg/0.5 Mg (3 Ml) Ud)  3 ml INH RQ6 PRN


   PRN Reason: Shortness of Breath


Calcium Acetate (Phoslo)  667 mg PO TID [facility]


   Last Admin: 02/25/19 13:14 Dose:  Not Given


Enoxaparin Sodium (Lovenox)  100 mg SC DAILY [facility]; Protocol


   Last Admin: 02/25/19 08:43 Dose:  100 mg


Metronidazole (Flagyl 500mg/100ml Ns)  100 mls @ 100 mls/hr IVPB Q8 [facility]; 

Protocol


   Last Admin: 02/25/19 08:42 Dose:  100 mls/hr


Levetiracetam 500 mg/ Sodium (Chloride)  105 mls @ 210 mls/hr IVPB Q12 [facility]


   Last Admin: 02/25/19 08:42 Dose:  210 mls/hr


Acyclovir 600 mg/ Sodium (Chloride)  100 mls @ 100 mls/hr IV Q24H [facility]; Protocol


   Last Admin: 02/24/19 17:08 Dose:  100 mls/hr


Ceftriaxone Sodium 2 gm/ (Sodium Chloride)  100 mls @ 100 mls/hr IVPB DAILY [facility];

Protocol


   Last Admin: 02/25/19 10:01 Dose:  100 mls/hr


Dextrose/Sodium Chloride (Dextrose 5%/0.45% Ns 1000 Ml)  1,000 mls @ 75 mls/hr 

IV .[id number] [facility]


   Stop: 02/26/19 11:27


   Last Admin: 02/25/19 12:00 Dose:  75 mls/hr











- Labs


Labs: 


                                        





                                 02/25/19 05:14 





                                 02/25/19 05:14 





                                        











PT  27.5 Seconds (9.8-13.1)  H  02/25/19  09:30    


 


INR  2.4   02/25/19  09:30    


 


APTT  44.5 Seconds (25.6-37.1)  H  02/23/19  14:50    














- Constitutional


Appears: Chronically Ill





- Head Exam


Head Exam: NORMOCEPHALIC





- Respiratory Exam


Respiratory Exam: Decreased Breath Sounds, Rales





- Cardiovascular Exam


Cardiovascular Exam: +S1, +S2





- GI/Abdominal Exam


GI & Abdominal Exam: Soft





- Neurological Exam


Neurological Exam: absent: Alert, Awake





- Skin


Skin Exam: Normal Color, Warm





Assessment and Plan


(1) Sepsis


Status: Acute   





(2) Creatinine elevation


Status: Acute   





(3) Elevated liver enzymes


Status: Acute   





(4) Elevated troponin level


Status: Acute   





- Assessment and Plan (Free Text)


Plan: 





monitor labs


monitor vitals


wbc improving


renal function worsening


possibly cva vs infection of cns


pending mri


c/w iv abx


appreciate consultants input


rest of plan as ordered

## 2019-02-26 LAB
ALBUMIN SERPL-MCNC: 3.1 G/DL (ref 3.5–5)
ALBUMIN/GLOB SERPL: 0.8 {RATIO} (ref 1–2.1)
ALT SERPL-CCNC: 2497 U/L (ref 9–52)
BILIRUBIN,DIRECT: 0.9 MG/ML (ref 0–0.4)
BUN SERPL-MCNC: 62 MG/DL (ref 7–17)
CALCIUM SERPL-MCNC: 7.6 MG/DL (ref 8.4–10.2)
ERYTHROCYTE [DISTWIDTH] IN BLOOD BY AUTOMATED COUNT: 19.7 % (ref 11.5–14.5)
GFR NON-AFRICAN AMERICAN: 8
HGB BLD-MCNC: 10 G/DL (ref 12–16)
INR PPP: 2.1
MCH RBC QN AUTO: 24.3 PG (ref 27–31)
MCHC RBC AUTO-ENTMCNC: 32 G/DL (ref 33–37)
MCV RBC AUTO: 76 FL (ref 81–99)
PLATELET # BLD: 140 K/UL (ref 130–400)
PROTHROMBIN TIME: 24.2 SECONDS (ref 9.8–13.1)
RBC # BLD AUTO: 4.1 MIL/UL (ref 3.8–5.2)
WBC # BLD AUTO: 14.7 K/UL (ref 4.8–10.8)

## 2019-02-26 PROCEDURE — 02HV33Z INSERTION OF INFUSION DEVICE INTO SUPERIOR VENA CAVA, PERCUTANEOUS APPROACH: ICD-10-PCS

## 2019-02-26 PROCEDURE — 3E0234Z INTRODUCTION OF SERUM, TOXOID AND VACCINE INTO MUSCLE, PERCUTANEOUS APPROACH: ICD-10-PCS | Performed by: FAMILY MEDICINE

## 2019-02-26 RX ADMIN — METRONIDAZOLE SCH MLS/HR: 500 INJECTION, SOLUTION INTRAVENOUS at 08:17

## 2019-02-26 RX ADMIN — DEXTROSE AND SODIUM CHLORIDE SCH MLS/HR: 5; 450 INJECTION, SOLUTION INTRAVENOUS at 01:05

## 2019-02-26 RX ADMIN — ENOXAPARIN SODIUM SCH MG: 100 INJECTION SUBCUTANEOUS at 08:18

## 2019-02-26 RX ADMIN — METRONIDAZOLE SCH MLS/HR: 500 INJECTION, SOLUTION INTRAVENOUS at 01:03

## 2019-02-26 RX ADMIN — METRONIDAZOLE SCH MLS/HR: 500 INJECTION, SOLUTION INTRAVENOUS at 16:45

## 2019-02-26 NOTE — CP.PCM.PN
Subjective





- Date & Time of Evaluation


Date of Evaluation: 02/26/19


Time of Evaluation: 18:53





- Subjective


Subjective: 





Patient remains obtunded.





Objective





- Vital Signs/Intake and Output


Vital Signs (last 24 hours): 


                                        











Temp Pulse Resp BP Pulse Ox


 


 97.2 F L  83   13   94/54 L  96 


 


 02/26/19 16:00  02/26/19 18:00  02/26/19 18:00  02/26/19 18:00  02/26/19 18:00








Intake and Output: 


                                        











 02/26/19 02/26/19





 06:59 18:59


 


Intake Total 1100 1525


 


Output Total  325


 


Balance 1100 1200














- Medications


Medications: 


                               Current Medications





Albuterol/Ipratropium (Duoneb 3 Mg/0.5 Mg (3 Ml) Ud)  3 ml INH RQ6 PRN


   PRN Reason: Shortness of Breath


Calcium Acetate (Phoslo)  667 mg PO TID [facility]


   Last Admin: 02/26/19 16:46 Dose:  667 mg


Enoxaparin Sodium (Lovenox)  100 mg SC DAILY [facility]; Protocol


   Last Admin: 02/26/19 08:18 Dose:  100 mg


Metronidazole (Flagyl 500mg/100ml Ns)  100 mls @ 100 mls/hr IVPB Q8 [facility]; 

Protocol


   Last Admin: 02/26/19 16:45 Dose:  100 mls/hr


Levetiracetam 500 mg/ Sodium (Chloride)  105 mls @ 210 mls/hr IVPB Q12 [facility]


   Last Admin: 02/26/19 08:18 Dose:  210 mls/hr


Acyclovir 600 mg/ Sodium (Chloride)  100 mls @ 100 mls/hr IV Q24H [facility]; Protocol


   Last Admin: 02/26/19 16:46 Dose:  100 mls/hr


Ceftriaxone Sodium 2 gm/ (Sodium Chloride)  100 mls @ 100 mls/hr IVPB DAILY [facility];

Protocol


   Last Admin: 02/26/19 08:19 Dose:  100 mls/hr











- Labs


Labs: 


                                        





                                 02/26/19 04:35 





                                 02/26/19 04:35 





                                        











PT  24.2 Seconds (9.8-13.1)  H  02/26/19  04:35    


 


INR  2.1   02/26/19  04:35    


 


APTT  44.5 Seconds (25.6-37.1)  H  02/23/19  14:50    














- Head Exam


Head Exam: ATRAUMATIC





- Eye Exam


Eye Exam: Normal appearance





- ENT Exam


ENT Exam: Normal Exam





- Neck Exam


Neck Exam: Full ROM





- Respiratory Exam


Respiratory Exam: Clear to Ausculation Bilateral





- Cardiovascular Exam


Cardiovascular Exam: REGULAR RHYTHM





- GI/Abdominal Exam


GI & Abdominal Exam: Soft.  absent: Tenderness





Assessment and Plan


(1) Elevated liver enzymes


Assessment & Plan: 


Liver function continues to improve/ Etiology likely shock liver.


Status: Acute

## 2019-02-26 NOTE — CP.PCM.PN
Subjective





- Date & Time of Evaluation


Date of Evaluation: 02/26/19


Time of Evaluation: 13:31





- Subjective


Subjective: 





Neurology Progress Note





Mrs. Omalley was seen and examined today at bedside in the ICU.  She continues 

to have episodes of staring and does not follow commands.  Sometimes she does 

move the left side to noxious stimulus. Recent CT head did not show any acute 

changes.  There is an old left insular/MCA infarct.  She has multiple systemic 

dysfunctions including liver and kidney.  





Objective





- Vital Signs/Intake and Output


Vital Signs (last 24 hours): 


                                        











Temp Pulse Resp BP Pulse Ox


 


 96.4 F L  75   14   108/75   99 


 


 02/26/19 12:00  02/26/19 12:00  02/26/19 12:00  02/26/19 12:00  02/26/19 12:00








Intake and Output: 


                                        











 02/26/19 02/26/19





 06:59 18:59


 


Intake Total 1100 775


 


Output Total  200


 


Balance 1100 575














- Medications


Medications: 


                               Current Medications





Albuterol/Ipratropium (Duoneb 3 Mg/0.5 Mg (3 Ml) Ud)  3 ml INH RQ6 PRN


   PRN Reason: Shortness of Breath


Calcium Acetate (Phoslo)  667 mg PO TID DESIRAE


   Last Admin: 02/26/19 12:48 Dose:  Not Given


Enoxaparin Sodium (Lovenox)  100 mg SC DAILY DESIRAE; Protocol


   Last Admin: 02/26/19 08:18 Dose:  100 mg


Metronidazole (Flagyl 500mg/100ml Ns)  100 mls @ 100 mls/hr IVPB Q8 DESIRAE; 

Protocol


   Last Admin: 02/26/19 08:17 Dose:  100 mls/hr


Levetiracetam 500 mg/ Sodium (Chloride)  105 mls @ 210 mls/hr IVPB Q12 DESIRAE


   Last Admin: 02/26/19 08:18 Dose:  210 mls/hr


Acyclovir 600 mg/ Sodium (Chloride)  100 mls @ 100 mls/hr IV Q24H DESIRAE; Protocol


   Last Admin: 02/25/19 19:18 Dose:  100 mls/hr


Ceftriaxone Sodium 2 gm/ (Sodium Chloride)  100 mls @ 100 mls/hr IVPB DAILY DESIRAE;

Protocol


   Last Admin: 02/26/19 08:19 Dose:  100 mls/hr











- Labs


Labs: 


                                        





                                 02/26/19 04:35 





                                 02/26/19 04:35 





                                        











PT  24.2 Seconds (9.8-13.1)  H  02/26/19  04:35    


 


INR  2.1   02/26/19  04:35    


 


APTT  44.5 Seconds (25.6-37.1)  H  02/23/19  14:50    














- Neurological Exam


Neurological Exam: Altered, CN II-XII Intact


Neuro motor strength exam: Left Upper Extremity: 2/1, Right Upper Extremity: 

2/1, Left Lower Extremity: 2/1, Right Lower Extremity: 2/1


Additional comments: 





GCS 8





Assessment and Plan


(1) Toxic metabolic encephalopathy


Assessment & Plan: 


Likely due to infection, multisystem failure, metabolic derangements sup

erimposed on previous encephalomalacia from left MCA stroke.  I recommend 

treating the underlying cause.  EEG is also recommended for re-evaluation.








Status: Acute

## 2019-02-26 NOTE — CON
DATE:  02/24/2019



NEUROLOGY CONSULTATION



Neurology consult called by  for Ms. Savanah Omalley.



HISTORY OF PRESENT ILLNESS:  This is a 58-year-old woman with a past

medical history of hypertension, stroke, CABG, MI, diabetes, and anemia,

who presented to the emergency room yesterday at around 1:30 in the

afternoon after she was found unconscious at home.  The patient apparently

used heroin as per family members' report and she was found unresponsive,

they called 911.  En route, EMS administered 2 mg of Narcan and 0.4 mg IV. 

She was also given 2 amps of D50 because her initial sugar was 29.  In the

ER, her Accu-Chek was 138.  Last admission was 02/06/2019 for asthma

exacerbation.  On admission, she was admitted to the ICU.  Code stroke was

not called.  CAT scan of the head was done and it showed diffuse atrophy

with an old large stroke that is present with encephalomalacia in the left

internal capsule and frontal temporoparietal region.  She is unsure when

the stroke occurred.  At this point, the patient has video EEG in place,

which shows burst suppression with no seizure noted.  Her lab shows that

she has mild hyponatremia.



REVIEW OF SYSTEMS:  Not obtainable due to the patient's mental status.



PAST MEDICAL HISTORY:  As mentioned prior.



PAST SURGICAL HISTORY:  As mentioned prior.



FAMILY HISTORY AND SOCIAL HISTORY:  She has a son.  We do not know more

than that.  We do know she used heroin, we do not know how often and how

much.



ALLERGIES:  NO KNOWN DRUG ALLERGIES.



PHYSICAL EXAMINATION:

GENERAL:  The patient is not intubated and not sedated.

NEUROLOGIC:  Pupils are pinpoint at 3 mm to 2 mm with light.  There are no

doll's eyes.  There is corneal on the left side but not the right, and she

blinks to threat onft eye compared to the right as well.  There is no

verbal stimuli.  The patient was noted to have heavy breathing.  She is not

withdrawing to pain.  Gag according to the nurse is present and induces a

left arm and leg movement; however, I did not observe this.  There is no

focal twitching noted.  Reflexes are +1 upper and lower limb bilaterally.



IMPRESSION:  This is a 58-year-old woman with anoxic encephalopathy.  At

this point, my differential includes myocardial infraction, new brainstem

stroke affecting her gag, her corneals and her doll's eye reflexes and her

level of consciousness.  I do not think that she is in status epilepticus;

however, I will continue her on Keppra 500 mg twice a day.  I have loaded

her with 1000 mg of Keppra already.



PLAN:

1.  MRI of the brain as soon as possible without gadolinium.

2.  Continue Keppra 750 mg twice a day intravenous.

3.  We way disconnect the electroencephalogram now for repeat CAT scan of

the head.

4.  CT of the head and neck should be done as well.

5.  Echocardiogram as needed as well.

6.  Poor prognosis.



Thank you for this interesting consult.  Our team will follow.







__________________________________________

Gutierrez Patrick MD





DD:  02/24/2019 11:36:05

DT:  02/24/2019 15:16:47

Job # [id number]

MTDD

## 2019-02-26 NOTE — CP.PCM.PN
Subjective





- Date & Time of Evaluation


Date of Evaluation: 02/26/19


Time of Evaluation: 11:18





- Subjective


Subjective: 





This patient is a 58-year-old female remained unconscious today although opening

her eyes but not communicating restless in bed.  Her son and other visitors at 

the bedside.


Urine output noted to be poor patient was given fair amount of volume expansion 

between yesterday and the emergency room and given Lasix and still not 

responding she is still oliguric acute renal failure.


Recommendation


Patient need dialysis she is not responding to diuretics or fluid management.  

Worsening kidney function.


I spoke to the son at the bedside also I called the daughter in the presence of 

the son and the nurse in ICU and consent was taken and to insert temporary 

dialysis catheter and proceed with hemodialysis





Objective





- Vital Signs/Intake and Output


Vital Signs (last 24 hours): 


                                        











Temp Pulse Resp BP Pulse Ox


 


 97.2 F L  78   11 L  108/75   99 


 


 02/26/19 08:00  02/26/19 10:00  02/26/19 10:00  02/26/19 10:00  02/26/19 10:00








Intake and Output: 


                                        











 02/26/19 02/26/19





 06:59 18:59


 


Intake Total 1100 625


 


Output Total  200


 


Balance 1100 425














- Medications


Medications: 


                               Current Medications





Albuterol/Ipratropium (Duoneb 3 Mg/0.5 Mg (3 Ml) Ud)  3 ml INH RQ6 PRN


   PRN Reason: Shortness of Breath


Calcium Acetate (Phoslo)  667 mg PO TID DESIRAE


   Last Admin: 02/26/19 08:19 Dose:  Not Given


Enoxaparin Sodium (Lovenox)  100 mg SC DAILY DESIRAE; Protocol


   Last Admin: 02/26/19 08:18 Dose:  100 mg


Metronidazole (Flagyl 500mg/100ml Ns)  100 mls @ 100 mls/hr IVPB Q8 DESIRAE; 

Protocol


   Last Admin: 02/26/19 08:17 Dose:  100 mls/hr


Levetiracetam 500 mg/ Sodium (Chloride)  105 mls @ 210 mls/hr IVPB Q12 DESIRAE


   Last Admin: 02/26/19 08:18 Dose:  210 mls/hr


Acyclovir 600 mg/ Sodium (Chloride)  100 mls @ 100 mls/hr IV Q24H DESIRAE; Protocol


   Last Admin: 02/25/19 19:18 Dose:  100 mls/hr


Ceftriaxone Sodium 2 gm/ (Sodium Chloride)  100 mls @ 100 mls/hr IVPB DAILY DESIRAE;

Protocol


   Last Admin: 02/26/19 08:19 Dose:  100 mls/hr


Dextrose/Sodium Chloride (Dextrose 5%/0.45% Ns 1000 Ml)  1,000 mls @ 75 mls/hr 

IV .[id number] DESIRAE


   Stop: 02/26/19 11:27


   Last Admin: 02/26/19 01:05 Dose:  75 mls/hr











- Labs


Labs: 


                                        





                                 02/26/19 04:35 





                                 02/26/19 04:35 





                                        











PT  24.2 Seconds (9.8-13.1)  H  02/26/19  04:35    


 


INR  2.1   02/26/19  04:35    


 


APTT  44.5 Seconds (25.6-37.1)  H  02/23/19  14:50    














- Constitutional


Appears: Toxic, Combative





- Eye Exam


Eye Exam: Conjunctival injection





- ENT Exam


ENT Exam: Mucous Membranes Moist





- Neck Exam


Neck Exam: absent: Lymphadenopathy





- Respiratory Exam


Respiratory Exam: NORMAL BREATHING PATTERN.  absent: Chest Wall Tenderness





- Cardiovascular Exam


Cardiovascular Exam: absent: Gallop, JVD, Rubs





- GI/Abdominal Exam


GI & Abdominal Exam: Soft, Normal Bowel Sounds





- Extremities Exam


Extremities Exam: absent: Calf Tenderness





- Back Exam


Back Exam: absent: CVA tenderness (L), CVA tenderness (R)





- Neurological Exam


Neurological Exam: Altered





- Psychiatric Exam


Psychiatric exam: Flat Affect





- Skin


Skin Exam: absent: Cyanosis





Assessment and Plan


(1) SHANITA (acute kidney injury)


Assessment & Plan: 


Acute renal failure/acute kidney injury related to multifactorial including 

sepsis?  Rhabdomyolysis.  Patient also having diagnosis to rule out meningitis.


Worsening kidney function serum creatinine and BUN continued to rise


Diabetes mellitus and history of hypertension history of CVA.


hyperurecemia


Recommendation


Patient need dialysis she is not responding to diuretics or fluid management.  

Worsening kidney function.


I spoke to the son at the bedside also I called the daughter in the presence of 

the son and the nurse in ICU and consent was taken and to insert temporary 

dialysis catheter and proceed with hemodialysis


please give allopurinol  300 mg daily


continue IV fluid





Status: Acute   





(2) Elevated liver enzymes


Status: Acute   





(3) Elevated troponin level


Status: Acute   





(4) Leukocytosis


Status: Acute   





(5) Sepsis


Status: Acute

## 2019-02-26 NOTE — CP.PCM.PN
Subjective





- Date & Time of Evaluation


Date of Evaluation: 02/26/19


Time of Evaluation: 11:00





- Subjective


Subjective: 





Continues to have poor response


Noted lower  WBC


 Noted elevated liver enzymes  but significantly less.


On intubation





Objective





- Vital Signs/Intake and Output


Vital Signs (last 24 hours): 


                                        











Temp Pulse Resp BP Pulse Ox


 


 97.2 F L  109 H  16   148/73   93 L


 


 02/26/19 16:00  02/26/19 16:00  02/26/19 16:00  02/26/19 16:00  02/26/19 16:00








Intake and Output: 


                                        











 02/26/19 02/26/19





 06:59 18:59


 


Intake Total 1100 1075


 


Output Total  225


 


Balance 1100 850














- Medications


Medications: 


                               Current Medications





Albuterol/Ipratropium (Duoneb 3 Mg/0.5 Mg (3 Ml) Ud)  3 ml INH RQ6 PRN


   PRN Reason: Shortness of Breath


Calcium Acetate (Phoslo)  667 mg PO TID DESIRAE


   Last Admin: 02/26/19 16:46 Dose:  667 mg


Enoxaparin Sodium (Lovenox)  100 mg SC DAILY DESIRAE; Protocol


   Last Admin: 02/26/19 08:18 Dose:  100 mg


Metronidazole (Flagyl 500mg/100ml Ns)  100 mls @ 100 mls/hr IVPB Q8 DESIRAE; 

Protocol


   Last Admin: 02/26/19 16:45 Dose:  100 mls/hr


Levetiracetam 500 mg/ Sodium (Chloride)  105 mls @ 210 mls/hr IVPB Q12 DESIRAE


   Last Admin: 02/26/19 08:18 Dose:  210 mls/hr


Acyclovir 600 mg/ Sodium (Chloride)  100 mls @ 100 mls/hr IV Q24H DESIRAE; Protocol


   Last Admin: 02/26/19 16:46 Dose:  100 mls/hr


Ceftriaxone Sodium 2 gm/ (Sodium Chloride)  100 mls @ 100 mls/hr IVPB DAILY DESIRAE;

Protocol


   Last Admin: 02/26/19 08:19 Dose:  100 mls/hr











- Labs


Labs: 


                                        





                                 02/26/19 04:35 





                                 02/26/19 04:35 





                                        











PT  24.2 Seconds (9.8-13.1)  H  02/26/19  04:35    


 


INR  2.1   02/26/19  04:35    


 


APTT  44.5 Seconds (25.6-37.1)  H  02/23/19  14:50    














- ENT Exam


ENT Exam: Mucous Membranes Moist





- Respiratory Exam


Respiratory Exam: Decreased Breath Sounds





- Cardiovascular Exam


Cardiovascular Exam: Tachycardia





- GI/Abdominal Exam


GI & Abdominal Exam: Hyperactive Bowel Sounds, Normal Bowel Sounds





- Neurological Exam


Neurological Exam: Altered





Assessment and Plan


(1) SHANITA (acute kidney injury)


Status: Acute   





(2) Acute respiratory failure with hypoxia


Status: Acute   





(3) Aspiration pneumonia


Status: Acute   





(4) Elevated liver enzymes


Status: Acute   





(5) Sepsis


Status: Acute   





(6) Toxic metabolic encephalopathy


Status: Acute   





- Assessment and Plan (Free Text)


Plan: 





Cont iv antibiotics


 cont fluids


 follow up with Pulmonary and ID

## 2019-02-26 NOTE — PCM.SURG1
Surgeon's Initial Post Op Note





- Surgeon's Notes


Surgeon: Chen


Assistant: None


Type of Anesthesia: Local


Pre-Operative Diagnosis: RF


Operative Findings: Pastent right IJV


Post-Operative Diagnosis: RF


Operation Performed: Right IJV shiley placed with the tip at the SVC


Specimen/Specimens Removed: None


Estimated Blood Loss: EBL {In ML}: 1


Date of Surgery/Procedure: 02/26/19


Time of Surgery/Procedure: 15:45

## 2019-02-26 NOTE — CP.PCM.PN
Subjective





- Date & Time of Evaluation


Date of Evaluation: 02/26/19


Time of Evaluation: 09:00





- Subjective


Subjective: 





obtunded in ICU


all cultures /serologies neg thus far


admitted with AMS, NSTEMI and fever -   now with SHANITA requiring HD 





Objective





- Vital Signs/Intake and Output


Vital Signs (last 24 hours): 


                                        











Temp Pulse Resp BP Pulse Ox


 


 97.2 F L  78   11 L  108/75   99 


 


 02/26/19 08:00  02/26/19 10:00  02/26/19 10:00  02/26/19 10:00  02/26/19 10:00








Intake and Output: 


                                        











 02/26/19 02/26/19





 06:59 18:59


 


Intake Total 1100 775


 


Output Total  200


 


Balance 1100 575














- Medications


Medications: 


                               Current Medications





Albuterol/Ipratropium (Duoneb 3 Mg/0.5 Mg (3 Ml) Ud)  3 ml INH RQ6 PRN


   PRN Reason: Shortness of Breath


Calcium Acetate (Phoslo)  667 mg PO TID DESIRAE


   Last Admin: 02/26/19 08:19 Dose:  Not Given


Enoxaparin Sodium (Lovenox)  100 mg SC DAILY DESIRAE; Protocol


   Last Admin: 02/26/19 08:18 Dose:  100 mg


Metronidazole (Flagyl 500mg/100ml Ns)  100 mls @ 100 mls/hr IVPB Q8 DESIRAE; 

Protocol


   Last Admin: 02/26/19 08:17 Dose:  100 mls/hr


Levetiracetam 500 mg/ Sodium (Chloride)  105 mls @ 210 mls/hr IVPB Q12 DESIRAE


   Last Admin: 02/26/19 08:18 Dose:  210 mls/hr


Acyclovir 600 mg/ Sodium (Chloride)  100 mls @ 100 mls/hr IV Q24H DESIRAE; Protocol


   Last Admin: 02/25/19 19:18 Dose:  100 mls/hr


Ceftriaxone Sodium 2 gm/ (Sodium Chloride)  100 mls @ 100 mls/hr IVPB DAILY DESIRAE;

Protocol


   Last Admin: 02/26/19 08:19 Dose:  100 mls/hr











- Labs


Labs: 


                                        





                                 02/26/19 04:35 





                                 02/26/19 04:35 





                                        











PT  24.2 Seconds (9.8-13.1)  H  02/26/19  04:35    


 


INR  2.1   02/26/19  04:35    


 


APTT  44.5 Seconds (25.6-37.1)  H  02/23/19  14:50    














- Constitutional


Appears: Confused, Chronically Ill





- Head Exam


Head Exam: NORMOCEPHALIC





- Eye Exam


Eye Exam: absent: Scleral icterus





- ENT Exam


ENT Exam: Mucous Membranes Dry





- Neck Exam


Neck Exam: absent: Lymphadenopathy





- Respiratory Exam


Respiratory Exam: Decreased Breath Sounds





- Cardiovascular Exam


Cardiovascular Exam: REGULAR RHYTHM, +S2





- GI/Abdominal Exam


GI & Abdominal Exam: Distended, Soft





- Rectal Exam


Rectal Exam: Deferred





-  Exam


 Exam: NORMAL INSPECTION





- Extremities Exam


Extremities Exam: Pedal Edema





- Back Exam


Back Exam: absent: CVA tenderness (L), CVA tenderness (R)





- Neurological Exam


Neurological Exam: Altered





Assessment and Plan


(1) Sepsis


Status: Acute   





(2) Change in mental state


Status: Acute   





(3) History of CVA with residual deficit


Status: Acute   





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


Status: Chronic   





(5) History of CVA (cerebrovascular accident)


Status: Chronic   





(6) NSTEMI (non-ST elevated myocardial infarction)


Status: Acute   





(7) SHANITA (acute kidney injury)


Status: Acute   





- Assessment and Plan (Free Text)


Assessment: 





59 yo female with Hx of HTN  Obesity  CVA with right weakness, CAD  s/p CABG  

Asthma  was admitted via ER with altered mental status which did not respond to 

Narcan and Glucose infusion by EMS 





admitted with fever  AMS and NSTEMI  


now has SHANITA and in need of HD


work up for stroke, infection in progress


all cultures/ serologies neg thus far








Patient was started on empiric IV antibiotics to cover for possible CNS 

infection pending cultures and LP 





prognosis poor from outset

## 2019-02-27 LAB
ANISOCYTOSIS BLD QL SMEAR: (no result)
APTT BLD: 42.6 SECONDS (ref 25.6–37.1)
ARTERIAL BLOOD GAS HEMOGLOBIN: 10.9 G/DL (ref 11.7–17.4)
ARTERIAL BLOOD GAS O2 SAT: 99.9 % (ref 95–98)
ARTERIAL BLOOD GAS PCO2: 40 MM/HG (ref 35–45)
ARTERIAL BLOOD GAS TCO2: 24.9 MMOL/L (ref 22–28)
ARTERIAL PATENCY WRIST A: YES
BASOPHILS # BLD AUTO: 0.1 K/UL (ref 0–0.2)
BASOPHILS NFR BLD: 0.5 % (ref 0–2)
BUN SERPL-MCNC: 69 MG/DL (ref 7–17)
CALCIUM SERPL-MCNC: 7.7 MG/DL (ref 8.4–10.2)
EOSINOPHIL # BLD AUTO: 0.1 K/UL (ref 0–0.7)
EOSINOPHIL NFR BLD: 0.7 % (ref 0–4)
ERYTHROCYTE [DISTWIDTH] IN BLOOD BY AUTOMATED COUNT: 20.5 % (ref 11.5–14.5)
GFR NON-AFRICAN AMERICAN: 6
HCO3 BLDA-SCNC: 23.9 MMOL/L (ref 21–28)
HGB BLD-MCNC: 10.2 G/DL (ref 12–16)
HYPOCHROMIC: SLIGHT
INHALED O2 CONCENTRATION: 100 %
INR PPP: 1.7
LYMPHOCYTE: 3 % (ref 20–50)
LYMPHOCYTES # BLD AUTO: 0.5 K/UL (ref 1–4.3)
LYMPHOCYTES NFR BLD AUTO: 4.2 % (ref 20–40)
MCH RBC QN AUTO: 24.4 PG (ref 27–31)
MCHC RBC AUTO-ENTMCNC: 32 G/DL (ref 33–37)
MCV RBC AUTO: 76.2 FL (ref 81–99)
MICROCYTES BLD QL SMEAR: SLIGHT
MONOCYTE: 19 % (ref 0–10)
MONOCYTES # BLD: 2.1 K/UL (ref 0–0.8)
MONOCYTES NFR BLD: 15.9 % (ref 0–10)
NEUTROPHILS # BLD: 10.3 K/UL (ref 1.8–7)
NEUTROPHILS NFR BLD AUTO: 77 % (ref 42–75)
NEUTROPHILS NFR BLD AUTO: 78.7 % (ref 50–75)
NRBC BLD AUTO-RTO: 0.3 % (ref 0–0)
NRBC BLD AUTO-RTO: 1 % (ref 0–0)
O2 CAP BLDA-SCNC: 15.2 ML/DL (ref 16–24)
O2 CT BLDA-SCNC: 15.2 ML/DL (ref 15–23)
PH BLDA: 7.38 [PH] (ref 7.35–7.45)
PLATELET # BLD EST: NORMAL 10*3/UL
PLATELET # BLD: 124 K/UL (ref 130–400)
PLATELET CLUMP BLD QL SMEAR: PRESENT
PMV BLD AUTO: 9.7 FL (ref 7.2–11.7)
PO2 BLDA: 159 MM/HG (ref 80–100)
PROTHROMBIN TIME: 19.3 SECONDS (ref 9.8–13.1)
RBC # BLD AUTO: 4.2 MIL/UL (ref 3.8–5.2)
TOTAL CELLS COUNTED BLD: 100
VARIANT LYMPHS NFR BLD MANUAL: 1 % (ref 0–0)
WBC # BLD AUTO: 13 K/UL (ref 4.8–10.8)

## 2019-02-27 PROCEDURE — 0BH17EZ INSERTION OF ENDOTRACHEAL AIRWAY INTO TRACHEA, VIA NATURAL OR ARTIFICIAL OPENING: ICD-10-PCS

## 2019-02-27 PROCEDURE — 5A1955Z RESPIRATORY VENTILATION, GREATER THAN 96 CONSECUTIVE HOURS: ICD-10-PCS | Performed by: FAMILY MEDICINE

## 2019-02-27 RX ADMIN — IPRATROPIUM BROMIDE AND ALBUTEROL SULFATE SCH ML: .5; 3 SOLUTION RESPIRATORY (INHALATION) at 19:38

## 2019-02-27 RX ADMIN — ENOXAPARIN SODIUM SCH MG: 100 INJECTION SUBCUTANEOUS at 10:34

## 2019-02-27 RX ADMIN — METRONIDAZOLE SCH MLS/HR: 500 INJECTION, SOLUTION INTRAVENOUS at 01:00

## 2019-02-27 RX ADMIN — CLINDAMYCIN IN 5 PERCENT DEXTROSE SCH MLS/HR: 12 INJECTION, SOLUTION INTRAVENOUS at 16:54

## 2019-02-27 NOTE — RAD
Date of service: 



02/27/2019



HISTORY:

 post-intubation 



COMPARISON:

2/25/2019 



FINDINGS:



LUNGS:

Patchy bilateral pulmonary infiltrate, right greater than left.  

Increasing on the right side compared to prior examination.



PLEURA:

Small bilateral pleural effusion, right greater than left.  No 

pneumothorax.



CARDIOVASCULAR:

No aortic atherosclerotic calcification present.



Normal heart size.  Mitral valve replacement.  Sternotomy wires.  ET 

tube and NG tube noted.  ET tube tip is approximately 4.9 cm above 

the tracheal gabby.  No congestive change.  Right IJ multi lumen 

central venous catheter noted.



OSSEOUS STRUCTURES:

No significant abnormalities.



VISUALIZED UPPER ABDOMEN:

Normal.



OTHER FINDINGS:

None.



IMPRESSION:

Mild patchy bilateral pulmonary opacity.  Small bilateral pleural 

effusion.  ET tube and NG tube appropriately positioned.

## 2019-02-27 NOTE — CP.PCM.PN
Subjective





- Date & Time of Evaluation


Date of Evaluation: 02/27/19


Time of Evaluation: 12:11





- Subjective


Subjective: 


Neuro Follow-Up Note:





Mrs. Omalley was evaluated this afternoon in the ICU.  She was intubated this 

morning immediately following the Brain MRI.  She is currently off sedation.  

ROS unobtainable from pt 2/2 her condition.  Chart reviewed; discussed with 

primary RN.





Objective





- Vital Signs/Intake and Output


Vital Signs (last 24 hours): 


                                        











Temp Pulse Resp BP Pulse Ox


 


 98.4 F   89   14   134/64   100 


 


 02/27/19 08:00  02/27/19 10:00  02/27/19 10:00  02/27/19 10:00  02/27/19 10:00








Intake and Output: 


                                        











 02/27/19 02/27/19





 06:59 18:59


 


Intake Total 1205 800


 


Output Total 350 250


 


Balance 855 550














- Medications


Medications: 


                               Current Medications





Albuterol/Ipratropium (Duoneb 3 Mg/0.5 Mg (3 Ml) Ud)  3 ml INH RQ6 PRN


   PRN Reason: Shortness of Breath


Calcium Acetate (Phoslo)  667 mg PO TID DESIRAE


   Last Admin: 02/27/19 10:34 Dose:  667 mg


Levetiracetam 500 mg/ Sodium (Chloride)  105 mls @ 210 mls/hr IVPB Q12 DESIRAE


   Last Admin: 02/27/19 10:33 Dose:  210 mls/hr


Acyclovir 600 mg/ Sodium (Chloride)  100 mls @ 100 mls/hr IV Q24H DESIRAE; Protocol


   Last Admin: 02/26/19 16:46 Dose:  100 mls/hr


Ceftriaxone Sodium 2 gm/ (Sodium Chloride)  100 mls @ 100 mls/hr IVPB DAILY DESIRAE;

Protocol


   Last Admin: 02/27/19 10:34 Dose:  100 mls/hr











- Labs


Labs: 


                                        





                                 02/27/19 06:31 





                                 02/27/19 06:31 





                                        











PT  24.2 Seconds (9.8-13.1)  H  02/26/19  04:35    


 


INR  2.1   02/26/19  04:35    


 


APTT  44.5 Seconds (25.6-37.1)  H  02/23/19  14:50    














- Constitutional


Appears: Other (intubated, off sedation; just barely opens eyes to sternal rub; 

no other movements to sternal rub)





- Head Exam


Head Exam: NORMOCEPHALIC





- Eye Exam


Eye Exam: PERRL


Pupil Exam: NORMAL ACCOMODATION, PERRL


Additional comments: 


Just barely opens eyes to sternal rub


Pupils are equal and reactive, approx 3 mm b/l


+ corneals, + dolls eyes





- ENT Exam


ENT Exam: Mucous Membranes Moist


Additional comments: 





intubated





- Neck Exam


Neck Exam: Normal Inspection





- Respiratory Exam


Respiratory Exam: absent: NORMAL BREATHING PATTERN (intubated)





- Cardiovascular Exam


Cardiovascular Exam: REGULAR RHYTHM





- Extremities Exam


Extremities Exam: absent: Full ROM


Additional comments: 





No extremity movements to sternal rub


No purposeful or non-purposeful movements


Extremities fall immediately to the bed when raised





- Neurological Exam


Neurological Exam: Altered


Neuro motor strength exam: Left Upper Extremity: 0, Right Upper Extremity: 0, 

Left Lower Extremity: 0, Right Lower Extremity: 0


Additional comments: 


Pt is intubated, off sedation; does not follow commands.


Pupils equal and reactive; approx 3 mm b/l; + corneals, + dolls eyes


Pt just barely opens eyes to sternal rub or voice


No extremity movements to sternal rub


No purposeful or non-purposeful movements


Extremities fall immediately to the bed when raised


Unable to assess sensation


Toes downgoing b/l


Difficulty in eliciting patellar reflexes





- Psychiatric Exam


Additional comments: 





intubated





- Skin


Skin Exam: Normal Color





Assessment and Plan


(1) Toxic metabolic encephalopathy


Assessment & Plan: 


Imaging reviewed:





-Brain MRI (2/27/19): 1. No acute intracranial abnormality. 2. Cystic 

encephalomalacia and gliosis in the left corona radiata and basal ganglia, 

sequela of remote MCA territory infarction.  3. Mild chronic microangiopathic 

changes and mild age-related global parenchymal volume loss. Old lacunar 

infarctions in the left cerebellar hemisphere.   4. Pansinusitis, with a 

complicated retention cyst/polyp in the right maxillary sinus. Fluid in the 

sphenoid sinus may represent acute sinusitis in the appropriate clinical 

setting. Bilateral mastoid effusions.


-EEG (repeat; 2/26/19):  This is  an abnormal EEG record that demonstrate the 

presence of severe non specific diffuse disturbance of cortical activity, this 

is keeping with a diffuse gray matter dysfunction, these findings re not 

specific. These type of EEG is usually seen in toxic metabolic encephalopathies 

, hypoxic-ischemic brain injury among others, clinical correlation is 

recommended.  The EEG look similar to the previous video EEG study  of 2/23,  

2/24.  No seizures. 


Patient is not in status epilepticus.  


-CT Head (2/24/19): No acute intracranial abnormalities. No significant findings

to account for the clinical presentation. No significant interval change 

compared to the prior examination(s).


-CT Head (2/22/19): No acute intracranial abnormalities. No significant findings

to account for the clinical presentation.


Extensive postoperative changes described above.


-EEG (2/24/19): shows burst suppression per Dr. Patrick.


-ECHO (2/24/19) done, results pending.








-MRA Head/Neck unable to be done at this time.


-Carotid and vertebral U/S ordered--will f/u with results once completed.


-We recommend for cardiology to do a VIVIENNE to r/o septic endocarditis.


-ID recommending LP--unable to be done at this time as pt was recently on 

Lovenox, INR was 2.1.  May re-address this if further indicated.


-Continue current medications and treatment of other acute medical issues. 


-Continue ICU management.


-Notify neuro team of any acute changes to pt's condition.





Chrystal Dean DNP, APN


Case discussed with Dr. Hassan





Status: Acute

## 2019-02-27 NOTE — CP.PCM.PCO
Physician Communication Note





- Physician Communication Note


Physician Communication Note: procalcitonin 36     suggest to cont IV 

antibiotics

## 2019-02-27 NOTE — CP.CCUPN
CCU Subjective





- Physician Review


Subjective (Free Text): 


Essentially obtunded since admission 5 days ago, orally intubated after becoming

hypoxemic with bloody secretions noted orally and audible coarse upper airway 

rhonchi. No improvement post intubation. No time for pre-intubation ABG. Mild 

hypercarbia noted several days ago, but acidosis most likely 2 metabolic 

etiology from worsening renal failure. Positive fluid balance daily noted since 

admission, with periods of oliguria. 


Now, only minimally withdraws and moves LUE, otherwise flaccid on R side. No 

observed recurrent seizure activity, no documented further hypoglycemic 

episodes. 





Afebrile, no fever spikes sine admission day. HR and SBPs have been relatively 

stable, otherwise in A flutter with controlled, variable VR. Low SPO2 noted 

today in the 80s during MRI Study. 





Other vitals and I/O's reviewed. 





ROS:  No other pertinent negs or positives on 10+  system review obtainable due 

to coma





PMSFH:   All other Nursing and physician documentation reviewed to date; no new 

pertinent info noted relevant to current medical problems.








EXAM- 


HEENT:  no icterus, no gaze preference, 3-4 mm and sluggish minimal reactivity 

noted, no  nystagmus


NECK: no JVD visible, supple, carotids equal upstroke bilat/no bruit, R IJV 

central line  intact. 


CHEST: decreased BS  at the bases, no wheezes audible


HEART: irregular, distant, S1S2, no rubs


ABD:  soft, obese, no distension, no focal  tenderness, no tympany, no guarding,

no organomegaly, BS hypoactive.  


EXT:    + LE edema, no mottling;  no calf tenderness or palpable cords, distal 

pulses intact and symmetrical, no cyanosis, no mottling.


NEURO:   R sided hemiplegia, minimal tone LUE more than LLE. 


SKIN:   no rashes,  warm and dry





LABS: 


WBC= 13.0


HGB= 10.2


PLTs=  124K





INR 2.1 yesterday





ABG: none since Feb 24





Na= 142


K= 4.4


CL= 102


HCO3= 23


BUN/Cr=  69/7.5


BS= 148


CPK down to 647 on Feb 25.


AST/ ALT decreased to 1644/2497, Alk Phos = 139


HEP ABC Panel = negative


PCT on Feb 25 = 39.62





CXR; (my interp) ETT position OK above gabby, increased R worse than L 

scattered bilat infiltrates.








EKG: Feb 24 study: (my interp)-  A flutter 74/min ith Flat / Inverted Ts 

inferiorly.





ECHO Feb 24: Normal EF, Enlarged LA and RA, Moderate AR, moderate TR. No 

intracardiac thrombi.











IMPRESSION / MAJOR PROBLEMS NOW:  


1.     Acute Hypoxemic Resp Failure 2 Aspiration PNA


2.     Possible Anoxic Encephalopathy 2  unclear Seizure event (hypoglycemic) 

and prolonged down time (EMS reports 25 minutes, but may have been longer as in 

not seen till next morning according to sons accounts of events, as he lives 

with her); or prolonged post-ictal state versus other occult CNS injury / 

infection / subacute CVA.


3.      Superimposed Metabolic Encephalopathy from Acute Renal Failure


4.     DM II





PLAN:


1.     Check ABG, sputum Cx, so far no allergic reaction to Rocephin, will add 

empiric Clindamycin to cover for new Aspiration Pna; continue Acyclovir or as 

directed by ID.  +Pan-sinusitis as seen on MRI Brain. 


2.     Neurochecks, seizure precautions, HPB elevation, liberal glucose control 

for now to avoid hypoglycemia.


3.     Check repeat coags, hold LMWH. 


4.     Access for any improvement in neuro-mental status after HD. First HD 

today. 


5.     Nepro feeds with water flushes. 


6.     No Advance Directives noted. 








Time spent with this patient did not overlap with any other provider's medical 

or critical care time. Additionally the code selected for the services rendered 

in this note includes the time spent:  talking to the patients family, 

associated physicians and reviewing hospital data/results not listed here which 

extended to a total of 40 minutes.

## 2019-02-27 NOTE — CP.PCM.PN
Subjective





- Date & Time of Evaluation


Date of Evaluation: 02/27/19


Time of Evaluation: 08:00





- Subjective


Subjective: 





obtunded in ICU- now intubated on HD 


MRI pending


LP pending still


empiric IV rx in progress





Objective





- Vital Signs/Intake and Output


Vital Signs (last 24 hours): 


                                        











Temp Pulse Resp BP Pulse Ox


 


 98.6 F   82   14   147/76   100 


 


 02/27/19 12:00  02/27/19 13:51  02/27/19 13:51  02/27/19 13:51  02/27/19 13:51








Intake and Output: 


                                        











 02/27/19 02/27/19





 06:59 18:59


 


Intake Total 1205 950


 


Output Total 350 300


 


Balance 855 650














- Medications


Medications: 


                               Current Medications





Albuterol/Ipratropium (Duoneb 3 Mg/0.5 Mg (3 Ml) Ud)  3 ml INH RQ6 DESIRAE


Calcium Acetate (Phoslo)  667 mg PO TID DESIRAE


   Last Admin: 02/27/19 12:33 Dose:  667 mg


Levetiracetam 500 mg/ Sodium (Chloride)  105 mls @ 210 mls/hr IVPB Q12 DESIRAE


   Last Admin: 02/27/19 10:33 Dose:  210 mls/hr


Acyclovir 600 mg/ Sodium (Chloride)  100 mls @ 100 mls/hr IV Q24H DESIRAE; Protocol


   Last Admin: 02/26/19 16:46 Dose:  100 mls/hr


Ceftriaxone Sodium 2 gm/ (Sodium Chloride)  100 mls @ 100 mls/hr IVPB DAILY DESIRAE;

Protocol


   Last Admin: 02/27/19 10:34 Dose:  100 mls/hr


Clindamycin Phosphate (Cleocin)  600 mg in 50 mls @ 50 mls/hr IVPB Q8 DESIRAE; 

Protocol











- Labs


Labs: 


                                        





                                 02/27/19 06:31 





                                 02/27/19 06:31 





                                        











PT  24.2 Seconds (9.8-13.1)  H  02/26/19  04:35    


 


INR  2.1   02/26/19  04:35    


 


APTT  44.5 Seconds (25.6-37.1)  H  02/23/19  14:50    














- Constitutional


Appears: Confused, Chronically Ill





- Head Exam


Head Exam: NORMOCEPHALIC





- Eye Exam


Eye Exam: absent: Scleral icterus





- ENT Exam


ENT Exam: Mucous Membranes Dry





- Neck Exam


Neck Exam: absent: Lymphadenopathy





- Respiratory Exam


Respiratory Exam: Decreased Breath Sounds





- Cardiovascular Exam


Cardiovascular Exam: REGULAR RHYTHM





- GI/Abdominal Exam


GI & Abdominal Exam: Distended, Soft





- Rectal Exam


Rectal Exam: Deferred





-  Exam


 Exam: NORMAL INSPECTION





- Extremities Exam


Extremities Exam: Pedal Edema





- Back Exam


Back Exam: absent: CVA tenderness (L), CVA tenderness (R)





- Neurological Exam


Neurological Exam: Altered





- Psychiatric Exam


Psychiatric exam: Depressed





- Skin


Skin Exam: Dry





Assessment and Plan


(1) Sepsis


Status: Acute   





(2) Change in mental state


Status: Acute   





(3) History of CVA with residual deficit


Status: Acute   





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


Status: Chronic   





(5) History of CVA (cerebrovascular accident)


Status: Chronic   





(6) NSTEMI (non-ST elevated myocardial infarction)


Status: Acute   





(7) SHANITA (acute kidney injury)


Status: Acute   





- Assessment and Plan (Free Text)


Assessment: 





59 yo female with Hx of HTN  Obesity  CVA with right weakness, CAD  s/p CABG  

Asthma  was admitted via ER with altered mental status which did not respond to 

Narcan and Glucose infusion by EMS 





admitted with fever  AMS and NSTEMI  


now has SHANITA and in need of HD


work up for stroke, infection in progress


all cultures/ serologies neg thus far


MRI just done - LP to follow 








Patient was started on empiric IV antibiotics to cover for possible CNS 

infection pending cultures and LP - all cultures neg thus far 





prognosis poor from outset

## 2019-02-27 NOTE — CP.PCM.PN
Subjective





- Date & Time of Evaluation


Date of Evaluation: 02/27/19


Time of Evaluation: 10:19





- Subjective


Subjective: 





Patient remained obtunded


Vital signs noted





Objective





- Vital Signs/Intake and Output


Vital Signs (last 24 hours): 


                                        











Temp Pulse Resp BP Pulse Ox


 


 98.4 F   91 H  13   131/67   99 


 


 02/27/19 08:00  02/27/19 09:30  02/27/19 06:00  02/27/19 06:00  02/27/19 06:00








Intake and Output: 


                                        











 02/27/19 02/27/19





 06:59 18:59


 


Intake Total 1205 


 


Output Total 350 


 


Balance 855 














- Medications


Medications: 


                               Current Medications





Albuterol/Ipratropium (Duoneb 3 Mg/0.5 Mg (3 Ml) Ud)  3 ml INH RQ6 PRN


   PRN Reason: Shortness of Breath


Calcium Acetate (Phoslo)  667 mg PO TID DESIRAE


   Last Admin: 02/26/19 16:46 Dose:  667 mg


Levetiracetam 500 mg/ Sodium (Chloride)  105 mls @ 210 mls/hr IVPB Q12 DESIRAE


   Last Admin: 02/26/19 21:00 Dose:  210 mls/hr


Acyclovir 600 mg/ Sodium (Chloride)  100 mls @ 100 mls/hr IV Q24H DESIRAE; Protocol


   Last Admin: 02/26/19 16:46 Dose:  100 mls/hr


Ceftriaxone Sodium 2 gm/ (Sodium Chloride)  100 mls @ 100 mls/hr IVPB DAILY DESIRAE;

Protocol


   Last Admin: 02/26/19 08:19 Dose:  100 mls/hr











- Labs


Labs: 


                                        





                                 02/27/19 06:31 





                                 02/27/19 06:31 





                                        











PT  24.2 Seconds (9.8-13.1)  H  02/26/19  04:35    


 


INR  2.1   02/26/19  04:35    


 


APTT  44.5 Seconds (25.6-37.1)  H  02/23/19  14:50    














- Constitutional


Appears: No Acute Distress





- Eye Exam


Eye Exam: Conjunctival injection





- ENT Exam


ENT Exam: Mucous Membranes Moist





- Neck Exam


Neck Exam: absent: Lymphadenopathy





- Respiratory Exam


Respiratory Exam: NORMAL BREATHING PATTERN.  absent: Chest Wall Tenderness





- Cardiovascular Exam


Cardiovascular Exam: absent: Gallop, JVD, Rubs





- GI/Abdominal Exam


GI & Abdominal Exam: Soft, Normal Bowel Sounds





- Extremities Exam


Extremities Exam: absent: Calf Tenderness





- Back Exam


Back Exam: absent: CVA tenderness (L), CVA tenderness (R)





- Neurological Exam


Neurological Exam: Altered





- Psychiatric Exam


Psychiatric exam: Flat Affect





- Skin


Skin Exam: absent: Cyanosis





Assessment and Plan


(1) SHANITA (acute kidney injury)


Assessment & Plan: 





Altered mental status


Acute renal failure/acute kidney injury related to multifactorial including 

sepsis?  Rhabdomyolysis.  Patient also having diagnosis to rule out meningitis.


Worsening kidney function serum creatinine and BUN continued to rise


Diabetes mellitus and history of hypertension history of CVA.


Patient remained obtunded with encephalopathy as noted by neurology


hyperurecemia


Hyperphosphatemia


Recommendatio





Hemodialysis at about to start.  Discussed with the dialysis nurse.


Order in place.


Patient need treatment for hyperuricemia such allopurinol and IV fluid








Status: Acute   





(2) Elevated liver enzymes


Status: Acute   





(3) Elevated troponin level


Status: Acute   





(4) Leukocytosis


Status: Acute   





(5) Sepsis


Status: Acute

## 2019-02-27 NOTE — PCM.EEG
Electroencephalogram Report





- Electroencephalogram Report


Procedure Date: 02/26/19


Medication: 





Keppra, Acyclovir, Cefriaxone


Interpretation: 








Technical Information: This was a 16-channel EEG, 1-channel EKG routine EEG 

performed using [employer] equipment.  Electrodes were applied using the 10/20 

international placement system. 


Start; 20;35


End; 21;25


Total 45 min 





Clinical Information: Alter mental status








EEG Details





During  the entire study  was not discernible awake EEG architecture,  the 

tracing showed a burst suppression/attenuation pattern  with periods of diffuse 

bilateral attenuation with frequencies in the 4 to 5 Hz., there was no 

reactivity of the EEG,  intermixed with  bursts of mid amplitude mainly 5 to 6 

Hz activity. 


Drowsiness not seen, sleep not seen. 





Hyperventilation was not performed.





Photic stimulation was  not  performed. 





Interictal activity; none





Focal abnormality; none








Impression: 





This is  an abnormal EEG record that demonstrate the presence of severe non 

specific diffuse disturbance of cortical activity, this is keeping with a 

diffuse gray matter dysfunction, these findings re not specific. These type of 

EEG is usually seen in toxic metabolic encephalopathies , hypoxic-ischemic brain

injury among others, clinical correlation is recommended.


The EEG look similar to the previous video EEG study  of 2/23,  2/24.





No seizures. 


Patient is not in status epilepticus.

## 2019-02-27 NOTE — PCM.PROC
Procedures


Attestation:: I certify that I have explained the specified Operation(s) or 

Procedure(s), risks, benefits and reasonable alternatives to the Patient and/or 

other person responsible. The opportunity was given to ask questions and all 

questions answered





- Intubation


Sedative: Other (Propofol 70 mg IVP)


Laryngoscope: Candace (Mac #3)


ET Tube Size: 7.5


ET Tube Secured at Depth: 23


ET Tube Secured Locarion: Lips


ET Tube Placement Confirmation: Visualized Passing Through Cords, Breath Sounds 

Equal Bilaterally, No Breath Sounds Over Epigastrum, Confirmation w/Capnometry


Patient Tolerated Procedure: Well


Procedure Immediate Complications: None


Additional comments: 





CXR shows satisfactory placement of distal ETT tip above gabby.

## 2019-02-27 NOTE — MRI
Date of service: 



02/27/2019



PROCEDURE:  MRI BRAIN WITHOUT CONTRAST



HISTORY:

Altered mental status; r/o acute stroke



COMPARISON:

Noncontrast head CT from 02/24/2019 and 02/23/2019 



TECHNIQUE:

Multiplanar, multisequence MR images of the brain were obtained 

without intravenous contrast enhancement.



FINDINGS:



HEMORRHAGE:

None



DWI:

No evidence of an acute or early subacute infarction.



BRAIN PARENCHYMA:

There is cystic encephalomalacia and gliosis in the left is corona 

radiata and basal ganglia with volume loss and ex vacuo dilatation of 

the left lateral ventricle. 



There are mild chronic microangiopathic changes. There are small 

lacunar infarctions in the left cerebellar hemisphere. There is no 

mass, mass effect or abnormal extra-axial fluid collection. 



VENTRICLES:

There is mild age-related global parenchymal volume loss and 

proportionate enlargement of the ventricles and cortical sulci. 



CRANIUM:

There is normal bone marrow signal pattern.



ORBITS:

Grossly unremarkable.



PARANASAL SINUSES/MASTOIDS:

There is mild mucosal thickening in the paranasal sinuses, 

complicated retention cyst/polyp in the right maxillary sinus and 

fluid in the sphenoid sinus. There are bilateral mastoid effusions. 



VASCULAR SYSTEM:

There are normal signal voids in the larger intracranial arteries. 



OTHER FINDINGS:

None. 



IMPRESSION:

1. No acute intracranial abnormality.



2. Cystic encephalomalacia and gliosis in the left corona radiata and 

basal ganglia, sequela of remote MCA territory infarction.



3. Mild chronic microangiopathic changes and mild age-related global 

parenchymal volume loss. Old lacunar infarctions in the left 

cerebellar hemisphere. 



4. Pansinusitis, with a complicated retention cyst/polyp in the right 

maxillary sinus. Fluid in the sphenoid sinus may represent acute 

sinusitis in the appropriate clinical setting. Bilateral mastoid 

effusions.

## 2019-02-28 LAB
ARTERIAL BLOOD GAS HEMOGLOBIN: 10.5 G/DL (ref 11.7–17.4)
ARTERIAL BLOOD GAS O2 SAT: 99.8 % (ref 95–98)
ARTERIAL BLOOD GAS PCO2: 42 MM/HG (ref 35–45)
ARTERIAL BLOOD GAS TCO2: 25.6 MMOL/L (ref 22–28)
ARTERIAL PATENCY WRIST A: YES
BUN SERPL-MCNC: 57 MG/DL (ref 7–17)
CALCIUM SERPL-MCNC: 8.1 MG/DL (ref 8.4–10.2)
ERYTHROCYTE [DISTWIDTH] IN BLOOD BY AUTOMATED COUNT: 20.4 % (ref 11.5–14.5)
GFR NON-AFRICAN AMERICAN: 7
HCO3 BLDA-SCNC: 24.2 MMOL/L (ref 21–28)
HGB BLD-MCNC: 10.1 G/DL (ref 12–16)
INHALED O2 CONCENTRATION: 50 %
MCH RBC QN AUTO: 24.2 PG (ref 27–31)
MCHC RBC AUTO-ENTMCNC: 32 G/DL (ref 33–37)
MCV RBC AUTO: 75.6 FL (ref 81–99)
O2 CAP BLDA-SCNC: 14.6 ML/DL (ref 16–24)
O2 CT BLDA-SCNC: 14.6 ML/DL (ref 15–23)
PH BLDA: 7.37 [PH] (ref 7.35–7.45)
PLATELET # BLD: 102 K/UL (ref 130–400)
PO2 BLDA: 143 MM/HG (ref 80–100)
RBC # BLD AUTO: 4.16 MIL/UL (ref 3.8–5.2)
WBC # BLD AUTO: 13.4 K/UL (ref 4.8–10.8)

## 2019-02-28 RX ADMIN — IPRATROPIUM BROMIDE AND ALBUTEROL SULFATE SCH ML: .5; 3 SOLUTION RESPIRATORY (INHALATION) at 19:09

## 2019-02-28 RX ADMIN — CLINDAMYCIN IN 5 PERCENT DEXTROSE SCH MLS/HR: 12 INJECTION, SOLUTION INTRAVENOUS at 00:25

## 2019-02-28 RX ADMIN — IPRATROPIUM BROMIDE AND ALBUTEROL SULFATE SCH ML: .5; 3 SOLUTION RESPIRATORY (INHALATION) at 03:25

## 2019-02-28 RX ADMIN — IPRATROPIUM BROMIDE AND ALBUTEROL SULFATE SCH ML: .5; 3 SOLUTION RESPIRATORY (INHALATION) at 14:11

## 2019-02-28 RX ADMIN — CLINDAMYCIN IN 5 PERCENT DEXTROSE SCH MLS/HR: 12 INJECTION, SOLUTION INTRAVENOUS at 10:25

## 2019-02-28 RX ADMIN — CLINDAMYCIN IN 5 PERCENT DEXTROSE SCH MLS/HR: 12 INJECTION, SOLUTION INTRAVENOUS at 18:31

## 2019-02-28 RX ADMIN — IPRATROPIUM BROMIDE AND ALBUTEROL SULFATE SCH ML: .5; 3 SOLUTION RESPIRATORY (INHALATION) at 07:26

## 2019-02-28 NOTE — CP.PCM.PN
Subjective





- Date & Time of Evaluation


Date of Evaluation: 02/28/19


Time of Evaluation: 11:29





- Subjective


Subjective: 





Patient obtunded and intubated


Daughter at the bedside





Objective





- Vital Signs/Intake and Output


Vital Signs (last 24 hours): 


                                        











Temp Pulse Resp BP Pulse Ox


 


 99.7 F H  89   18   176/81 H  100 


 


 02/28/19 08:00  02/28/19 10:00  02/28/19 10:00  02/28/19 08:00  02/28/19 10:00








Intake and Output: 


                                        











 02/28/19 02/28/19





 06:59 18:59


 


Intake Total 1605 250


 


Output Total 200 


 


Balance 1405 250














- Medications


Medications: 


                               Current Medications





Albuterol/Ipratropium (Duoneb 3 Mg/0.5 Mg (3 Ml) Ud)  3 ml INH RQ6 DESIRAE


   Last Admin: 02/28/19 07:26 Dose:  3 ml


Calcium Acetate (Phoslo)  667 mg PO TID DESIRAE


   Last Admin: 02/28/19 10:19 Dose:  667 mg


Levetiracetam 500 mg/ Sodium (Chloride)  105 mls @ 210 mls/hr IVPB Q12 DESIRAE


   Last Admin: 02/28/19 09:31 Dose:  210 mls/hr


Acyclovir 600 mg/ Sodium (Chloride)  100 mls @ 100 mls/hr IV Q24H DESIRAE; Protocol


   Last Admin: 02/27/19 16:58 Dose:  100 mls/hr


Clindamycin Phosphate (Cleocin)  600 mg in 50 mls @ 50 mls/hr IVPB Q8 DESIRAE; 

Protocol


   Last Admin: 02/28/19 10:25 Dose:  50 mls/hr


Ceftriaxone Sodium 2 gm/ (Sodium Chloride)  100 mls @ 0 mls/hr IVPB Q12H DESIRAE; 

Protocol


   Last Admin: 02/28/19 04:03 Dose:  100 mls/hr











- Labs


Labs: 


                                        





                                 02/28/19 04:50 





                                 02/28/19 04:50 





                                        











PT  19.3 Seconds (9.8-13.1)  H  02/27/19  16:15    


 


INR  1.7   02/27/19  16:15    


 


APTT  42.6 Seconds (25.6-37.1)  H  02/27/19  16:15    














- Constitutional


Appears: No Acute Distress





- Eye Exam


Eye Exam: Conjunctival injection





- ENT Exam


ENT Exam: Mucous Membranes Moist





- Neck Exam


Neck Exam: absent: Lymphadenopathy





- Cardiovascular Exam


Cardiovascular Exam: absent: Gallop, JVD, Rubs





- GI/Abdominal Exam


GI & Abdominal Exam: Normal Bowel Sounds.  absent: Soft





- Extremities Exam


Extremities Exam: absent: Calf Tenderness





- Back Exam


Back Exam: absent: CVA tenderness (L), CVA tenderness (R)





- Neurological Exam


Neurological Exam: Altered





- Skin


Skin Exam: absent: Cyanosis





Assessment and Plan


(1) SHANITA (acute kidney injury)


Assessment & Plan: 





Altered mental status


Respiratory failure/intubated 


Acute renal failure/acute kidney injury related to multifactorial including 

sepsis?  


Diabetes mellitus and history of hypertension history of CVA.


Patient remained obtunded with encephalopathy as noted by neurology


hyperurecemia


Hyperphosphatemia


Recommendation


Scheduled for hemodialysis for tomorrow


As per intensive care unit team with the other management and antibiotics








Status: Acute   





(2) Elevated liver enzymes


Status: Acute   





(3) Elevated troponin level


Status: Acute   





(4) Leukocytosis


Status: Acute   





(5) Sepsis


Status: Acute

## 2019-02-28 NOTE — CP.PCM.PN
Subjective





- Date & Time of Evaluation


Date of Evaluation: 02/28/19


Time of Evaluation: 11:20





- Subjective


Subjective: 





Neuro Follow-Up Note:





Mrs. Omalley was evaluated this morning in the ICU.  Daughter and son present. 

She remains intubated, off sedation.  ROS unobtainable from pt 2/2 her 

condition.  Chart reviewed; discussed with primary RN.  Family concerns and 

questions addressed.





Objective





- Vital Signs/Intake and Output


Vital Signs (last 24 hours): 


                                        











Temp Pulse Resp BP Pulse Ox


 


 99.7 F H  89   18   176/81 H  100 


 


 02/28/19 08:00  02/28/19 10:00  02/28/19 10:00  02/28/19 08:00  02/28/19 10:00








Intake and Output: 


                                        











 02/28/19 02/28/19





 06:59 18:59


 


Intake Total 1605 250


 


Output Total 200 


 


Balance 1405 250














- Medications


Medications: 


                               Current Medications





Albuterol/Ipratropium (Duoneb 3 Mg/0.5 Mg (3 Ml) Ud)  3 ml INH RQ6 DESIRAE


   Last Admin: 02/28/19 07:26 Dose:  3 ml


Calcium Acetate (Phoslo)  667 mg PO TID DESIRAE


   Last Admin: 02/28/19 10:19 Dose:  667 mg


Levetiracetam 500 mg/ Sodium (Chloride)  105 mls @ 210 mls/hr IVPB Q12 DESIRAE


   Last Admin: 02/28/19 09:31 Dose:  210 mls/hr


Acyclovir 600 mg/ Sodium (Chloride)  100 mls @ 100 mls/hr IV Q24H DESIRAE; Protocol


   Last Admin: 02/27/19 16:58 Dose:  100 mls/hr


Clindamycin Phosphate (Cleocin)  600 mg in 50 mls @ 50 mls/hr IVPB Q8 DESIRAE; 

Protocol


   Last Admin: 02/28/19 10:25 Dose:  50 mls/hr


Ceftriaxone Sodium 2 gm/ (Sodium Chloride)  100 mls @ 0 mls/hr IVPB Q12H DESIRAE; 

Protocol


   Last Admin: 02/28/19 04:03 Dose:  100 mls/hr











- Labs


Labs: 


                                        





                                 02/28/19 04:50 





                                 02/28/19 04:50 





                                        











PT  19.3 Seconds (9.8-13.1)  H  02/27/19  16:15    


 


INR  1.7   02/27/19  16:15    


 


APTT  42.6 Seconds (25.6-37.1)  H  02/27/19  16:15    














- Constitutional


Appears: Other (intubated, off sedation; just barely opens eyes to sternal rub; 

no other movements to sternal rub)





- Head Exam


Head Exam: NORMAL INSPECTION, NORMOCEPHALIC





- Eye Exam


Eye Exam: PERRL


Pupil Exam: NORMAL ACCOMODATION


Additional comments: 





Pupils both reactive and equal, approx 4 mm today


+ dolls eyes; + corneals


Appears that pt just barely opens eyes during sternal rub and when name called, 

same as yesterday





- ENT Exam


ENT Exam: Mucous Membranes Moist


Additional comments: 





intubated





- Neck Exam


Neck Exam: Normal Inspection





- Respiratory Exam


Respiratory Exam: absent: NORMAL BREATHING PATTERN (intubated)





- Cardiovascular Exam


Cardiovascular Exam: REGULAR RHYTHM





-  Exam


Additional comments: 





quinones cath in place





- Extremities Exam


Extremities Exam: absent: Full ROM


Additional comments: 





Extremities flaccid; fall immediately to the bed when raised.


No purposeful or non-purposeful movements noted.





- Neurological Exam


Neurological Exam: Altered


Neuro motor strength exam: Left Upper Extremity: 0, Right Upper Extremity: 0, 

Left Lower Extremity: 0, Right Lower Extremity: 0


Additional comments: 


Pt is still intubated, off sedation; does not follow commands.


Pupils equal and reactive; approx 4 mm b/l; + corneals, + dolls eyes


Pt just barely opens eyes to sternal rub and when name called, same as 

yesterday.


No extremity movements to sternal rub


No purposeful or non-purposeful movements


Extremities fall immediately to the bed when raised


Unable to assess sensation


Toes downgoing b/l


Difficulty in eliciting patellar reflexes





- Psychiatric Exam


Additional comments: 


intubated; off sedation; does not follow commands





- Skin


Skin Exam: Normal Color





Assessment and Plan


(1) Toxic metabolic encephalopathy


Assessment & Plan: 


Imaging reviewed:





-Brain MRI (2/27/19): 1. No acute intracranial abnormality. 2. Cystic 

encephalomalacia and gliosis in the left corona radiata and basal ganglia, 

sequela of remote MCA territory infarction.  3. Mild chronic microangiopathic 

changes and mild age-related global parenchymal volume loss. Old lacunar 

infarctions in the left cerebellar hemisphere.   4. Pansinusitis, with a 

complicated retention cyst/polyp in the right maxillary sinus. Fluid in the 

sphenoid sinus may represent acute sinusitis in the appropriate clinical 

setting. Bilateral mastoid effusions.


-EEG (repeat; 2/26/19):  This is  an abnormal EEG record that demonstrate the 

presence of severe non specific diffuse disturbance of cortical activity, this 

is keeping with a diffuse gray matter dysfunction, these findings re not 

specific. These type of EEG is usually seen in toxic metabolic encephalopathies 

, hypoxic-ischemic brain injury among others, clinical correlation is 

recommended.  The EEG look similar to the previous video EEG study  of 2/23,  

2/24.  No seizures. 


Patient is not in status epilepticus.  


-CT Head (2/24/19): No acute intracranial abnormalities. No significant findings

to account for the clinical presentation. No significant interval change 

compared to the prior examination(s).


-CT Head (2/22/19): No acute intracranial abnormalities. No significant findings

to account for the clinical presentation.


Extensive postoperative changes described above.


-EEG (2/24/19): shows burst suppression per Dr. Patrick.


-ECHO (2/24/19) done, results pending.








-MRA Head/Neck unable to be done at this time.


-Carotid and vertebral U/S ordered yesterday and is pending--will f/u with 

results once completed.


-We still recommend for Cardiology to do a VIVIENNE to r/o septic endocarditis.


-ID recommending LP--INR should be below 1.7 to decrease risk of bleeding.  May 

re-address this if further indicated.


-Continue current medications and treatment of other acute medical issues. 


-Continue ICU management.


-Notify neuro team of any acute changes to pt's condition.





Chrystal Dean DNP, APN


Case discussed with Dr. Hassan


Status: Acute

## 2019-02-28 NOTE — CP.CCUPN
CCU Subjective





- Physician Review


Subjective (Free Text): 





Remains obtunded since admission 6 days ago, orally intubated yesterday after 

becoming hypoxemic with bloody secretions noted orally and audible coarse upper 

airway rhonchi. Positive fluid balance daily noted since admission, with periods

of oliguria. First HD yesterday, UF 1.5L.


No observed recurrent seizure activity, no documented further hypoglycemic 

episodes. 





Afebrile, no fever spikes sine admission day. HR and SBPs have been relatively 

stable, otherwise in A flutter with controlled, variable VR. Breathing 22 on AC 

14, 450 ml, 50% oxygen and PEEP 5, SPo2 100%.  





Other vitals and I/O's reviewed. 





ROS:  No other pertinent negs or positives on 10+  system review obtainable due 

to coma





PMSFH:   All other Nursing and physician documentation reviewed to date; no new 

pertinent info noted relevant to current medical problems.








EXAM- 


HEENT:  no icterus, no gaze preference, 3-4 mm and sluggish minimal reactivity 

noted, no  nystagmus


NECK: no JVD visible, supple, carotids equal upstroke bilat/no bruit, R IJV 

central line  intact. 


CHEST: decreased BS  at the bases, no wheezes audible


HEART: irregular, distant, S1S2, no rubs


ABD:  soft, obese, no distension, no focal  tenderness, no tympany, no guarding,

no organomegaly, BS hypoactive.  


EXT:    + LE edema, no mottling;  no calf tenderness or palpable cords, distal 

pulses intact and symmetrical, no cyanosis, no mottling.


NEURO:   R sided hemiplegia, loss of tone LUE and LLE today, essentially flaccid

x 4.  GCS= 4T ( E2 V1 M1)


SKIN:   no rashes,  warm and dry





LABS: 


WBC= 13.4


HGB= 10.1


PLTs=  102K





INR = 1.7 with elevated PTT





7.37/42/143





Na= 142


K= 3.7


CL= 100


HCO3= 26


BUN/Cr=  57/3.9


BS= 100





CXR; (my interp) ETT position OK above gabby, increased R worse than L 

scattered bilat infiltrates, progressed to R lower Lobar atelectasis. 





ECHO Feb 24: Normal EF, Enlarged LA and RA, Moderate AR, moderate TR. No 

intracardiac thrombi.








IMPRESSION / MAJOR PROBLEMS NOW:  


1.     Acute Hypoxemic Resp Failure 2 Aspiration PNA


2.     Possible Anoxic Encephalopathy 2  unclear Seizure event (hypoglycemic) 

and prolonged down time (EMS reports 25 minutes, but may have been longer as in 

not seen till next morning according to sons accounts of events, as he lives 

with her); or prolonged post-ictal state versus other occult CNS injury / 

infection / subacute CVA. 


3.      Superimposed Metabolic Encephalopathy from Acute Renal Failure


4.     DM II





PLAN:


1.     Directional Suctioning / Pulm toilet especially to R lung / RLL.  If no 

improvement, will need FOB/BAL, get Pulm consultation. . No improvement in 

mental status post intubation. Mild hypercarbia noted several days ago, but aci

dosis most likely 2 metabolic etiology from worsening renal failure.


2.       Sputum Cx re-ordered again today. So far no allergic reaction to 

Rocephin, will add empiric Clindamycin to cover for new Aspiration Pna; continue

Acyclovir or as directed by ID.  +Pan-sinusitis as seen on MRI Brain. After 

review of consultants previous Notes, LP looking for meningitis is not a 

consideration at this time. 


3.     Neurochecks, seizure precautions, HPB elevation, liberal glucose control 

for now to avoid hypoglycemia. No improvement in neuro-mental status after first

HD yesterday.


4.     Checked repeat coags (elevated), platelets lower, too; hold LMWH. 


5.     Nepro feeds with water flushes. 


6.     No Advance Directives noted. 





Time spent with this patient did not overlap with any other provider's medical o

r critical care time. Additionally the code selected for the services rendered 

in this note includes the time spent:  talking to the patients family, 

associated physicians and reviewing hospital data/results not listed here which 

extended to a total of 35 minutes of critical care.

## 2019-02-28 NOTE — VASCULAR
Ultrasound and fluoroscopically guided insertion of right internal 

jugular vein non tunneled dialysis catheter 



History: 58 -year-old female requiring non tunneled dialysis catheter 

for acute renal failure. 



Comparison: None. 



Anesthesia: Local lidocaine. 



Procedure findings: 



The procedure was explained to the patient with relative risks and 

benefits.  The patient understood the procedure and provided written 

informed consent.  The patient was positioned supine on the 

angiographic table. Continuous physiologic monitoring was provided by 

the interventional radiology nurse. 



The right neck region was prepped and draped in the usual sterile 

technique. Under direct ultrasound guidance, the right internal 

jugular vein was accessed using a 21 gauge micropuncture needle. A 

0.018 inch wire was introduced through the needle into the SVC. The 

micropuncture needle was then exchanged for a 4 [place] transition 

catheter. 



A stiff guidewire was introduced through the transition catheter into 

the IVC. The transition catheter was then removed and serial fascial 

dilators were introduced into the right neck extending up to the 

venotomy site.  Subsequently, a 14 [place], 15 centimeter dual lumen 

non tunneled dialysis catheter was introduced into the right internal 

jugular vein under fluoroscopic guidance. The catheter flushed with 

normal saline and heparin. 



The patient tolerated the procedure well without any incident.  The 

patient was transferred from the interventional Radiology department 

in stable condition. 



Impression: 



Successful introduction of a right internal jugular vein non tunneled 

dialysis catheter.

## 2019-02-28 NOTE — RAD
Date of service: 



02/28/2019



HISTORY:

 intubated 



COMPARISON:

02/27/2019. 



FINDINGS:

The endotracheal tube is stable in position and terminates in the mid 

trachea.  The right IJV line terminates at the cavoatrial junction.  

The nasogastric tube is coiled in the stomach. 



LUNGS:

There is interval development of airspace disease in the right lower 

lobe.  There is interval mild improvement in pulmonary venous 

congestion with residual mild pulmonary venous congestion.



PLEURA:

Small pleural effusions, larger on the right.  No pneumothorax. 



CARDIOVASCULAR:

Persistent moderate cardiomegaly.  Status post prosthetic mitral 

valve.  No aortic atherosclerotic calcifications present. 



OSSEOUS STRUCTURES:

Within normal limits for the patient's age.



VISUALIZED UPPER ABDOMEN:

Normal.



OTHER FINDINGS:

None.



IMPRESSION:

1.  Interval development of right lower lobe airspace disease likely 

developing pneumonia. 



2.  Interval mild improvement in pulmonary venous congestion.  Stable 

moderate cardiomegaly and pleural effusions, larger on the right.

## 2019-02-28 NOTE — CON
DATE:  02/28/2019



HISTORY OF PRESENT ILLNESS:  Ms. Omalley is a 58-year-old female who was

referred for pulmonary evaluation following endotracheal intubation and

ventilation because of hypoxic respiratory failure.  She is only responsive

to deep painful stimuli and cannot give any history, but history obtained

from medical records indicates the patient was intubated because of

hypoxemia and bloody secretion.  She had these done about six days prior to

this consultation and has not clinically improved.  She is unable to give

any history.  Her sister is at bedside and is also unable to contribute

most of the history.  The patient is only responsive to deep painful

stimuli.



PHYSICAL EXAMINATION:

VITAL SIGNS:  Reviewed.  The patient has O2 saturation of 100% on present

ventilator settings.

GENERAL:  She is diaphoretic.

HEENT:  Pupils are sluggish.  ET tube is in good position.

NECK:  Shows no abnormalities.

LUNGS:  Bilateral coarse rales.  No wheezing.

HEART:  Tachycardic, irregular.

ABDOMEN:  Soft.  No distention.  No organomegaly appreciated.

EXTREMITIES:  Has pedal edema.

CENTRAL NERVOUS SYSTEM:  Exam could not be performed adequately because the

patient is obtunded.



LABORATORY DATA:  WBC 13,000, hemoglobin 10, platelet count 124,000. 

Sodium 142, potassium 4.4, BUN 69, creatinine 7.5, serum glucose 148. 

Chest x-ray:  ET tube is in good position.  There is increased right

pulmonary infiltrates.  Scattered bilateral infiltrates noted.  ABG:  The

pH of 7.37, pCO2 of 42, pO2 of 143.  This is on 50% FiO2 on access control.



IMPRESSION:  Hypoxic respiratory failure with worsening pulmonary

infiltrates, right greater than left, sepsis, renal insufficiency

(multiorgan failure).



PLAN:  The plan is to continue therapy as ordered with IV antibiotics and

ventilator care.  Obtain sputum for Gram stain and cultures.  We will

continue to treat appropriately and monitor labs.  Prognosis is extremely

guarded.  We will continue to follow with you.







__________________________________________

Librado Romreo MD





DD:  02/28/2019 11:00:52

DT:  02/28/2019 11:03:13

Job # [id number]

## 2019-02-28 NOTE — CP.PCM.PN
Subjective





- Date & Time of Evaluation


Date of Evaluation: 02/28/19


Time of Evaluation: 10:00





- Subjective


Subjective: 





patient seen and examined at bedside. Spoke with daughter


Interim events noted


obtunded/intubated


available diagnostic data reviewed





Review of Systems unable to obtain due to status





Objective





Vital Signs Stable


- Constitutional


Appears: Ill appearing 





Head Exam: NORMAL INSPECTION





Respiratory Exam: Intubated, decreased breath sounds





Cardiovascular Exam: +S1, +S2





GI & Abdominal Exam: Soft





Neurological Exam: Alert, Awake





Psychiatric exam: Normal Affect, Normal Mood





Skin Exam: Normal Color, Warm





Assessment and Plan





monitor vitals


monitor labs


Cont meds


Cont tx


ID, Cardio, Neuro, ICU, Renal, Pulm following


consultants appreciated input


prognosis poor


rest of plan as ordered








Assessment and Plan


(1) Respiratory failure


Status: Acute   





(2) Sepsis


Status: Acute   





(3) SHANITA (acute kidney injury)


Status: Acute

## 2019-03-01 LAB
ALBUMIN SERPL-MCNC: 3.3 G/DL (ref 3.5–5)
ALBUMIN SERPL-MCNC: 3.8 G/DL (ref 3.5–5)
ALBUMIN/GLOB SERPL: 0.9 {RATIO} (ref 1–2.1)
ALBUMIN/GLOB SERPL: 0.9 {RATIO} (ref 1–2.1)
ALT SERPL-CCNC: 1034 U/L (ref 9–52)
ALT SERPL-CCNC: 930 U/L (ref 9–52)
APTT BLD: 35.6 SECONDS (ref 25.6–37.1)
ARTERIAL BLOOD GAS HEMOGLOBIN: 10.8 G/DL (ref 11.7–17.4)
ARTERIAL BLOOD GAS O2 SAT: 100.4 % (ref 95–98)
ARTERIAL BLOOD GAS PCO2: 34 MM/HG (ref 35–45)
ARTERIAL BLOOD GAS TCO2: 25.2 MMOL/L (ref 22–28)
ARTERIAL PATENCY WRIST A: YES
AST SERPL-CCNC: 168 U/L (ref 14–36)
AST SERPL-CCNC: 177 U/L (ref 14–36)
BILIRUBIN,DIRECT: 0.6 MG/ML (ref 0–0.4)
BUN SERPL-MCNC: 72 MG/DL (ref 7–17)
BUN SERPL-MCNC: 72 MG/DL (ref 7–17)
CALCIUM SERPL-MCNC: 8.9 MG/DL (ref 8.4–10.2)
CALCIUM SERPL-MCNC: 8.9 MG/DL (ref 8.4–10.2)
ERYTHROCYTE [DISTWIDTH] IN BLOOD BY AUTOMATED COUNT: 19.7 % (ref 11.5–14.5)
GFR NON-AFRICAN AMERICAN: 6
GFR NON-AFRICAN AMERICAN: 6
HCO3 BLDA-SCNC: 25.5 MMOL/L (ref 21–28)
HGB BLD-MCNC: 10.1 G/DL (ref 12–16)
INHALED O2 CONCENTRATION: 50 %
INR PPP: 1.4
MCH RBC QN AUTO: 24 PG (ref 27–31)
MCHC RBC AUTO-ENTMCNC: 32 G/DL (ref 33–37)
MCV RBC AUTO: 74.9 FL (ref 81–99)
O2 CAP BLDA-SCNC: 15.1 ML/DL (ref 16–24)
O2 CT BLDA-SCNC: 15.2 ML/DL (ref 15–23)
OSMOLALITY,URINE: 330 MOSM/KG (ref 300–1000)
PH BLDA: 7.46 [PH] (ref 7.35–7.45)
PLATELET # BLD: 123 K/UL (ref 130–400)
PO2 BLDA: 166 MM/HG (ref 80–100)
PROTHROMBIN TIME: 16.2 SECONDS (ref 9.8–13.1)
RBC # BLD AUTO: 4.19 MIL/UL (ref 3.8–5.2)
WBC # BLD AUTO: 19.2 K/UL (ref 4.8–10.8)

## 2019-03-01 PROCEDURE — 5A1D70Z PERFORMANCE OF URINARY FILTRATION, INTERMITTENT, LESS THAN 6 HOURS PER DAY: ICD-10-PCS | Performed by: FAMILY MEDICINE

## 2019-03-01 RX ADMIN — IPRATROPIUM BROMIDE AND ALBUTEROL SULFATE SCH ML: .5; 3 SOLUTION RESPIRATORY (INHALATION) at 19:05

## 2019-03-01 RX ADMIN — ACETAMINOPHEN PRN MG: 160 SOLUTION ORAL at 13:06

## 2019-03-01 RX ADMIN — CLINDAMYCIN IN 5 PERCENT DEXTROSE SCH MLS/HR: 12 INJECTION, SOLUTION INTRAVENOUS at 16:13

## 2019-03-01 RX ADMIN — IPRATROPIUM BROMIDE AND ALBUTEROL SULFATE SCH ML: .5; 3 SOLUTION RESPIRATORY (INHALATION) at 07:35

## 2019-03-01 RX ADMIN — IPRATROPIUM BROMIDE AND ALBUTEROL SULFATE SCH ML: .5; 3 SOLUTION RESPIRATORY (INHALATION) at 02:14

## 2019-03-01 RX ADMIN — CLINDAMYCIN IN 5 PERCENT DEXTROSE SCH MLS/HR: 12 INJECTION, SOLUTION INTRAVENOUS at 00:11

## 2019-03-01 RX ADMIN — IPRATROPIUM BROMIDE AND ALBUTEROL SULFATE SCH ML: .5; 3 SOLUTION RESPIRATORY (INHALATION) at 13:12

## 2019-03-01 RX ADMIN — CLINDAMYCIN IN 5 PERCENT DEXTROSE SCH MLS/HR: 12 INJECTION, SOLUTION INTRAVENOUS at 13:07

## 2019-03-01 NOTE — CP.PCM.PN
Subjective





- Date & Time of Evaluation


Date of Evaluation: 03/01/19


Time of Evaluation: 14:14





- Subjective


Subjective: 





Neuro Follow-Up Note:





Mrs. Omalley was evaluated this afternoon in the ICU.  Family present.  She 

remains intubated, off sedation.  Son states that today the pt has been opening 

her eyes a lot, though has not had much movement to her extremities.  ROS 

unobtainable from pt 2/2 her condition.  Chart reviewed; discussed with primary 

RN.  Family concerns and questions addressed





Objective





- Vital Signs/Intake and Output


Vital Signs (last 24 hours): 


                                        











Temp Pulse Resp BP Pulse Ox


 


 100.9 F H  93 H  30 H  139/78   100 


 


 03/01/19 13:06  03/01/19 12:00  03/01/19 12:00  03/01/19 12:00  03/01/19 12:00








Intake and Output: 


                                        











 03/01/19 03/01/19





 06:59 18:59


 


Intake Total 1353 282


 


Output Total 550 


 


Balance 803 282














- Medications


Medications: 


                               Current Medications





Acetaminophen (Tylenol 650mg/20.3ml Solution Ud)  650 mg PO Q6 PRN


   PRN Reason: Fever>100.4 F


   Last Admin: 03/01/19 13:06 Dose:  650 mg


Albuterol/Ipratropium (Duoneb 3 Mg/0.5 Mg (3 Ml) Ud)  3 ml INH RQ6 DESIRAE


   Last Admin: 03/01/19 13:12 Dose:  3 ml


Allopurinol (Zyloprim)  300 mg PO DAILY DESIRAE


   Last Admin: 03/01/19 13:06 Dose:  300 mg


Calcium Acetate (Phoslo)  667 mg PO TID DESIRAE


   Last Admin: 03/01/19 13:06 Dose:  667 mg


Levetiracetam 500 mg/ Sodium (Chloride)  105 mls @ 210 mls/hr IVPB Q12 DESIRAE


   Last Admin: 03/01/19 13:06 Dose:  210 mls/hr


Acyclovir 600 mg/ Sodium (Chloride)  100 mls @ 100 mls/hr IV Q24H DESIRAE; Protocol


   Last Admin: 02/28/19 17:19 Dose:  100 mls/hr


Clindamycin Phosphate (Cleocin)  600 mg in 50 mls @ 50 mls/hr IVPB Q8 DESIRAE; 

Protocol


   Last Admin: 03/01/19 13:07 Dose:  50 mls/hr


Ceftriaxone Sodium 2 gm/ (Sodium Chloride)  100 mls @ 0 mls/hr IVPB Q12H DESIRAE; 

Protocol


   Last Admin: 03/01/19 04:06 Dose:  100 mls/hr


Vancomycin HCl 1 gm/ Sodium (Chloride)  250 mls @ 250 mls/hr IVPB MWF DESIRAE; 

Protocol


Insulin Human Regular (Humulin R)  0 units SC ACCU-CHECK DESIRAE; Protocol


   Last Admin: 03/01/19 11:14 Dose:  1 u











- Labs


Labs: 


                                        





                                 03/01/19 06:00 





                                 03/01/19 06:00 





                                        











PT  16.2 Seconds (9.8-13.1)  H  03/01/19  10:28    


 


INR  1.4   03/01/19  10:28    


 


APTT  35.6 Seconds (25.6-37.1)   03/01/19  10:28    














- Constitutional


Appears: Other (intubated, off sedation; opens eyes spontaneously; does not 

follow commands)





- Head Exam


Head Exam: ATRAUMATIC, NORMAL INSPECTION, NORMOCEPHALIC





- Eye Exam


Eye Exam: PERRL


Pupil Exam: NORMAL ACCOMODATION, PERRL


Additional comments: 


Pupils both reactive and equal, approx 3 mm today


+ dolls eyes though sluggish; + corneals


Pt opening eyes spontaneously and to sternal rub during exam





- ENT Exam


ENT Exam: Mucous Membranes Moist


Additional comments: 





intubated





- Neck Exam


Neck Exam: Normal Inspection





- Respiratory Exam


Respiratory Exam: absent: NORMAL BREATHING PATTERN (intubated)





- Cardiovascular Exam


Cardiovascular Exam: REGULAR RHYTHM





-  Exam


Additional comments: 





quinones cath in place





- Extremities Exam


Extremities Exam: absent: Calf Tenderness, Full ROM, Pedal Edema


Additional comments: 


Extremities flaccid; no tone to left sided extremities (this side was not 

affected by her previous CVA).


Extremities fall immediately to the bed when raised.


Some non-purposeful movements noted to LLE during my exam.








- Neurological Exam


Neurological Exam: Altered.  absent: Alert, Awake, Reflexes Normal


Neuro motor strength exam: Left Upper Extremity: 0, Right Upper Extremity: 0, 

Left Lower Extremity: 0, Right Lower Extremity: 0


Additional comments: 


 Pt is still intubated, off sedation; does not follow commands.


Pupils equal and reactive; approx 3 mm b/l; + corneals, + dolls eyes though 

sluggish


Opens eyes spontaneously and to sternal rub.


Extremities flaccid; no tone to left sided extremities (this side was not 

affected by her previous CVA).


Extremities fall immediately to the bed when raised.


Some non-purposeful movements noted to LLE during my exam.


Unable to assess sensation


Toes downgoing b/l


Difficulty in eliciting patellar reflexes








- Psychiatric Exam


Additional comments: 





intubated, off sedation; does not follow commands





- Skin


Skin Exam: Normal Color





Assessment and Plan


(1) Toxic metabolic encephalopathy


Assessment & Plan: 


Imaging reviewed:





-Brain MRI (2/27/19): 1. No acute intracranial abnormality. 2. Cystic 

encephalomalacia and gliosis in the left corona radiata and basal ganglia, 

sequela of remote MCA territory infarction.  3. Mild chronic microangiopathic 

changes and mild age-related global parenchymal volume loss. Old lacunar 

infarctions in the left cerebellar hemisphere.   4. Pansinusitis, with a 

complicated retention cyst/polyp in the right maxillary sinus. Fluid in the 

sphenoid sinus may represent acute sinusitis in the appropriate clinical 

setting. Bilateral mastoid effusions.


-EEG (repeat; 2/26/19):  This is  an abnormal EEG record that demonstrate the 

presence of severe non specific diffuse disturbance of cortical activity, this 

is keeping with a diffuse gray matter dysfunction, these findings re not 

specific. These type of EEG is usually seen in toxic metabolic encephalopathies 

, hypoxic-ischemic brain injury among others, clinical correlation is 

recommended.  The EEG look similar to the previous video EEG study  of 2/23,  

2/24.  No seizures. 


Patient is not in status epilepticus.  


-CT Head (2/24/19): No acute intracranial abnormalities. No significant findings

to account for the clinical presentation. No significant interval change 

compared to the prior examination(s).


-CT Head (2/22/19): No acute intracranial abnormalities. No significant findings

to account for the clinical presentation.


Extensive postoperative changes described above.


-EEG (2/24/19): shows burst suppression per Dr. Patrick.


-ECHO (2/24/19) done: 50-55%








-MRA Head/Neck was initially unable to be done (day of intubation)--I reordered 

it--will f/u with results once done.


-Carotid and vertebral U/S ordered but cannot be done at bedside and room in the

dept cannot accommodate vent.


-We still recommend for Cardiology to do a VIVIENNE to r/o septic endocarditis.


-ID recommending LP--INR today is 1.4.


-Continue current medications and treatment of other acute medical issues. 


-Continue ICU management.


-Notify neuro team of any acute changes to pt's condition.








Chrystal Dean DNP, APN


Case discussed with Dr. Hassan


Status: Acute

## 2019-03-01 NOTE — CP.PCM.PN
Subjective





- Date & Time of Evaluation


Date of Evaluation: 03/01/19


Time of Evaluation: 09:00





- Subjective


Subjective: 





remains obtunded


intubated


not responding to painful stimuli and flaccid 





Objective





- Vital Signs/Intake and Output


Vital Signs (last 24 hours): 


                                        











Temp Pulse Resp BP Pulse Ox


 


 100.9 F H  93 H  30 H  139/78   100 


 


 03/01/19 13:06  03/01/19 12:00  03/01/19 12:00  03/01/19 12:00  03/01/19 12:00








Intake and Output: 


                                        











 03/01/19 03/01/19





 06:59 18:59


 


Intake Total 1353 282


 


Output Total 550 


 


Balance 803 282














- Medications


Medications: 


                               Current Medications





Acetaminophen (Tylenol 650mg/20.3ml Solution Ud)  650 mg PO Q6 PRN


   PRN Reason: Fever>100.4 F


   Last Admin: 03/01/19 13:06 Dose:  650 mg


Albuterol/Ipratropium (Duoneb 3 Mg/0.5 Mg (3 Ml) Ud)  3 ml INH RQ6 DESIRAE


   Last Admin: 03/01/19 13:12 Dose:  3 ml


Allopurinol (Zyloprim)  300 mg PO DAILY DESIRAE


   Last Admin: 03/01/19 13:06 Dose:  300 mg


Calcium Acetate (Phoslo)  667 mg PO TID DESIRAE


   Last Admin: 03/01/19 13:06 Dose:  667 mg


Levetiracetam 500 mg/ Sodium (Chloride)  105 mls @ 210 mls/hr IVPB Q12 DESIRAE


   Last Admin: 03/01/19 13:06 Dose:  210 mls/hr


Acyclovir 600 mg/ Sodium (Chloride)  100 mls @ 100 mls/hr IV Q24H DESIRAE; Protocol


   Last Admin: 02/28/19 17:19 Dose:  100 mls/hr


Clindamycin Phosphate (Cleocin)  600 mg in 50 mls @ 50 mls/hr IVPB Q8 DESIRAE; 

Protocol


   Last Admin: 03/01/19 13:07 Dose:  50 mls/hr


Ceftriaxone Sodium 2 gm/ (Sodium Chloride)  100 mls @ 0 mls/hr IVPB Q12H DESIRAE; 

Protocol


   Last Admin: 03/01/19 04:06 Dose:  100 mls/hr


Insulin Human Regular (Humulin R)  0 units SC ACCU-CHECK DESIRAE; Protocol


   Last Admin: 03/01/19 11:14 Dose:  1 u











- Labs


Labs: 


                                        





                                 03/01/19 06:00 





                                 03/01/19 06:00 





                                        











PT  16.2 Seconds (9.8-13.1)  H  03/01/19  10:28    


 


INR  1.4   03/01/19  10:28    


 


APTT  35.6 Seconds (25.6-37.1)   03/01/19  10:28    














- Constitutional


Appears: Chronically Ill





- Head Exam


Head Exam: NORMOCEPHALIC





- Eye Exam


Eye Exam: absent: Scleral icterus





- ENT Exam


ENT Exam: Mucous Membranes Dry





- Neck Exam


Neck Exam: absent: Lymphadenopathy





- Respiratory Exam


Respiratory Exam: Decreased Breath Sounds





- Cardiovascular Exam


Cardiovascular Exam: REGULAR RHYTHM





- GI/Abdominal Exam


GI & Abdominal Exam: Distended





- Rectal Exam


Rectal Exam: Deferred





-  Exam


 Exam: NORMAL INSPECTION





- Extremities Exam


Extremities Exam: absent: Pedal Edema





- Back Exam


Back Exam: absent: CVA tenderness (L), CVA tenderness (R)





- Neurological Exam


Neurological Exam: Altered





- Psychiatric Exam


Psychiatric exam: Depressed





- Skin


Skin Exam: Dry





Assessment and Plan


(1) Sepsis


Status: Acute   





(2) Change in mental state


Status: Acute   





(3) History of CVA with residual deficit


Status: Acute   





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


Status: Chronic   





(5) History of CVA (cerebrovascular accident)


Status: Chronic   





(6) NSTEMI (non-ST elevated myocardial infarction)


Status: Acute   





(7) SHANITA (acute kidney injury)


Status: Acute   





- Assessment and Plan (Free Text)


Assessment: 





57 yo female with Hx of HTN  Obesity  CVA with right weakness, CAD  s/p CABG  

Asthma  was admitted via ER with altered mental status which did not respond to 

Narcan and Glucose infusion by EMS   Had prolonged downtime- anoxic event could 

not be ruled out 





admitted with fever  AMS and NSTEMI  


now has SHANITA on  HD


MRI shows severe pansinusitis 


all cultures/ serologies neg thus far





Patient was started on empiric IV antibiotics to cover for possible CNS 

infection pending cultures - all cultures neg thus far however Procalcitonin 

elevated


Rx with  Vanco / Rocephin/ clinda and acyclovir in progress   LP to be deferred 

at this time


OK to d//c isolation 








prognosis poor from outset

## 2019-03-01 NOTE — CP.PCM.PN
Subjective





- Date & Time of Evaluation


Date of Evaluation: 03/01/19


Time of Evaluation: 11:21





- Subjective


Subjective: 





Dialysis note





She was seen on hemodialysis


Vital signs stable


Discussed with the dialysis nurse


Patient obtunded and intubated








Objective





- Vital Signs/Intake and Output


Vital Signs (last 24 hours): 


                                        











Temp Pulse Resp BP Pulse Ox


 


 100.6 F H  88   22   153/85 H  100 


 


 03/01/19 10:00  03/01/19 10:00  03/01/19 10:00  03/01/19 10:00  03/01/19 10:00








Intake and Output: 


                                        











 03/01/19 03/01/19





 06:59 18:59


 


Intake Total 1353 188


 


Output Total 550 


 


Balance 803 188














- Medications


Medications: 


                               Current Medications





Acetaminophen (Tylenol 650mg/20.3ml Solution Ud)  650 mg PO Q6 PRN


   PRN Reason: Fever>100.4 F


Albuterol/Ipratropium (Duoneb 3 Mg/0.5 Mg (3 Ml) Ud)  3 ml INH RQ6 [facility]


   Last Admin: 03/01/19 07:35 Dose:  3 ml


Allopurinol (Zyloprim)  300 mg PO DAILY [facility]AE


Calcium Acetate (Phoslo)  667 mg PO TID DESIRAE


   Last Admin: 02/28/19 17:06 Dose:  667 mg


Levetiracetam 500 mg/ Sodium (Chloride)  105 mls @ 210 mls/hr IVPB Q12 [facility]


   Last Admin: 02/28/19 20:50 Dose:  210 mls/hr


Acyclovir 600 mg/ Sodium (Chloride)  100 mls @ 100 mls/hr IV Q24H [facility]; Protocol


   Last Admin: 02/28/19 17:19 Dose:  100 mls/hr


Clindamycin Phosphate (Cleocin)  600 mg in 50 mls @ 50 mls/hr IVPB Q8 [facility]; 

Protocol


   Last Admin: 03/01/19 00:11 Dose:  50 mls/hr


Ceftriaxone Sodium 2 gm/ (Sodium Chloride)  100 mls @ 0 mls/hr IVPB Q12H [facility]; 

Protocol


   Last Admin: 03/01/19 04:06 Dose:  100 mls/hr


Insulin Human Regular (Humulin R)  0 units SC ACCU-CHECK [facility]; Protocol


   Last Admin: 03/01/19 11:14 Dose:  1 u











- Labs


Labs: 


                                        





                                 03/01/19 06:00 





                                 03/01/19 06:00 





                                        











PT  16.2 Seconds (9.8-13.1)  H  03/01/19  10:28    


 


INR  1.4   03/01/19  10:28    


 


APTT  35.6 Seconds (25.6-37.1)   03/01/19  10:28    














- Constitutional


Appears: No Acute Distress





- Eye Exam


Eye Exam: absent: Conjunctival injection





- ENT Exam


ENT Exam: Mucous Membranes Moist





- Neck Exam


Neck Exam: absent: Lymphadenopathy





- Respiratory Exam


Respiratory Exam: Rhonchi, NORMAL BREATHING PATTERN.  absent: Chest Wall 

Tenderness, Rales





- Cardiovascular Exam


Cardiovascular Exam: absent: Gallop, JVD, Rubs





- GI/Abdominal Exam


GI & Abdominal Exam: Soft, Normal Bowel Sounds





- Extremities Exam


Extremities Exam: absent: Calf Tenderness





- Back Exam


Back Exam: absent: CVA tenderness (L), CVA tenderness (R)





- Neurological Exam


Neurological Exam: Altered





- Psychiatric Exam


Psychiatric exam: Flat Affect





- Skin


Skin Exam: absent: Cyanosis





Assessment and Plan


(1) SHANITA (acute kidney injury)


Assessment & Plan: 








Altered mental status


Respiratory failure/intubated 


Acute renal failure/acute kidney injury related to multifactorial including 

sepsis.


Diabetes mellitus and history of hypertension history of CVA.


Patient remained obtunded with encephalopathy 


hyperurecemia


Hyperphosphatemia


Shock liver


Recommendation


She was seen on hemodialysis


Vital signs stable


Discussed with the dialysis nurse with ultrafiltration of approximately 2000 cc 

as tolerated


Sodium bath 138


Bicarbonate bath 34


Patient remain on respirator obtunded


 allopurinol for hyperuricemia


Repeat CPK stat


Myoglobin in the urine has not been done to repeat specimen again


Stat spot urine for ratio of protein to creatinine


If no clear etiology for the acute renal failure by early next week we will 

consider kidney biopsy


Scheduled for more hemodialysis tomorrow


Sepsis management as per ID and the primary team of ICU


Status: Acute   





(2) Elevated liver enzymes


Status: Acute   





(3) Elevated troponin level


Status: Acute   





(4) Leukocytosis


Status: Acute   





(5) Sepsis


Status: Acute

## 2019-03-01 NOTE — RAD
Date of service: 



03/01/2019



PROCEDURE:  CHEST RADIOGRAPH, 1 VIEW



HISTORY:

Intubated



COMPARISON:

2/28/2019



FINDINGS:



LUNGS:

Patchy opacity at right base.  Possible pneumonia.  Improved compared 

to prior examination.  No other abnormal opacity elsewhere.



PLEURA:

Probable small right pleural effusion.  No pneumothorax.  Minimal 

blunting left costophrenic angle may reflect very small pleural 

effusion or chronic pleural thickening.



CARDIOVASCULAR:

 No aortic atherosclerotic calcification present. Normal heart size.  

Status post mitral valve replacement.  Sternotomy wires noted ET tube,

 NG tube and right IJ central venous catheter are unchanged.  Right 

tunneled central venous dialysis catheter unchanged.



OSSEOUS STRUCTURES:

No significant abnormalities.



VISUALIZED UPPER ABDOMEN:

Normal.



OTHER FINDINGS:

None. 



IMPRESSION:

Small right pleural effusion.  Opacity at right base may reflect 

pneumonia.  Lines and tubes unchanged.

## 2019-03-01 NOTE — CP.CCUPN
CCU Subjective





- Physician Review


Subjective (Free Text): 


Remains obtunded since admission 6 days ago, orally intubated yesterday after 

becoming hypoxemic with bloody secretions noted orally and audible coarse upper 

airway rhonchi. Positive fluid balance daily noted since admission, with periods

of oliguria. First HD yesterday, UF 1.5L.


No observed recurrent seizure activity, no documented further hypoglycemic 

episodes. 





Afebrile, no fever spikes sine admission day. HR and SBPs have been relatively 

stable, otherwise in A flutter with controlled, variable VR. Breathing 22 on AC 

14, 450 ml, 50% oxygen and PEEP 5, SPo2 100%.  





Other vitals and I/O's reviewed. 





ROS:  No other pertinent negs or positives on 10+  system review obtainable due 

to coma





PMSFH:   All other Nursing and physician documentation reviewed to date; no new 

pertinent info noted relevant to current medical problems.








EXAM- 


HEENT:  no icterus, no gaze preference, 3-4 mm and sluggish minimal reactivity 

noted, no  nystagmus


NECK: no JVD visible, supple, carotids equal upstroke bilat/no bruit, R IJV 

central line  intact. 


CHEST: decreased BS  at the bases, no wheezes audible


HEART: irregular, distant, S1S2, no rubs


ABD:  soft, obese, no distension, no focal  tenderness, no tympany, no guarding,

no organomegaly, BS hypoactive.  


EXT:    + LE edema, no mottling;  no calf tenderness or palpable cords, distal 

pulses intact and symmetrical, no cyanosis, no mottling.


NEURO:   R sided hemiplegia, loss of tone LUE and LLE today, essentially flaccid

x 4.  GCS= 4T ( E2 V1 M1)


SKIN:   no rashes,  warm and dry





LABS: 


WBC= 19.2


HGB= 10.1


PLTs=  123K





INR = 1.4 with normal PTT





7.46/34/166





Na= 143


K= 3.5


CL= 102


HCO3= 24


BUN/Cr=  72/7.2


BS= 222





CXR; (my interp) ETT position OK above gabby, increased R worse than L 

scattered bilat infiltrates, improved aeration RLL. 





ECHO Feb 24: Normal EF, Enlarged LA and RA, Moderate AR, moderate TR. No 

intracardiac thrombi.








IMPRESSION / MAJOR PROBLEMS NOW:  


1.     Acute Hypoxemic Resp Failure 2 Aspiration PNA


2.     Anoxic Encephalopathy 2  unclear Seizure event (hypoglycemic) and 

prolonged down time (EMS reports 25 minutes, but may have been longer as in not 

seen till next morning according to sons accounts of events, as he lives with 

her); or prolonged post-ictal state versus other occult CNS injury / infection /

subacute CVA. 


3.      Superimposed Metabolic Encephalopathy from Acute Renal Failure


4.     DM II





PLAN:


1.   Could decrease fiO2 to 40% today, expect goal of 1.5 to 2.0L UF removal 

today from HD. 


2.    Ongoing  Directional Suctioning / Pulm toilet especially to R lung / RLL. 

If lobar atelectasis recurs, will need FOB/BAL, Pulm consultation on board. No 

improvement in mental status post intubation. Mild hypercarbia noted several 

days ago, but acidosis most likely 2 metabolic etiology from worsening renal 

failure.


3.   Again, Sputum Cx re-ordered today. So far no allergic reaction to Rocephin,

will add empiric Clindamycin to cover for new Aspiration Pna; continue Acyclovir

or as directed by ID.  +Pan-sinusitis as seen on MRI Brain. After review of 

consultants previous Notes, LP looking for meningitis is not a consideration at 

this time. 


4.   Neurochecks, seizure precautions, HPB elevation, liberal glucose control 

for now to avoid hypoglycemia. No improvement in neuro-mental status with HD. 


5.   Bloody, oliguric urine output noted, checked coags- not worse. 





Time spent with this patient did not overlap with any other provider's medical 

or critical care time. Additionally the code selected for the services rendered 

in this note includes the time spent:  talking to the patients family, 

associated physicians and reviewing hospital data/results not listed here which 

extended to a total of 30 minutes of critical care.

## 2019-03-01 NOTE — CP.PCM.PN
Subjective





- Date & Time of Evaluation


Date of Evaluation: 03/01/19


Time of Evaluation: 11:41





- Subjective


Subjective: 





OPENS EYES


DIALYSIS IN SESSION


STILL FEBRILE


INTUBATED AND BEING VENTILATED


WILL CONTINUE CURRENT RX


WILL FOLLOW





Objective





- Vital Signs/Intake and Output


Vital Signs (last 24 hours): 


                                        











Temp Pulse Resp BP Pulse Ox


 


 100.6 F H  88   22   153/85 H  100 


 


 03/01/19 10:00  03/01/19 10:00  03/01/19 10:00  03/01/19 10:00  03/01/19 10:00








Intake and Output: 


                                        











 03/01/19 03/01/19





 06:59 18:59


 


Intake Total 1353 188


 


Output Total 550 


 


Balance 803 188














- Medications


Medications: 


                               Current Medications





Acetaminophen (Tylenol 650mg/20.3ml Solution Ud)  650 mg PO Q6 PRN


   PRN Reason: Fever>100.4 F


Albuterol/Ipratropium (Duoneb 3 Mg/0.5 Mg (3 Ml) Ud)  3 ml INH RQ6 [facility]


   Last Admin: 03/01/19 07:35 Dose:  3 ml


Allopurinol (Zyloprim)  300 mg PO DAILY [facility]


Calcium Acetate (Phoslo)  667 mg PO TID [facility]


   Last Admin: 02/28/19 17:06 Dose:  667 mg


Levetiracetam 500 mg/ Sodium (Chloride)  105 mls @ 210 mls/hr IVPB Q12 [facility]


   Last Admin: 02/28/19 20:50 Dose:  210 mls/hr


Acyclovir 600 mg/ Sodium (Chloride)  100 mls @ 100 mls/hr IV Q24H [facility]; Protocol


   Last Admin: 02/28/19 17:19 Dose:  100 mls/hr


Clindamycin Phosphate (Cleocin)  600 mg in 50 mls @ 50 mls/hr IVPB Q8 [facility]; 

Protocol


   Last Admin: 03/01/19 00:11 Dose:  50 mls/hr


Ceftriaxone Sodium 2 gm/ (Sodium Chloride)  100 mls @ 0 mls/hr IVPB Q12H [facility]; 

Protocol


   Last Admin: 03/01/19 04:06 Dose:  100 mls/hr


Insulin Human Regular (Humulin R)  0 units SC ACCU-CHECK [facility]; Protocol


   Last Admin: 03/01/19 11:14 Dose:  1 u











- Labs


Labs: 


                                        





                                 03/01/19 06:00 





                                 03/01/19 06:00 





                                        











PT  16.2 Seconds (9.8-13.1)  H  03/01/19  10:28    


 


INR  1.4   03/01/19  10:28    


 


APTT  35.6 Seconds (25.6-37.1)   03/01/19  10:28

## 2019-03-02 LAB
ARTERIAL BLOOD GAS HEMOGLOBIN: 9.7 G/DL (ref 11.7–17.4)
ARTERIAL BLOOD GAS O2 SAT: 100.8 % (ref 95–98)
ARTERIAL BLOOD GAS PCO2: 33 MM/HG (ref 35–45)
ARTERIAL BLOOD GAS TCO2: 26.7 MMOL/L (ref 22–28)
ARTERIAL PATENCY WRIST A: YES
BUN SERPL-MCNC: 56 MG/DL (ref 7–17)
CALCIUM SERPL-MCNC: 9.1 MG/DL (ref 8.4–10.2)
ERYTHROCYTE [DISTWIDTH] IN BLOOD BY AUTOMATED COUNT: 19.7 % (ref 11.5–14.5)
GFR NON-AFRICAN AMERICAN: 8
HCO3 BLDA-SCNC: 27 MMOL/L (ref 21–28)
HGB BLD-MCNC: 9.6 G/DL (ref 12–16)
INHALED O2 CONCENTRATION: 50 %
MCH RBC QN AUTO: 24.2 PG (ref 27–31)
MCHC RBC AUTO-ENTMCNC: 32.2 G/DL (ref 33–37)
MCV RBC AUTO: 75.1 FL (ref 81–99)
O2 CAP BLDA-SCNC: 13.8 ML/DL (ref 16–24)
O2 CT BLDA-SCNC: 13.9 ML/DL (ref 15–23)
PH BLDA: 7.5 [PH] (ref 7.35–7.45)
PLATELET # BLD: 115 K/UL (ref 130–400)
PO2 BLDA: 212 MM/HG (ref 80–100)
RBC # BLD AUTO: 3.97 MIL/UL (ref 3.8–5.2)
WBC # BLD AUTO: 20.6 K/UL (ref 4.8–10.8)

## 2019-03-02 RX ADMIN — IPRATROPIUM BROMIDE AND ALBUTEROL SULFATE SCH ML: .5; 3 SOLUTION RESPIRATORY (INHALATION) at 02:11

## 2019-03-02 RX ADMIN — CLINDAMYCIN IN 5 PERCENT DEXTROSE SCH MLS/HR: 12 INJECTION, SOLUTION INTRAVENOUS at 00:39

## 2019-03-02 RX ADMIN — IPRATROPIUM BROMIDE AND ALBUTEROL SULFATE SCH ML: .5; 3 SOLUTION RESPIRATORY (INHALATION) at 19:06

## 2019-03-02 RX ADMIN — IPRATROPIUM BROMIDE AND ALBUTEROL SULFATE SCH ML: .5; 3 SOLUTION RESPIRATORY (INHALATION) at 14:07

## 2019-03-02 RX ADMIN — LABETALOL HYDROCHLORIDE PRN MG: 5 INJECTION, SOLUTION INTRAVENOUS at 20:37

## 2019-03-02 RX ADMIN — IPRATROPIUM BROMIDE AND ALBUTEROL SULFATE SCH ML: .5; 3 SOLUTION RESPIRATORY (INHALATION) at 08:35

## 2019-03-02 RX ADMIN — LABETALOL HYDROCHLORIDE PRN MG: 5 INJECTION, SOLUTION INTRAVENOUS at 13:15

## 2019-03-02 RX ADMIN — CLINDAMYCIN IN 5 PERCENT DEXTROSE SCH MLS/HR: 12 INJECTION, SOLUTION INTRAVENOUS at 08:38

## 2019-03-02 RX ADMIN — CLINDAMYCIN IN 5 PERCENT DEXTROSE SCH MLS/HR: 12 INJECTION, SOLUTION INTRAVENOUS at 16:30

## 2019-03-02 NOTE — RAD
Date of service: 



03/02/2019



PROCEDURE:  CHEST RADIOGRAPH, 1 VIEW



HISTORY:

Intubated



COMPARISON:

Chest radiograph dated 03/01/2019.



FINDINGS:



LUNGS:

Pulmonary vascular congestion.  Bibasilar atelectasis.



PLEURA:

Small bilateral pleural effusions.  No appreciable pneumothorax.



CARDIOVASCULAR:

Prior sternotomy with sternal wires, prosthetic aortic valve and 

surgical clips redemonstrated.  Aortic atherosclerotic 

calcifications.  Cardiomediastinal silhouette stably enlarged. 



OSSEOUS STRUCTURES:

Changed.



VISUALIZED UPPER ABDOMEN:

Normal.



OTHER FINDINGS:

Endotracheal and enteric tubes, unchanged.  Right internal jugular 

access central venous catheter and non tunneled hemodialysis catheter 

is, unchanged. 



IMPRESSION:

Stable tubes and lines.  Stable pulmonary vascular congestion and 

small bilateral pleural effusions

## 2019-03-02 NOTE — CP.PCM.PN
Subjective





- Date & Time of Evaluation


Date of Evaluation: 03/02/19


Time of Evaluation: 13:56





- Subjective


Subjective: 





Mrs. Omalley was seen and examined today in the ICU.  She continues to be 

intubated, off sedation not following any commands.  Moves the left side 

spontaneously, but not the right.  





Objective





- Vital Signs/Intake and Output


Vital Signs (last 24 hours): 


                                        











Temp Pulse Resp BP Pulse Ox


 


 100.4 F H  89   24   182/88 H  100 


 


 03/02/19 12:00  03/02/19 12:00  03/02/19 12:00  03/02/19 12:00  03/02/19 12:00








Intake and Output: 


                                        











 03/02/19 03/02/19





 06:59 18:59


 


Intake Total 720 578


 


Output Total 500 


 


Balance 220 578














- Medications


Medications: 


                               Current Medications





Acetaminophen (Tylenol 650mg/20.3ml Solution Ud)  650 mg PO Q6 PRN


   PRN Reason: Fever>100.4 F


   Last Admin: 03/01/19 13:06 Dose:  650 mg


Albuterol/Ipratropium (Duoneb 3 Mg/0.5 Mg (3 Ml) Ud)  3 ml INH RQ6 [facility]


   Last Admin: 03/02/19 08:35 Dose:  3 ml


Allopurinol (Zyloprim)  300 mg PO DAILY [facility]


   Last Admin: 03/02/19 08:39 Dose:  300 mg


Calcium Acetate (Phoslo)  667 mg PO TID [facility]


   Last Admin: 03/02/19 12:23 Dose:  667 mg


Levetiracetam 500 mg/ Sodium (Chloride)  105 mls @ 210 mls/hr IVPB Q12 [facility]


   Last Admin: 03/02/19 08:38 Dose:  210 mls/hr


Clindamycin Phosphate (Cleocin)  600 mg in 50 mls @ 50 mls/hr IVPB Q8 [facility]; 

Protocol


   Last Admin: 03/02/19 08:38 Dose:  50 mls/hr


Vancomycin HCl 1 gm/ Sodium (Chloride)  250 mls @ 250 mls/hr IVPB MWF [facility]; 

Protocol


Cefepime HCl 1 gm/ Sodium (Chloride)  100 mls @ 100 mls/hr IVPB DAILY [facility]; 

Protocol


   Last Admin: 03/02/19 08:39 Dose:  100 mls/hr


Insulin Human Regular (Humulin R)  0 units SC ACCU-CHECK [facility]; Protocol


   Last Admin: 03/02/19 11:16 Dose:  1 u


Labetalol HCl (Trandate)  20 mg IVP Q4 PRN


   PRN Reason: Systolic Blood Pressure


   Last Admin: 03/02/19 13:15 Dose:  20 mg


Vitamin B Complex/Vit C/Folic Acid (Nephro-Yoseph)  1 tab PO DAILY [facility]











- Labs


Labs: 


                                        





                                 03/02/19 04:40 





                                 03/02/19 04:40 





                                        











PT  16.2 Seconds (9.8-13.1)  H  03/01/19  10:28    


 


INR  1.4   03/01/19  10:28    


 


APTT  35.6 Seconds (25.6-37.1)   03/01/19  10:28    














- Neurological Exam


Neurological Exam: Altered


Neuro motor strength exam: Left Upper Extremity: 3, Right Upper Extremity: 0, 

Left Lower Extremity: 3, Right Lower Extremity: 0


Additional comments: 





Right gaze preference, not tracking, not following commands, withdraws to pain 

on the left side but not the right.  Pupils are reactive. Brainstem reflexes 

intact. 





Assessment and Plan


(1) Toxic metabolic encephalopathy


Assessment & Plan: 


Continue current medical management.  A source of infection may be evaluated 

for.  It appears there may be a systemic embolic event that has affected the 

liver, kidneys and brain.  VIVIENNE is recommended.  


Status: Acute

## 2019-03-02 NOTE — CP.PCM.PN
Subjective





- Date & Time of Evaluation


Date of Evaluation: 03/02/19


Time of Evaluation: 10:22





- Subjective


Subjective: 





EPISODES OF AGITATION


OPENS EYES ON TOUCH


STILL INTUBATED AND BEING VENTILATED





Objective





- Vital Signs/Intake and Output


Vital Signs (last 24 hours): 


                                        











Temp Pulse Resp BP Pulse Ox


 


 101.3 F H  98 H  29 H  183/84 H  100 


 


 03/02/19 08:00  03/02/19 08:00  03/02/19 08:00  03/02/19 08:00  03/02/19 08:00








Intake and Output: 


                                        











 03/02/19 03/02/19





 06:59 18:59


 


Intake Total 720 94


 


Output Total 500 


 


Balance 220 94














- Medications


Medications: 


                               Current Medications





Acetaminophen (Tylenol 650mg/20.3ml Solution Ud)  650 mg PO Q6 PRN


   PRN Reason: Fever>100.4 F


   Last Admin: 03/01/19 13:06 Dose:  650 mg


Albuterol/Ipratropium (Duoneb 3 Mg/0.5 Mg (3 Ml) Ud)  3 ml INH RQ6 [facility]IRAE


   Last Admin: 03/02/19 08:35 Dose:  3 ml


Allopurinol (Zyloprim)  300 mg PO DAILY [facility]


   Last Admin: 03/02/19 08:39 Dose:  300 mg


Calcium Acetate (Phoslo)  667 mg PO TID [facility]


   Last Admin: 03/02/19 08:39 Dose:  667 mg


Levetiracetam 500 mg/ Sodium (Chloride)  105 mls @ 210 mls/hr IVPB Q12 [facility]


   Last Admin: 03/02/19 08:38 Dose:  210 mls/hr


Clindamycin Phosphate (Cleocin)  600 mg in 50 mls @ 50 mls/hr IVPB Q8 [facility]; 

Protocol


   Last Admin: 03/02/19 08:38 Dose:  50 mls/hr


Vancomycin HCl 1 gm/ Sodium (Chloride)  250 mls @ 250 mls/hr IVPB MWF [facility]; 

Protocol


Cefepime HCl 1 gm/ Sodium (Chloride)  100 mls @ 100 mls/hr IVPB DAILY [facility]; 

Protocol


   Last Admin: 03/02/19 08:39 Dose:  100 mls/hr


Insulin Human Regular (Humulin R)  0 units SC ACCU-CHECK [facility]; Protocol


   Last Admin: 03/02/19 06:23 Dose:  1 u


Vitamin B Complex/Vit C/Folic Acid (Nephro-Yoseph)  1 tab PO DAILY [facility]IR[facility]











- Labs


Labs: 


                                        





                                 03/02/19 04:40 





                                 03/02/19 04:40 





                                        











PT  16.2 Seconds (9.8-13.1)  H  03/01/19  10:28    


 


INR  1.4   03/01/19  10:28    


 


APTT  35.6 Seconds (25.6-37.1)   03/01/19  10:28    














- Constitutional


Appears: Chronically Ill





- Head Exam


Head Exam: ATRAUMATIC, NORMAL INSPECTION, NORMOCEPHALIC





- Eye Exam


Eye Exam: EOMI, Normal appearance, PERRL


Pupil Exam: NORMAL ACCOMODATION, PERRL





- ENT Exam


ENT Exam: Mucous Membranes Moist, Normal Exam





- Neck Exam


Neck Exam: Full ROM, Normal Inspection.  absent: Lymphadenopathy





- Respiratory Exam


Respiratory Exam: Prolonged Expiratory Phase, Rales


Additional comments: 





INTUBATED AND BEING VENTILATED





- Cardiovascular Exam


Cardiovascular Exam: REGULAR RHYTHM, +S1, +S2.  absent: Murmur





- GI/Abdominal Exam


GI & Abdominal Exam: Soft, Normal Bowel Sounds.  absent: Tenderness





- Rectal Exam


Rectal Exam: NORMAL INSPECTION





- Extremities Exam


Extremities Exam: Full ROM, Normal Capillary Refill, Pedal Edema.  absent: Joint

Swelling





- Back Exam


Back Exam: NORMAL INSPECTION





- Psychiatric Exam


Psychiatric exam: Normal Affect, Normal Mood





- Skin


Skin Exam: Dry, Intact, Normal Color, Warm





Assessment and Plan





- Assessment and Plan (Free Text)


Assessment: 





RESP FAILURE


RENAL FAILURE


PNEUMONIA


SEPSIS


Plan: 





CONTINUE CURRENT RX


WILL FOLLOW

## 2019-03-02 NOTE — CP.PCM.PN
Subjective





- Date & Time of Evaluation


Date of Evaluation: 03/02/19


Time of Evaluation: 10:00





- Subjective


Subjective: 


Nephrology Consultation Note





Assessment: critical


Altered mental status


Respiratory failure/intubated 


Acute renal failure/acute kidney injury related to multifactorial including 

sepsis.


Diabetes mellitus and history of hypertension history of CVA.


Patient remained obtunded with encephalopathy 


hyperurecemia


Hyperphosphatemia


Shock liver


nephrosis with 7 gram proteinuria on dipstick





Plan


plan for HD as ordered today if possible or tomorrow 


Hypertension control with meds as ordered. Maintain hemodynamics stable. Avoid 

hypotension. Patient not on ACEI/ARB due to recent SHANITA


Monitor Input/Output, daily weights and renal function with basic metabolic 

panel





Check ANCA, GBM ab 





Dose meds/antibiotics for reduced GFR. Avoid fleets enema/magnesium based 

laxatives. Avoid nephrotoxins/NSAIDs/ iodinated contrast (unless needed emerge

ntly)


Glycemic control


Further work up for as per primary team





Thanks for allowing me to participate in care of your patient. Will follow 

patient with you. Please call if any Qs. had d/w team


Dr Stevo Sanders


Office: [phone] Cell: [phone] Fax: [phone]





Subjective: Noted events overnight. Patients intubated





Physical Examination:      


General Appearance: orally intuabted ill appearing


Vitals reviewed and noted as below


Head; Atraumatic, normocephalic


ENT: intubated


Neck; supple no lymphadenopathy, no thyromegaly or bruit


Lungs: Normal respiratory rate/effort. Breath sounds bilateral reduced at bases


Heart: Normal rate. s1s2 normal. No rub or gallop. 


Extremities: no edema. No varicose veins


Neurological: Patient is unresponsive


Skin: Warm and dry. Normal turgor. No rash. Palpitation: Normal elasticity for 

age


Abdomen: Abdomen is soft. Bowel sounds +. There is no abdominal tenderness, no 

guarding/rigidity no organomegaly


Psych: unable


MSK: no joint tenderness or swelling. Digits and nails normal, no deformity


: kidney or bladder not palpable


Rt IJ as access





Labs/imaging reviewed.


Past medical history, past surgical history, family history, social history, 

allergy reviewed and noted as below


Family hx: no hx of CKD. Rest non-contributory 











Objective





- Vital Signs/Intake and Output


Vital Signs (last 24 hours): 


                                        











Temp Pulse Resp BP Pulse Ox


 


 100.4 F H  89   24   182/88 H  100 


 


 03/02/19 12:00  03/02/19 12:00  03/02/19 12:00  03/02/19 12:00  03/02/19 12:00








Intake and Output: 


                                        











 03/02/19 03/02/19





 06:59 18:59


 


Intake Total 720 578


 


Output Total 500 


 


Balance 220 578














- Medications


Medications: 


                               Current Medications





Acetaminophen (Tylenol 650mg/20.3ml Solution Ud)  650 mg PO Q6 PRN


   PRN Reason: Fever>100.4 F


   Last Admin: 03/01/19 13:06 Dose:  650 mg


Albuterol/Ipratropium (Duoneb 3 Mg/0.5 Mg (3 Ml) Ud)  3 ml INH RQ6 [facility]


   Last Admin: 03/02/19 08:35 Dose:  3 ml


Allopurinol (Zyloprim)  300 mg PO DAILY [facility]


   Last Admin: 03/02/19 08:39 Dose:  300 mg


Calcium Acetate (Phoslo)  667 mg PO TID [facility]


   Last Admin: 03/02/19 12:23 Dose:  667 mg


Levetiracetam 500 mg/ Sodium (Chloride)  105 mls @ 210 mls/hr IVPB Q12 [facility]


   Last Admin: 03/02/19 08:38 Dose:  210 mls/hr


Clindamycin Phosphate (Cleocin)  600 mg in 50 mls @ 50 mls/hr IVPB Q8 [facility]; 

Protocol


   Last Admin: 03/02/19 08:38 Dose:  50 mls/hr


Vancomycin HCl 1 gm/ Sodium (Chloride)  250 mls @ 250 mls/hr IVPB MWF [facility]; 

Protocol


Cefepime HCl 1 gm/ Sodium (Chloride)  100 mls @ 100 mls/hr IVPB DAILY [facility]; 

Protocol


   Last Admin: 03/02/19 08:39 Dose:  100 mls/hr


Insulin Human Regular (Humulin R)  0 units SC ACCU-CHECK [facility]; Protocol


   Last Admin: 03/02/19 11:16 Dose:  1 u


Labetalol HCl (Trandate)  20 mg IVP Q4 PRN


   PRN Reason: Systolic Blood Pressure


   Last Admin: 03/02/19 13:15 Dose:  20 mg


Vitamin B Complex/Vit C/Folic Acid (Nephro-Yoseph)  1 tab PO DAILY [facility]











- Labs


Labs: 


                                        





                                 03/02/19 04:40 





                                 03/02/19 04:40 





                                        











PT  16.2 Seconds (9.8-13.1)  H  03/01/19  10:28    


 


INR  1.4   03/01/19  10:28    


 


APTT  35.6 Seconds (25.6-37.1)   03/01/19  10:28

## 2019-03-02 NOTE — CP.CCUPN
CCU Subjective





- Physician Review


Subjective (Free Text): 


Except for minimal increase in spontaneous eye opening, no other real 

improvement in neuro-mental status.  Positive fluid balance daily noted since 

admission, with periods of oliguria. 


No observed recurrent seizure activity, no documented further hypoglycemic 

episodes. 





Persistent fevers over past 24H, 101.1max,  HR and SBPs which were relatively 

stable are now exhibiting higher SBPs up to 199. Breathing 26 on AC 14, 450 ml, 

50% oxygen and PEEP 5, SPo2 100%.  





Other vitals and I/O's reviewed. 





ROS:  No other pertinent negs or positives on 10+  system review obtainable due 

to coma





PMSFH:   All other Nursing and physician documentation reviewed to date; no new 

pertinent info noted relevant to current medical problems.








EXAM- 


HEENT:  no icterus, no gaze preference, 3-4 mm and sluggish minimal reactivity 

noted, no  nystagmus


NECK: no JVD visible, supple, carotids equal upstroke bilat/no bruit, R IJV 

central line  intact. 


CHEST: decreased BS  at the bases, no wheezes audible


HEART: irregular, distant, S1S2, no rubs


ABD:  soft, obese, no distension, no focal  tenderness, no tympany, no guarding,

no organomegaly, BS hypoactive.  


EXT:    + LE edema, no mottling;  no calf tenderness or palpable cords, distal 

pulses intact and symmetrical, no cyanosis, no mottling.


NEURO:   R sided hemiplegia, minimal posturing of LUE and LLE 


SKIN:   no rashes,  warm and dry





LABS: 


WBC= 20.6


HGB= 9.6


PLTs=  115K





7.50/33/212





Na= 142


K= 3.3


CL= 101


HCO3= 25


BUN/Cr=  56/5.5


BS= 173





CXR; (my interp) ETT position OK above gabby, increased R worse than L 

scattered bilat infiltrates, improved aeration RLL, now worse bibasilar haziness

today.  








IMPRESSION / MAJOR PROBLEMS NOW:  


1.     Acute Hypoxemic Resp Failure 2 Aspiration PNA


2.     Anoxic Encephalopathy 2  unclear Seizure event (hypoglycemic) and 

prolonged down time (EMS reports 25 minutes, but may have been longer as in not 

seen till next morning according to sons accounts of events, as he lives with 

her); or prolonged post-ictal state versus other occult CNS injury / infection /

subacute CVA. 


3.      Superimposed Metabolic Encephalopathy from Acute Renal Failure


4.      Accelerated HTN


5.      DM II





PLAN:


1.   No MV weans given obtundation; fio2 decreased to 40% today. 


2.   More K supplements today.


3.   Watch platelet counts.


4.   Awaiting on Feb 28 Sputum results. 


5.   Labetalol prn if MAP exceeds 110.

## 2019-03-03 LAB
ALBUMIN SERPL-MCNC: 3.4 G/DL (ref 3.5–5)
ALBUMIN/GLOB SERPL: 0.9 {RATIO} (ref 1–2.1)
ALT SERPL-CCNC: 469 U/L (ref 9–52)
ANISOCYTOSIS BLD QL SMEAR: SLIGHT
ARTERIAL BLOOD GAS HEMOGLOBIN: 10 G/DL (ref 11.7–17.4)
ARTERIAL BLOOD GAS O2 SAT: 100.4 % (ref 95–98)
ARTERIAL BLOOD GAS PCO2: 29 MM/HG (ref 35–45)
ARTERIAL BLOOD GAS TCO2: 25.7 MMOL/L (ref 22–28)
ARTERIAL PATENCY WRIST A: YES
AST SERPL-CCNC: 71 U/L (ref 14–36)
BASOPHILS # BLD AUTO: 0.1 K/UL (ref 0–0.2)
BASOPHILS NFR BLD: 0.3 % (ref 0–2)
BUN SERPL-MCNC: 68 MG/DL (ref 7–17)
CALCIUM SERPL-MCNC: 9.3 MG/DL (ref 8.4–10.2)
EOSINOPHIL # BLD AUTO: 0.3 K/UL (ref 0–0.7)
EOSINOPHIL NFR BLD: 1 % (ref 0–7)
EOSINOPHIL NFR BLD: 1.6 % (ref 0–4)
ERYTHROCYTE [DISTWIDTH] IN BLOOD BY AUTOMATED COUNT: 19.5 % (ref 11.5–14.5)
GFR NON-AFRICAN AMERICAN: 8
HCO3 BLDA-SCNC: 27 MMOL/L (ref 21–28)
HGB BLD-MCNC: 9.7 G/DL (ref 12–16)
HYPOCHROMIC: SLIGHT
INHALED O2 CONCENTRATION: 50 %
LYMPHOCYTE: 2 % (ref 20–50)
LYMPHOCYTES # BLD AUTO: 0.7 K/UL (ref 1–4.3)
LYMPHOCYTES NFR BLD AUTO: 3.7 % (ref 20–40)
MCH RBC QN AUTO: 24.3 PG (ref 27–31)
MCHC RBC AUTO-ENTMCNC: 32.2 G/DL (ref 33–37)
MCV RBC AUTO: 75.4 FL (ref 81–99)
MONOCYTE: 9 % (ref 0–10)
MONOCYTES # BLD: 2.2 K/UL (ref 0–0.8)
MONOCYTES NFR BLD: 11.2 % (ref 0–10)
NEUTROPHILS # BLD: 16.6 K/UL (ref 1.8–7)
NEUTROPHILS NFR BLD AUTO: 83.2 % (ref 50–75)
NEUTROPHILS NFR BLD AUTO: 88 % (ref 42–75)
NRBC BLD AUTO-RTO: 0.1 % (ref 0–0)
O2 CAP BLDA-SCNC: 14.1 ML/DL (ref 16–24)
O2 CT BLDA-SCNC: 14.2 ML/DL (ref 15–23)
OVALOCYTES BLD QL SMEAR: SLIGHT
PH BLDA: 7.54 [PH] (ref 7.35–7.45)
PLATELET # BLD EST: NORMAL 10*3/UL
PLATELET # BLD: 158 K/UL (ref 130–400)
PMV BLD AUTO: 11.3 FL (ref 7.2–11.7)
PO2 BLDA: 225 MM/HG (ref 80–100)
RBC # BLD AUTO: 3.97 MIL/UL (ref 3.8–5.2)
TOTAL CELLS COUNTED BLD: 100
WBC # BLD AUTO: 19.9 K/UL (ref 4.8–10.8)

## 2019-03-03 RX ADMIN — IPRATROPIUM BROMIDE AND ALBUTEROL SULFATE SCH ML: .5; 3 SOLUTION RESPIRATORY (INHALATION) at 19:22

## 2019-03-03 RX ADMIN — IPRATROPIUM BROMIDE AND ALBUTEROL SULFATE SCH ML: .5; 3 SOLUTION RESPIRATORY (INHALATION) at 07:21

## 2019-03-03 RX ADMIN — IPRATROPIUM BROMIDE AND ALBUTEROL SULFATE SCH ML: .5; 3 SOLUTION RESPIRATORY (INHALATION) at 02:42

## 2019-03-03 RX ADMIN — Medication SCH TAB: at 08:05

## 2019-03-03 RX ADMIN — LABETALOL HYDROCHLORIDE PRN MG: 5 INJECTION, SOLUTION INTRAVENOUS at 02:38

## 2019-03-03 RX ADMIN — IPRATROPIUM BROMIDE AND ALBUTEROL SULFATE SCH ML: .5; 3 SOLUTION RESPIRATORY (INHALATION) at 13:04

## 2019-03-03 NOTE — CP.CCUPN
CCU Subjective





- Physician Review


Subjective (Free Text): 


Eyes spontaneously open more often, not following commands, moving left 

extremities more purposefully. 


No observed recurrent seizure activity, no documented further hypoglycemic 

episodes. 





Now 99F, but 101.3max over the past 24H,  HR and SBPs which were relatively s

table are now exhibiting higher SBPs up to 190s. Labetalol prn stated. 

Breathing 22 on AC 14, 450 ml, 50% oxygen and PEEP 5, SPo2 100%.  





Other vitals and I/O's reviewed. 





ROS:  No other pertinent negs or positives on 10+  system review obtainable due 

to coma





PMSFH:   All other Nursing and physician documentation reviewed to date; no new 

pertinent info noted relevant to current medical problems.








EXAM- 


HEENT:  no icterus, no gaze preference, 3-4 mm and sluggish minimal reactivity 

noted, no  nystagmus


NECK: no JVD visible, supple, carotids equal upstroke bilat/no bruit, R IJV 

central line  intact. 


CHEST: decreased BS  at the bases, no wheezes audible


HEART: irregular, distant, S1S2, no rubs


ABD:  soft, obese, no distension, no focal  tenderness, no tympany, no guarding,

no organomegaly, BS hypoactive.  


EXT:    + LE edema, no mottling;  no calf tenderness or palpable cords, distal 

pulses intact and symmetrical, no cyanosis, no mottling.


NEURO:   R sided hemiplegia, minimal posturing of LUE and LLE 


SKIN:   no rashes,  warm and dry





LABS: 


WBC= 19.9


HGB= 9.7


PLTs=  158K





7.50/33/212





Na= 143


K= 3.2


CL= 102


HCO3= 26


BUN/Cr=  68/5.7


BS= 239





CXR; (my interp) ETT position OK above gabby, ekta;ateral,  bibasilar haziness 

today.  








IMPRESSION / MAJOR PROBLEMS NOW:  


1.     Acute Hypoxemic Resp Failure 2 Aspiration PNA


2.     Anoxic Encephalopathy 2  unclear Seizure event (hypoglycemic) and 

prolonged down time (EMS reports 25 minutes, but may have been longer as in not 

seen till next morning according to sons accounts of events, as he lives with 

her); or prolonged post-ictal state versus other occult CNS injury / infection /

subacute CVA. 


3.      Superimposed Metabolic Encephalopathy from Acute Renal Failure


4.      Accelerated HTN


5.      DM II





PLAN:


1.   Day # 5 on MV,  no overall improvement in neuromental status precludes MV 

weans,  try decreasing fio2 to 40% today. May need to consider early trach. 


2.   Only 20 meq K supplement today. No arrhythmias noted. For HD in AM 

tomorrow. 


3.   Platelet counts improved today. LFTs slowly improving


4.   Feb 28 Sputum results reviewed; no growth of any organisms. Ongoing 

Maxipime / Vanco. Procalcitonin appropriately decreasing on current abx regimen.




5.   Went for MRA Head & Neck study yesterday;  results pending. 


6.   Labetalol prn if MAP exceeds 110.

## 2019-03-03 NOTE — RAD
Date of service: 



03/03/2019



HISTORY:

 Patient intubated 



COMPARISON:

Chest radiograph dated 03/02/2019. 



FINDINGS:



LUNGS:

Pulmonary vascular congestion.  Bibasilar atelectasis.



PLEURA:

Small bilateral pleural effusions.  No appreciable pneumothorax.



CARDIOVASCULAR:

Prior sternotomy with sternal wires, prosthetic aortic valve and 

surgical clips in place.  Aortic atherosclerotic calcifications.  

Cardiomediastinal silhouette stably enlarged. 



OSSEOUS STRUCTURES:

Unchanged.



VISUALIZED UPPER ABDOMEN:

Right upper quadrant surgical clips.



OTHER FINDINGS:

Endotracheal and enteric tubes, unchanged.  Right internal jugular 

access central venous and non tunneled hemodialysis catheters, 

unchanged.



IMPRESSION:

Stable tubes and lines.  Stable pulmonary vascular congestion small 

bilateral pleural effusions.  No significant interval change.

## 2019-03-03 NOTE — CP.PCM.PN
Subjective





- Date & Time of Evaluation


Date of Evaluation: 03/03/19


Time of Evaluation: 12:24





- Subjective


Subjective: 





Nephrology Consultation Note





Assessment: critical


Altered mental status


Respiratory failure/intubated 


Acute renal failure/acute kidney injury related to multifactorial including 

sepsis.


Diabetes mellitus and history of hypertension history of CVA.


Patient remained obtunded with encephalopathy 


hyperurecemia


Hyperphosphatemia


Shock liver


nephrosis with 7 gram proteinuria on dipstick





Plan


plan for HD as ordered today


Hypertension control with meds as ordered. Maintain hemodynamics stable. Avoid 

hypotension. Patient not on ACEI/ARB due to recent SHANITA


Monitor Input/Output, daily weights and renal function with basic metabolic pane

l


supplement lytes as needed


Check ANCA, GBM ab 





Dose meds/antibiotics for reduced GFR. Avoid fleets enema/magnesium based 

laxatives. Avoid nephrotoxins/NSAIDs/ iodinated contrast (unless needed valentin

gently)


Glycemic control


Further work up for as per primary team





Thanks for allowing me to participate in care of your patient. Will follow 

patient with you. Please call if any Qs. had d/w team


Dr Stevo Sanders


Office: [phone] Cell: [phone] Fax: [phone]





Subjective: Noted events overnight. Patients intubated





Physical Examination:      


General Appearance: orally intuabted ill appearing


Vitals reviewed and noted as below


Head; Atraumatic, normocephalic


ENT: intubated


Neck; supple no lymphadenopathy, no thyromegaly or bruit


Lungs: Normal respiratory rate/effort. Breath sounds bilateral reduced at bases


Heart: Normal rate. s1s2 normal. No rub or gallop. 


Extremities: no edema. No varicose veins


Neurological: Patient is awake today


Skin: Warm and dry. Normal turgor. No rash. Palpitation: Normal elasticity for 

age


Abdomen: Abdomen is soft. Bowel sounds +. There is no abdominal tenderness, no 

guarding/rigidity no organomegaly


Psych: unable


MSK: no joint tenderness or swelling. Digits and nails normal, no deformity


: kidney or bladder not palpable


Rt IJ as access





Labs/imaging reviewed.


Past medical history, past surgical history, family history, social history, 

allergy reviewed and noted as below


Family hx: no hx of CKD. Rest non-contributory 








Objective





- Vital Signs/Intake and Output


Vital Signs (last 24 hours): 


                                        











Temp Pulse Resp BP Pulse Ox


 


 99.9 F H  74   28 H  174/74 H  100 


 


 03/03/19 12:00  03/03/19 12:00  03/03/19 12:00  03/03/19 12:00  03/03/19 12:00








Intake and Output: 


                                        











 03/03/19 03/03/19





 06:59 18:59


 


Intake Total 454 465


 


Output Total 700 


 


Balance -246 465














- Medications


Medications: 


                               Current Medications





Acetaminophen (Tylenol 650mg/20.3ml Solution Ud)  650 mg PO Q6 PRN


   PRN Reason: Fever>100.4 F


   Last Admin: 03/01/19 13:06 Dose:  650 mg


Albuterol/Ipratropium (Duoneb 3 Mg/0.5 Mg (3 Ml) Ud)  3 ml INH RQ6 DESIRAE


   Last Admin: 03/03/19 07:21 Dose:  3 ml


Allopurinol (Zyloprim)  300 mg PO DAILY [facility]IRAE


   Last Admin: 03/03/19 08:43 Dose:  300 mg


Calcium Acetate (Phoslo)  667 mg PO TID DESIRAE


   Last Admin: 03/03/19 08:05 Dose:  667 mg


Levetiracetam 500 mg/ Sodium (Chloride)  105 mls @ 210 mls/hr IVPB Q12 DESIRAE


   Last Admin: 03/03/19 08:06 Dose:  210 mls/hr


Vancomycin HCl 1 gm/ Sodium (Chloride)  250 mls @ 250 mls/hr IVPB MWF [facility]; 

Protocol


Cefepime HCl 1 gm/ Sodium (Chloride)  100 mls @ 100 mls/hr IVPB DAILY [facility]; 

Protocol


   Last Admin: 03/03/19 08:44 Dose:  100 mls/hr


Insulin Human Regular (Humulin R)  0 units SC ACCU-CHECK [facility]AE; Protocol


   Last Admin: 03/03/19 11:22 Dose:  2 u


Labetalol HCl (Trandate)  20 mg IVP Q4 PRN


   PRN Reason: Systolic Blood Pressure


   Last Admin: 03/03/19 02:38 Dose:  20 mg


Vitamin B Complex/Vit C/Folic Acid (Nephro-Yoseph)  1 tab PO DAILY DESIRAE


   Last Admin: 03/03/19 08:05 Dose:  1 tab











- Labs


Labs: 


                                        





                                 03/03/19 05:40 





                                 03/03/19 05:40 





                                        











PT  16.2 Seconds (9.8-13.1)  H  03/01/19  10:28    


 


INR  1.4   03/01/19  10:28    


 


APTT  35.6 Seconds (25.6-37.1)   03/01/19  10:28

## 2019-03-03 NOTE — CP.PCM.PN
Subjective





- Date & Time of Evaluation


Date of Evaluation: 03/03/19


Time of Evaluation: 09:00





- Subjective


Subjective: 





events noted


fever on /off





opens eyes 


weaker on right 


Neuro following








Objective





- Vital Signs/Intake and Output


Vital Signs (last 24 hours): 


                                        











Temp Pulse Resp BP Pulse Ox


 


 99.7 F H  70   18   149/67   100 


 


 03/03/19 08:00  03/03/19 11:00  03/03/19 11:00  03/03/19 11:00  03/03/19 11:00








Intake and Output: 


                                        











 03/03/19 03/03/19





 06:59 18:59


 


Intake Total 454 465


 


Output Total 700 


 


Balance -246 465














- Medications


Medications: 


                               Current Medications





Acetaminophen (Tylenol 650mg/20.3ml Solution Ud)  650 mg PO Q6 PRN


   PRN Reason: Fever>100.4 F


   Last Admin: 03/01/19 13:06 Dose:  650 mg


Albuterol/Ipratropium (Duoneb 3 Mg/0.5 Mg (3 Ml) Ud)  3 ml INH RQ6 DESIRAE


   Last Admin: 03/03/19 07:21 Dose:  3 ml


Allopurinol (Zyloprim)  300 mg PO DAILY DESIRAE


   Last Admin: 03/03/19 08:43 Dose:  300 mg


Calcium Acetate (Phoslo)  667 mg PO TID DESIRAE


   Last Admin: 03/03/19 08:05 Dose:  667 mg


Levetiracetam 500 mg/ Sodium (Chloride)  105 mls @ 210 mls/hr IVPB Q12 DESIRAE


   Last Admin: 03/03/19 08:06 Dose:  210 mls/hr


Vancomycin HCl 1 gm/ Sodium (Chloride)  250 mls @ 250 mls/hr IVPB MWF [facility]; 

Protocol


Cefepime HCl 1 gm/ Sodium (Chloride)  100 mls @ 100 mls/hr IVPB DAILY [facility]; 

Protocol


   Last Admin: 03/03/19 08:44 Dose:  100 mls/hr


Insulin Human Regular (Humulin R)  0 units SC ACCU-CHECK [facility]; Protocol


   Last Admin: 03/03/19 11:22 Dose:  2 u


Labetalol HCl (Trandate)  20 mg IVP Q4 PRN


   PRN Reason: Systolic Blood Pressure


   Last Admin: 03/03/19 02:38 Dose:  20 mg


Vitamin B Complex/Vit C/Folic Acid (Nephro-Yoseph)  1 tab PO DAILY DESIRAE


   Last Admin: 03/03/19 08:05 Dose:  1 tab











- Labs


Labs: 


                                        





                                 03/03/19 05:40 





                                 03/03/19 05:40 





                                        











PT  16.2 Seconds (9.8-13.1)  H  03/01/19  10:28    


 


INR  1.4   03/01/19  10:28    


 


APTT  35.6 Seconds (25.6-37.1)   03/01/19  10:28    














- Constitutional


Appears: Confused, Chronically Ill





- Head Exam


Head Exam: NORMOCEPHALIC





- Eye Exam


Eye Exam: PERRL.  absent: Scleral icterus





- ENT Exam


ENT Exam: Mucous Membranes Dry





- Neck Exam


Neck Exam: absent: Lymphadenopathy





- Respiratory Exam


Respiratory Exam: Decreased Breath Sounds, Prolonged Expiratory Phase, Rhonchi





- Cardiovascular Exam


Cardiovascular Exam: REGULAR RHYTHM





- GI/Abdominal Exam


GI & Abdominal Exam: Distended, Soft





- Rectal Exam


Rectal Exam: Deferred





-  Exam


 Exam: NORMAL INSPECTION





- Extremities Exam


Extremities Exam: Pedal Edema





- Back Exam


Back Exam: absent: CVA tenderness (L), CVA tenderness (R)





- Neurological Exam


Neurological Exam: Altered





- Psychiatric Exam


Psychiatric exam: Depressed





- Skin


Skin Exam: Dry





Assessment and Plan


(1) Sepsis


Status: Acute   





(2) Change in mental state


Status: Acute   





(3) History of CVA with residual deficit


Status: Acute   





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


Status: Chronic   





(5) History of CVA (cerebrovascular accident)


Status: Chronic   





(6) NSTEMI (non-ST elevated myocardial infarction)


Status: Acute   





(7) SHANITA (acute kidney injury)


Status: Acute   





(8) Pansinusitis


Status: Acute   





- Assessment and Plan (Free Text)


Assessment: 


Improving  ON HD today


fever on and off - will reculture 


cont rx pansinusitis/ sepsis  Vanco/cefepime 


AMS improved however neuro deficit persists

## 2019-03-04 LAB
ALBUMIN SERPL-MCNC: 3.2 G/DL (ref 3.5–5)
ALBUMIN/GLOB SERPL: 0.8 {RATIO} (ref 1–2.1)
ALT SERPL-CCNC: 349 U/L (ref 9–52)
ARTERIAL BLOOD GAS HEMOGLOBIN: 11 G/DL (ref 11.7–17.4)
ARTERIAL BLOOD GAS O2 SAT: 100.7 % (ref 95–98)
ARTERIAL BLOOD GAS O2 SAT: 101 % (ref 95–98)
ARTERIAL BLOOD GAS PCO2: 33 MM/HG (ref 35–45)
ARTERIAL BLOOD GAS PCO2: 33 MM/HG (ref 35–45)
ARTERIAL BLOOD GAS TCO2: 26.7 MMOL/L (ref 22–28)
ARTERIAL BLOOD GAS TCO2: 29.2 MMOL/L (ref 22–28)
ARTERIAL PATENCY WRIST A: YES
ARTERIAL PATENCY WRIST A: YES
AST SERPL-CCNC: 63 U/L (ref 14–36)
BASOPHILS # BLD AUTO: 0 K/UL (ref 0–0.2)
BASOPHILS NFR BLD: 0.2 % (ref 0–2)
BUN SERPL-MCNC: 40 MG/DL (ref 7–17)
CALCIUM SERPL-MCNC: 8.9 MG/DL (ref 8.4–10.2)
EOSINOPHIL # BLD AUTO: 0.4 K/UL (ref 0–0.7)
EOSINOPHIL NFR BLD: 2.1 % (ref 0–4)
ERYTHROCYTE [DISTWIDTH] IN BLOOD BY AUTOMATED COUNT: 19.6 % (ref 11.5–14.5)
GFR NON-AFRICAN AMERICAN: 14
HCO3 BLDA-SCNC: 27 MMOL/L (ref 21–28)
HCO3 BLDA-SCNC: 29.5 MMOL/L (ref 21–28)
HGB BLD-MCNC: 9.8 G/DL (ref 12–16)
INHALED O2 CONCENTRATION: 40 %
INHALED O2 CONCENTRATION: 40 %
LYMPHOCYTES # BLD AUTO: 0.6 K/UL (ref 1–4.3)
LYMPHOCYTES NFR BLD AUTO: 2.8 % (ref 20–40)
MCH RBC QN AUTO: 23.9 PG (ref 27–31)
MCHC RBC AUTO-ENTMCNC: 31.5 G/DL (ref 33–37)
MCV RBC AUTO: 76 FL (ref 81–99)
MONOCYTES # BLD: 2.5 K/UL (ref 0–0.8)
MONOCYTES NFR BLD: 12.1 % (ref 0–10)
NEUTROPHILS # BLD: 17.3 K/UL (ref 1.8–7)
NEUTROPHILS NFR BLD AUTO: 82.8 % (ref 50–75)
NRBC BLD AUTO-RTO: 0.1 % (ref 0–0)
O2 CAP BLDA-SCNC: 15 ML/DL (ref 16–24)
O2 CT BLDA-SCNC: 15.2 ML/DL (ref 15–23)
PH BLDA: 7.5 [PH] (ref 7.35–7.45)
PH BLDA: 7.54 [PH] (ref 7.35–7.45)
PLATELET # BLD: 169 K/UL (ref 130–400)
PMV BLD AUTO: 10.9 FL (ref 7.2–11.7)
PO2 BLDA: 136 MM/HG (ref 80–100)
PO2 BLDA: 139 MM/HG (ref 80–100)
RBC # BLD AUTO: 4.1 MIL/UL (ref 3.8–5.2)
WBC # BLD AUTO: 20.9 K/UL (ref 4.8–10.8)

## 2019-03-04 RX ADMIN — Medication SCH TAB: at 08:21

## 2019-03-04 RX ADMIN — POTASSIUM CHLORIDE SCH MLS/HR: 14.9 INJECTION, SOLUTION INTRAVENOUS at 11:47

## 2019-03-04 RX ADMIN — POTASSIUM CHLORIDE SCH MLS/HR: 14.9 INJECTION, SOLUTION INTRAVENOUS at 10:02

## 2019-03-04 RX ADMIN — IPRATROPIUM BROMIDE AND ALBUTEROL SULFATE SCH ML: .5; 3 SOLUTION RESPIRATORY (INHALATION) at 19:57

## 2019-03-04 RX ADMIN — IPRATROPIUM BROMIDE AND ALBUTEROL SULFATE SCH ML: .5; 3 SOLUTION RESPIRATORY (INHALATION) at 07:12

## 2019-03-04 RX ADMIN — IPRATROPIUM BROMIDE AND ALBUTEROL SULFATE SCH ML: .5; 3 SOLUTION RESPIRATORY (INHALATION) at 13:05

## 2019-03-04 RX ADMIN — IPRATROPIUM BROMIDE AND ALBUTEROL SULFATE SCH ML: .5; 3 SOLUTION RESPIRATORY (INHALATION) at 02:41

## 2019-03-04 NOTE — CP.PCM.PN
Subjective





- Date & Time of Evaluation


Date of Evaluation: 02/27/19


Time of Evaluation: 08:00





- Subjective


Subjective: 





Pt seen and assessed at bedside. Currently obtunded; unable to follow any type 

of commands. Plan of care discussed with staff. 





Review of Systems





- Review of Systems


Systems not reviewed;Unavailable: Acuity of Condition





Objective





- Vital Signs/Intake and Output


Vital Signs (last 24 hours): 


                                        











Temp Pulse Resp BP Pulse Ox


 


 98 F   72   18   147/84   100 


 


 03/04/19 00:00  03/04/19 00:00  03/04/19 00:00  03/04/19 00:00  03/04/19 00:00








Intake and Output: 


                                        











 03/03/19 03/04/19





 18:59 06:59


 


Intake Total 1125 428


 


Output Total 1900 


 


Balance -775 428














- Medications


Medications: 


                               Current Medications





Acetaminophen (Tylenol 650mg/20.3ml Solution Ud)  650 mg PO Q6 PRN


   PRN Reason: Fever>100.4 F


   Last Admin: 03/01/19 13:06 Dose:  650 mg


Albuterol/Ipratropium (Duoneb 3 Mg/0.5 Mg (3 Ml) Ud)  3 ml INH RQ6 DESIRAE


   Last Admin: 03/03/19 19:22 Dose:  3 ml


Allopurinol (Zyloprim)  300 mg PO DAILY DESIRAE


   Last Admin: 03/03/19 08:43 Dose:  300 mg


Calcium Acetate (Phoslo)  667 mg PO TID DESIRAE


   Last Admin: 03/03/19 16:04 Dose:  667 mg


Levetiracetam 500 mg/ Sodium (Chloride)  105 mls @ 210 mls/hr IVPB Q12 [facility]IRAE


   Last Admin: 03/03/19 20:53 Dose:  210 mls/hr


Vancomycin HCl 1 gm/ Sodium (Chloride)  250 mls @ 250 mls/hr IVPB MWF [facility]; 

Protocol


Cefepime HCl 1 gm/ Sodium (Chloride)  100 mls @ 100 mls/hr IVPB DAILY [facility]; 

Protocol


   Last Admin: 03/03/19 08:44 Dose:  100 mls/hr


Insulin Human Regular (Humulin R)  0 units SC ACCU-CHECK [facility]AE; Protocol


   Last Admin: 03/03/19 22:05 Dose:  2 u


Labetalol HCl (Trandate)  20 mg IVP Q4 PRN


   PRN Reason: Systolic Blood Pressure


   Last Admin: 03/03/19 02:38 Dose:  20 mg


Vitamin B Complex/Vit C/Folic Acid (Nephro-Yoseph)  1 tab PO DAILY DESIRAE


   Last Admin: 03/03/19 08:05 Dose:  1 tab











- Labs


Labs: 


                                        





                                 03/03/19 05:40 





                                 03/03/19 05:40 





                                        











PT  16.2 Seconds (9.8-13.1)  H  03/01/19  10:28    


 


INR  1.4   03/01/19  10:28    


 


APTT  35.6 Seconds (25.6-37.1)   03/01/19  10:28    














- Head Exam


Head Exam: ATRAUMATIC, NORMAL INSPECTION, NORMOCEPHALIC





- Eye Exam


Eye Exam: EOMI, Normal appearance, PERRL


Pupil Exam: NORMAL ACCOMODATION, PERRL





- ENT Exam


ENT Exam: Mucous Membranes Moist





- Neck Exam


Neck Exam: Normal Inspection





- Respiratory Exam


Respiratory Exam: Rhonchi





- Cardiovascular Exam


Cardiovascular Exam: Irregular Rhythm





- GI/Abdominal Exam


GI & Abdominal Exam: Soft, Normal Bowel Sounds





- Extremities Exam


Extremities Exam: Normal Inspection





- Back Exam


Back Exam: NORMAL INSPECTION





- Neurological Exam


Additional comments: 





obtunded; unable to follow commands. 





- Skin


Skin Exam: Dry, Normal Color, Warm





Assessment and Plan


(1) Encephalopathy


Assessment & Plan: 


1.) Encephalopathy





-Currently obtunded and unable to follows commands.


-Consults input appreciated.


-Assess respiratory status and need for additional oxygenation/intubation.  


-evaluate cause of encephalopathy (anoxic, metabolic). 


-EEG to be done with neurology. 


-Continue current tx, prognosis is poor. 


Status: Acute

## 2019-03-04 NOTE — RAD
Date of service: 



03/04/2019



HISTORY:

 Patient intubated 



COMPARISON:

Portable chest 03/03/2019. 



FINDINGS:



LUNGS:

Endotracheal and nasogastric tubes are unchanged in position as well 

as right temporary dialysis catheter.  Prosthetic cardiac valve 

reiterated.



Improved aeration is seen at the mid to inferior right lung zone 

reflecting improved atelectasis/infiltrate.  No definite pulmonary 

vascular congestion appreciable at this time.



PLEURA:

Trace right pleural effusion is in question.  None is seen at the 

right.  No pneumothorax bilaterally.



CARDIOVASCULAR:

Calcific atherosclerotic changes are seen related to the thoracic 

aorta.



Normal cardiac size. Prior pulmonary vascular congestion resolved.



OSSEOUS STRUCTURES:

No significant abnormalities.



VISUALIZED UPPER ABDOMEN:

Normal.



OTHER FINDINGS:

None.



IMPRESSION:

Trace residual right pleural effusion with limited residual airspace 

disease right base and none on the left.  Prior pulmonary vascular 

congestion appears resolved.

## 2019-03-04 NOTE — CP.PCM.PN
Subjective





- Date & Time of Evaluation


Date of Evaluation: 03/04/19


Time of Evaluation: 13:00





- Subjective


Subjective: 


Neuro Follow-Up Note:





Mrs. Omalley was evaluated this afternoon in the ICU.  No family at bedside 

during time of exam.  She remains intubated, off sedation.  Today, pt is awake, 

able to follow commands, and is able to answer "yes" and "no" by nodding and 

shaking her head.  Neurologically improving since I last saw her on Friday.  ROS

is limited, however, pt denies h/a, dizziness, visual changes, chest pain, 

nausea; admits to abd pain.





Objective





- Vital Signs/Intake and Output


Vital Signs (last 24 hours): 


                                        











Temp Pulse Resp BP Pulse Ox


 


 98.9 F   81   30 H  153/73 H  100 


 


 03/04/19 12:00  03/04/19 12:00  03/04/19 12:00  03/04/19 12:00  03/04/19 12:00








Intake and Output: 


                                        











 03/04/19 03/04/19





 06:59 18:59


 


Intake Total 860 1075


 


Output Total 650 250


 


Balance 210 825














- Medications


Medications: 


                               Current Medications





Acetaminophen (Tylenol 650mg/20.3ml Solution Ud)  650 mg PO Q6 PRN


   PRN Reason: Fever>100.4 F


   Last Admin: 03/01/19 13:06 Dose:  650 mg


Albuterol/Ipratropium (Duoneb 3 Mg/0.5 Mg (3 Ml) Ud)  3 ml INH RQ6 [facility]


   Last Admin: 03/04/19 13:05 Dose:  3 ml


Allopurinol (Zyloprim)  300 mg PO DAILY [facility]


   Last Admin: 03/04/19 08:22 Dose:  300 mg


Calcium Acetate (Phoslo)  667 mg PO TID [facility]


   Last Admin: 03/04/19 12:04 Dose:  667 mg


Levetiracetam 500 mg/ Sodium (Chloride)  105 mls @ 210 mls/hr IVPB Q12 [facility]


   Last Admin: 03/04/19 08:20 Dose:  210 mls/hr


Vancomycin HCl 1 gm/ Sodium (Chloride)  250 mls @ 250 mls/hr IVPB MWF [facility]; 

Protocol


   Last Admin: 03/04/19 08:22 Dose:  250 mls/hr


Cefepime HCl 1 gm/ Sodium (Chloride)  100 mls @ 100 mls/hr IVPB DAILY [facility]; 

Protocol


   Last Admin: 03/04/19 08:21 Dose:  100 mls/hr


Insulin Human Regular (Humulin R)  0 units SC ACCU-CHECK [facility]; Protocol


   Last Admin: 03/04/19 11:46 Dose:  1 u


Labetalol HCl (Trandate)  20 mg IVP Q4 PRN


   PRN Reason: Systolic Blood Pressure


   Last Admin: 03/03/19 02:38 Dose:  20 mg


Vitamin B Complex/Vit C/Folic Acid (Nephro-Yoseph)  1 tab PO DAILY [facility]


   Last Admin: 03/04/19 08:21 Dose:  1 tab











- Labs


Labs: 


                                        





                                 03/04/19 04:10 





                                 03/04/19 04:10 





                                        











PT  16.2 Seconds (9.8-13.1)  H  03/01/19  10:28    


 


INR  1.4   03/01/19  10:28    


 


APTT  35.6 Seconds (25.6-37.1)   03/01/19  10:28    














- Constitutional


Appears: Other (intubated; off sedation; able to follow commands; nods/shakes 

head in response to questions.)





- Head Exam


Head Exam: NORMAL INSPECTION, NORMOCEPHALIC





- Eye Exam


Eye Exam: EOMI, Normal appearance, PERRL.  absent: Nystagmus


Pupil Exam: NORMAL ACCOMODATION, PERRL


Additional comments: 





pupils reactive, approx 3 mm b/l





- ENT Exam


ENT Exam: Mucous Membranes Moist


Additional comments: 





intubated





- Neck Exam


Neck Exam: Normal Inspection





- Respiratory Exam


Respiratory Exam: absent: NORMAL BREATHING PATTERN (intubated)





- GI/Abdominal Exam


GI & Abdominal Exam: Tenderness (generalized abd tenderness; pt grimaces when 

abd palpated; OGT in place.)





- Extremities Exam


Extremities Exam: absent: Calf Tenderness, Full ROM, Pedal Edema


Additional comments: 





RUE and RLE flaccid 2/2 previous CVA


Able to move LUE and LLE (LUE ROM limited as pt has soft wrist restraints on).  

Able to raise her LLE for at least 3-4 seconds.





- Neurological Exam


Neurological Exam: Alert, Awake, CN II-XII Intact


Neuro motor strength exam: Left Upper Extremity: 2/1 (has soft wrist restraints 

on;  1/5), Right Upper Extremity: 0 (flaccid), Left Lower Extremity: 3 

(plantar flexion 3/5), Right Lower Extremity: 0 (flaccid)


Additional comments: 


Pt is intubated, off sedation; awake, alert, able to follow commands.


Pupils equal and reactive.


RUE and RLE flaccid 2/2 previous CVA


Able to move LUE and LLE (LUE ROM limited as pt has soft wrist restraints on).  

Attempts to squeeze my hand with left hand, but weakened  noted.  Able to 

raise her LLE for at least 3-4 seconds.


Sensation intact b/l


No tremors


Toes down doing left side; plantar response difficult to elicit on right side.








- Psychiatric Exam


Additional comments: 





nonverbal 2/2 intubated; follows commands; cooperative; calm





- Skin


Skin Exam: Normal Color





Assessment and Plan


(1) Toxic metabolic encephalopathy


Assessment & Plan: 


Imaging reviewed:





-Brain MRI (2/27/19): 1. No acute intracranial abnormality. 2. Cystic 

encephalomalacia and gliosis in the left corona radiata and basal ganglia, 

sequela of remote MCA territory infarction.  3. Mild chronic microangiopathic 

changes and mild age-related global parenchymal volume loss. Old lacunar 

infarctions in the left cerebellar hemisphere.   4. Pansinusitis, with a 

complicated retention cyst/polyp in the right maxillary sinus. Fluid in the 

sphenoid sinus may represent acute sinusitis in the appropriate clinical 

setting. Bilateral mastoid effusions.


-EEG (repeat; 2/26/19):  This is  an abnormal EEG record that demonstrate the 

presence of severe non specific diffuse disturbance of cortical activity, this 

is keeping with a diffuse gray matter dysfunction, these findings re not 

specific. These type of EEG is usually seen in toxic metabolic encephalopathies 

, hypoxic-ischemic brain injury among others, clinical correlation is 

recommended.  The EEG look similar to the previous video EEG study  of 2/23,  

2/24.  No seizures. 


Patient is not in status epilepticus.  


-CT Head (2/24/19): No acute intracranial abnormalities. No significant findings

to account for the clinical presentation. No significant interval change 

compared to the prior examination(s).


-CT Head (2/22/19): No acute intracranial abnormalities. No significant findings

to account for the clinical presentation.


Extensive postoperative changes described above.


-EEG (2/24/19): shows burst suppression per Dr. Patrick.


-ECHO (2/24/19) done: 50-55%








-MRA Head/Neck was initially unable to be done (day of intubation)--I reordered 

it; it is still pending--will f/u with results once done.


-Carotid and vertebral U/S ordered but cannot be done at bedside and room in the

dept cannot accommodate vent.


-We still recommend for Cardiology to do a VIVIENNE to r/o septic endocarditis.


-Possible extubation today per Dr. Ron.


-Continue current medications and treatment of other acute medical issues. 


-Continue ICU management.


-Discussed with Dr. Ron and primary RN.


-Notify neuro team of any acute changes to pt's condition.








Chrystal Dean, AMBAR, APN


Case discussed with Dr. Hassan


Status: Acute

## 2019-03-04 NOTE — CP.CCUPN
CCU Subjective





- Physician Review


Events Since Last Encounter (Free Text): 





03/04/19 13:50


The patient was Seen/interviewed and examined by me at the bedside during ICU 

round, 


Medical records reviewed and Management issues were discussed and formulated 

with the house staff.


Events reviewed


Off sedations, orally intubated and mechanically ventilated. 


Awake, follows commands


Comfortable, NAD


She was olaced of CPAP trial this morning and doing well so far


The plan to further decrease PS, get ABG, possible extubate today 


Resp nonlabored, strong spontaneous resp noted.


Afebrile, NSR on the monitor





 


03/04/19 14:55


Brain MRI (2/27/19): 1. No acute intracranial abnormality. 2. Cystic 

encephalomalacia and gliosis in the left corona radiata and basal ganglia, 

sequela of remote MCA territory infarction.  3. Mild chronic microangiopathic 

changes and mild age-related global parenchymal volume loss. Old lacunar 

infarctions in the left cerebellar hemisphere.   4. Pansinusitis, with a 

complicated retention cyst/polyp in the right maxillary sinus. Fluid in the 

sphenoid sinus may represent acute sinusitis in the appropriate clinical 

setting. Bilateral mastoid effusions.


Critical Care Time Spent (in minutes): 45





CCU Objective





- Vital Signs / Intake & Output


Vital Signs (Last 4 hours): 


Vital Signs











  Temp Pulse Resp BP Pulse Ox


 


 03/04/19 08:00  98.2 F  75  25 H  171/80 H  100


 


 03/04/19 06:00   87  31 H  149/93 H  100











Intake and Output (Last 8hrs): 


                                 Intake & Output











 03/03/19 03/04/19 03/04/19





 22:59 06:59 14:59


 


Intake Total 664 526 45


 


Output Total 1900 650 100


 


Balance -1236 -124 -55


 


Weight  223 lb 12.8 oz 


 


Intake:   


 


  IV 4 16 


 


  Tube Feeding 360 360 45


 


  Free Water Flush 300 150 


 


Output:   


 


  Urine 400 650 100


 


    Urethral (Segura) 400 650 100


 


  Ultrafiltrate 1500  


 


Other:   


 


  # Bowel Movements 1 3 














- Physical Exam


Head: Positive for: Atraumatic, Normocephalic


Pupils: Positive for: PERRL


Extroacular Muscles: Positive for: EOMI


Conjunctiva: Positive for: Normal


Mouth: Positive for: Moist Mucous Membranes


Nose (Internal): Positive for: Normal Inspection


Neck: Positive for: Normal Range of Motion


Respiratory/Chest: Positive for: Clear to Auscultation, Good Air Exchange, 

Accessory Muscle Use.  Negative for: Respiratory Distress


Abdomen: Positive for: Distention, Normal Bowel Sounds


Upper Extremity: Positive for: Normal Inspection


Lower Extremity: Positive for: Edema





- Medications


Active Medications: 


Active Medications











Generic Name Dose Route Start Last Admin





  Trade Name Freq  PRN Reason Stop Dose Admin


 


Acetaminophen  650 mg  03/01/19 09:45  03/01/19 13:06





  Tylenol 650mg/20.3ml Solution Ud  PO   650 mg





  Q6 PRN   Administration





  Fever>100.4 F   





     





     





     


 


Albuterol/Ipratropium  3 ml  02/27/19 14:30  03/04/19 07:12





  Duoneb 3 Mg/0.5 Mg (3 Ml) Ud  INH   3 ml





  RQ6 DESIRAE   Administration





     





     





     





     


 


Allopurinol  300 mg  03/01/19 11:15  03/04/19 08:22





  Zyloprim  PO   300 mg





  DAILY DESIRAE   Administration





     





     





     





     


 


Calcium Acetate  667 mg  02/25/19 09:00  03/04/19 08:21





  Phoslo  PO   667 mg





  TID DESIRAE   Administration





     





     





     





     


 


Levetiracetam 500 mg/ Sodium  105 mls @ 210 mls/hr  02/24/19 09:00  03/04/19 

08:20





  Chloride  IVPB   210 mls/hr





  Q12 DESIRAE   Administration





     





     





     





     


 


Vancomycin HCl 1 gm/ Sodium  250 mls @ 250 mls/hr  03/04/19 09:00  03/04/19 

08:22





  Chloride  IVPB   250 mls/hr





  MWF DESIRAE   Administration





     





     





  Protocol   





     


 


Cefepime HCl 1 gm/ Sodium  100 mls @ 100 mls/hr  03/02/19 09:00  03/04/19 08:21





  Chloride  IVPB   100 mls/hr





  DAILY DESIRAE   Administration





     





     





  Protocol   





     


 


Insulin Human Regular  0 units  02/28/19 17:00  03/03/19 22:05





  Humulin R  SC   2 u





  ACCU-CHECK DESIRAE   Administration





     





     





  Protocol   





     


 


Labetalol HCl  20 mg  03/02/19 12:45  03/03/19 02:38





  Trandate  IVP   20 mg





  Q4 PRN   Administration





  Systolic Blood Pressure   





     





     





     


 


Vitamin B Complex/Vit C/Folic Acid  1 tab  03/03/19 09:00  03/04/19 08:21





  Nephro-Yoseph  PO   1 tab





  DAILY DESIRAE   Administration





     





     





     





     














- Patient Studies


Lab Studies: 


                                   Lab Studies











  03/04/19 03/04/19 03/04/19 Range/Units





  06:06 04:10 04:10 


 


WBC    20.9 H  (4.8-10.8)  K/uL


 


RBC    4.10  (3.80-5.20)  Mil/uL


 


Hgb    9.8 L  (12.0-16.0)  g/dL


 


Hct    31.2 L  (34.0-47.0)  %


 


MCV    76.0 L  (81.0-99.0)  fl


 


MCH    23.9 L  (27.0-31.0)  pg


 


MCHC    31.5 L  (33.0-37.0)  g/dL


 


RDW    19.6 H  (11.5-14.5)  %


 


Plt Count    169  (130-400)  K/uL


 


MPV    10.9  (7.2-11.7)  fl


 


Neut % (Auto)    82.8 H  (50.0-75.0)  %


 


Lymph % (Auto)    2.8 L  (20.0-40.0)  %


 


Mono % (Auto)    12.1 H  (0.0-10.0)  %


 


Eos % (Auto)    2.1  (0.0-4.0)  %


 


Baso % (Auto)    0.2  (0.0-2.0)  %


 


Neut # (Auto)    17.3 H  (1.8-7.0)  K/uL


 


Lymph # (Auto)    0.6 L  (1.0-4.3)  K/uL


 


Mono # (Auto)    2.5 H  (0.0-0.8)  K/uL


 


Eos # (Auto)    0.4  (0.0-0.7)  K/uL


 


Baso # (Auto)    0.0  (0.0-0.2)  K/uL


 


Neutrophils % (Manual)     (42-75)  %


 


Lymphocytes % (Manual)     (20-50)  %


 


Monocytes % (Manual)     (0-10)  %


 


Eosinophils % (Manual)     (0-7)  %


 


Platelet Estimate     (NORMAL)  


 


Hypochromasia (manual)     


 


Anisocytosis (manual)     


 


Ovalocytes     


 


pCO2     (35-45)  mm/Hg


 


pO2     ()  mm/Hg


 


HCO3     (21-28)  mmol/L


 


ABG pH     (7.35-7.45)  


 


ABG Total CO2     (22-28)  mmol/L


 


ABG O2 Saturation     (95-98)  %


 


ABG O2 Content     (15-23)  ML/dL


 


ABG Base Excess     (-2.0-3.0)  mmol/L


 


ABG Hemoglobin     (11.7-17.4)  g/dL


 


ABG Carboxyhemoglobin     (0.5-1.5)  %


 


POC ABG HHb (Measured)     (0.0-5.0)  %


 


ABG Methemoglobin     (0.0-3.0)  %


 


ABG O2 Capacity     (16-24)  mL/dL


 


Efrem Test     


 


A-a O2 Difference     mm/Hg


 


Hgb O2 Saturation     (95.0-98.0)  %


 


Vent Mode     


 


Mechanical Rate     


 


FiO2     %


 


Tidal Volume     


 


PEEP     


 


Sodium   139   (132-148)  mmol/l


 


Potassium   3.0 L   (3.6-5.0)  MMOL/L


 


Chloride   99   ()  mmol/L


 


Carbon Dioxide   27   (22-30)  mmol/L


 


Anion Gap   16   (10-20)  


 


BUN   40 H   (7-17)  mg/dl


 


Creatinine   3.4 H   (0.7-1.2)  mg/dl


 


Est GFR ( Amer)   17   


 


Est GFR (Non-Af Amer)   14   


 


POC Glucose (mg/dL)  149 H    ()  mg/dL


 


Random Glucose   151 H   ()  mg/dL


 


Calcium   8.9   (8.4-10.2)  mg/dL


 


Total Bilirubin   1.2   (0.2-1.3)  mg/dl


 


AST   63 H   (14-36)  U/L


 


ALT   349 H D   (9-52)  U/L


 


Alkaline Phosphatase   123   ()  U/L


 


Total Protein   7.4   (6.3-8.2)  G/DL


 


Albumin   3.2 L   (3.5-5.0)  g/dL


 


Globulin   4.1 H   (2.2-3.9)  gm/dL


 


Albumin/Globulin Ratio   0.8 L   (1.0-2.1)  














  03/04/19 03/03/19 03/03/19 Range/Units





  04:00 22:37 16:23 


 


WBC     (4.8-10.8)  K/uL


 


RBC     (3.80-5.20)  Mil/uL


 


Hgb     (12.0-16.0)  g/dL


 


Hct     (34.0-47.0)  %


 


MCV     (81.0-99.0)  fl


 


MCH     (27.0-31.0)  pg


 


MCHC     (33.0-37.0)  g/dL


 


RDW     (11.5-14.5)  %


 


Plt Count     (130-400)  K/uL


 


MPV     (7.2-11.7)  fl


 


Neut % (Auto)     (50.0-75.0)  %


 


Lymph % (Auto)     (20.0-40.0)  %


 


Mono % (Auto)     (0.0-10.0)  %


 


Eos % (Auto)     (0.0-4.0)  %


 


Baso % (Auto)     (0.0-2.0)  %


 


Neut # (Auto)     (1.8-7.0)  K/uL


 


Lymph # (Auto)     (1.0-4.3)  K/uL


 


Mono # (Auto)     (0.0-0.8)  K/uL


 


Eos # (Auto)     (0.0-0.7)  K/uL


 


Baso # (Auto)     (0.0-0.2)  K/uL


 


Neutrophils % (Manual)     (42-75)  %


 


Lymphocytes % (Manual)     (20-50)  %


 


Monocytes % (Manual)     (0-10)  %


 


Eosinophils % (Manual)     (0-7)  %


 


Platelet Estimate     (NORMAL)  


 


Hypochromasia (manual)     


 


Anisocytosis (manual)     


 


Ovalocytes     


 


pCO2  33 L    (35-45)  mm/Hg


 


pO2  136 H    ()  mm/Hg


 


HCO3  27.0    (21-28)  mmol/L


 


ABG pH  7.50 H    (7.35-7.45)  


 


ABG Total CO2  26.7    (22-28)  mmol/L


 


ABG O2 Saturation  101.0 H    (95-98)  %


 


ABG O2 Content  15.2    (15-23)  ML/dL


 


ABG Base Excess  2.7    (-2.0-3.0)  mmol/L


 


ABG Hemoglobin  11.0 L    (11.7-17.4)  g/dL


 


ABG Carboxyhemoglobin  2.1 H    (0.5-1.5)  %


 


POC ABG HHb (Measured)  -1.0 L    (0.0-5.0)  %


 


ABG Methemoglobin  2.3    (0.0-3.0)  %


 


ABG O2 Capacity  15.0 L    (16-24)  mL/dL


 


Efrem Test  Yes    


 


A-a O2 Difference  108.0    mm/Hg


 


Hgb O2 Saturation  96.6    (95.0-98.0)  %


 


Vent Mode  A/c    


 


Mechanical Rate  14    


 


FiO2  40.0    %


 


Tidal Volume  450    


 


PEEP  5    


 


Sodium     (132-148)  mmol/l


 


Potassium     (3.6-5.0)  MMOL/L


 


Chloride     ()  mmol/L


 


Carbon Dioxide     (22-30)  mmol/L


 


Anion Gap     (10-20)  


 


BUN     (7-17)  mg/dl


 


Creatinine     (0.7-1.2)  mg/dl


 


Est GFR (African Amer)     


 


Est GFR (Non-Af Amer)     


 


POC Glucose (mg/dL)   174 H  188 H  ()  mg/dL


 


Random Glucose     ()  mg/dL


 


Calcium     (8.4-10.2)  mg/dL


 


Total Bilirubin     (0.2-1.3)  mg/dl


 


AST     (14-36)  U/L


 


ALT     (9-52)  U/L


 


Alkaline Phosphatase     ()  U/L


 


Total Protein     (6.3-8.2)  G/DL


 


Albumin     (3.5-5.0)  g/dL


 


Globulin     (2.2-3.9)  gm/dL


 


Albumin/Globulin Ratio     (1.0-2.1)  














  03/03/19 03/03/19 03/03/19 Range/Units





  11:16 05:40 05:01 


 


WBC     (4.8-10.8)  K/uL


 


RBC     (3.80-5.20)  Mil/uL


 


Hgb     (12.0-16.0)  g/dL


 


Hct     (34.0-47.0)  %


 


MCV     (81.0-99.0)  fl


 


MCH     (27.0-31.0)  pg


 


MCHC     (33.0-37.0)  g/dL


 


RDW     (11.5-14.5)  %


 


Plt Count     (130-400)  K/uL


 


MPV     (7.2-11.7)  fl


 


Neut % (Auto)     (50.0-75.0)  %


 


Lymph % (Auto)     (20.0-40.0)  %


 


Mono % (Auto)     (0.0-10.0)  %


 


Eos % (Auto)     (0.0-4.0)  %


 


Baso % (Auto)     (0.0-2.0)  %


 


Neut # (Auto)     (1.8-7.0)  K/uL


 


Lymph # (Auto)     (1.0-4.3)  K/uL


 


Mono # (Auto)     (0.0-0.8)  K/uL


 


Eos # (Auto)     (0.0-0.7)  K/uL


 


Baso # (Auto)     (0.0-0.2)  K/uL


 


Neutrophils % (Manual)   88 H   (42-75)  %


 


Lymphocytes % (Manual)   2 L   (20-50)  %


 


Monocytes % (Manual)   9   (0-10)  %


 


Eosinophils % (Manual)   1   (0-7)  %


 


Platelet Estimate   Normal   (NORMAL)  


 


Hypochromasia (manual)   Slight   


 


Anisocytosis (manual)   Slight   


 


Ovalocytes   Slight   


 


pCO2    29 L  (35-45)  mm/Hg


 


pO2    225 H  ()  mm/Hg


 


HCO3    27.0  (21-28)  mmol/L


 


ABG pH    7.54 H  (7.35-7.45)  


 


ABG Total CO2    25.7  (22-28)  mmol/L


 


ABG O2 Saturation    100.4 H  (95-98)  %


 


ABG O2 Content    14.2 L  (15-23)  ML/dL


 


ABG Base Excess    2.6  (-2.0-3.0)  mmol/L


 


ABG Hemoglobin    10.0 L  (11.7-17.4)  g/dL


 


ABG Carboxyhemoglobin    1.3  (0.5-1.5)  %


 


POC ABG HHb (Measured)    -0.4 L  (0.0-5.0)  %


 


ABG Methemoglobin    1.7  (0.0-3.0)  %


 


ABG O2 Capacity    14.1 L  (16-24)  mL/dL


 


Efrem Test    Yes  


 


A-a O2 Difference    95.0  mm/Hg


 


Hgb O2 Saturation    97.4  (95.0-98.0)  %


 


Vent Mode    A/c  


 


Mechanical Rate    14  


 


FiO2    50.0  %


 


Tidal Volume    450  


 


PEEP    5  


 


Sodium     (132-148)  mmol/l


 


Potassium     (3.6-5.0)  MMOL/L


 


Chloride     ()  mmol/L


 


Carbon Dioxide     (22-30)  mmol/L


 


Anion Gap     (10-20)  


 


BUN     (7-17)  mg/dl


 


Creatinine     (0.7-1.2)  mg/dl


 


Est GFR (African Amer)     


 


Est GFR (Non-Af Amer)     


 


POC Glucose (mg/dL)  220 H    ()  mg/dL


 


Random Glucose     ()  mg/dL


 


Calcium     (8.4-10.2)  mg/dL


 


Total Bilirubin     (0.2-1.3)  mg/dl


 


AST     (14-36)  U/L


 


ALT     (9-52)  U/L


 


Alkaline Phosphatase     ()  U/L


 


Total Protein     (6.3-8.2)  G/DL


 


Albumin     (3.5-5.0)  g/dL


 


Globulin     (2.2-3.9)  gm/dL


 


Albumin/Globulin Ratio     (1.0-2.1)  








                         Laboratory Results - last 24 hr











  03/03/19 03/03/19 03/03/19





  05:01 05:40 11:16


 


WBC   


 


RBC   


 


Hgb   


 


Hct   


 


MCV   


 


MCH   


 


MCHC   


 


RDW   


 


Plt Count   


 


MPV   


 


Neut % (Auto)   


 


Lymph % (Auto)   


 


Mono % (Auto)   


 


Eos % (Auto)   


 


Baso % (Auto)   


 


Neut # (Auto)   


 


Lymph # (Auto)   


 


Mono # (Auto)   


 


Eos # (Auto)   


 


Baso # (Auto)   


 


Neutrophils % (Manual)   88 H 


 


Lymphocytes % (Manual)   2 L 


 


Monocytes % (Manual)   9 


 


Eosinophils % (Manual)   1 


 


Platelet Estimate   Normal 


 


Hypochromasia (manual)   Slight 


 


Anisocytosis (manual)   Slight 


 


Ovalocytes   Slight 


 


pCO2  29 L  


 


pO2  225 H  


 


HCO3  27.0  


 


ABG pH  7.54 H  


 


ABG Total CO2  25.7  


 


ABG O2 Saturation  100.4 H  


 


ABG O2 Content  14.2 L  


 


ABG Base Excess  2.6  


 


ABG Hemoglobin  10.0 L  


 


ABG Carboxyhemoglobin  1.3  


 


POC ABG HHb (Measured)  -0.4 L  


 


ABG Methemoglobin  1.7  


 


ABG O2 Capacity  14.1 L  


 


Efrem Test  Yes  


 


A-a O2 Difference  95.0  


 


Hgb O2 Saturation  97.4  


 


Vent Mode  A/c  


 


Mechanical Rate  14  


 


FiO2  50.0  


 


Tidal Volume  450  


 


PEEP  5  


 


Sodium   


 


Potassium   


 


Chloride   


 


Carbon Dioxide   


 


Anion Gap   


 


BUN   


 


Creatinine   


 


Est GFR ( Amer)   


 


Est GFR (Non-Af Amer)   


 


POC Glucose (mg/dL)    220 H


 


Random Glucose   


 


Calcium   


 


Total Bilirubin   


 


AST   


 


ALT   


 


Alkaline Phosphatase   


 


Total Protein   


 


Albumin   


 


Globulin   


 


Albumin/Globulin Ratio   














  03/03/19 03/03/19 03/04/19





  16:23 22:37 04:00


 


WBC   


 


RBC   


 


Hgb   


 


Hct   


 


MCV   


 


MCH   


 


MCHC   


 


RDW   


 


Plt Count   


 


MPV   


 


Neut % (Auto)   


 


Lymph % (Auto)   


 


Mono % (Auto)   


 


Eos % (Auto)   


 


Baso % (Auto)   


 


Neut # (Auto)   


 


Lymph # (Auto)   


 


Mono # (Auto)   


 


Eos # (Auto)   


 


Baso # (Auto)   


 


Neutrophils % (Manual)   


 


Lymphocytes % (Manual)   


 


Monocytes % (Manual)   


 


Eosinophils % (Manual)   


 


Platelet Estimate   


 


Hypochromasia (manual)   


 


Anisocytosis (manual)   


 


Ovalocytes   


 


pCO2    33 L


 


pO2    136 H


 


HCO3    27.0


 


ABG pH    7.50 H


 


ABG Total CO2    26.7


 


ABG O2 Saturation    101.0 H


 


ABG O2 Content    15.2


 


ABG Base Excess    2.7


 


ABG Hemoglobin    11.0 L


 


ABG Carboxyhemoglobin    2.1 H


 


POC ABG HHb (Measured)    -1.0 L


 


ABG Methemoglobin    2.3


 


ABG O2 Capacity    15.0 L


 


Efrem Test    Yes


 


A-a O2 Difference    108.0


 


Hgb O2 Saturation    96.6


 


Vent Mode    A/c


 


Mechanical Rate    14


 


FiO2    40.0


 


Tidal Volume    450


 


PEEP    5


 


Sodium   


 


Potassium   


 


Chloride   


 


Carbon Dioxide   


 


Anion Gap   


 


BUN   


 


Creatinine   


 


Est GFR ( Amer)   


 


Est GFR (Non-Af Amer)   


 


POC Glucose (mg/dL)  188 H  174 H 


 


Random Glucose   


 


Calcium   


 


Total Bilirubin   


 


AST   


 


ALT   


 


Alkaline Phosphatase   


 


Total Protein   


 


Albumin   


 


Globulin   


 


Albumin/Globulin Ratio   














  03/04/19 03/04/19 03/04/19





  04:10 04:10 06:06


 


WBC  20.9 H  


 


RBC  4.10  


 


Hgb  9.8 L  


 


Hct  31.2 L  


 


MCV  76.0 L  


 


MCH  23.9 L  


 


MCHC  31.5 L  


 


RDW  19.6 H  


 


Plt Count  169  


 


MPV  10.9  


 


Neut % (Auto)  82.8 H  


 


Lymph % (Auto)  2.8 L  


 


Mono % (Auto)  12.1 H  


 


Eos % (Auto)  2.1  


 


Baso % (Auto)  0.2  


 


Neut # (Auto)  17.3 H  


 


Lymph # (Auto)  0.6 L  


 


Mono # (Auto)  2.5 H  


 


Eos # (Auto)  0.4  


 


Baso # (Auto)  0.0  


 


Neutrophils % (Manual)   


 


Lymphocytes % (Manual)   


 


Monocytes % (Manual)   


 


Eosinophils % (Manual)   


 


Platelet Estimate   


 


Hypochromasia (manual)   


 


Anisocytosis (manual)   


 


Ovalocytes   


 


pCO2   


 


pO2   


 


HCO3   


 


ABG pH   


 


ABG Total CO2   


 


ABG O2 Saturation   


 


ABG O2 Content   


 


ABG Base Excess   


 


ABG Hemoglobin   


 


ABG Carboxyhemoglobin   


 


POC ABG HHb (Measured)   


 


ABG Methemoglobin   


 


ABG O2 Capacity   


 


Efrem Test   


 


A-a O2 Difference   


 


Hgb O2 Saturation   


 


Vent Mode   


 


Mechanical Rate   


 


FiO2   


 


Tidal Volume   


 


PEEP   


 


Sodium   139 


 


Potassium   3.0 L 


 


Chloride   99 


 


Carbon Dioxide   27 


 


Anion Gap   16 


 


BUN   40 H 


 


Creatinine   3.4 H 


 


Est GFR ( Amer)   17 


 


Est GFR (Non-Af Amer)   14 


 


POC Glucose (mg/dL)    149 H


 


Random Glucose   151 H 


 


Calcium   8.9 


 


Total Bilirubin   1.2 


 


AST   63 H 


 


ALT   349 H D 


 


Alkaline Phosphatase   123 


 


Total Protein   7.4 


 


Albumin   3.2 L 


 


Globulin   4.1 H 


 


Albumin/Globulin Ratio   0.8 L 











Radiology Impressions: 


                              Radiology Impressions





Chest X-Ray  03/03/19 04:14


IMPRESSION:


Stable tubes and lines.  Stable pulmonary vascular congestion small 


bilateral pleural effusions.  No significant interval change.  


 


 











Fingerstick Blood Sugar Results: 220





Review of Systems





- Review of Systems


Systems not reviewed;Unavailable: Intubated





Critical Care Progress Note





- Nutrition


Nutrition: 


                                    Nutrition











 Category Date Time Status


 


 NPO Diet [DIET] Diets  02/25/19 Breakfast Active














Assessment/Plan


(1) Toxic metabolic encephalopathy


Current Visit: Yes   Status: Acute   Priority: High   Comment: 


Patient clinically improved, she is more alert and awake now 


sepsis under control


renal failure managed with HD


No seizures   





(2) Acute respiratory failure with hypoxia


Current Visit: Yes   Status: Acute   Priority: High   Comment: 


Vent weaning in progress


Patient has been tolerating pressure support trial today and the plan is to 

follow ABG and possible extubation today


Minimal respiratory secretion   





(3) Accelerated essential hypertension


Current Visit: Yes   Status: Acute   Priority: High   





(4) Aspiration pneumonia


Current Visit: Yes   Status: Acute   Comment: 


Continue current antibiotic with IV vancomycin and IV cefepime


Maintain map 65-75


   





(5) SHANITA (acute kidney injury)


Current Visit: Yes   Status: Acute   Priority: High   Comment: 


Improving


Acute kidney injury likely multifactorial due to circulatory failure and sepsis


HD as per renal


   





- Assessment and Plan (Free Text)


Assessment: 





GI/DVT PPX   


Code Status: Full code 


Total critical care time 42 minutes

## 2019-03-04 NOTE — CP.PCM.PN
Subjective





- Date & Time of Evaluation


Date of Evaluation: 03/04/19


Time of Evaluation: 10:30





- Subjective


Subjective: 


patient seen and examined at bedside. no family at bedside


Interim events noted


Remains intubated though much more awake/alert


available diagnostic data reviewed





Review of Systems unable to obtain due to status





Objective





Vital Signs Stable


- Constitutional


Appears: Chronically Ill





Head Exam: NORMAL INSPECTION





Respiratory Exam: Intubated, CTABL





Cardiovascular Exam: +S1, +S2





GI & Abdominal Exam: Soft





Neurological Exam: Alert, Awake





Skin Exam: Normal Color, Warm





Assessment and Plan





monitor vitals


monitor labs


Cont meds


Cont tx


ID, Cardio, Neuro, ICU, Renal, Pulm following


consultants appreciated input


possible extubation soon


rest of plan as ordered








Objective





- Vital Signs/Intake and Output


Vital Signs (last 24 hours): 


                                        











Temp Pulse Resp BP Pulse Ox


 


 100.4 F H  79   19   140/76   100 


 


 03/04/19 16:00  03/04/19 18:00  03/04/19 18:00  03/04/19 18:00  03/04/19 18:00








Intake and Output: 


                                        











 03/04/19 03/04/19





 06:59 18:59


 


Intake Total 860 1495


 


Output Total 650 550


 


Balance 210 945














- Medications


Medications: 


                               Current Medications





Acetaminophen (Tylenol 650mg/20.3ml Solution Ud)  650 mg PO Q6 PRN


   PRN Reason: Fever>100.4 F


   Last Admin: 03/01/19 13:06 Dose:  650 mg


Albuterol/Ipratropium (Duoneb 3 Mg/0.5 Mg (3 Ml) Ud)  3 ml INH RQ6 [facility]


   Last Admin: 03/04/19 13:05 Dose:  3 ml


Allopurinol (Zyloprim)  300 mg PO DAILY [facility]


   Last Admin: 03/04/19 08:22 Dose:  300 mg


Calcium Acetate (Phoslo)  667 mg PO TID [facility]


   Last Admin: 03/04/19 16:45 Dose:  667 mg


Dimethicone (Proshield Plus Skin Protectant)  1 applic TOP Q8 [facility]


Levetiracetam 500 mg/ Sodium (Chloride)  105 mls @ 210 mls/hr IVPB Q12 [facility]


   Last Admin: 03/04/19 08:20 Dose:  210 mls/hr


Vancomycin HCl 1 gm/ Sodium (Chloride)  250 mls @ 250 mls/hr IVPB MWF [facility]; 

Protocol


   Last Admin: 03/04/19 08:22 Dose:  250 mls/hr


Cefepime HCl 1 gm/ Sodium (Chloride)  100 mls @ 100 mls/hr IVPB Q12 [facility]; 

Protocol


Insulin Human Regular (Humulin R)  0 units SC ACCU-CHECK [facility]; Protocol


   Last Admin: 03/04/19 16:45 Dose:  2 u


Labetalol HCl (Trandate)  20 mg IVP Q4 PRN


   PRN Reason: Systolic Blood Pressure


   Last Admin: 03/03/19 02:38 Dose:  20 mg


Vitamin B Complex/Vit C/Folic Acid (Nephro-Yoseph)  1 tab PO DAILY [facility]


   Last Admin: 03/04/19 08:21 Dose:  1 tab











- Labs


Labs: 


                                        





                                 03/04/19 04:10 





                                 03/04/19 04:10 





                                        











PT  16.2 Seconds (9.8-13.1)  H  03/01/19  10:28    


 


INR  1.4   03/01/19  10:28    


 


APTT  35.6 Seconds (25.6-37.1)   03/01/19  10:28    














Assessment and Plan


(1) Respiratory failure


Status: Acute   





(2) Sepsis


Status: Acute   





(3) SHANITA (acute kidney injury)


Status: Acute

## 2019-03-04 NOTE — CP.PCM.PN
Subjective





- Date & Time of Evaluation


Date of Evaluation: 03/02/19


Time of Evaluation: 12:00





- Subjective


Subjective: 








Pt seen and assessed at bedside. Orally intubated in room 421. Pt currently has 

a fever. 





Review of Systems





- Review of Systems


Systems not reviewed;Unavailable: Acuity of Condition





Objective





- Vital Signs/Intake and Output


Vital Signs (last 24 hours): 


                                        











Temp Pulse Resp BP Pulse Ox


 


 98 F   72   18   147/84   100 


 


 03/04/19 00:00  03/04/19 00:00  03/04/19 00:00  03/04/19 00:00  03/04/19 00:00








Intake and Output: 


                                        











 03/03/19 03/04/19





 18:59 06:59


 


Intake Total 1125 428


 


Output Total 1900 


 


Balance -775 428














- Medications


Medications: 


                               Current Medications





Acetaminophen (Tylenol 650mg/20.3ml Solution Ud)  650 mg PO Q6 PRN


   PRN Reason: Fever>100.4 F


   Last Admin: 03/01/19 13:06 Dose:  650 mg


Albuterol/Ipratropium (Duoneb 3 Mg/0.5 Mg (3 Ml) Ud)  3 ml INH RQ6 [facility]


   Last Admin: 03/04/19 02:41 Dose:  3 ml


Allopurinol (Zyloprim)  300 mg PO DAILY [facility]


   Last Admin: 03/03/19 08:43 Dose:  300 mg


Calcium Acetate (Phoslo)  667 mg PO TID [facility]


   Last Admin: 03/03/19 16:04 Dose:  667 mg


Levetiracetam 500 mg/ Sodium (Chloride)  105 mls @ 210 mls/hr IVPB Q12 [facility]


   Last Admin: 03/03/19 20:53 Dose:  210 mls/hr


Vancomycin HCl 1 gm/ Sodium (Chloride)  250 mls @ 250 mls/hr IVPB MWF [facility]; 

Protocol


Cefepime HCl 1 gm/ Sodium (Chloride)  100 mls @ 100 mls/hr IVPB DAILY [facility]; 

Protocol


   Last Admin: 03/03/19 08:44 Dose:  100 mls/hr


Insulin Human Regular (Humulin R)  0 units SC ACCU-CHECK [facility]; Protocol


   Last Admin: 03/03/19 22:05 Dose:  2 u


Labetalol HCl (Trandate)  20 mg IVP Q4 PRN


   PRN Reason: Systolic Blood Pressure


   Last Admin: 03/03/19 02:38 Dose:  20 mg


Vitamin B Complex/Vit C/Folic Acid (Nephro-Yoseph)  1 tab PO DAILY [facility]


   Last Admin: 03/03/19 08:05 Dose:  1 tab











- Labs


Labs: 


                                        





                                 03/03/19 05:40 





                                 03/03/19 05:40 





                                        











PT  16.2 Seconds (9.8-13.1)  H  03/01/19  10:28    


 


INR  1.4   03/01/19  10:28    


 


APTT  35.6 Seconds (25.6-37.1)   03/01/19  10:28    














- Constitutional


Appears: Chronically Ill





- Head Exam


Head Exam: ATRAUMATIC, NORMAL INSPECTION, NORMOCEPHALIC





- Eye Exam


Eye Exam: EOMI, Normal appearance, PERRL


Pupil Exam: NORMAL ACCOMODATION


Additional comments: 





pupils sluggish 





- ENT Exam


ENT Exam: Mucous Membranes Moist





- Neck Exam


Neck Exam: Normal Inspection





- Respiratory Exam


Respiratory Exam: Rhonchi





- Cardiovascular Exam


Cardiovascular Exam: Irregular Rhythm


Additional comments: 





A-fib on cardiac monitor. 





- GI/Abdominal Exam


GI & Abdominal Exam: Soft, Normal Bowel Sounds





- Extremities Exam


Extremities Exam: Normal Capillary Refill


Additional comments: 





spontaneous, likely non-purposeful, movement of LUE. 





- Back Exam


Back Exam: NORMAL INSPECTION





- Neurological Exam


Neurological Exam: Awake





- Skin


Skin Exam: Dry, Warm





Assessment and Plan


(1) Encephalopathy


Assessment & Plan: 


1.) Encephalopathy





-Remains orally intubated.


-Fever noted, on cooling blanket. Unclear if infectious or neurogenic etiology. 


-consults input appreciated.


-prognosis remains poor. 


-continue current tx. 


Status: Acute

## 2019-03-04 NOTE — CP.PCM.PN
Subjective





- Date & Time of Evaluation


Date of Evaluation: 03/04/19


Time of Evaluation: 10:00





- Subjective


Subjective: 





Patient more awake and she looks much better.


Vital signs noted to be stable


Urine output improving


Blood tests and electrolytes reviewed and showed hypokalemia with potassium 

supplement





Objective





- Vital Signs/Intake and Output


Vital Signs (last 24 hours): 


                                        











Temp Pulse Resp BP Pulse Ox


 


 98.2 F   75   25 H  171/80 H  100 


 


 03/04/19 08:00  03/04/19 08:00  03/04/19 08:00  03/04/19 08:00  03/04/19 08:00








Intake and Output: 


                                        











 03/04/19 03/04/19





 06:59 18:59


 


Intake Total 860 45


 


Output Total 650 100


 


Balance 210 -55














- Medications


Medications: 


                               Current Medications





Acetaminophen (Tylenol 650mg/20.3ml Solution Ud)  650 mg PO Q6 PRN


   PRN Reason: Fever>100.4 F


   Last Admin: 03/01/19 13:06 Dose:  650 mg


Albuterol/Ipratropium (Duoneb 3 Mg/0.5 Mg (3 Ml) Ud)  3 ml INH RQ6 [facility]


   Last Admin: 03/04/19 07:12 Dose:  3 ml


Allopurinol (Zyloprim)  300 mg PO DAILY [facility]


   Last Admin: 03/04/19 08:22 Dose:  300 mg


Calcium Acetate (Phoslo)  667 mg PO TID [facility]


   Last Admin: 03/04/19 08:21 Dose:  667 mg


Levetiracetam 500 mg/ Sodium (Chloride)  105 mls @ 210 mls/hr IVPB Q12 [facility]


   Last Admin: 03/04/19 08:20 Dose:  210 mls/hr


Vancomycin HCl 1 gm/ Sodium (Chloride)  250 mls @ 250 mls/hr IVPB MWF [facility]; Pro

tocol


   Last Admin: 03/04/19 08:22 Dose:  250 mls/hr


Cefepime HCl 1 gm/ Sodium (Chloride)  100 mls @ 100 mls/hr IVPB DAILY [facility]; 

Protocol


   Last Admin: 03/04/19 08:21 Dose:  100 mls/hr


Potassium Chloride (Potassium Cl 10meq/50ml Sterile Water)  50 mls @ 50 mls/hr 

IVPB Q1 [facility]


   Stop: 03/04/19 11:59


Insulin Human Regular (Humulin R)  0 units SC ACCU-CHECK [facility]; Protocol


   Last Admin: 03/03/19 22:05 Dose:  2 u


Labetalol HCl (Trandate)  20 mg IVP Q4 PRN


   PRN Reason: Systolic Blood Pressure


   Last Admin: 03/03/19 02:38 Dose:  20 mg


Vitamin B Complex/Vit C/Folic Acid (Nephro-Yoseph)  1 tab PO DAILY [facility]


   Last Admin: 03/04/19 08:21 Dose:  1 tab











- Labs


Labs: 


                                        





                                 03/04/19 04:10 





                                 03/04/19 04:10 





                                        











PT  16.2 Seconds (9.8-13.1)  H  03/01/19  10:28    


 


INR  1.4   03/01/19  10:28    


 


APTT  35.6 Seconds (25.6-37.1)   03/01/19  10:28    














- Constitutional


Appears: No Acute Distress





- Eye Exam


Eye Exam: Conjunctival injection





- ENT Exam


ENT Exam: Mucous Membranes Moist





- Neck Exam


Neck Exam: absent: Lymphadenopathy





- Respiratory Exam


Respiratory Exam: NORMAL BREATHING PATTERN.  absent: Chest Wall Tenderness





- Cardiovascular Exam


Cardiovascular Exam: absent: Gallop, JVD, Rubs





- GI/Abdominal Exam


GI & Abdominal Exam: Soft, Normal Bowel Sounds





- Neurological Exam


Neurological Exam: Awake





- Skin


Skin Exam: absent: Cyanosis





Assessment and Plan


(1) SHANITA (acute kidney injury)


Assessment & Plan: 


Assessment: critical


Altered mental status


Respiratory failure/intubated 


Acute renal failure/acute kidney injury related to multifactorial including 

sepsis.


Diabetes mellitus and history of hypertension history of CVA.


Patient remained obtunded with encephalopathy 


hyperurecemia


Hyperphosphatemia


Shock liver


Nephrotic syndrome proteinuria with 7 gram proteinuria based on protein to 

creatinine ratio


Recommendation





Patient improving more awake responded very good


Patient did have hemodialysis yesterday


Potassium is 3.0 today and need potassium supplement


Hold dialysis today and rescheduled for tomorrow pending BMP in the morning


Liver function improving


Antibiotics as per primary team considering GFR


Patient remain intubated


Urine output improving





Status: Acute   





(2) Elevated liver enzymes


Status: Acute   





(3) Elevated troponin level


Status: Acute   





(4) Leukocytosis


Status: Acute   





(5) Sepsis


Status: Acute

## 2019-03-04 NOTE — CP.PCM.PN
Subjective





- Date & Time of Evaluation


Date of Evaluation: 03/01/19


Time of Evaluation: 11:00





- Subjective


Subjective: 





Remains intubated.


WBC is 19.








Objective





- Vital Signs/Intake and Output


Vital Signs (last 24 hours): 


                                        











Temp Pulse Resp BP Pulse Ox


 


 98.9 F   81   30 H  153/73 H  100 


 


 03/04/19 12:00  03/04/19 12:00  03/04/19 12:00  03/04/19 12:00  03/04/19 12:00








Intake and Output: 


                                        











 03/04/19 03/04/19





 06:59 18:59


 


Intake Total 860 1075


 


Output Total 650 250


 


Balance 210 825














- Medications


Medications: 


                               Current Medications





Acetaminophen (Tylenol 650mg/20.3ml Solution Ud)  650 mg PO Q6 PRN


   PRN Reason: Fever>100.4 F


   Last Admin: 03/01/19 13:06 Dose:  650 mg


Albuterol/Ipratropium (Duoneb 3 Mg/0.5 Mg (3 Ml) Ud)  3 ml INH RQ6 [facility]


   Last Admin: 03/04/19 07:12 Dose:  3 ml


Allopurinol (Zyloprim)  300 mg PO DAILY [facility]


   Last Admin: 03/04/19 08:22 Dose:  300 mg


Calcium Acetate (Phoslo)  667 mg PO TID [facility]


   Last Admin: 03/04/19 12:04 Dose:  667 mg


Levetiracetam 500 mg/ Sodium (Chloride)  105 mls @ 210 mls/hr IVPB Q12 [facility]


   Last Admin: 03/04/19 08:20 Dose:  210 mls/hr


Vancomycin HCl 1 gm/ Sodium (Chloride)  250 mls @ 250 mls/hr IVPB MWF [facility]; 

Protocol


   Last Admin: 03/04/19 08:22 Dose:  250 mls/hr


Cefepime HCl 1 gm/ Sodium (Chloride)  100 mls @ 100 mls/hr IVPB DAILY [facility]; 

Protocol


   Last Admin: 03/04/19 08:21 Dose:  100 mls/hr


Insulin Human Regular (Humulin R)  0 units SC ACCU-CHECK [facility]; Protocol


   Last Admin: 03/04/19 11:46 Dose:  1 u


Labetalol HCl (Trandate)  20 mg IVP Q4 PRN


   PRN Reason: Systolic Blood Pressure


   Last Admin: 03/03/19 02:38 Dose:  20 mg


Vitamin B Complex/Vit C/Folic Acid (Nephro-Yoseph)  1 tab PO DAILY [facility]


   Last Admin: 03/04/19 08:21 Dose:  1 tab











- Labs


Labs: 


                                        





                                 03/04/19 04:10 





                                 03/04/19 04:10 





                                        











PT  16.2 Seconds (9.8-13.1)  H  03/01/19  10:28    


 


INR  1.4   03/01/19  10:28    


 


APTT  35.6 Seconds (25.6-37.1)   03/01/19  10:28    














- Respiratory Exam


Respiratory Exam: Decreased Breath Sounds





- Cardiovascular Exam


Cardiovascular Exam: Tachycardia





- GI/Abdominal Exam


GI & Abdominal Exam: Normal Bowel Sounds





Assessment and Plan


(1) Respiratory failure


Status: Acute   





(2) Sepsis


Status: Acute   





(3) Toxic metabolic encephalopathy


Status: Acute   





- Assessment and Plan (Free Text)


Plan: 





Con tmeds


 Cont vent support


 follow up with resp and ID.


 Folow up ca nd S reports.

## 2019-03-04 NOTE — CP.PCM.PN
Subjective





- Date & Time of Evaluation


Date of Evaluation: 03/04/19


Time of Evaluation: 08:00





- Subjective


Subjective: 





improved mental state


awake alert responsive


denies headache


still with fever and leukocytosis


MRI show sinusitis


LFT's up








Objective





- Vital Signs/Intake and Output


Vital Signs (last 24 hours): 


                                        











Temp Pulse Resp BP Pulse Ox


 


 100.4 F H  82   26 H  123/67   92 L


 


 03/04/19 16:00  03/04/19 16:00  03/04/19 16:00  03/04/19 16:00  03/04/19 16:00








Intake and Output: 


                                        











 03/04/19 03/04/19





 06:59 18:59


 


Intake Total 860 1255


 


Output Total 650 450


 


Balance 210 805














- Medications


Medications: 


                               Current Medications





Acetaminophen (Tylenol 650mg/20.3ml Solution Ud)  650 mg PO Q6 PRN


   PRN Reason: Fever>100.4 F


   Last Admin: 03/01/19 13:06 Dose:  650 mg


Albuterol/Ipratropium (Duoneb 3 Mg/0.5 Mg (3 Ml) Ud)  3 ml INH RQ6 [facility]


   Last Admin: 03/04/19 13:05 Dose:  3 ml


Allopurinol (Zyloprim)  300 mg PO DAILY [facility]


   Last Admin: 03/04/19 08:22 Dose:  300 mg


Calcium Acetate (Phoslo)  667 mg PO TID [facility]


   Last Admin: 03/04/19 16:45 Dose:  667 mg


Dimethicone (Proshield Plus Skin Protectant)  1 applic TOP Q8 [facility]


Levetiracetam 500 mg/ Sodium (Chloride)  105 mls @ 210 mls/hr IVPB Q12 [facility]


   Last Admin: 03/04/19 08:20 Dose:  210 mls/hr


Vancomycin HCl 1 gm/ Sodium (Chloride)  250 mls @ 250 mls/hr IVPB MWF [facility]; 

Protocol


   Last Admin: 03/04/19 08:22 Dose:  250 mls/hr


Cefepime HCl 1 gm/ Sodium (Chloride)  100 mls @ 100 mls/hr IVPB DAILY [facility]; 

Protocol


   Last Admin: 03/04/19 08:21 Dose:  100 mls/hr


Insulin Human Regular (Humulin R)  0 units SC ACCU-CHECK [facility]; Protocol


   Last Admin: 03/04/19 16:45 Dose:  2 u


Labetalol HCl (Trandate)  20 mg IVP Q4 PRN


   PRN Reason: Systolic Blood Pressure


   Last Admin: 03/03/19 02:38 Dose:  20 mg


Vitamin B Complex/Vit C/Folic Acid (Nephro-Yoseph)  1 tab PO DAILY [facility]


   Last Admin: 03/04/19 08:21 Dose:  1 tab











- Labs


Labs: 


                                        





                                 03/04/19 04:10 





                                 03/04/19 04:10 





                                        











PT  16.2 Seconds (9.8-13.1)  H  03/01/19  10:28    


 


INR  1.4   03/01/19  10:28    


 


APTT  35.6 Seconds (25.6-37.1)   03/01/19  10:28    














- Constitutional


Appears: No Acute Distress





- Head Exam


Head Exam: NORMOCEPHALIC





- Eye Exam


Eye Exam: absent: Scleral icterus





- ENT Exam


ENT Exam: Mucous Membranes Dry


Additional comments: 





ETT +





- Neck Exam


Neck Exam: absent: Lymphadenopathy





- Respiratory Exam


Respiratory Exam: Decreased Breath Sounds, Rhonchi





- Cardiovascular Exam


Cardiovascular Exam: REGULAR RHYTHM, +S1, +S2





- GI/Abdominal Exam


GI & Abdominal Exam: Distended, Soft.  absent: Tenderness





- Rectal Exam


Rectal Exam: Deferred





-  Exam


 Exam: NORMAL INSPECTION





- Extremities Exam


Extremities Exam: absent: Pedal Edema





- Back Exam


Back Exam: absent: CVA tenderness (L), CVA tenderness (R)





- Neurological Exam


Neurological Exam: Alert, Awake, CN II-XII Intact, Oriented x3


Neuro motor strength exam: Left Upper Extremity: 3, Right Upper Extremity: 0, 

Left Lower Extremity: 3, Right Lower Extremity: 0





- Psychiatric Exam


Psychiatric exam: Depressed





- Skin


Skin Exam: Dry





Assessment and Plan


(1) Sepsis


Status: Acute   





(2) Change in mental state


Status: Acute   





(3) History of CVA with residual deficit


Status: Acute   





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


Status: Chronic   





(5) History of CVA (cerebrovascular accident)


Status: Chronic   





(6) NSTEMI (non-ST elevated myocardial infarction)


Status: Acute   





(7) SHANITA (acute kidney injury)


Status: Acute   





(8) Pansinusitis


Status: Acute   





- Assessment and Plan (Free Text)


Assessment: 








res[iratory status better- possible extubation soon 


awake alert responsive


denies headache


still with fever and leukocytosis


MRI show sinusitis   consider ENT eval  ? parameningeal focus 


LFT's up    consider d/c allopurinol 


agree with VIVIENNE

## 2019-03-04 NOTE — CP.PCM.PN
Subjective





- Date & Time of Evaluation


Date of Evaluation: 03/03/19


Time of Evaluation: 13:30





- Subjective


Subjective: 








Pt seen and assessed at bedside. Currently undergoing hemodialysis, remains 

orally intubated. On exam, pt was responsive to verbal stimuli. Plan of care 

discussed with staff. 





Objective





- Vital Signs/Intake and Output


Vital Signs (last 24 hours): 


                                        











Temp Pulse Resp BP Pulse Ox


 


 98 F   72   18   147/84   100 


 


 03/04/19 00:00  03/04/19 00:00  03/04/19 00:00  03/04/19 00:00  03/04/19 00:00








Intake and Output: 


                                        











 03/03/19 03/04/19





 18:59 06:59


 


Intake Total 1125 428


 


Output Total 1900 


 


Balance -775 428














- Medications


Medications: 


                               Current Medications





Acetaminophen (Tylenol 650mg/20.3ml Solution Ud)  650 mg PO Q6 PRN


   PRN Reason: Fever>100.4 F


   Last Admin: 03/01/19 13:06 Dose:  650 mg


Albuterol/Ipratropium (Duoneb 3 Mg/0.5 Mg (3 Ml) Ud)  3 ml INH RQ6 [facility]


   Last Admin: 03/04/19 02:41 Dose:  3 ml


Allopurinol (Zyloprim)  300 mg PO DAILY [facility]


   Last Admin: 03/03/19 08:43 Dose:  300 mg


Calcium Acetate (Phoslo)  667 mg PO TID [facility]


   Last Admin: 03/03/19 16:04 Dose:  667 mg


Levetiracetam 500 mg/ Sodium (Chloride)  105 mls @ 210 mls/hr IVPB Q12 [facility]


   Last Admin: 03/03/19 20:53 Dose:  210 mls/hr


Vancomycin HCl 1 gm/ Sodium (Chloride)  250 mls @ 250 mls/hr IVPB MWF [facility]; 

Protocol


Cefepime HCl 1 gm/ Sodium (Chloride)  100 mls @ 100 mls/hr IVPB DAILY [facility]; 

Protocol


   Last Admin: 03/03/19 08:44 Dose:  100 mls/hr


Insulin Human Regular (Humulin R)  0 units SC ACCU-CHECK [facility]; Protocol


   Last Admin: 03/03/19 22:05 Dose:  2 u


Labetalol HCl (Trandate)  20 mg IVP Q4 PRN


   PRN Reason: Systolic Blood Pressure


   Last Admin: 03/03/19 02:38 Dose:  20 mg


Vitamin B Complex/Vit C/Folic Acid (Nephro-Yoseph)  1 tab PO DAILY [facility]


   Last Admin: 03/03/19 08:05 Dose:  1 tab











- Labs


Labs: 


                                        





                                 03/03/19 05:40 





                                 03/03/19 05:40 





                                        











PT  16.2 Seconds (9.8-13.1)  H  03/01/19  10:28    


 


INR  1.4   03/01/19  10:28    


 


APTT  35.6 Seconds (25.6-37.1)   03/01/19  10:28    














- Constitutional


Appears: Chronically Ill





- Head Exam


Head Exam: ATRAUMATIC, NORMAL INSPECTION, NORMOCEPHALIC





- Eye Exam


Eye Exam: EOMI, Normal appearance, PERRL


Pupil Exam: NORMAL ACCOMODATION, PERRL





- ENT Exam


ENT Exam: Mucous Membranes Moist, Normal Exam





- Neck Exam


Neck Exam: Normal Inspection





- Respiratory Exam


Respiratory Exam: Rhonchi





- Cardiovascular Exam


Cardiovascular Exam: Irregular Rhythm


Additional comments: 





A-fib on cardiac monitor. 





- GI/Abdominal Exam


GI & Abdominal Exam: Soft, Normal Bowel Sounds





- Extremities Exam


Extremities Exam: Normal Capillary Refill, Normal Inspection


Additional comments: 





moves left upper extremity. 





- Back Exam


Back Exam: NORMAL INSPECTION





- Neurological Exam


Neurological Exam: Alert, Awake





- Skin


Skin Exam: Dry, Normal Color, Warm





Assessment and Plan


(1) Encephalopathy


Assessment & Plan: 


1.) Encephalopathy





-Mild improvement in neurologic status, pt now able to follow basic verbal 

commands.


-Left upper extremity movement appears to be more purposeful. 


-Remains orally intubated. 


-Previous fever noted, pt on cooling blanket. 


-No seizure activity observed. 


-consults input appreciated.


-continue current tx. 


Status: Acute

## 2019-03-04 NOTE — CP.PCM.PN
Subjective





- Date & Time of Evaluation


Date of Evaluation: 03/04/19


Time of Evaluation: 08:36





- Subjective


Subjective: 





MORE AWAKE AND ALERT


RESPONDS APPROPRIATELY TO VERBAL COMMANDS


STILL INTUBATED


O2 SAT--100%





Objective





- Vital Signs/Intake and Output


Vital Signs (last 24 hours): 


                                        











Temp Pulse Resp BP Pulse Ox


 


 98.2 F   75   25 H  171/80 H  100 


 


 03/04/19 08:00  03/04/19 08:00  03/04/19 08:00  03/04/19 08:00  03/04/19 08:00








Intake and Output: 


                                        











 03/04/19 03/04/19





 06:59 18:59


 


Intake Total 860 45


 


Output Total 650 100


 


Balance 210 -55














- Medications


Medications: 


                               Current Medications





Acetaminophen (Tylenol 650mg/20.3ml Solution Ud)  650 mg PO Q6 PRN


   PRN Reason: Fever>100.4 F


   Last Admin: 03/01/19 13:06 Dose:  650 mg


Albuterol/Ipratropium (Duoneb 3 Mg/0.5 Mg (3 Ml) Ud)  3 ml INH RQ6 [facility]


   Last Admin: 03/04/19 07:12 Dose:  3 ml


Allopurinol (Zyloprim)  300 mg PO DAILY [facility]


   Last Admin: 03/04/19 08:22 Dose:  300 mg


Calcium Acetate (Phoslo)  667 mg PO TID [facility]


   Last Admin: 03/04/19 08:21 Dose:  667 mg


Levetiracetam 500 mg/ Sodium (Chloride)  105 mls @ 210 mls/hr IVPB Q12 [facility]


   Last Admin: 03/04/19 08:20 Dose:  210 mls/hr


Vancomycin HCl 1 gm/ Sodium (Chloride)  250 mls @ 250 mls/hr IVPB MWF [facility]; 

Protocol


   Last Admin: 03/04/19 08:22 Dose:  250 mls/hr


Cefepime HCl 1 gm/ Sodium (Chloride)  100 mls @ 100 mls/hr IVPB DAILY [facility]; 

Protocol


   Last Admin: 03/04/19 08:21 Dose:  100 mls/hr


Insulin Human Regular (Humulin R)  0 units SC ACCU-CHECK [facility]; Protocol


   Last Admin: 03/03/19 22:05 Dose:  2 u


Labetalol HCl (Trandate)  20 mg IVP Q4 PRN


   PRN Reason: Systolic Blood Pressure


   Last Admin: 03/03/19 02:38 Dose:  20 mg


Vitamin B Complex/Vit C/Folic Acid (Nephro-Yoseph)  1 tab PO DAILY [facility]


   Last Admin: 03/04/19 08:21 Dose:  1 tab











- Labs


Labs: 


                                        





                                 03/04/19 04:10 





                                 03/04/19 04:10 





                                        











PT  16.2 Seconds (9.8-13.1)  H  03/01/19  10:28    


 


INR  1.4   03/01/19  10:28    


 


APTT  35.6 Seconds (25.6-37.1)   03/01/19  10:28    














- Constitutional


Appears: No Acute Distress





- Head Exam


Head Exam: ATRAUMATIC, NORMAL INSPECTION, NORMOCEPHALIC





- Eye Exam


Eye Exam: EOMI, Normal appearance, PERRL


Pupil Exam: NORMAL ACCOMODATION, PERRL





- ENT Exam


ENT Exam: Mucous Membranes Moist, Normal Exam





- Neck Exam


Neck Exam: Full ROM, Normal Inspection.  absent: Lymphadenopathy





- Respiratory Exam


Respiratory Exam: Clear to Ausculation Bilateral


Additional comments: 





ETT IN PLACE





- Cardiovascular Exam


Cardiovascular Exam: REGULAR RHYTHM, +S1, +S2.  absent: Murmur





- GI/Abdominal Exam


GI & Abdominal Exam: Soft, Normal Bowel Sounds.  absent: Tenderness





- Rectal Exam


Rectal Exam: NORMAL INSPECTION





- Extremities Exam


Extremities Exam: Full ROM, Normal Capillary Refill, Normal Inspection.  absent:

Joint Swelling, Pedal Edema





- Back Exam


Back Exam: NORMAL INSPECTION





- Neurological Exam


Neurological Exam: Alert, Awake, CN II-XII Intact, Oriented x3





- Psychiatric Exam


Psychiatric exam: Normal Affect, Normal Mood





- Skin


Skin Exam: Dry, Intact, Normal Color, Warm





Assessment and Plan





- Assessment and Plan (Free Text)


Assessment: 





RESP FAILURE IMPROVED


Plan: 





BEGIN EXTUBATION PROTOCOL

## 2019-03-05 LAB
ARTERIAL BLOOD GAS HEMOGLOBIN: 9.9 G/DL (ref 11.7–17.4)
ARTERIAL BLOOD GAS O2 SAT: 99.6 % (ref 95–98)
ARTERIAL BLOOD GAS PCO2: 40 MM/HG (ref 35–45)
ARTERIAL BLOOD GAS TCO2: 29.6 MMOL/L (ref 22–28)
ARTERIAL PATENCY WRIST A: YES
BUN SERPL-MCNC: 46 MG/DL (ref 7–17)
CALCIUM SERPL-MCNC: 8.8 MG/DL (ref 8.4–10.2)
ERYTHROCYTE [DISTWIDTH] IN BLOOD BY AUTOMATED COUNT: 19.6 % (ref 11.5–14.5)
GFR NON-AFRICAN AMERICAN: 14
HCO3 BLDA-SCNC: 28.2 MMOL/L (ref 21–28)
HGB BLD-MCNC: 9.2 G/DL (ref 12–16)
INHALED O2 CONCENTRATION: 28 %
MCH RBC QN AUTO: 24.9 PG (ref 27–31)
MCHC RBC AUTO-ENTMCNC: 32.3 G/DL (ref 33–37)
MCV RBC AUTO: 77.2 FL (ref 81–99)
O2 CAP BLDA-SCNC: 13.6 ML/DL (ref 16–24)
O2 CT BLDA-SCNC: 13.5 ML/DL (ref 15–23)
PH BLDA: 7.46 [PH] (ref 7.35–7.45)
PLATELET # BLD: 215 K/UL (ref 130–400)
PO2 BLDA: 93 MM/HG (ref 80–100)
RBC # BLD AUTO: 3.69 MIL/UL (ref 3.8–5.2)
URATE SERPL-MCNC: 6.3 MG/DL (ref 2.2–7.5)
WBC # BLD AUTO: 20.1 K/UL (ref 4.8–10.8)

## 2019-03-05 RX ADMIN — IPRATROPIUM BROMIDE AND ALBUTEROL SULFATE SCH ML: .5; 3 SOLUTION RESPIRATORY (INHALATION) at 13:33

## 2019-03-05 RX ADMIN — POTASSIUM CHLORIDE SCH MLS/HR: 14.9 INJECTION, SOLUTION INTRAVENOUS at 12:30

## 2019-03-05 RX ADMIN — Medication SCH: at 10:13

## 2019-03-05 RX ADMIN — IPRATROPIUM BROMIDE AND ALBUTEROL SULFATE SCH ML: .5; 3 SOLUTION RESPIRATORY (INHALATION) at 19:18

## 2019-03-05 RX ADMIN — POTASSIUM CHLORIDE SCH MLS/HR: 14.9 INJECTION, SOLUTION INTRAVENOUS at 13:38

## 2019-03-05 RX ADMIN — Medication SCH: at 12:19

## 2019-03-05 RX ADMIN — POLYETHYLENE GLYCOL 3350 SCH GM: 17 POWDER, FOR SOLUTION ORAL at 21:45

## 2019-03-05 RX ADMIN — IPRATROPIUM BROMIDE AND ALBUTEROL SULFATE SCH ML: .5; 3 SOLUTION RESPIRATORY (INHALATION) at 01:00

## 2019-03-05 RX ADMIN — IPRATROPIUM BROMIDE AND ALBUTEROL SULFATE SCH ML: .5; 3 SOLUTION RESPIRATORY (INHALATION) at 08:23

## 2019-03-05 RX ADMIN — Medication SCH APPLIC: at 16:00

## 2019-03-05 NOTE — RAD
Date of service: 



03/05/2019



HISTORY:

 abd pain 



COMPARISON:

Abdomen pelvis CT without contrast 02/24/2019.



FINDINGS:



BOWEL:

Increased gaseous dilatation of small as well as large-bowel loops is 

identified with a mild amount of gas at the distal rectosigmoid in a 

pattern most compatible with an ileus rather than distal large bowel 

obstruction.  The stomach is also moderately dilated.  Gas pattern 

has increased since prior CT 02/24/2019. No free intrarenal gas 

collection or abnormal internal calcifications are identified.  

Surgical clips are again seen the right upper quadrant abdomen.



BONES:

Normal.



OTHER FINDINGS:

None.



IMPRESSION:

Increasing gaseous dilatation of the gastrointestinal tract is 

appreciated most compatible with ileus rather than distal large bowel 

obstruction.

## 2019-03-05 NOTE — CP.PCM.PN
Subjective





- Date & Time of Evaluation


Date of Evaluation: 03/05/19


Time of Evaluation: 10:57





- Subjective


Subjective: 





Neuro Follow-Up Note:





Mrs. Omalley was evaluated this morning in the ICU.  No family at bedside 

during time of exam.  She is now extubated.  Pt is awake, verbal, and able to 

follow commands.  Pt still cannot recall events that preceded her episode of AMS

and the reason why family called EMS.  Today pt denies h/a, dizziness, visual c

hanges, chest pain, nausea; still admits to abd pain but improved since 

yesterday. 





Objective





- Vital Signs/Intake and Output


Vital Signs (last 24 hours): 


                                        











Temp Pulse Resp BP Pulse Ox


 


 99.2 F   82   34 H  149/76   100 


 


 03/05/19 08:00  03/05/19 08:00  03/05/19 08:00  03/05/19 08:00  03/05/19 08:00








Intake and Output: 


                                        











 03/05/19 03/05/19





 06:59 18:59


 


Intake Total 733 


 


Output Total 800 


 


Balance -67 














- Medications


Medications: 


                               Current Medications





Acetaminophen (Tylenol 650mg/20.3ml Solution Ud)  650 mg PO Q6 PRN


   PRN Reason: Fever>100.4 F


   Last Admin: 03/01/19 13:06 Dose:  650 mg


Albuterol/Ipratropium (Duoneb 3 Mg/0.5 Mg (3 Ml) Ud)  3 ml INH RQ6 [facility]


   Last Admin: 03/05/19 08:23 Dose:  3 ml


Dimethicone (Proshield Plus Skin Protectant)  1 applic TOP Q8 [facility]


Levetiracetam 500 mg/ Sodium (Chloride)  105 mls @ 210 mls/hr IVPB Q12 [facility]


   Last Admin: 03/05/19 10:12 Dose:  210 mls/hr


Vancomycin HCl 1 gm/ Sodium (Chloride)  250 mls @ 250 mls/hr IVPB MWF [facility]; 

Protocol


   Last Admin: 03/04/19 08:22 Dose:  250 mls/hr


Cefepime HCl 1 gm/ Sodium (Chloride)  100 mls @ 100 mls/hr IVPB Q12 [facility]; 

Protocol


   Last Admin: 03/05/19 10:13 Dose:  100 mls/hr


Potassium Chloride (Potassium Cl 10meq/50ml Sterile Water)  50 mls @ 50 mls/hr 

IVPB Q1 [facility]AE


   Stop: 03/05/19 11:59


Insulin Human Regular (Humulin R)  0 units SC ACCU-CHECK [facility]AE; Protocol


   Last Admin: 03/05/19 06:57 Dose:  2 u


Labetalol HCl (Trandate)  20 mg IVP Q4 PRN


   PRN Reason: Systolic Blood Pressure


   Last Admin: 03/03/19 02:38 Dose:  20 mg


Ondansetron HCl (Zofran Inj)  4 mg IVP Q6 PRN


   PRN Reason: Nausea/Vomiting


   Last Admin: 03/05/19 01:04 Dose:  4 mg


Vitamin B Complex/Vit C/Folic Acid (Nephro-Yoseph)  1 tab PO DAILY [facility]


   Last Admin: 03/05/19 10:13 Dose:  Not Given











- Labs


Labs: 


                                        





                                 03/05/19 05:00 





                                 03/05/19 05:00 





                                        











PT  16.2 Seconds (9.8-13.1)  H  03/01/19  10:28    


 


INR  1.4   03/01/19  10:28    


 


APTT  35.6 Seconds (25.6-37.1)   03/01/19  10:28    














- Constitutional


Appears: Well, Non-toxic





- Head Exam


Head Exam: ATRAUMATIC, NORMAL INSPECTION, NORMOCEPHALIC





- Eye Exam


Eye Exam: EOMI


Pupil Exam: absent: NORMAL ACCOMODATION


Additional comments: 





Pupils are both reactive to light, though unequal---Left pupil is approx 2-3 mm;

Right pupil is approx 6-7 mm.


No visual field deficits


No diplopia





- ENT Exam


ENT Exam: Mucous Membranes Moist





- Neck Exam


Neck Exam: Normal Inspection





- Respiratory Exam


Respiratory Exam: NORMAL BREATHING PATTERN





- GI/Abdominal Exam


GI & Abdominal Exam: Tenderness (still  has some tenderness to gen abd area, 

improved since yesterday)





- Extremities Exam


Extremities Exam: absent: Calf Tenderness, Full ROM, Pedal Edema


Additional comments: 


RUE and RLE flaccid 2/2 previous CVA


Able to move LUE and LLE---able to raise her LLE for at least 3-4 seconds.








- Neurological Exam


Neurological Exam: Alert, Awake, CN II-XII Intact


Neuro motor strength exam: Left Upper Extremity: 3 ( 3/5), Right Upper 

Extremity: 0, Left Lower Extremity: 3 (plantar flexion 3/5), Right Lower 

Extremity: 0


Additional comments: 


Pt is awake, alert, verbal, follows commands.


Oriented to place--knows she is in the hospital. 


Pupils are both reactive to light, though unequal---Left pupil is approx 2-3 mm;

Right pupil is approx 6-7 mm.


No visual field deficits


No diplopia


RUE and RLE flaccid 2/2 previous CVA


Able to move LUE and LLE, still  has weakened  but better than 

yesterday---to raise her LLE for at least 3-4 seconds.


Sensation intact b/l


No tremors


Toes down doing left side; plantar response difficult to elicit on right side.








- Psychiatric Exam


Psychiatric exam: Normal Affect





- Skin


Skin Exam: Normal Color





Assessment and Plan


(1) Toxic metabolic encephalopathy


Assessment & Plan: 


Imaging reviewed:





-Brain MRI (2/27/19): 1. No acute intracranial abnormality. 2. Cystic 

encephalomalacia and gliosis in the left corona radiata and basal ganglia, 

sequela of remote MCA territory infarction.  3. Mild chronic microangiopathic 

changes and mild age-related global parenchymal volume loss. Old lacunar 

infarctions in the left cerebellar hemisphere.   4. Pansinusitis, with a 

complicated retention cyst/polyp in the right maxillary sinus. Fluid in the 

sphenoid sinus may represent acute sinusitis in the appropriate clinical 

setting. Bilateral mastoid effusions.


-EEG (repeat; 2/26/19):  This is  an abnormal EEG record that demonstrate the 

presence of severe non specific diffuse disturbance of cortical activity, this 

is keeping with a diffuse gray matter dysfunction, these findings re not 

specific. These type of EEG is usually seen in toxic metabolic encephalopathies 

, hypoxic-ischemic brain injury among others, clinical correlation is recommen

ded.  The EEG look similar to the previous video EEG study  of 2/23,  2/24.  No 

seizures. 


Patient is not in status epilepticus.  


-CT Head (2/24/19): No acute intracranial abnormalities. No significant findings

to account for the clinical presentation. No significant interval change 

compared to the prior examination(s).


-CT Head (2/22/19): No acute intracranial abnormalities. No significant findings

to account for the clinical presentation.


Extensive postoperative changes described above.


-EEG (2/24/19): shows burst suppression per Dr. Patrick.


-ECHO (2/24/19) done: 50-55%








-Will order Carotid and Vertebral U/S to be done tomorrow morning now that the 

pt is extubated.


-No need for repeat CT head per Dr. Patrick for unequal pupils today.  


-We still recommend for Cardiology to do a VIVIENNE to r/o septic endocarditis if 

there is no improvement in pt condition--ID in agreement.


-Continue current medications and treatment of other acute medical issues. 


-Continue ICU management.


-Discussed neuro plan with Dr. Ron and primary RN.


-Notify neuro team of any acute changes to pt's condition.








Chrystal Dean, AMBAR, APN


Case discussed with Dr. Hassan


Status: Acute

## 2019-03-05 NOTE — CP.PCM.PN
Subjective





- Date & Time of Evaluation


Date of Evaluation: 03/05/19


Time of Evaluation: 11:00





- Subjective


Subjective: 





Patient is awake


Extubated.


On Nc  and no SOB


Has no fever


 Continues to have elevated  WBC.


On Cefepime and Vancomycin








Objective





- Vital Signs/Intake and Output


Vital Signs (last 24 hours): 


                                        











Temp Pulse Resp BP Pulse Ox


 


 98.4 F   86   28 H  166/78 H  100 


 


 03/05/19 16:00  03/05/19 18:00  03/05/19 18:00  03/05/19 18:00  03/05/19 18:00








Intake and Output: 


                                        











 03/05/19 03/06/19





 18:59 06:59


 


Intake Total 300 


 


Output Total 825 


 


Balance -525 














- Medications


Medications: 


                               Current Medications





Acetaminophen (Tylenol 650mg/20.3ml Solution Ud)  650 mg PO Q6 PRN


   PRN Reason: Fever>100.4 F


   Last Admin: 03/01/19 13:06 Dose:  650 mg


Acetaminophen (Tylenol 325mg Tab)  650 mg PO Q6 PRN


   PRN Reason: Pain, moderate (4-7)


   Last Admin: 03/05/19 19:42 Dose:  650 mg


Albuterol/Ipratropium (Duoneb 3 Mg/0.5 Mg (3 Ml) Ud)  3 ml INH RQ6 DESIRAE


   Last Admin: 03/05/19 19:18 Dose:  3 ml


Dimethicone (Proshield Plus Skin Protectant)  1 applic TOP Q8 DESIRAE


   Last Admin: 03/05/19 16:00 Dose:  1 applic


Levetiracetam 500 mg/ Sodium (Chloride)  105 mls @ 210 mls/hr IVPB Q12 [facility]IRAE


   Last Admin: 03/05/19 20:00 Dose:  210 mls/hr


Vancomycin HCl 1 gm/ Sodium (Chloride)  250 mls @ 250 mls/hr IVPB MWF DESIRAE; 

Protocol


   Last Admin: 03/04/19 08:22 Dose:  250 mls/hr


Cefepime HCl 1 gm/ Sodium (Chloride)  100 mls @ 100 mls/hr IVPB Q12 [facility]IRAE; 

Protocol


   Last Admin: 03/05/19 20:01 Dose:  100 mls/hr


Insulin Human Regular (Humulin R)  0 units SC ACCU-CHECK DESIRAE; Protocol


   Last Admin: 03/05/19 16:52 Dose:  1 u


Labetalol HCl (Trandate)  20 mg IVP Q4 PRN


   PRN Reason: Systolic Blood Pressure


   Last Admin: 03/03/19 02:38 Dose:  20 mg


Ondansetron HCl (Zofran Inj)  4 mg IVP Q6 PRN


   PRN Reason: Nausea/Vomiting


   Last Admin: 03/05/19 01:04 Dose:  4 mg


Polyethylene Glycol (Miralax)  17 gm PO HS [facility]


Vitamin B Complex/Vit C/Folic Acid (Nephro-Yoseph)  1 tab PO DAILY [facility]


   Last Admin: 03/05/19 10:13 Dose:  Not Given











- Labs


Labs: 


                                        





                                 03/05/19 05:00 





                                 03/05/19 05:00 





                                        











PT  16.2 Seconds (9.8-13.1)  H  03/01/19  10:28    


 


INR  1.4   03/01/19  10:28    


 


APTT  35.6 Seconds (25.6-37.1)   03/01/19  10:28    














- Head Exam


Head Exam: NORMAL INSPECTION





- Eye Exam


Eye Exam: Normal appearance





- ENT Exam


ENT Exam: Mucous Membranes Moist





- Respiratory Exam


Respiratory Exam: Decreased Breath Sounds





- GI/Abdominal Exam


GI & Abdominal Exam: Normal Bowel Sounds





- Neurological Exam


Neurological Exam: Awake, Oriented x3





Assessment and Plan


(1) Respiratory failure


Status: Acute   





(2) Sepsis


Status: Acute   





(3) Toxic metabolic encephalopathy


Status: Acute   





- Assessment and Plan (Free Text)


Plan: 





Cont med


 Cont iv antibiotics


Follow up with ID


 check labs


Incentive spirometry

## 2019-03-05 NOTE — RAD
Date of service: 



03/05/2019



HISTORY:

 Intubated 



COMPARISON:

Portable chest 03/04/2019.



FINDINGS:



LUNGS:

The endotracheal tube is not visualized suggesting extubation.  

Clinically correlate.  Right central venous line and right center 

venous dialysis catheter unchanged in position.



Taking differences in technique into account, limited patchy density 

is identified at the right lateral base with remaining lung fields 

clear.



PLEURA:

Trace right pleural effusion is not excluded.  None is grossly 

evident at the left.  The left costophrenic sulcus is been excluded.  

Follow-up chest radiograph recommended.



CARDIOVASCULAR:

Calcific atherosclerotic changes are seen related to the thoracic 

aorta.



 Normal cardiac size.  Prosthetic cardiac valve again evident.  No 

pulmonary vascular congestion. 



OSSEOUS STRUCTURES:

Sternotomy wires reiterated.



VISUALIZED UPPER ABDOMEN:

Surgical clips again noted right upper quadrant abdomen.



OTHER FINDINGS:

None.



IMPRESSION:

Limited patchy atelectasis favored over infiltrate right base 

laterally.  Trace right pleural effusion present.  No gross left 

pleural effusion evident.  Interval extubation likely.  Clinically 

correlate.  Remaining catheters unchanged in position.

## 2019-03-05 NOTE — CP.PCM.PN
Subjective





- Date & Time of Evaluation


Date of Evaluation: 03/05/19


Time of Evaluation: 07:00





- Subjective


Subjective: 





Patient in bed awake and conscious and extubated.


Vital signs noted to be stable.


Kidney function noted to be stable with serum creatinine is not rising.


Mental status improving.





Objective





- Vital Signs/Intake and Output


Vital Signs (last 24 hours): 


                                        











Temp Pulse Resp BP Pulse Ox


 


 99.2 F   82   34 H  149/76   100 


 


 03/05/19 08:00  03/05/19 08:00  03/05/19 08:00  03/05/19 08:00  03/05/19 08:00








Intake and Output: 


                                        











 03/05/19 03/05/19





 06:59 18:59


 


Intake Total 733 


 


Output Total 800 


 


Balance -67 














- Medications


Medications: 


                               Current Medications





Acetaminophen (Tylenol 650mg/20.3ml Solution Ud)  650 mg PO Q6 PRN


   PRN Reason: Fever>100.4 F


   Last Admin: 03/01/19 13:06 Dose:  650 mg


Albuterol/Ipratropium (Duoneb 3 Mg/0.5 Mg (3 Ml) Ud)  3 ml INH RQ6 DESIRAE


   Last Admin: 03/05/19 08:23 Dose:  3 ml


Dimethicone (Proshield Plus Skin Protectant)  1 applic TOP Q8 [facility]IRAE


Levetiracetam 500 mg/ Sodium (Chloride)  105 mls @ 210 mls/hr IVPB Q12 [facility]IRAE


   Last Admin: 03/04/19 20:37 Dose:  210 mls/hr


Vancomycin HCl 1 gm/ Sodium (Chloride)  250 mls @ 250 mls/hr IVPB MWF DESIRAE; 

Protocol


   Last Admin: 03/04/19 08:22 Dose:  250 mls/hr


Cefepime HCl 1 gm/ Sodium (Chloride)  100 mls @ 100 mls/hr IVPB Q12 [facility]IRAE; 

Protocol


   Last Admin: 03/04/19 21:22 Dose:  100 mls/hr


Potassium Chloride (Potassium Cl 10meq/50ml Sterile Water)  50 mls @ 50 mls/hr 

IVPB Q1 DESIRAE


   Stop: 03/05/19 11:59


Insulin Human Regular (Humulin R)  0 units SC ACCU-CHECK DESIRAE; Protocol


   Last Admin: 03/05/19 06:57 Dose:  2 u


Labetalol HCl (Trandate)  20 mg IVP Q4 PRN


   PRN Reason: Systolic Blood Pressure


   Last Admin: 03/03/19 02:38 Dose:  20 mg


Ondansetron HCl (Zofran Inj)  4 mg IVP Q6 PRN


   PRN Reason: Nausea/Vomiting


   Last Admin: 03/05/19 01:04 Dose:  4 mg


Vitamin B Complex/Vit C/Folic Acid (Nephro-Yoseph)  1 tab PO DAILY [facility]


   Last Admin: 03/04/19 08:21 Dose:  1 tab











- Labs


Labs: 


                                        





                                 03/05/19 05:00 





                                 03/05/19 05:00 





                                        











PT  16.2 Seconds (9.8-13.1)  H  03/01/19  10:28    


 


INR  1.4   03/01/19  10:28    


 


APTT  35.6 Seconds (25.6-37.1)   03/01/19  10:28    














- Constitutional


Appears: No Acute Distress





- Eye Exam


Eye Exam: Conjunctival injection





- ENT Exam


ENT Exam: Mucous Membranes Moist





- Neck Exam


Neck Exam: absent: Lymphadenopathy





- Respiratory Exam


Respiratory Exam: NORMAL BREATHING PATTERN.  absent: Chest Wall Tenderness





- Cardiovascular Exam


Cardiovascular Exam: absent: Gallop, JVD, Rubs





- GI/Abdominal Exam


GI & Abdominal Exam: Soft, Normal Bowel Sounds





- Extremities Exam


Extremities Exam: absent: Calf Tenderness, Pedal Edema





- Back Exam


Back Exam: absent: CVA tenderness (L), CVA tenderness (R)





- Neurological Exam


Neurological Exam: Awake





- Skin


Skin Exam: absent: Cyanosis





Assessment and Plan


(1) SHANITA (acute kidney injury)


Assessment & Plan: 


Assessment: critical


Respiratory failure/   patient extubated


Acute renal failure/acute kidney injury related to multifactorial including 

sepsis.


Diabetes mellitus and history of hypertension history of CVA.


 encephalopathy   patient improving mental status much better and more awake


hyperurecemia    corrected , DC allopurinol


Hyperphosphatemia    corrected DC calcium acetate


Shock liver


Nephrotic syndrome proteinuria with 7 gram proteinuria based on protein to 

creatinine ratio


Recommendation





DC allopurinol and DC calcium acetate


Worsening leukocytosis as per ID with antibiotics considering GFR


Kidney function continued to improve and serum creatinine is stable as noted and

therefore we will hold hemodialysis until further order and keep monitoring


Status: Acute   





(2) Elevated liver enzymes


Status: Acute   





(3) Elevated troponin level


Status: Acute   





(4) Leukocytosis


Status: Acute   





(5) Sepsis


Status: Acute

## 2019-03-05 NOTE — CP.CCUPN
CCU Subjective





- Physician Review


Subjective (Free Text): 





03/05/19 17:32


The patient was Seen/interviewed and examined by me at the bedside during ICU 

round, 


Medical records reviewed and Management issues were discussed and formulated 

with the house staff.


Events reviewed





Extubated and saturating well  on nasal cannula


Resp nonlabored, minimal respiratory secretions


Awake, follows commands


Comfortable, NAD


+ lower Abd pain, no n/v/d, Abd XR consistant with Ileus, Started Tylenol for 

pain and stool softener


Denies any chest pain, SOB or Palpitations


Afebrile, NSR on the monitor


Last 24H I&O 2228/1350


This morning labs revealed Stable renal function, No HD today





 





**

********************************************************************************


*


Abd XR: 03/05/2019


HISTORY:abd pain 


COMPARISON:Abdomen pelvis CT without contrast 02/24/2019.


FINDINGS:


BOWEL:


Increased gaseous dilatation of small as well as large-bowel loops is identified

with a mild amount of gas at the distal rectosigmoid in a pattern most 

compatible with an ileus rather than distal large bowel obstruction.  The 

stomach is also moderately dilated.  Gas pattern has increased since prior CT 

02/24/2019. No free intrarenal gas collection or abnormal internal 

calcifications are identified.  Surgical clips are again seen the right upper 

quadrant abdomen.








IMPRESSION:


Increasing gaseous dilatation of the gastrointestinal tract is appreciated most 

compatible with ileus rather than distal large bowel obstruction.











CCU Objective





- Vital Signs / Intake & Output


Vital Signs (Last 4 hours): 


Vital Signs











  Temp Pulse Resp BP Pulse Ox


 


 03/05/19 16:00  98.4 F  84   165/82 H  99


 


 03/05/19 14:00   91 H  22  142/73  100











Intake and Output (Last 8hrs): 


                                 Intake & Output











 03/05/19 03/05/19 03/05/19





 06:59 14:59 22:59


 


Intake Total 294 300 


 


Output Total 800 650 50


 


Balance -506 -350 -50


 


Weight 223 lb  


 


Intake:   


 


  IV 14  


 


  Intake, Piggyback 100 300 


 


  Tube Feeding 180  


 


Output:   


 


  Urine 800 650 50


 


    Urethral (Segura) 800 650 50














- Physical Exam


Head: Positive for: Atraumatic, Normocephalic


Pupils: Positive for: PERRL


Extroacular Muscles: Positive for: EOMI


Conjunctiva: Positive for: Normal


Mouth: Positive for: Moist Mucous Membranes


Nose (Internal): Positive for: Normal Inspection


Neck: Positive for: Normal Range of Motion


Respiratory/Chest: Positive for: Clear to Auscultation, Good Air Exchange, 

Accessory Muscle Use.  Negative for: Respiratory Distress


Abdomen: Positive for: Distention, Normal Bowel Sounds.  Negative for: 

Peritoneal Signs, Rebound, Guarding, Hernias, Feeding Tubes, Ostomy Tubes


Upper Extremity: Positive for: Normal Inspection


Lower Extremity: Positive for: Edema





- Medications


Active Medications: 


Active Medications











Generic Name Dose Route Start Last Admin





  Trade Name Freq  PRN Reason Stop Dose Admin


 


Acetaminophen  650 mg  03/01/19 09:45  03/01/19 13:06





  Tylenol 650mg/20.3ml Solution Ud  PO   650 mg





  Q6 PRN   Administration





  Fever>100.4 F   





     





     





     


 


Albuterol/Ipratropium  3 ml  02/27/19 14:30  03/05/19 13:33





  Duoneb 3 Mg/0.5 Mg (3 Ml) Ud  INH   3 ml





  RQ6 DESIRAE   Administration





     





     





     





     


 


Dimethicone  1 applic  03/05/19 09:00  03/05/19 16:00





  Proshield Plus Skin Protectant  TOP   1 applic





  Q8 DESIRAE   Administration





     





     





     





     


 


Levetiracetam 500 mg/ Sodium  105 mls @ 210 mls/hr  02/24/19 09:00  03/05/19 

10:12





  Chloride  IVPB   210 mls/hr





  Q12 DESIRAE   Administration





     





     





     





     


 


Vancomycin HCl 1 gm/ Sodium  250 mls @ 250 mls/hr  03/04/19 09:00  03/04/19 

08:22





  Chloride  IVPB   250 mls/hr





  MWF DESIRAE   Administration





     





     





  Protocol   





     


 


Cefepime HCl 1 gm/ Sodium  100 mls @ 100 mls/hr  03/04/19 21:00  03/05/19 10:13





  Chloride  IVPB   100 mls/hr





  Q12 DESIRAE   Administration





     





     





  Protocol   





     


 


Insulin Human Regular  0 units  02/28/19 17:00  03/05/19 12:18





  Humulin R  SC   1 u





  ACCU-CHECK DESIRAE   Administration





     





     





  Protocol   





     


 


Labetalol HCl  20 mg  03/02/19 12:45  03/03/19 02:38





  Trandate  IVP   20 mg





  Q4 PRN   Administration





  Systolic Blood Pressure   





     





     





     


 


Ondansetron HCl  4 mg  03/05/19 00:52  03/05/19 01:04





  Zofran Inj  IVP   4 mg





  Q6 PRN   Administration





  Nausea/Vomiting   





     





     





     


 


Vitamin B Complex/Vit C/Folic Acid  1 tab  03/03/19 09:00  03/05/19 10:13





  Nephro-Yoseph  PO   Not Given





  DAILY DESIRAE   





     





     





     





     














- Patient Studies


Lab Studies: 


                                   Lab Studies











  03/05/19 03/05/19 03/05/19 Range/Units





  11:05 06:07 05:00 


 


WBC     (4.8-10.8)  K/uL


 


RBC     (3.80-5.20)  Mil/uL


 


Hgb     (12.0-16.0)  g/dL


 


Hct     (34.0-47.0)  %


 


MCV     (81.0-99.0)  fl


 


MCH     (27.0-31.0)  pg


 


MCHC     (33.0-37.0)  g/dL


 


RDW     (11.5-14.5)  %


 


Plt Count     (130-400)  K/uL


 


pCO2    40  (35-45)  mm/Hg


 


pO2    93  ()  mm/Hg


 


HCO3    28.2 H  (21-28)  mmol/L


 


ABG pH    7.46 H  (7.35-7.45)  


 


ABG Total CO2    29.6 H  (22-28)  mmol/L


 


ABG O2 Saturation    99.6 H  (95-98)  %


 


ABG O2 Content    13.5 L  (15-23)  ML/dL


 


ABG Base Excess    4.2 H  (-2.0-3.0)  mmol/L


 


ABG Hemoglobin    9.9 L  (11.7-17.4)  g/dL


 


ABG Carboxyhemoglobin    2.0 H  (0.5-1.5)  %


 


POC ABG HHb (Measured)    0.4  (0.0-5.0)  %


 


ABG Methemoglobin    1.8  (0.0-3.0)  %


 


ABG O2 Capacity    13.6 L  (16-24)  mL/dL


 


Efrem Test    Yes  


 


A-a O2 Difference    57.0  mm/Hg


 


Hgb O2 Saturation    95.7  (95.0-98.0)  %


 


FiO2    28.0  %


 


Sodium     (132-148)  mmol/l


 


Potassium     (3.6-5.0)  MMOL/L


 


Chloride     ()  mmol/L


 


Carbon Dioxide     (22-30)  mmol/L


 


Anion Gap     (10-20)  


 


BUN     (7-17)  mg/dl


 


Creatinine     (0.7-1.2)  mg/dl


 


Est GFR (African Amer)     


 


Est GFR (Non-Af Amer)     


 


POC Glucose (mg/dL)  179 H  206 H   ()  mg/dL


 


Random Glucose     ()  mg/dL


 


Uric Acid     (2.2-7.5)  mg/Dl


 


Calcium     (8.4-10.2)  mg/dL


 


Phosphorus     (2.5-4.5)  mg/dl


 


Procalcitonin     (0.19-0.49)  NG/ML


 


Urine Myoglobin     














  03/05/19 03/05/19 03/05/19 Range/Units





  05:00 05:00 05:00 


 


WBC  20.1 H    (4.8-10.8)  K/uL


 


RBC  3.69 L    (3.80-5.20)  Mil/uL


 


Hgb  9.2 L    (12.0-16.0)  g/dL


 


Hct  28.5 L    (34.0-47.0)  %


 


MCV  77.2 L    (81.0-99.0)  fl


 


MCH  24.9 L    (27.0-31.0)  pg


 


MCHC  32.3 L    (33.0-37.0)  g/dL


 


RDW  19.6 H    (11.5-14.5)  %


 


Plt Count  215    (130-400)  K/uL


 


pCO2     (35-45)  mm/Hg


 


pO2     ()  mm/Hg


 


HCO3     (21-28)  mmol/L


 


ABG pH     (7.35-7.45)  


 


ABG Total CO2     (22-28)  mmol/L


 


ABG O2 Saturation     (95-98)  %


 


ABG O2 Content     (15-23)  ML/dL


 


ABG Base Excess     (-2.0-3.0)  mmol/L


 


ABG Hemoglobin     (11.7-17.4)  g/dL


 


ABG Carboxyhemoglobin     (0.5-1.5)  %


 


POC ABG HHb (Measured)     (0.0-5.0)  %


 


ABG Methemoglobin     (0.0-3.0)  %


 


ABG O2 Capacity     (16-24)  mL/dL


 


Efrem Test     


 


A-a O2 Difference     mm/Hg


 


Hgb O2 Saturation     (95.0-98.0)  %


 


FiO2     %


 


Sodium    141  (132-148)  mmol/l


 


Potassium    3.1 L  (3.6-5.0)  MMOL/L


 


Chloride    106  ()  mmol/L


 


Carbon Dioxide    28  (22-30)  mmol/L


 


Anion Gap    10  (10-20)  


 


BUN    46 H  (7-17)  mg/dl


 


Creatinine    3.3 H  (0.7-1.2)  mg/dl


 


Est GFR ( Amer)    17  


 


Est GFR (Non-Af Amer)    14  


 


POC Glucose (mg/dL)     ()  mg/dL


 


Random Glucose    210 H  ()  mg/dL


 


Uric Acid    6.3  (2.2-7.5)  mg/Dl


 


Calcium    8.8  (8.4-10.2)  mg/dL


 


Phosphorus    3.9  (2.5-4.5)  mg/dl


 


Procalcitonin   2.01 H   (0.19-0.49)  NG/ML


 


Urine Myoglobin     














  03/04/19 03/01/19 Range/Units





  20:51 11:00 


 


WBC    (4.8-10.8)  K/uL


 


RBC    (3.80-5.20)  Mil/uL


 


Hgb    (12.0-16.0)  g/dL


 


Hct    (34.0-47.0)  %


 


MCV    (81.0-99.0)  fl


 


MCH    (27.0-31.0)  pg


 


MCHC    (33.0-37.0)  g/dL


 


RDW    (11.5-14.5)  %


 


Plt Count    (130-400)  K/uL


 


pCO2    (35-45)  mm/Hg


 


pO2    ()  mm/Hg


 


HCO3    (21-28)  mmol/L


 


ABG pH    (7.35-7.45)  


 


ABG Total CO2    (22-28)  mmol/L


 


ABG O2 Saturation    (95-98)  %


 


ABG O2 Content    (15-23)  ML/dL


 


ABG Base Excess    (-2.0-3.0)  mmol/L


 


ABG Hemoglobin    (11.7-17.4)  g/dL


 


ABG Carboxyhemoglobin    (0.5-1.5)  %


 


POC ABG HHb (Measured)    (0.0-5.0)  %


 


ABG Methemoglobin    (0.0-3.0)  %


 


ABG O2 Capacity    (16-24)  mL/dL


 


Efrem Test    


 


A-a O2 Difference    mm/Hg


 


Hgb O2 Saturation    (95.0-98.0)  %


 


FiO2    %


 


Sodium    (132-148)  mmol/l


 


Potassium    (3.6-5.0)  MMOL/L


 


Chloride    ()  mmol/L


 


Carbon Dioxide    (22-30)  mmol/L


 


Anion Gap    (10-20)  


 


BUN    (7-17)  mg/dl


 


Creatinine    (0.7-1.2)  mg/dl


 


Est GFR (African Amer)    


 


Est GFR (Non-Af Amer)    


 


POC Glucose (mg/dL)  196 H   ()  mg/dL


 


Random Glucose    ()  mg/dL


 


Uric Acid    (2.2-7.5)  mg/Dl


 


Calcium    (8.4-10.2)  mg/dL


 


Phosphorus    (2.5-4.5)  mg/dl


 


Procalcitonin    (0.19-0.49)  NG/ML


 


Urine Myoglobin   TNP  








                         Laboratory Results - last 24 hr











  03/01/19 03/04/19 03/05/19





  11:00 20:51 05:00


 


WBC   


 


RBC   


 


Hgb   


 


Hct   


 


MCV   


 


MCH   


 


MCHC   


 


RDW   


 


Plt Count   


 


pCO2   


 


pO2   


 


HCO3   


 


ABG pH   


 


ABG Total CO2   


 


ABG O2 Saturation   


 


ABG O2 Content   


 


ABG Base Excess   


 


ABG Hemoglobin   


 


ABG Carboxyhemoglobin   


 


POC ABG HHb (Measured)   


 


ABG Methemoglobin   


 


ABG O2 Capacity   


 


Efrem Test   


 


A-a O2 Difference   


 


Hgb O2 Saturation   


 


FiO2   


 


Sodium    141


 


Potassium    3.1 L


 


Chloride    106


 


Carbon Dioxide    28


 


Anion Gap    10


 


BUN    46 H


 


Creatinine    3.3 H


 


Est GFR ( Amer)    17


 


Est GFR (Non-Af Amer)    14


 


POC Glucose (mg/dL)   196 H 


 


Random Glucose    210 H


 


Uric Acid    6.3


 


Calcium    8.8


 


Phosphorus    3.9


 


Procalcitonin   


 


Urine Myoglobin  TNP  














  03/05/19 03/05/19 03/05/19





  05:00 05:00 05:00


 


WBC   20.1 H 


 


RBC   3.69 L 


 


Hgb   9.2 L 


 


Hct   28.5 L 


 


MCV   77.2 L 


 


MCH   24.9 L 


 


MCHC   32.3 L 


 


RDW   19.6 H 


 


Plt Count   215 


 


pCO2    40


 


pO2    93


 


HCO3    28.2 H


 


ABG pH    7.46 H


 


ABG Total CO2    29.6 H


 


ABG O2 Saturation    99.6 H


 


ABG O2 Content    13.5 L


 


ABG Base Excess    4.2 H


 


ABG Hemoglobin    9.9 L


 


ABG Carboxyhemoglobin    2.0 H


 


POC ABG HHb (Measured)    0.4


 


ABG Methemoglobin    1.8


 


ABG O2 Capacity    13.6 L


 


Efrem Test    Yes


 


A-a O2 Difference    57.0


 


Hgb O2 Saturation    95.7


 


FiO2    28.0


 


Sodium   


 


Potassium   


 


Chloride   


 


Carbon Dioxide   


 


Anion Gap   


 


BUN   


 


Creatinine   


 


Est GFR ( Amer)   


 


Est GFR (Non-Af Amer)   


 


POC Glucose (mg/dL)   


 


Random Glucose   


 


Uric Acid   


 


Calcium   


 


Phosphorus   


 


Procalcitonin  2.01 H  


 


Urine Myoglobin   














  03/05/19 03/05/19





  06:07 11:05


 


WBC  


 


RBC  


 


Hgb  


 


Hct  


 


MCV  


 


MCH  


 


MCHC  


 


RDW  


 


Plt Count  


 


pCO2  


 


pO2  


 


HCO3  


 


ABG pH  


 


ABG Total CO2  


 


ABG O2 Saturation  


 


ABG O2 Content  


 


ABG Base Excess  


 


ABG Hemoglobin  


 


ABG Carboxyhemoglobin  


 


POC ABG HHb (Measured)  


 


ABG Methemoglobin  


 


ABG O2 Capacity  


 


Efrem Test  


 


A-a O2 Difference  


 


Hgb O2 Saturation  


 


FiO2  


 


Sodium  


 


Potassium  


 


Chloride  


 


Carbon Dioxide  


 


Anion Gap  


 


BUN  


 


Creatinine  


 


Est GFR ( Amer)  


 


Est GFR (Non-Af Amer)  


 


POC Glucose (mg/dL)  206 H  179 H


 


Random Glucose  


 


Uric Acid  


 


Calcium  


 


Phosphorus  


 


Procalcitonin  


 


Urine Myoglobin  











Radiology Impressions: 


                              Radiology Impressions





Abdomen X-Ray  03/05/19 03:40


IMPRESSION:


Increasing gaseous dilatation of the gastrointestinal tract is 


appreciated most compatible with ileus rather than distal large bowel 


obstruction.


 


 








Chest X-Ray  03/05/19 06:00


IMPRESSION:


Limited patchy atelectasis favored over infiltrate right base 


laterally.  Trace right pleural effusion present.  No gross left 


pleural effusion evident.  Interval extubation likely.  Clinically 


correlate.  Remaining catheters unchanged in position.  


 


 











Fingerstick Blood Sugar Results: 179





Review of Systems





- Constitutional


Constitutional: absent: Fever, Chills, Sweats, Weakness





- Cardiovascular


Cardiovascular: absent: Chest Pain, Chest Pain at Rest, Chest Pain with 

Activity, Claudication, Diaphoresis





- Respiratory


Respiratory: absent: Cough, Dyspnea, Hemoptysis, Dyspnea on Exertion, Wheezing, 

Snoring





Critical Care Progress Note





- Nutrition


Nutrition: 


                                    Nutrition











 Category Date Time Status


 


 Dysphagia/Modified Consistency Diet [DIET] Diets  03/05/19 Dinner Active














Assessment/Plan


(1) Acute respiratory failure with hypoxia


Current Visit: Yes   Status: Acute   Priority: High   Comment: 


Extubated and saturating well  on nasal cannula


Minimal respiratory secretion   





(2) Toxic metabolic encephalopathy


Current Visit: Yes   Status: Acute   Priority: High   Comment: 


Patient clinically improved, she is more alert and awake, Following commands 


sepsis under control


renal failure managed with HD


No seizures   





(3) Ileus


Current Visit: Yes   Status: Acute   Priority: High   Comment: 


+ lower Abd pain, no n/v/d, Abd XR consistant with Ileus, 


No evidance of Abd obstruction


Started Tylenol for pain and stool softener   





(4) Accelerated essential hypertension


Current Visit: Yes   Status: Acute   Priority: High   





(5) Aspiration pneumonia


Current Visit: Yes   Status: Acute   Comment: 


Continue current antibiotic with IV vancomycin and IV cefepime


Maintain map 65-75


   





(6) SHANITA (acute kidney injury)


Current Visit: Yes   Status: Acute   Priority: Medium   Comment: 


Improving, Stable renal function, K 3.1


Acute kidney injury likely multifactorial due to circulatory failure and sepsis


HD as per renal


   





(7) Shock liver


Current Visit: Yes   Status: Resolved   Priority: Medium

## 2019-03-06 LAB
% IRON SATURATION: 11 % (ref 20–55)
ALBUMIN SERPL-MCNC: 3.4 G/DL (ref 3.5–5)
ALBUMIN/GLOB SERPL: 0.8 {RATIO} (ref 1–2.1)
ALT SERPL-CCNC: 232 U/L (ref 9–52)
ANCA SCREEN: NEGATIVE
AST SERPL-CCNC: 87 U/L (ref 14–36)
BUN SERPL-MCNC: 47 MG/DL (ref 7–17)
CALCIUM SERPL-MCNC: 8.8 MG/DL (ref 8.4–10.2)
ERYTHROCYTE [DISTWIDTH] IN BLOOD BY AUTOMATED COUNT: 19.2 % (ref 11.5–14.5)
GFR NON-AFRICAN AMERICAN: 17
HDLC SERPL-MCNC: 33 MG/DL (ref 30–70)
HGB BLD-MCNC: 9 G/DL (ref 12–16)
IRON SERPL-MCNC: 29 UG/DL (ref 37–170)
LDLC SERPL-MCNC: 57 MG/DL (ref 0–129)
MCH RBC QN AUTO: 25.8 PG (ref 27–31)
MCHC RBC AUTO-ENTMCNC: 32.9 G/DL (ref 33–37)
MCV RBC AUTO: 78.3 FL (ref 81–99)
P-ANCA ATYPICAL TITR SER IF: (no result) {TITER}
P-ANCA TITR SER IF: (no result) {TITER}
PLATELET # BLD: 241 K/UL (ref 130–400)
RBC # BLD AUTO: 3.5 MIL/UL (ref 3.8–5.2)
TIBC SERPL-MCNC: 268 UG/DL (ref 250–450)
VIT B12 SERPL-MCNC: 901 PG/ML (ref 239–931)
WBC # BLD AUTO: 21 K/UL (ref 4.8–10.8)

## 2019-03-06 RX ADMIN — INSULIN LISPRO SCH UNITS: 100 INJECTION, SUSPENSION SUBCUTANEOUS at 16:20

## 2019-03-06 RX ADMIN — ACETAMINOPHEN PRN MG: 160 SOLUTION ORAL at 20:37

## 2019-03-06 RX ADMIN — Medication SCH APPLIC: at 16:29

## 2019-03-06 RX ADMIN — LABETALOL HYDROCHLORIDE PRN MG: 5 INJECTION, SOLUTION INTRAVENOUS at 16:19

## 2019-03-06 RX ADMIN — Medication SCH TAB: at 08:29

## 2019-03-06 RX ADMIN — Medication SCH APPLIC: at 08:32

## 2019-03-06 RX ADMIN — IPRATROPIUM BROMIDE AND ALBUTEROL SULFATE SCH ML: .5; 3 SOLUTION RESPIRATORY (INHALATION) at 07:46

## 2019-03-06 RX ADMIN — INSULIN LISPRO SCH UNITS: 100 INJECTION, SUSPENSION SUBCUTANEOUS at 08:25

## 2019-03-06 RX ADMIN — POLYETHYLENE GLYCOL 3350 SCH GM: 17 POWDER, FOR SOLUTION ORAL at 21:04

## 2019-03-06 RX ADMIN — METRONIDAZOLE SCH MLS/HR: 500 INJECTION, SOLUTION INTRAVENOUS at 16:20

## 2019-03-06 RX ADMIN — IPRATROPIUM BROMIDE AND ALBUTEROL SULFATE SCH ML: .5; 3 SOLUTION RESPIRATORY (INHALATION) at 01:06

## 2019-03-06 RX ADMIN — IPRATROPIUM BROMIDE AND ALBUTEROL SULFATE SCH ML: .5; 3 SOLUTION RESPIRATORY (INHALATION) at 19:08

## 2019-03-06 RX ADMIN — IPRATROPIUM BROMIDE AND ALBUTEROL SULFATE SCH ML: .5; 3 SOLUTION RESPIRATORY (INHALATION) at 13:46

## 2019-03-06 RX ADMIN — Medication SCH APPLIC: at 01:12

## 2019-03-06 NOTE — CP.PCM.PN
Subjective





- Date & Time of Evaluation


Date of Evaluation: 03/06/19


Time of Evaluation: 08:00





- Subjective


Subjective: 





awake


extubated


IV rx in progress


c/o abd pain


+ fever





Objective





- Vital Signs/Intake and Output


Vital Signs (last 24 hours): 


                                        











Temp Pulse Resp BP Pulse Ox


 


 100.9 F H  81   33 H  178/84 H  95 


 


 03/06/19 12:26  03/06/19 12:00  03/06/19 12:00  03/06/19 12:00  03/06/19 12:00








Intake and Output: 


                                        











 03/06/19 03/06/19





 06:59 18:59


 


Intake Total 490 718


 


Output Total 800 


 


Balance -310 718














- Medications


Medications: 


                               Current Medications





Acetaminophen (Tylenol 650mg/20.3ml Solution Ud)  650 mg PO Q6 PRN


   PRN Reason: Fever>100.4 F


   Last Admin: 03/01/19 13:06 Dose:  650 mg


Acetaminophen (Tylenol 325mg Tab)  650 mg PO Q6 PRN


   PRN Reason: Pain, moderate (4-7)


   Last Admin: 03/06/19 11:26 Dose:  650 mg


Albuterol/Ipratropium (Duoneb 3 Mg/0.5 Mg (3 Ml) Ud)  3 ml INH RQ6 DESIRAE


   Last Admin: 03/06/19 07:46 Dose:  3 ml


Dimethicone (Proshield Plus Skin Protectant)  1 applic TOP Q8 DESIRAE


   Last Admin: 03/06/19 08:32 Dose:  1 applic


Ferrous Sulfate (Feosol Liq)  300 mg PO TID DESIRAE


Vancomycin HCl 1 gm/ Sodium (Chloride)  250 mls @ 250 mls/hr IVPB MWF DESIRAE; 

Protocol


   Last Admin: 03/06/19 08:29 Dose:  250 mls/hr


Cefepime HCl 1 gm/ Sodium (Chloride)  100 mls @ 100 mls/hr IVPB Q12 DESIRAE; 

Protocol


   Last Admin: 03/06/19 08:28 Dose:  100 mls/hr


Insulin Human Regular (Humulin R)  0 units SC ACCU-CHECK DESIRAE; Protocol


   Last Admin: 03/06/19 11:25 Dose:  2 u


Insulin Lispro Protam/Lispro Human (Humalog Mix 75/25)  5 units SC BID DESIRAE; 

Protocol


   Last Admin: 03/06/19 08:25 Dose:  5 units


Labetalol HCl (Trandate)  20 mg IVP Q4 PRN


   PRN Reason: Systolic Blood Pressure


   Last Admin: 03/03/19 02:38 Dose:  20 mg


Levetiracetam (Keppra)  500 mg PO BID [facility]


   Last Admin: 03/06/19 08:26 Dose:  500 mg


Metoprolol Tartrate (Lopressor)  50 mg PO Q12 [facility]


   Last Admin: 03/06/19 08:28 Dose:  50 mg


Ondansetron HCl (Zofran Inj)  4 mg IVP Q6 PRN


   PRN Reason: Nausea/Vomiting


   Last Admin: 03/05/19 01:04 Dose:  4 mg


Polyethylene Glycol (Miralax)  17 gm PO HS [facility]


   Last Admin: 03/05/19 21:45 Dose:  17 gm


Vitamin B Complex/Vit C/Folic Acid (Nephro-Yoseph)  1 tab PO DAILY [facility]


   Last Admin: 03/06/19 08:29 Dose:  1 tab











- Labs


Labs: 


                                        





                                 03/06/19 04:20 





                                 03/06/19 04:20 





                                        











PT  16.2 Seconds (9.8-13.1)  H  03/01/19  10:28    


 


INR  1.4   03/01/19  10:28    


 


APTT  35.6 Seconds (25.6-37.1)   03/01/19  10:28    














- Constitutional


Appears: No Acute Distress, Chronically Ill





- Head Exam


Head Exam: NORMOCEPHALIC





- Eye Exam


Eye Exam: absent: Scleral icterus





- ENT Exam


ENT Exam: Mucous Membranes Dry





- Neck Exam


Neck Exam: absent: Lymphadenopathy





- Respiratory Exam


Respiratory Exam: Decreased Breath Sounds





- Cardiovascular Exam


Cardiovascular Exam: REGULAR RHYTHM





- GI/Abdominal Exam


GI & Abdominal Exam: Distended, Guarding, Soft, Tenderness, Diminished Bowel 

Sounds





- Rectal Exam


Rectal Exam: Deferred





-  Exam


 Exam: NORMAL INSPECTION





- Extremities Exam


Extremities Exam: absent: Pedal Edema





- Back Exam


Back Exam: absent: CVA tenderness (L), CVA tenderness (R)





- Neurological Exam


Neurological Exam: Alert, Awake, CN II-XII Intact


Neuro motor strength exam: Left Upper Extremity: 3, Right Upper Extremity: 0, 

Left Lower Extremity: 3, Right Lower Extremity: 0





- Psychiatric Exam


Psychiatric exam: Depressed





Assessment and Plan


(1) Sepsis


Status: Acute   





(2) Change in mental state


Status: Acute   





(3) History of CVA with residual deficit


Status: Acute   





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


Status: Chronic   





(5) History of CVA (cerebrovascular accident)


Status: Chronic   





(6) NSTEMI (non-ST elevated myocardial infarction)


Status: Acute   





(7) SHANITA (acute kidney injury)


Status: Acute   





(8) Pansinusitis


Status: Acute   





- Assessment and Plan (Free Text)


Assessment: 





Procalcitonin trending down


now with abd pain/ ileus


consider CT Abd/ Surgical eval


add Flagyl

## 2019-03-06 NOTE — CP.PCM.PN
Subjective





- Date & Time of Evaluation


Date of Evaluation: 03/06/19


Time of Evaluation: 12:58





- Subjective


Subjective: 





Patient awake and feeling much better verbalizing somewhat


Vital signs noted with intermittent high blood pressure





Objective





- Vital Signs/Intake and Output


Vital Signs (last 24 hours): 


                                        











Temp Pulse Resp BP Pulse Ox


 


 100.9 F H  81   33 H  178/84 H  95 


 


 03/06/19 12:26  03/06/19 12:00  03/06/19 12:00  03/06/19 12:00  03/06/19 12:00








Intake and Output: 


                                        











 03/06/19 03/06/19





 06:59 18:59


 


Intake Total 490 718


 


Output Total 800 


 


Balance -310 718














- Medications


Medications: 


                               Current Medications





Acetaminophen (Tylenol 650mg/20.3ml Solution Ud)  650 mg PO Q6 PRN


   PRN Reason: Fever>100.4 F


   Last Admin: 03/01/19 13:06 Dose:  650 mg


Acetaminophen (Tylenol 325mg Tab)  650 mg PO Q6 PRN


   PRN Reason: Pain, moderate (4-7)


   Last Admin: 03/06/19 11:26 Dose:  650 mg


Albuterol/Ipratropium (Duoneb 3 Mg/0.5 Mg (3 Ml) Ud)  3 ml INH RQ6 DESIRAE


   Last Admin: 03/06/19 07:46 Dose:  3 ml


Dimethicone (Proshield Plus Skin Protectant)  1 applic TOP Q8 DESIRAE


   Last Admin: 03/06/19 08:32 Dose:  1 applic


Ferrous Sulfate (Feosol Liq)  300 mg PO TID DESIRAE


Vancomycin HCl 1 gm/ Sodium (Chloride)  250 mls @ 250 mls/hr IVPB MWF DESIRAE; 

Protocol


   Last Admin: 03/06/19 08:29 Dose:  250 mls/hr


Cefepime HCl 1 gm/ Sodium (Chloride)  100 mls @ 100 mls/hr IVPB Q12 DESIRAE; 

Protocol


   Last Admin: 03/06/19 08:28 Dose:  100 mls/hr


Insulin Human Regular (Humulin R)  0 units SC ACCU-CHECK DESIRAE; Protocol


   Last Admin: 03/06/19 11:25 Dose:  2 u


Insulin Lispro Protam/Lispro Human (Humalog Mix 75/25)  5 units SC BID DESIRAE; 

Protocol


   Last Admin: 03/06/19 08:25 Dose:  5 units


Labetalol HCl (Trandate)  20 mg IVP Q4 PRN


   PRN Reason: Systolic Blood Pressure


   Last Admin: 03/03/19 02:38 Dose:  20 mg


Levetiracetam (Keppra)  500 mg PO BID [facility]


   Last Admin: 03/06/19 08:26 Dose:  500 mg


Metoprolol Tartrate (Lopressor)  50 mg PO Q12 [facility]


   Last Admin: 03/06/19 08:28 Dose:  50 mg


Ondansetron HCl (Zofran Inj)  4 mg IVP Q6 PRN


   PRN Reason: Nausea/Vomiting


   Last Admin: 03/05/19 01:04 Dose:  4 mg


Polyethylene Glycol (Miralax)  17 gm PO HS [facility]


   Last Admin: 03/05/19 21:45 Dose:  17 gm


Vitamin B Complex/Vit C/Folic Acid (Nephro-Yoseph)  1 tab PO DAILY [facility]


   Last Admin: 03/06/19 08:29 Dose:  1 tab











- Labs


Labs: 


                                        





                                 03/06/19 04:20 





                                 03/06/19 04:20 





                                        











PT  16.2 Seconds (9.8-13.1)  H  03/01/19  10:28    


 


INR  1.4   03/01/19  10:28    


 


APTT  35.6 Seconds (25.6-37.1)   03/01/19  10:28    














- Constitutional


Appears: No Acute Distress





- Eye Exam


Eye Exam: Conjunctival injection





- ENT Exam


ENT Exam: Mucous Membranes Moist





- Neck Exam


Neck Exam: absent: Lymphadenopathy





- Respiratory Exam


Respiratory Exam: Rhonchi.  absent: Chest Wall Tenderness





- Cardiovascular Exam


Cardiovascular Exam: absent: Gallop, JVD, Rubs





- GI/Abdominal Exam


GI & Abdominal Exam: Soft, Normal Bowel Sounds





- Extremities Exam


Extremities Exam: absent: Calf Tenderness





- Back Exam


Back Exam: absent: CVA tenderness (L), CVA tenderness (R)





- Neurological Exam


Neurological Exam: Altered, Awake





Assessment and Plan


(1) SHANITA (acute kidney injury)


Assessment & Plan: 


Assessment: critical


Respiratory failure/   patient extubated


Acute renal failure/acute kidney injury related to multifactorial including 

sepsis.


Diabetes mellitus and history of hypertension history of CVA.


 encephalopathy   patient improving mental status much better and more awake


hyperurecemia    corrected , DC allopurinol


Hyperphosphatemia    corrected DC calcium acetate


Shock liver


Nephrotic syndrome proteinuria with 7 gram proteinuria based on protein to 

creatinine ratio


Hypokalemia.  Given potassium chloride repeat magnesium


Recommendation


Patient remains septic with leukocytosis


Kidney function continued to improve patient is off dialysis


Remove dialysis catheter as soon as possible because of the sepsis..  Discussed 

with ICU nurse


Antibiotics management as per primary team


Liver function continued to improve


Status: Acute   





(2) Elevated liver enzymes


Status: Acute   





(3) Elevated troponin level


Status: Acute   





(4) Leukocytosis


Status: Acute   





(5) Sepsis


Status: Acute

## 2019-03-06 NOTE — CP.PCM.PN
Subjective





- Date & Time of Evaluation


Date of Evaluation: 03/06/19


Time of Evaluation: 12:01





- Subjective


Subjective: 


Neuro Follow-Up Note:





Mrs. Omalley was evaluated this morning in the ICU.  No family at bedside 

during time of exam.  She remains extubated and is awake, verbal, and able to 

follow commands.  Pt states that she feels better today.  Still has some abd 

pain (cannot rate it for me).  She appears to have some SOB, however, she 

denies. Denies h/a, dizziness, visual changes, chest pain, palpitations, cough, 

n/v/d, paresthesias.





Objective





- Vital Signs/Intake and Output


Vital Signs (last 24 hours): 


                                        











Temp Pulse Resp BP Pulse Ox


 


 101.2 F H  81   45 H  159/81 H  95 


 


 03/06/19 11:26  03/06/19 10:00  03/06/19 10:00  03/06/19 10:00  03/06/19 10:00








Intake and Output: 


                                        











 03/06/19 03/06/19





 06:59 18:59


 


Intake Total 490 718


 


Output Total 800 


 


Balance -310 718














- Medications


Medications: 


                               Current Medications





Acetaminophen (Tylenol 650mg/20.3ml Solution Ud)  650 mg PO Q6 PRN


   PRN Reason: Fever>100.4 F


   Last Admin: 03/01/19 13:06 Dose:  650 mg


Acetaminophen (Tylenol 325mg Tab)  650 mg PO Q6 PRN


   PRN Reason: Pain, moderate (4-7)


   Last Admin: 03/06/19 11:26 Dose:  650 mg


Albuterol/Ipratropium (Duoneb 3 Mg/0.5 Mg (3 Ml) Ud)  3 ml INH RQ6 [facility]


   Last Admin: 03/06/19 07:46 Dose:  3 ml


Dimethicone (Proshield Plus Skin Protectant)  1 applic TOP Q8 [facility]


   Last Admin: 03/06/19 08:32 Dose:  1 applic


Ferrous Sulfate (Feosol Liq)  300 mg PO TID [facility]


Vancomycin HCl 1 gm/ Sodium (Chloride)  250 mls @ 250 mls/hr IVPB MWF [facility]; 

Protocol


   Last Admin: 03/06/19 08:29 Dose:  250 mls/hr


Cefepime HCl 1 gm/ Sodium (Chloride)  100 mls @ 100 mls/hr IVPB Q12 [facility]; 

Protocol


   Last Admin: 03/06/19 08:28 Dose:  100 mls/hr


Insulin Human Regular (Humulin R)  0 units SC ACCU-CHECK [facility]; Protocol


   Last Admin: 03/06/19 11:25 Dose:  2 u


Insulin Lispro Protam/Lispro Human (Humalog Mix 75/25)  5 units SC BID [facility]; P

rotocol


   Last Admin: 03/06/19 08:25 Dose:  5 units


Labetalol HCl (Trandate)  20 mg IVP Q4 PRN


   PRN Reason: Systolic Blood Pressure


   Last Admin: 03/03/19 02:38 Dose:  20 mg


Levetiracetam (Keppra)  500 mg PO BID [facility]


   Last Admin: 03/06/19 08:26 Dose:  500 mg


Metoprolol Tartrate (Lopressor)  50 mg PO Q12 [facility]


   Last Admin: 03/06/19 08:28 Dose:  50 mg


Ondansetron HCl (Zofran Inj)  4 mg IVP Q6 PRN


   PRN Reason: Nausea/Vomiting


   Last Admin: 03/05/19 01:04 Dose:  4 mg


Polyethylene Glycol (Miralax)  17 gm PO HS [facility]


   Last Admin: 03/05/19 21:45 Dose:  17 gm


Vitamin B Complex/Vit C/Folic Acid (Nephro-Yoseph)  1 tab PO DAILY [facility]


   Last Admin: 03/06/19 08:29 Dose:  1 tab











- Labs


Labs: 


                                        





                                 03/06/19 04:20 





                                 03/06/19 04:20 





                                        











PT  16.2 Seconds (9.8-13.1)  H  03/01/19  10:28    


 


INR  1.4   03/01/19  10:28    


 


APTT  35.6 Seconds (25.6-37.1)   03/01/19  10:28    














- Constitutional


Appears: Well





- Head Exam


Head Exam: ATRAUMATIC, NORMAL INSPECTION, NORMOCEPHALIC





- Eye Exam


Eye Exam: EOMI, Normal appearance, PERRL


Pupil Exam: NORMAL ACCOMODATION, PERRL


Additional comments: 





Pupils are equal today


No diplopia; no visual field deficits





- ENT Exam


ENT Exam: Mucous Membranes Moist





- Neck Exam


Neck Exam: Normal Inspection





- Respiratory Exam


Respiratory Exam: absent: NORMAL BREATHING PATTERN (appears to have some sob, RR

22)





- Cardiovascular Exam


Cardiovascular Exam: Irregular Rhythm





- GI/Abdominal Exam


GI & Abdominal Exam: Tenderness (general abd area)





- Extremities Exam


Extremities Exam: absent: Calf Tenderness, Full ROM, Pedal Edema


Additional comments: 


RUE and RLE flaccid 2/2 previous CVA


Able to move LUE and LLE---still able to raise her LLE for at least 3 seconds.











- Neurological Exam


Neurological Exam: Alert, Awake, CN II-XII Intact


Neuro motor strength exam: Left Upper Extremity: 3 ( 4/5), Right Upper 

Extremity: 0, Left Lower Extremity: 3 (plantar flexion 3/5), Right Lower 

Extremity: 0


Additional comments: 


Pt is awake, alert, verbal, follows commands.


Oriented to place.


Pupils are equal and reactive today--both at 3 mm.


No visual field deficits


No diplopia


RUE and RLE flaccid 2/2 previous CVA


Able to move LUE and LLE, still has slightly weakened  but is better than 

yesterday.


Sensation intact b/l


No tremors


Toes down doing left side; plantar response difficult to elicit on right side.





- Psychiatric Exam


Psychiatric exam: Normal Affect, Normal Mood





- Skin


Skin Exam: Normal Color





Assessment and Plan


(1) Toxic metabolic encephalopathy


Assessment & Plan: 


Imaging reviewed:





-Brain MRI (2/27/19): 1. No acute intracranial abnormality. 2. Cystic 

encephalomalacia and gliosis in the left corona radiata and basal ganglia, s

equela of remote MCA territory infarction.  3. Mild chronic microangiopathic 

changes and mild age-related global parenchymal volume loss. Old lacunar 

infarctions in the left cerebellar hemisphere.   4. Pansinusitis, with a 

complicated retention cyst/polyp in the right maxillary sinus. Fluid in the s

phenoid sinus may represent acute sinusitis in the appropriate clinical setting.

Bilateral mastoid effusions.


-EEG (repeat; 2/26/19):  This is  an abnormal EEG record that demonstrate the 

presence of severe non specific diffuse disturbance of cortical activity, this 

is keeping with a diffuse gray matter dysfunction, these findings re not 

specific. These type of EEG is usually seen in toxic metabolic encephalopathies 

, hypoxic-ischemic brain injury among others, clinical correlation is recommende

d.  The EEG look similar to the previous video EEG study  of 2/23,  2/24.  No 

seizures. 


Patient is not in status epilepticus.  


-CT Head (2/24/19): No acute intracranial abnormalities. No significant findings

to account for the clinical presentation. No significant interval change 

compared to the prior examination(s).


-CT Head (2/22/19): No acute intracranial abnormalities. No significant findings

to account for the clinical presentation.


Extensive postoperative changes described above.


-EEG (2/24/19): shows burst suppression per Dr. Patrick.


-ECHO (2/24/19) done: 50-55%








-Carotid and Vertebral U/S pending--will f/u once completed.  If pt is able to 

tolerate MRA Head and Neck, that can be done as well.


-We still recommend for Cardiology to do a VIVIENNE to r/o septic endocarditis if pt 

continues to be febrile and have leukocytosis--ID in agreement.


-Continue current medications and treatment of other acute medical issues. 


-Continue ICU management.


-Notify neuro team of any acute changes to pt's condition.  Otherwise, pt is 

improving neurologically and is doing better each day.  


-If daughter has any questions, feel free to contact me.








Chrystal Dean, AMBAR, APN


Case discussed with Dr. Patrick


Status: Acute

## 2019-03-06 NOTE — CP.CCUPN
CCU Subjective





- Physician Review


Subjective (Free Text): 


Awake and conversant, no distress since self Extubation two days ago. 


No observed recurrent seizure activity, no documented further hypoglycemic 

episodes. 





Now 99F, but 100.4max over the past 24H,  HR and SBPs which were relatively 

stable are now trending higher SBPs up to 180s. Labetalol prn stated. 





Other vitals and I/O's reviewed. Urine output over a liter per 24H. 





ROS:  No other pertinent negs or positives on 10+  system review obtainable due 

to coma





PMSFH:   All other Nursing and physician documentation reviewed to date; no new 

pertinent info noted relevant to current medical problems.








EXAM- 


HEENT:  no icterus, no gaze preference, pupils with normal reactivity noted, no 

nystagmus


NECK: no JVD visible, supple, carotids equal upstroke bilat/no bruit, R IJV 

central line  intact. 


CHEST: decreased BS  at the bases, no wheezes audible


HEART: irregular, distant, S1S2, no rubs


ABD:  soft, obese, no distension, no focal  tenderness, no tympany, no guarding,

no organomegaly, BS hypoactive.  


EXT:    + LE edema, no mottling;  no calf tenderness or palpable cords, distal 

pulses intact and symmetrical, no cyanosis, no mottling.


NEURO:   R sided hemiplegia, minimal posturing of LUE and LLE 


SKIN:   no rashes,  warm and dry





LABS: 


WBC= 21.0


HGB= 9.0


PLTs=  241K





Na= 147


K= 3.3


CL= 104


HCO3= 27


BUN/Cr=  47/2.9


BS= 198








IMPRESSION / MAJOR PROBLEMS NOW:  


1.     s/p Acute Hypoxemic Resp Failure 2 Aspiration PNA


2.     Anoxic Encephalopathy 2  unclear Seizure event (hypoglycemic) and 

prolonged down time (EMS reports 25 minutes, but may have been longer as in not 

seen till next morning according to sons accounts of events, as he lives with 

her); or prolonged post-ictal state versus other occult CNS injury / infection /

subacute CVA. 


3.      Superimposed Metabolic Encephalopathy from Acute Renal Failure


4.      Accelerated HTN


5.      DM II





PLAN:


1.   Stable resp status, on nasal cannula oxygen. 


2.   Only 20 meq K supplement today. No arrhythmias noted. 


3.   Platelet counts normalized. LFTs slowly improving


4.   Feb 28 Sputum results reviewed; no growth of any organisms. Ongoing 

Maxipime / Vanco. Check Vanco trough, keep <15. WBC still high. Check need for 

keeping any invasive lines, or remove. 


5.   Lopressor added to anti-HTN regimen.


6.     Supplemental Iron therapy. 


7.   Consider AC for chronic A. flutter. Cardio opinion. 


7.   Stable for Tele bed, see orders.


8.   Physical Therapy

## 2019-03-06 NOTE — CP.PCM.PN
Subjective





- Date & Time of Evaluation


Date of Evaluation: 03/06/19


Time of Evaluation: 10:30





- Subjective


Subjective: 





extubated


alert and awake


responds to verbal commands





Objective





- Vital Signs/Intake and Output


Vital Signs (last 24 hours): 


                                        











Temp Pulse Resp BP Pulse Ox


 


 99.3 F   92 H  53 H  172/91 H  97 


 


 03/06/19 08:00  03/06/19 08:28  03/06/19 08:00  03/06/19 08:28  03/06/19 08:00








Intake and Output: 


                                        











 03/06/19 03/06/19





 06:59 18:59


 


Intake Total 490 4


 


Output Total 800 


 


Balance -310 4














- Medications


Medications: 


                               Current Medications





Acetaminophen (Tylenol 650mg/20.3ml Solution Ud)  650 mg PO Q6 PRN


   PRN Reason: Fever>100.4 F


   Last Admin: 03/01/19 13:06 Dose:  650 mg


Acetaminophen (Tylenol 325mg Tab)  650 mg PO Q6 PRN


   PRN Reason: Pain, moderate (4-7)


   Last Admin: 03/05/19 19:42 Dose:  650 mg


Albuterol/Ipratropium (Duoneb 3 Mg/0.5 Mg (3 Ml) Ud)  3 ml INH RQ6 [facility]


   Last Admin: 03/06/19 07:46 Dose:  3 ml


Dimethicone (Proshield Plus Skin Protectant)  1 applic TOP Q8 [facility]


   Last Admin: 03/06/19 08:32 Dose:  1 applic


Ferrous Sulfate (Feosol Liq)  300 mg PO TID [facility]


Vancomycin HCl 1 gm/ Sodium (Chloride)  250 mls @ 250 mls/hr IVPB MWF [facility]; 

Protocol


   Last Admin: 03/06/19 08:29 Dose:  250 mls/hr


Cefepime HCl 1 gm/ Sodium (Chloride)  100 mls @ 100 mls/hr IVPB Q12 [facility]; 

Protocol


   Last Admin: 03/06/19 08:28 Dose:  100 mls/hr


Insulin Human Regular (Humulin R)  0 units SC ACCU-CHECK [facility]; Protocol


   Last Admin: 03/06/19 06:46 Dose:  1 u


Insulin Lispro Protam/Lispro Human (Humalog Mix 75/25)  5 units SC BID [facility]; 

Protocol


   Last Admin: 03/06/19 08:25 Dose:  5 units


Labetalol HCl (Trandate)  20 mg IVP Q4 PRN


   PRN Reason: Systolic Blood Pressure


   Last Admin: 03/03/19 02:38 Dose:  20 mg


Levetiracetam (Keppra)  500 mg PO BID [facility]


   Last Admin: 03/06/19 08:26 Dose:  500 mg


Metoprolol Tartrate (Lopressor)  50 mg PO Q12 [facility]


   Last Admin: 03/06/19 08:28 Dose:  50 mg


Ondansetron HCl (Zofran Inj)  4 mg IVP Q6 PRN


   PRN Reason: Nausea/Vomiting


   Last Admin: 03/05/19 01:04 Dose:  4 mg


Polyethylene Glycol (Miralax)  17 gm PO HS [facility]


   Last Admin: 03/05/19 21:45 Dose:  17 gm


Vitamin B Complex/Vit C/Folic Acid (Nephro-Yoseph)  1 tab PO DAILY [facility]


   Last Admin: 03/06/19 08:29 Dose:  1 tab











- Labs


Labs: 


                                        





                                 03/06/19 04:20 





                                 03/06/19 04:20 





                                        











PT  16.2 Seconds (9.8-13.1)  H  03/01/19  10:28    


 


INR  1.4   03/01/19  10:28    


 


APTT  35.6 Seconds (25.6-37.1)   03/01/19  10:28    














- Constitutional


Appears: No Acute Distress





- Head Exam


Head Exam: ATRAUMATIC, NORMAL INSPECTION, NORMOCEPHALIC





- Eye Exam


Eye Exam: EOMI, Normal appearance, PERRL


Pupil Exam: NORMAL ACCOMODATION, PERRL





- ENT Exam


ENT Exam: Mucous Membranes Moist, Normal Exam





- Neck Exam


Neck Exam: Full ROM, Normal Inspection.  absent: Lymphadenopathy





- Respiratory Exam


Respiratory Exam: Clear to Ausculation Bilateral, Prolonged Expiratory Phase, 

NORMAL BREATHING PATTERN


Additional comments: 





on nc o2





- Cardiovascular Exam


Cardiovascular Exam: REGULAR RHYTHM, +S1, +S2.  absent: Murmur





- GI/Abdominal Exam


GI & Abdominal Exam: Soft, Normal Bowel Sounds.  absent: Tenderness





- Rectal Exam


Rectal Exam: NORMAL INSPECTION





- Extremities Exam


Extremities Exam: Full ROM, Normal Capillary Refill, Normal Inspection.  absent:

Joint Swelling, Pedal Edema





- Back Exam


Back Exam: NORMAL INSPECTION





- Neurological Exam


Neurological Exam: Alert, Awake, CN II-XII Intact, Oriented x3





- Psychiatric Exam


Psychiatric exam: Normal Affect, Normal Mood





- Skin


Skin Exam: Dry, Intact, Normal Color, Warm





Assessment and Plan





- Assessment and Plan (Free Text)


Assessment: 





s/p resp failure


pneumonia--improved


esrd


Plan: 





continue current rx

## 2019-03-07 LAB
ALBUMIN SERPL-MCNC: 3.4 G/DL (ref 3.5–5)
ALBUMIN/GLOB SERPL: 0.8 {RATIO} (ref 1–2.1)
ALT SERPL-CCNC: 218 U/L (ref 9–52)
ANISOCYTOSIS BLD QL SMEAR: SLIGHT
ARTERIAL BLOOD GAS O2 SAT: 97.2 % (ref 95–98)
ARTERIAL BLOOD GAS PCO2: 32 MM/HG (ref 35–45)
ARTERIAL BLOOD GAS TCO2: 26.5 MMOL/L (ref 22–28)
ARTERIAL PATENCY WRIST A: YES
AST SERPL-CCNC: 116 U/L (ref 14–36)
BASOPHILS # BLD AUTO: 0 K/UL (ref 0–0.2)
BASOPHILS NFR BLD: 0.1 % (ref 0–2)
BUN SERPL-MCNC: 51 MG/DL (ref 7–17)
CALCIUM SERPL-MCNC: 8.9 MG/DL (ref 8.4–10.2)
EOSINOPHIL # BLD AUTO: 0.1 K/UL (ref 0–0.7)
EOSINOPHIL NFR BLD: 0.6 % (ref 0–4)
EOSINOPHIL NFR BLD: 1 % (ref 0–7)
ERYTHROCYTE [DISTWIDTH] IN BLOOD BY AUTOMATED COUNT: 19.1 % (ref 11.5–14.5)
GFR NON-AFRICAN AMERICAN: 18
HCO3 BLDA-SCNC: 27.2 MMOL/L (ref 21–28)
HGB BLD-MCNC: 8.5 G/DL (ref 12–16)
HYPOCHROMIC: SLIGHT
INHALED O2 CONCENTRATION: 36 %
LYMPHOCYTE: 2 % (ref 20–50)
LYMPHOCYTES # BLD AUTO: 0.4 K/UL (ref 1–4.3)
LYMPHOCYTES NFR BLD AUTO: 2.5 % (ref 20–40)
MCH RBC QN AUTO: 25.4 PG (ref 27–31)
MCHC RBC AUTO-ENTMCNC: 32.7 G/DL (ref 33–37)
MCV RBC AUTO: 77.8 FL (ref 81–99)
MONOCYTE: 7 % (ref 0–10)
MONOCYTES # BLD: 1.4 K/UL (ref 0–0.8)
MONOCYTES NFR BLD: 7.9 % (ref 0–10)
NEUTROPHILS # BLD: 15.9 K/UL (ref 1.8–7)
NEUTROPHILS NFR BLD AUTO: 88.9 % (ref 50–75)
NEUTROPHILS NFR BLD AUTO: 90 % (ref 42–75)
NRBC BLD AUTO-RTO: 0 % (ref 0–0)
OVALOCYTES BLD QL SMEAR: SLIGHT
PH BLDA: 7.51 [PH] (ref 7.35–7.45)
PLATELET # BLD EST: NORMAL 10*3/UL
PLATELET # BLD: 257 K/UL (ref 130–400)
PMV BLD AUTO: 10.2 FL (ref 7.2–11.7)
PO2 BLDA: 73 MM/HG (ref 80–100)
RBC # BLD AUTO: 3.35 MIL/UL (ref 3.8–5.2)
STOMATOCYTES BLD QL SMEAR: SLIGHT
TOTAL CELLS COUNTED BLD: 100
WBC # BLD AUTO: 17.8 K/UL (ref 4.8–10.8)

## 2019-03-07 RX ADMIN — IPRATROPIUM BROMIDE AND ALBUTEROL SULFATE SCH ML: .5; 3 SOLUTION RESPIRATORY (INHALATION) at 19:18

## 2019-03-07 RX ADMIN — ACETAMINOPHEN PRN MG: 160 SOLUTION ORAL at 07:39

## 2019-03-07 RX ADMIN — IPRATROPIUM BROMIDE AND ALBUTEROL SULFATE SCH ML: .5; 3 SOLUTION RESPIRATORY (INHALATION) at 13:08

## 2019-03-07 RX ADMIN — METRONIDAZOLE SCH MLS/HR: 500 INJECTION, SOLUTION INTRAVENOUS at 01:45

## 2019-03-07 RX ADMIN — Medication SCH APPLIC: at 16:53

## 2019-03-07 RX ADMIN — Medication SCH APPLIC: at 08:25

## 2019-03-07 RX ADMIN — Medication SCH TAB: at 08:24

## 2019-03-07 RX ADMIN — INSULIN LISPRO SCH UNITS: 100 INJECTION, SUSPENSION SUBCUTANEOUS at 08:20

## 2019-03-07 RX ADMIN — METRONIDAZOLE SCH MLS/HR: 500 INJECTION, SOLUTION INTRAVENOUS at 08:19

## 2019-03-07 RX ADMIN — IPRATROPIUM BROMIDE AND ALBUTEROL SULFATE SCH ML: .5; 3 SOLUTION RESPIRATORY (INHALATION) at 08:05

## 2019-03-07 RX ADMIN — POLYETHYLENE GLYCOL 3350 SCH GM: 17 POWDER, FOR SOLUTION ORAL at 21:03

## 2019-03-07 RX ADMIN — ACETAMINOPHEN PRN MG: 160 SOLUTION ORAL at 20:45

## 2019-03-07 RX ADMIN — METRONIDAZOLE SCH MLS/HR: 500 INJECTION, SOLUTION INTRAVENOUS at 16:54

## 2019-03-07 RX ADMIN — IPRATROPIUM BROMIDE AND ALBUTEROL SULFATE SCH ML: .5; 3 SOLUTION RESPIRATORY (INHALATION) at 01:00

## 2019-03-07 RX ADMIN — LABETALOL HYDROCHLORIDE PRN MG: 5 INJECTION, SOLUTION INTRAVENOUS at 02:16

## 2019-03-07 RX ADMIN — INSULIN LISPRO SCH UNITS: 100 INJECTION, SUSPENSION SUBCUTANEOUS at 16:54

## 2019-03-07 RX ADMIN — Medication SCH APPLIC: at 01:45

## 2019-03-07 NOTE — CP.PCM.PN
Subjective





- Date & Time of Evaluation


Date of Evaluation: 03/07/19


Time of Evaluation: 09:49





- Subjective


Subjective: 





STILL CHRONICALLY ILL


HAS SOB





Objective





- Vital Signs/Intake and Output


Vital Signs (last 24 hours): 


                                        











Temp Pulse Resp BP Pulse Ox


 


 100.7 F H  72   36 H  177/78 H  93 L


 


 03/07/19 08:39  03/07/19 08:24  03/07/19 08:00  03/07/19 08:24  03/07/19 08:00








Intake and Output: 


                                        











 03/07/19 03/07/19





 06:59 18:59


 


Intake Total 339 180


 


Output Total 600 


 


Balance -261 180














- Medications


Medications: 


                               Current Medications





Acetaminophen (Tylenol 650mg/20.3ml Solution Ud)  650 mg PO Q6 PRN


   PRN Reason: Fever>100.4 F


   Last Admin: 03/07/19 07:39 Dose:  650 mg


Acetaminophen (Tylenol 325mg Tab)  650 mg PO Q6 PRN


   PRN Reason: Pain, moderate (4-7)


   Last Admin: 03/06/19 11:26 Dose:  650 mg


Albuterol/Ipratropium (Duoneb 3 Mg/0.5 Mg (3 Ml) Ud)  3 ml INH RQ6 [facility]


   Last Admin: 03/07/19 08:05 Dose:  3 ml


Dimethicone (Proshield Plus Skin Protectant)  1 applic TOP Q8 [facility]


   Last Admin: 03/07/19 08:25 Dose:  1 applic


Ferrous Sulfate (Feosol)  325 mg PO TID [facility]


   Last Admin: 03/07/19 08:23 Dose:  325 mg


Furosemide (Lasix)  40 mg IVP DAILY [facility]


Vancomycin HCl 1 gm/ Sodium (Chloride)  250 mls @ 250 mls/hr IVPB MWF [facility]; 

Protocol


   Last Admin: 03/06/19 08:29 Dose:  250 mls/hr


Cefepime HCl 1 gm/ Sodium (Chloride)  100 mls @ 100 mls/hr IVPB Q12 [facility]; 

Protocol


   Last Admin: 03/07/19 08:21 Dose:  100 mls/hr


Metronidazole (Flagyl 500mg/100ml Ns)  100 mls @ 100 mls/hr IVPB Q8 [facility]; 

Protocol


   Last Admin: 03/07/19 08:19 Dose:  100 mls/hr


Dextrose (Dextrose 5% In Water 1000 Ml)  1,000 mls @ 80 mls/hr IV .[id number] [facility]


   Stop: 03/08/19 06:41


   Last Admin: 03/07/19 06:57 Dose:  80 mls/hr


Insulin Human Regular (Humulin R)  0 units SC ACCU-CHECK [facility]; Protocol


   Last Admin: 03/07/19 06:53 Dose:  1 u


Insulin Lispro Protam/Lispro Human (Humalog Mix 75/25)  5 units SC BID [facility]; 

Protocol


   Last Admin: 03/07/19 08:20 Dose:  5 units


Ketorolac Tromethamine (Toradol)  30 mg IVP Q6 PRN


   PRN Reason: Pain, moderate (4-7)


   Last Admin: 03/06/19 22:44 Dose:  30 mg


Labetalol HCl (Trandate)  20 mg IVP Q4 PRN


   PRN Reason: Systolic Blood Pressure


   Last Admin: 03/07/19 02:16 Dose:  20 mg


Levetiracetam (Keppra)  500 mg PO BID [facility]


   Last Admin: 03/07/19 08:24 Dose:  500 mg


Metoprolol Tartrate (Lopressor)  50 mg PO Q12 [facility]


   Last Admin: 03/07/19 08:24 Dose:  50 mg


Ondansetron HCl (Zofran Inj)  4 mg IVP Q6 PRN


   PRN Reason: Nausea/Vomiting


   Last Admin: 03/05/19 01:04 Dose:  4 mg


Polyethylene Glycol (Miralax)  17 gm PO HS [facility]


   Last Admin: 03/06/19 21:04 Dose:  17 gm


Vitamin B Complex/Vit C/Folic Acid (Nephro-Yoseph)  1 tab PO DAILY [facility]


   Last Admin: 03/07/19 08:24 Dose:  1 tab











- Labs


Labs: 


                                        





                                 03/07/19 02:55 





                                 03/07/19 02:55 





                                        











PT  16.2 Seconds (9.8-13.1)  H  03/01/19  10:28    


 


INR  1.4   03/01/19  10:28    


 


APTT  35.6 Seconds (25.6-37.1)   03/01/19  10:28    














- Constitutional


Appears: Chronically Ill





- Head Exam


Head Exam: ATRAUMATIC, NORMAL INSPECTION, NORMOCEPHALIC





- Eye Exam


Eye Exam: EOMI, Normal appearance, PERRL


Pupil Exam: NORMAL ACCOMODATION, PERRL





- ENT Exam


ENT Exam: Mucous Membranes Moist, Normal Exam





- Neck Exam


Neck Exam: Full ROM, Normal Inspection.  absent: Lymphadenopathy





- Respiratory Exam


Respiratory Exam: Decreased Breath Sounds, Prolonged Expiratory Phase, Rales


Additional comments: 





TACHYPNEIC





- Cardiovascular Exam


Cardiovascular Exam: REGULAR RHYTHM, +S1, +S2.  absent: Murmur





- GI/Abdominal Exam


GI & Abdominal Exam: Soft, Normal Bowel Sounds.  absent: Tenderness





- Rectal Exam


Rectal Exam: NORMAL INSPECTION





- Extremities Exam


Extremities Exam: Full ROM, Normal Capillary Refill, Normal Inspection.  absent:

Joint Swelling, Pedal Edema





- Back Exam


Back Exam: NORMAL INSPECTION





- Neurological Exam


Neurological Exam: Alert, Awake, CN II-XII Intact





- Psychiatric Exam


Psychiatric exam: Normal Affect, Normal Mood





- Skin


Skin Exam: Dry, Intact, Normal Color, Warm





Assessment and Plan





- Assessment and Plan (Free Text)


Assessment: 





RESP FAILURE--EXTUBATED


PLEURAL EFFUSIONS AND ATELECTASES ON CT SCAN OF CHEST


SEPSIS


Plan: 





WILL GIVE LASIX IV TODAY


MAY NEED THORACENTESES IF EFFUSION PERSISTS

## 2019-03-07 NOTE — CP.CCUPN
CCU Subjective





- Physician Review


Events Since Last Encounter (Free Text): 





03/07/19 03:32


Not feeling well and c/o abdominal pain





CCU Objective





- Vital Signs / Intake & Output


Vital Signs (Last 4 hours): 


Vital Signs











  Temp Pulse Resp BP Pulse Ox


 


 03/07/19 02:00   79  40 H  141/82  97


 


 03/07/19 01:53   76   


 


 03/07/19 01:00   74  51 H  170/83 H  97


 


 03/07/19 00:00  98.6 F  77  27 H  179/86 H  96


 


 03/06/19 23:00   66  35 H  156/73 H  97











Intake and Output (Last 8hrs): 


                                 Intake & Output











 03/06/19 03/06/19 03/07/19





 14:59 22:59 06:59


 


Intake Total 722 327 8


 


Output Total  900 


 


Balance 722 -573 8


 


Intake:   


 


  IV 12 12 8


 


  Intake, Piggyback 350 200 


 


  Oral 360 115 


 


Output:   


 


  Urine  900 


 


    Urethral (Segura)  900 


 


Other:   


 


  # Bowel Movements  1 














- Physical Exam


Head: Positive for: Atraumatic, Normocephalic


Pupils: Positive for: PERRL


Extroacular Muscles: Positive for: EOMI


Conjunctiva: Positive for: Normal


Mouth: Positive for: Moist Mucous Membranes


Nose (Internal): Positive for: Normal Inspection


Neck: Positive for: Normal Range of Motion


Respiratory/Chest: Positive for: Accessory Muscle Use, Tachypneic.  Negative 

for: Respiratory Distress


Abdomen: Negative for: Peritoneal Signs, Rebound, Guarding, Hernias, Feeding 

Tubes, Ostomy Tubes


Upper Extremity: Positive for: Normal Inspection





- Medications


Active Medications: 


Active Medications











Generic Name Dose Route Start Last Admin





  Trade Name Freq  PRN Reason Stop Dose Admin


 


Acetaminophen  650 mg  03/01/19 09:45  03/06/19 20:37





  Tylenol 650mg/20.3ml Solution Ud  PO   650 mg





  Q6 PRN   Administration





  Fever>100.4 F   





     





     





     


 


Acetaminophen  650 mg  03/05/19 18:01  03/06/19 11:26





  Tylenol 325mg Tab  PO   650 mg





  Q6 PRN   Administration





  Pain, moderate (4-7)   





     





     





     


 


Albuterol/Ipratropium  3 ml  02/27/19 14:30  03/07/19 01:00





  Duoneb 3 Mg/0.5 Mg (3 Ml) Ud  INH   3 ml





  RQ6 DESIRAE   Administration





     





     





     





     


 


Dimethicone  1 applic  03/05/19 09:00  03/07/19 01:45





  Proshield Plus Skin Protectant  TOP   1 applic





  Q8 DESIRAE   Administration





     





     





     





     


 


Ferrous Sulfate  325 mg  03/06/19 17:00  03/06/19 16:19





  Feosol  PO   325 mg





  TID DESIRAE   Administration





     





     





     





     


 


Vancomycin HCl 1 gm/ Sodium  250 mls @ 250 mls/hr  03/04/19 09:00  03/06/19 

08:29





  Chloride  IVPB   250 mls/hr





  MWF [facility]   Administration





     





     





  Protocol   





     


 


Cefepime HCl 1 gm/ Sodium  100 mls @ 100 mls/hr  03/04/19 21:00  03/06/19 20:38





  Chloride  IVPB   100 mls/hr





  Q12 DESIRAE   Administration





     





     





  Protocol   





     


 


Metronidazole  100 mls @ 100 mls/hr  03/06/19 17:00  03/07/19 01:45





  Flagyl 500mg/100ml Ns  IVPB   100 mls/hr





  Q8 DESIRAE   Administration





     





     





  Protocol   





     


 


Insulin Human Regular  0 units  02/28/19 17:00  03/06/19 22:00





  Humulin R  SC   Not Given





  ACCU-CHECK [facility]   





     





     





  Protocol   





     


 


Insulin Lispro Protam/Lispro Human  5 units  03/06/19 09:00  03/06/19 16:20





  Humalog Mix 75/25  SC   5 units





  BID [facility]IRAE   Administration





     





     





  Protocol   





     


 


Ketorolac Tromethamine  30 mg  03/06/19 16:18  03/06/19 22:44





  Toradol  IVP   30 mg





  Q6 PRN   Administration





  Pain, moderate (4-7)   





     





     





     


 


Labetalol HCl  20 mg  03/02/19 12:45  03/07/19 02:16





  Trandate  IVP   20 mg





  Q4 PRN   Administration





  Systolic Blood Pressure   





     





     





     


 


Levetiracetam  500 mg  03/06/19 09:00  03/06/19 16:19





  Keppra  PO   500 mg





  BID DESIRAE   Administration





     





     





     





     


 


Metoprolol Tartrate  50 mg  03/06/19 09:00  03/06/19 20:50





  Lopressor  PO   50 mg





  Q12 DESIRAE   Administration





     





     





     





     


 


Ondansetron HCl  4 mg  03/05/19 00:52  03/05/19 01:04





  Zofran Inj  IVP   4 mg





  Q6 PRN   Administration





  Nausea/Vomiting   





     





     





     


 


Polyethylene Glycol  17 gm  03/05/19 22:00  03/06/19 21:04





  Miralax  PO   17 gm





  HS DESIRAE   Administration





     





     





     





     


 


Vitamin B Complex/Vit C/Folic Acid  1 tab  03/03/19 09:00  03/06/19 08:29





  Nephro-Yoseph  PO   1 tab





  DAILY DESIRAE   Administration





     





     





     





     














- Patient Studies


Lab Studies: 


                                   Lab Studies











  03/06/19 03/06/19 03/06/19 Range/Units





  20:50 16:30 13:01 


 


WBC     (4.8-10.8)  K/uL


 


RBC     (3.80-5.20)  Mil/uL


 


Hgb     (12.0-16.0)  g/dL


 


Hct     (34.0-47.0)  %


 


MCV     (81.0-99.0)  fl


 


MCH     (27.0-31.0)  pg


 


MCHC     (33.0-37.0)  g/dL


 


RDW     (11.5-14.5)  %


 


Plt Count     (130-400)  K/uL


 


Sodium     (132-148)  mmol/l


 


Potassium     (3.6-5.0)  MMOL/L


 


Chloride     ()  mmol/L


 


Carbon Dioxide     (22-30)  mmol/L


 


Anion Gap     (10-20)  


 


BUN     (7-17)  mg/dl


 


Creatinine     (0.7-1.2)  mg/dl


 


Est GFR (African Amer)     


 


Est GFR (Non-Af Amer)     


 


POC Glucose (mg/dL)  150 H  152 H   ()  mg/dL


 


Random Glucose     ()  mg/dL


 


Hemoglobin A1c     (4.2-6.5)  %


 


Calcium     (8.4-10.2)  mg/dL


 


Magnesium    1.4 L  (1.6-2.3)  MG/DL


 


Iron     ()  ug/dL


 


TIBC     (250-450)  ug/dL


 


% Saturation     (20-55)  %


 


Total Bilirubin     (0.2-1.3)  mg/dl


 


AST     (14-36)  U/L


 


ALT     (9-52)  U/L


 


Alkaline Phosphatase     ()  U/L


 


Total Protein     (6.3-8.2)  G/DL


 


Albumin     (3.5-5.0)  g/dL


 


Globulin     (2.2-3.9)  gm/dL


 


Albumin/Globulin Ratio     (1.0-2.1)  


 


Triglycerides     (0-149)  mg/DL


 


Cholesterol     (0-199)  mg/dL


 


LDL Cholesterol Direct     (0-129)  mg/dL


 


HDL Cholesterol     (30-70)  MG/DL


 


Vitamin B12     (239-931)  pg/mL


 


TSH 3rd Generation     (0.46-4.68)  mIU/ML


 


ANCA Screen     (NEGATIVE)  


 


c-ANCA Titer     


 


Proteinase 3 (PR3)     (<1.0)  AI


 


p-ANCA Titer     


 


Atypical p-ANCA Titer     


 


Myeloperoxidase Ab     (<1.0)  AI














  03/06/19 03/06/19 03/06/19 Range/Units





  10:58 04:50 04:20 


 


WBC    21.0 H  (4.8-10.8)  K/uL


 


RBC    3.50 L  (3.80-5.20)  Mil/uL


 


Hgb    9.0 L  (12.0-16.0)  g/dL


 


Hct    27.4 L  (34.0-47.0)  %


 


MCV    78.3 L  (81.0-99.0)  fl


 


MCH    25.8 L  (27.0-31.0)  pg


 


MCHC    32.9 L  (33.0-37.0)  g/dL


 


RDW    19.2 H  (11.5-14.5)  %


 


Plt Count    241  (130-400)  K/uL


 


Sodium     (132-148)  mmol/l


 


Potassium     (3.6-5.0)  MMOL/L


 


Chloride     ()  mmol/L


 


Carbon Dioxide     (22-30)  mmol/L


 


Anion Gap     (10-20)  


 


BUN     (7-17)  mg/dl


 


Creatinine     (0.7-1.2)  mg/dl


 


Est GFR (African Amer)     


 


Est GFR (Non-Af Amer)     


 


POC Glucose (mg/dL)  206 H  187 H   ()  mg/dL


 


Random Glucose     ()  mg/dL


 


Hemoglobin A1c     (4.2-6.5)  %


 


Calcium     (8.4-10.2)  mg/dL


 


Magnesium     (1.6-2.3)  MG/DL


 


Iron     ()  ug/dL


 


TIBC     (250-450)  ug/dL


 


% Saturation     (20-55)  %


 


Total Bilirubin     (0.2-1.3)  mg/dl


 


AST     (14-36)  U/L


 


ALT     (9-52)  U/L


 


Alkaline Phosphatase     ()  U/L


 


Total Protein     (6.3-8.2)  G/DL


 


Albumin     (3.5-5.0)  g/dL


 


Globulin     (2.2-3.9)  gm/dL


 


Albumin/Globulin Ratio     (1.0-2.1)  


 


Triglycerides     (0-149)  mg/DL


 


Cholesterol     (0-199)  mg/dL


 


LDL Cholesterol Direct     (0-129)  mg/dL


 


HDL Cholesterol     (30-70)  MG/DL


 


Vitamin B12     (239-931)  pg/mL


 


TSH 3rd Generation     (0.46-4.68)  mIU/ML


 


ANCA Screen     (NEGATIVE)  


 


c-ANCA Titer     


 


Proteinase 3 (PR3)     (<1.0)  AI


 


p-ANCA Titer     


 


Atypical p-ANCA Titer     


 


Myeloperoxidase Ab     (<1.0)  AI














  03/06/19 03/06/19 03/06/19 Range/Units





  04:20 04:20 04:20 


 


WBC     (4.8-10.8)  K/uL


 


RBC     (3.80-5.20)  Mil/uL


 


Hgb     (12.0-16.0)  g/dL


 


Hct     (34.0-47.0)  %


 


MCV     (81.0-99.0)  fl


 


MCH     (27.0-31.0)  pg


 


MCHC     (33.0-37.0)  g/dL


 


RDW     (11.5-14.5)  %


 


Plt Count     (130-400)  K/uL


 


Sodium   147   (132-148)  mmol/l


 


Potassium   3.3 L   (3.6-5.0)  MMOL/L


 


Chloride   104   ()  mmol/L


 


Carbon Dioxide   27   (22-30)  mmol/L


 


Anion Gap   19   (10-20)  


 


BUN   47 H   (7-17)  mg/dl


 


Creatinine   2.9 H   (0.7-1.2)  mg/dl


 


Est GFR ( Amer)   20   


 


Est GFR (Non-Af Amer)   17   


 


POC Glucose (mg/dL)     ()  mg/dL


 


Random Glucose   198 H   ()  mg/dL


 


Hemoglobin A1c  6.2    (4.2-6.5)  %


 


Calcium   8.8   (8.4-10.2)  mg/dL


 


Magnesium     (1.6-2.3)  MG/DL


 


Iron    29 L  ()  ug/dL


 


TIBC    268  (250-450)  ug/dL


 


% Saturation    11 L  (20-55)  %


 


Total Bilirubin   1.8 H   (0.2-1.3)  mg/dl


 


AST   87 H D   (14-36)  U/L


 


ALT   232 H D   (9-52)  U/L


 


Alkaline Phosphatase   108   ()  U/L


 


Total Protein   7.9   (6.3-8.2)  G/DL


 


Albumin   3.4 L   (3.5-5.0)  g/dL


 


Globulin   4.5 H   (2.2-3.9)  gm/dL


 


Albumin/Globulin Ratio   0.8 L   (1.0-2.1)  


 


Triglycerides   97   (0-149)  mg/DL


 


Cholesterol   106   (0-199)  mg/dL


 


LDL Cholesterol Direct   57   (0-129)  mg/dL


 


HDL Cholesterol   33   (30-70)  MG/DL


 


Vitamin B12   901   (239-931)  pg/mL


 


TSH 3rd Generation   2.96   (0.46-4.68)  mIU/ML


 


ANCA Screen     (NEGATIVE)  


 


c-ANCA Titer     


 


Proteinase 3 (PR3)     (<1.0)  AI


 


p-ANCA Titer     


 


Atypical p-ANCA Titer     


 


Myeloperoxidase Ab     (<1.0)  AI














  03/02/19 Range/Units





  04:00 


 


WBC   (4.8-10.8)  K/uL


 


RBC   (3.80-5.20)  Mil/uL


 


Hgb   (12.0-16.0)  g/dL


 


Hct   (34.0-47.0)  %


 


MCV   (81.0-99.0)  fl


 


MCH   (27.0-31.0)  pg


 


MCHC   (33.0-37.0)  g/dL


 


RDW   (11.5-14.5)  %


 


Plt Count   (130-400)  K/uL


 


Sodium   (132-148)  mmol/l


 


Potassium   (3.6-5.0)  MMOL/L


 


Chloride   ()  mmol/L


 


Carbon Dioxide   (22-30)  mmol/L


 


Anion Gap   (10-20)  


 


BUN   (7-17)  mg/dl


 


Creatinine   (0.7-1.2)  mg/dl


 


Est GFR (African Amer)   


 


Est GFR (Non-Af Amer)   


 


POC Glucose (mg/dL)   ()  mg/dL


 


Random Glucose   ()  mg/dL


 


Hemoglobin A1c   (4.2-6.5)  %


 


Calcium   (8.4-10.2)  mg/dL


 


Magnesium   (1.6-2.3)  MG/DL


 


Iron   ()  ug/dL


 


TIBC   (250-450)  ug/dL


 


% Saturation   (20-55)  %


 


Total Bilirubin   (0.2-1.3)  mg/dl


 


AST   (14-36)  U/L


 


ALT   (9-52)  U/L


 


Alkaline Phosphatase   ()  U/L


 


Total Protein   (6.3-8.2)  G/DL


 


Albumin   (3.5-5.0)  g/dL


 


Globulin   (2.2-3.9)  gm/dL


 


Albumin/Globulin Ratio   (1.0-2.1)  


 


Triglycerides   (0-149)  mg/DL


 


Cholesterol   (0-199)  mg/dL


 


LDL Cholesterol Direct   (0-129)  mg/dL


 


HDL Cholesterol   (30-70)  MG/DL


 


Vitamin B12   (239-931)  pg/mL


 


TSH 3rd Generation   (0.46-4.68)  mIU/ML


 


ANCA Screen  Negative  (NEGATIVE)  


 


c-ANCA Titer  TNP  


 


Proteinase 3 (PR3)  <1.0  (<1.0)  AI


 


p-ANCA Titer  TNP  


 


Atypical p-ANCA Titer  TNP  


 


Myeloperoxidase Ab  <1.0  (<1.0)  AI








                         Laboratory Results - last 24 hr











  03/02/19 03/06/19 03/06/19





  04:00 04:20 04:20


 


WBC   


 


RBC   


 


Hgb   


 


Hct   


 


MCV   


 


MCH   


 


MCHC   


 


RDW   


 


Plt Count   


 


Sodium    147


 


Potassium    3.3 L


 


Chloride    104


 


Carbon Dioxide    27


 


Anion Gap    19


 


BUN    47 H


 


Creatinine    2.9 H


 


Est GFR ( Amer)    20


 


Est GFR (Non-Af Amer)    17


 


POC Glucose (mg/dL)   


 


Random Glucose    198 H


 


Hemoglobin A1c   


 


Calcium    8.8


 


Magnesium   


 


Iron   29 L 


 


TIBC   268 


 


% Saturation   11 L 


 


Total Bilirubin    1.8 H


 


AST    87 H D


 


ALT    232 H D


 


Alkaline Phosphatase    108


 


Total Protein    7.9


 


Albumin    3.4 L


 


Globulin    4.5 H


 


Albumin/Globulin Ratio    0.8 L


 


Triglycerides    97


 


Cholesterol    106


 


LDL Cholesterol Direct    57


 


HDL Cholesterol    33


 


Vitamin B12    901


 


TSH 3rd Generation    2.96


 


ANCA Screen  Negative  


 


c-ANCA Titer  TNP  


 


Proteinase 3 (PR3)  <1.0  


 


p-ANCA Titer  TNP  


 


Atypical p-ANCA Titer  TNP  


 


Myeloperoxidase Ab  <1.0  














  03/06/19 03/06/19 03/06/19





  04:20 04:20 04:50


 


WBC   21.0 H 


 


RBC   3.50 L 


 


Hgb   9.0 L 


 


Hct   27.4 L 


 


MCV   78.3 L 


 


MCH   25.8 L 


 


MCHC   32.9 L 


 


RDW   19.2 H 


 


Plt Count   241 


 


Sodium   


 


Potassium   


 


Chloride   


 


Carbon Dioxide   


 


Anion Gap   


 


BUN   


 


Creatinine   


 


Est GFR ( Amer)   


 


Est GFR (Non-Af Amer)   


 


POC Glucose (mg/dL)    187 H


 


Random Glucose   


 


Hemoglobin A1c  6.2  


 


Calcium   


 


Magnesium   


 


Iron   


 


TIBC   


 


% Saturation   


 


Total Bilirubin   


 


AST   


 


ALT   


 


Alkaline Phosphatase   


 


Total Protein   


 


Albumin   


 


Globulin   


 


Albumin/Globulin Ratio   


 


Triglycerides   


 


Cholesterol   


 


LDL Cholesterol Direct   


 


HDL Cholesterol   


 


Vitamin B12   


 


TSH 3rd Generation   


 


ANCA Screen   


 


c-ANCA Titer   


 


Proteinase 3 (PR3)   


 


p-ANCA Titer   


 


Atypical p-ANCA Titer   


 


Myeloperoxidase Ab   














  03/06/19 03/06/19 03/06/19





  10:58 13:01 16:30


 


WBC   


 


RBC   


 


Hgb   


 


Hct   


 


MCV   


 


MCH   


 


MCHC   


 


RDW   


 


Plt Count   


 


Sodium   


 


Potassium   


 


Chloride   


 


Carbon Dioxide   


 


Anion Gap   


 


BUN   


 


Creatinine   


 


Est GFR ( Amer)   


 


Est GFR (Non-Af Amer)   


 


POC Glucose (mg/dL)  206 H   152 H


 


Random Glucose   


 


Hemoglobin A1c   


 


Calcium   


 


Magnesium   1.4 L 


 


Iron   


 


TIBC   


 


% Saturation   


 


Total Bilirubin   


 


AST   


 


ALT   


 


Alkaline Phosphatase   


 


Total Protein   


 


Albumin   


 


Globulin   


 


Albumin/Globulin Ratio   


 


Triglycerides   


 


Cholesterol   


 


LDL Cholesterol Direct   


 


HDL Cholesterol   


 


Vitamin B12   


 


TSH 3rd Generation   


 


ANCA Screen   


 


c-ANCA Titer   


 


Proteinase 3 (PR3)   


 


p-ANCA Titer   


 


Atypical p-ANCA Titer   


 


Myeloperoxidase Ab   














  03/06/19





  20:50


 


WBC 


 


RBC 


 


Hgb 


 


Hct 


 


MCV 


 


MCH 


 


MCHC 


 


RDW 


 


Plt Count 


 


Sodium 


 


Potassium 


 


Chloride 


 


Carbon Dioxide 


 


Anion Gap 


 


BUN 


 


Creatinine 


 


Est GFR ( Amer) 


 


Est GFR (Non-Af Amer) 


 


POC Glucose (mg/dL)  150 H


 


Random Glucose 


 


Hemoglobin A1c 


 


Calcium 


 


Magnesium 


 


Iron 


 


TIBC 


 


% Saturation 


 


Total Bilirubin 


 


AST 


 


ALT 


 


Alkaline Phosphatase 


 


Total Protein 


 


Albumin 


 


Globulin 


 


Albumin/Globulin Ratio 


 


Triglycerides 


 


Cholesterol 


 


LDL Cholesterol Direct 


 


HDL Cholesterol 


 


Vitamin B12 


 


TSH 3rd Generation 


 


ANCA Screen 


 


c-ANCA Titer 


 


Proteinase 3 (PR3) 


 


p-ANCA Titer 


 


Atypical p-ANCA Titer 


 


Myeloperoxidase Ab 











Fingerstick Blood Sugar Results: 150





Critical Care Progress Note





- Nutrition


Nutrition: 


                                    Nutrition











 Category Date Time Status


 


 Dysphagia/Modified Consistency Diet [DIET] Diets  03/05/19 Dinner Active














Assessment/Plan





- Assessment and Plan (Free Text)


Assessment: 


57 YO AAF admitted with seizure and AMS develped VDRF, SHANITA requring HD and acute

 liver injury with enzymes in 1000s as well as severe lactic acidosis with PH of

 7.1 and lactate of 8.5. Also persistent fever. Was treated with broad spec ABx 

and mech vent and HD. Improved and self extubated. HD cath was removed yesterday

 as Cr improving off HD. Neurological status improving. LFTs trending down; 

however, still has persistent fever on Cefepime, Vanco and Flagyl. WBC which 

initially improved is again trending up and patient c/o abdominal pain. 

Abdominal X-ray done the day before yesterday concerning for ileus vs distal 

bowel obstruction. Since yesterday RR gonging up. Was called by nurse to assess 

the patient as her RR in now in 50s/min. Upon my exam patient is in respiratory 

distress. Lungs with coarse breathing sounds b/l. S1/S2, pulses full and 

symmetric b/l. Abdomen is severely tender with guarding and possible rebound but

 not rigid. No audible bowel sounds. Ordered bipap; however, patient not 

cooperative. She is AAOx1 and mentation improved as compared to the time of 

admission but still not at the level that she can understand her condition and 

tolerate treatment modalities. Will order stat ABG, lactic acid, CBC and CMP. 

Possible metabolic acidosis with compensatory tachypnea. If any signs of fatigue

 might require re-intubation. Will order stat CT chest, abdomen and pelvis. I 

personally reviewed the chart including lab, Xray and EKG at the bed side. I 

spent total of 43 min of critical care time trying to stabilize this critically 

unstable patient excluding time spent for any procedures.

## 2019-03-07 NOTE — CP.PCM.PN
Subjective





- Date & Time of Evaluation


Date of Evaluation: 03/07/19


Time of Evaluation: 11:00





- Subjective


Subjective: 


patient seen and examined at bedside. no family at bedside


Interim events noted


sleepy at this time due to pain meds though reports of being more awake/alert


available diagnostic data reviewed





Review of Systems unable to obtain due to status





Objective





Vital Signs Stable


- Constitutional


Appears: Chronically Ill





Head Exam: NORMAL INSPECTION





Respiratory Exam: Decreased breath sounds





Cardiovascular Exam: +S1, +S2





GI & Abdominal Exam: Soft





Neurological Exam: Alert, Awake





Skin Exam: Normal Color, Warm





Assessment and Plan





monitor vitals


monitor labs


Cont meds


Cont tx


ID, Cardio, Neuro, ICU, Renal, Pulm following


consultants appreciated input


lasix


consider thoracentesis if effusion doesnt resolve


CT of chest, abd, pelvis pending


rest of plan as ordered











Objective





- Vital Signs/Intake and Output


Vital Signs (last 24 hours): 


                                        











Temp Pulse Resp BP Pulse Ox


 


 100.7 F H  72   23   172/81 H  95 


 


 03/07/19 16:00  03/07/19 18:00  03/07/19 18:00  03/07/19 18:00  03/07/19 18:00








Intake and Output: 


                                        











 03/07/19 03/07/19





 06:59 18:59


 


Intake Total 339 1052


 


Output Total 600 1400


 


Balance -261 -348














- Medications


Medications: 


                               Current Medications





Acetaminophen (Tylenol 650mg/20.3ml Solution Ud)  650 mg PO Q6 PRN


   PRN Reason: Fever>100.4 F


   Last Admin: 03/07/19 07:39 Dose:  650 mg


Acetaminophen (Tylenol 325mg Tab)  650 mg PO Q6 PRN


   PRN Reason: Pain, moderate (4-7)


   Last Admin: 03/06/19 11:26 Dose:  650 mg


Albuterol/Ipratropium (Duoneb 3 Mg/0.5 Mg (3 Ml) Ud)  3 ml INH RQ6 [facility]


   Last Admin: 03/07/19 13:08 Dose:  3 ml


Dimethicone (Proshield Plus Skin Protectant)  1 applic TOP Q8 [facility]


   Last Admin: 03/07/19 16:53 Dose:  1 applic


Ferrous Sulfate (Feosol)  325 mg PO TID [facility]


   Last Admin: 03/07/19 16:54 Dose:  325 mg


Furosemide (Lasix)  40 mg IVP DAILY [facility]


   Last Admin: 03/07/19 10:10 Dose:  40 mg


Hydromorphone HCl (Dilaudid)  1 mg IVP Q4 PRN


   PRN Reason: Pain, moderate (4-7)


   Last Admin: 03/07/19 15:15 Dose:  1 mg


Vancomycin HCl 1 gm/ Sodium (Chloride)  250 mls @ 250 mls/hr IVPB MWF [facility]; 

Protocol


   Last Admin: 03/06/19 08:29 Dose:  250 mls/hr


Cefepime HCl 1 gm/ Sodium (Chloride)  100 mls @ 100 mls/hr IVPB Q12 [facility]; 

Protocol


   Last Admin: 03/07/19 08:21 Dose:  100 mls/hr


Metronidazole (Flagyl 500mg/100ml Ns)  100 mls @ 100 mls/hr IVPB Q8 [facility]; 

Protocol


   Last Admin: 03/07/19 16:54 Dose:  100 mls/hr


Insulin Human Regular (Humulin R)  0 units SC ACCU-CHECK [facility]; Protocol


   Last Admin: 03/07/19 16:56 Dose:  1 u


Insulin Lispro Protam/Lispro Human (Humalog Mix 75/25)  5 units SC BID [facility]; 

Protocol


   Last Admin: 03/07/19 16:54 Dose:  5 units


Ketorolac Tromethamine (Toradol)  30 mg IVP Q6 PRN


   PRN Reason: Pain, moderate (4-7)


   Last Admin: 03/06/19 22:44 Dose:  30 mg


Labetalol HCl (Trandate)  20 mg IVP Q4 PRN


   PRN Reason: Systolic Blood Pressure


   Last Admin: 03/07/19 02:16 Dose:  20 mg


Levetiracetam (Keppra)  500 mg PO BID [facility]


   Last Admin: 03/07/19 16:54 Dose:  500 mg


Metoprolol Tartrate (Lopressor)  50 mg PO Q12 [facility]


   Last Admin: 03/07/19 08:24 Dose:  50 mg


Ondansetron HCl (Zofran Inj)  4 mg IVP Q6 PRN


   PRN Reason: Nausea/Vomiting


   Last Admin: 03/05/19 01:04 Dose:  4 mg


Polyethylene Glycol (Miralax)  17 gm PO HS [facility]


   Last Admin: 03/06/19 21:04 Dose:  17 gm


Vitamin B Complex/Vit C/Folic Acid (Nephro-Yoseph)  1 tab PO DAILY [facility]


   Last Admin: 03/07/19 08:24 Dose:  1 tab











- Labs


Labs: 


                                        





                                 03/07/19 02:55 





                                 03/07/19 02:55 





                                        











PT  16.2 Seconds (9.8-13.1)  H  03/01/19  10:28    


 


INR  1.4   03/01/19  10:28    


 


APTT  35.6 Seconds (25.6-37.1)   03/01/19  10:28    














Assessment and Plan


(1) Respiratory failure


Status: Acute   





(2) Sepsis


Status: Acute   





(3) SHANITA (acute kidney injury)


Status: Acute   





(4) Abdominal pain


Status: Acute

## 2019-03-07 NOTE — CP.PCM.PN
Subjective





- Date & Time of Evaluation


Date of Evaluation: 03/07/19


Time of Evaluation: 10:00





- Subjective


Subjective: 





Patient awake


Trying to verbalize


Vital signs noted








Objective





- Vital Signs/Intake and Output


Vital Signs (last 24 hours): 


                                        











Temp Pulse Resp BP Pulse Ox


 


 100.7 F H  73   33 H  177/78 H  94 L


 


 03/07/19 08:39  03/07/19 10:00  03/07/19 10:00  03/07/19 10:10  03/07/19 10:00








Intake and Output: 


                                        











 03/07/19 03/07/19





 06:59 18:59


 


Intake Total 339 580


 


Output Total 600 


 


Balance -261 580














- Medications


Medications: 


                               Current Medications





Acetaminophen (Tylenol 650mg/20.3ml Solution Ud)  650 mg PO Q6 PRN


   PRN Reason: Fever>100.4 F


   Last Admin: 03/07/19 07:39 Dose:  650 mg


Acetaminophen (Tylenol 325mg Tab)  650 mg PO Q6 PRN


   PRN Reason: Pain, moderate (4-7)


   Last Admin: 03/06/19 11:26 Dose:  650 mg


Albuterol/Ipratropium (Duoneb 3 Mg/0.5 Mg (3 Ml) Ud)  3 ml INH RQ6 [facility]


   Last Admin: 03/07/19 08:05 Dose:  3 ml


Dimethicone (Proshield Plus Skin Protectant)  1 applic TOP Q8 [facility]


   Last Admin: 03/07/19 08:25 Dose:  1 applic


Ferrous Sulfate (Feosol)  325 mg PO TID [facility]


   Last Admin: 03/07/19 08:23 Dose:  325 mg


Furosemide (Lasix)  40 mg IVP DAILY [facility]


   Last Admin: 03/07/19 10:10 Dose:  40 mg


Hydromorphone HCl (Dilaudid)  1 mg IVP Q4 PRN


   PRN Reason: Pain, moderate (4-7)


   Last Admin: 03/07/19 10:10 Dose:  1 mg


Vancomycin HCl 1 gm/ Sodium (Chloride)  250 mls @ 250 mls/hr IVPB MWF [facility]; Prot

ocol


   Last Admin: 03/06/19 08:29 Dose:  250 mls/hr


Cefepime HCl 1 gm/ Sodium (Chloride)  100 mls @ 100 mls/hr IVPB Q12 [facility]; 

Protocol


   Last Admin: 03/07/19 08:21 Dose:  100 mls/hr


Metronidazole (Flagyl 500mg/100ml Ns)  100 mls @ 100 mls/hr IVPB Q8 [facility]; 

Protocol


   Last Admin: 03/07/19 08:19 Dose:  100 mls/hr


Insulin Human Regular (Humulin R)  0 units SC ACCU-CHECK [facility]; Protocol


   Last Admin: 03/07/19 06:53 Dose:  1 u


Insulin Lispro Protam/Lispro Human (Humalog Mix 75/25)  5 units SC BID [facility]; 

Protocol


   Last Admin: 03/07/19 08:20 Dose:  5 units


Ketorolac Tromethamine (Toradol)  30 mg IVP Q6 PRN


   PRN Reason: Pain, moderate (4-7)


   Last Admin: 03/06/19 22:44 Dose:  30 mg


Labetalol HCl (Trandate)  20 mg IVP Q4 PRN


   PRN Reason: Systolic Blood Pressure


   Last Admin: 03/07/19 02:16 Dose:  20 mg


Levetiracetam (Keppra)  500 mg PO BID [facility]


   Last Admin: 03/07/19 08:24 Dose:  500 mg


Metoprolol Tartrate (Lopressor)  50 mg PO Q12 [facility]


   Last Admin: 03/07/19 08:24 Dose:  50 mg


Ondansetron HCl (Zofran Inj)  4 mg IVP Q6 PRN


   PRN Reason: Nausea/Vomiting


   Last Admin: 03/05/19 01:04 Dose:  4 mg


Polyethylene Glycol (Miralax)  17 gm PO HS [facility]


   Last Admin: 03/06/19 21:04 Dose:  17 gm


Vitamin B Complex/Vit C/Folic Acid (Nephro-Yoseph)  1 tab PO DAILY [facility]


   Last Admin: 03/07/19 08:24 Dose:  1 tab











- Labs


Labs: 


                                        





                                 03/07/19 02:55 





                                 03/07/19 02:55 





                                        











PT  16.2 Seconds (9.8-13.1)  H  03/01/19  10:28    


 


INR  1.4   03/01/19  10:28    


 


APTT  35.6 Seconds (25.6-37.1)   03/01/19  10:28    














- Constitutional


Appears: No Acute Distress





- Eye Exam


Eye Exam: Conjunctival injection





- ENT Exam


ENT Exam: Mucous Membranes Moist





- Respiratory Exam


Respiratory Exam: Rhonchi.  absent: Chest Wall Tenderness





- Cardiovascular Exam


Cardiovascular Exam: absent: Gallop, JVD, Rubs





- GI/Abdominal Exam


GI & Abdominal Exam: Soft, Normal Bowel Sounds





- Extremities Exam


Extremities Exam: absent: Calf Tenderness





- Back Exam


Back Exam: absent: CVA tenderness (L), CVA tenderness (R)





- Neurological Exam


Neurological Exam: Awake





- Skin


Skin Exam: absent: Cyanosis





Assessment and Plan


(1) SHANITA (acute kidney injury)


Assessment & Plan: 


Assessment: critical


Respiratory failure/   patient extubated


Acute renal failure/acute kidney injury related to multifactorial including 

sepsis.


Diabetes mellitus and history of hypertension history of CVA.


 encephalopathy   patient improving mental status much better and more awake


hyperurecemia    corrected , DC allopurinol


Hyperphosphatemia    corrected DC calcium acetate


Shock liver


Nephrotic syndrome proteinuria with 7 gram proteinuria based on protein to 

creatinine ratio


Hypokalemia.  Given potassium chloride 


Hypomagnesemia


Hypernatremia sodium up 150


Recommendation


IV magnesium just being given


Potassium chloride need supplement


I agree with Lasix patient is congested


Gentle D5W


Status: Acute   





(2) Elevated liver enzymes


Status: Acute   





(3) Elevated troponin level


Status: Acute   





(4) Leukocytosis


Status: Acute   





(5) Sepsis


Status: Acute

## 2019-03-07 NOTE — CP.CCUPN
CCU Subjective





- Physician Review


Subjective (Free Text): 


Awake and conversant, yelling with abdominal discomfort, having multiple BMs, 

able to sit up with legs dangling off bed, denies any chest discomfort, but 

noticeably tachypneic up to 30s. SPO2 96% on NC. Refused BiPAP overnight.  

Requiring Dilaudid to ease abdominal discomfort. CTAP and CT chest exams 

reviewed. 


Recurrent fever spike to 101F this AM, SBPs high up to 170s, HR 77.





No observed recurrent seizure activity, no documented further hypoglycemic 

episodes. 





Other vitals and I/O's reviewed. Urine output approx. 1.5 L per last 24H, and in

negative fluid balance.





ROS:  No other pertinent negs or positives on 10+  system review





PMSFH:   All other Nursing and physician documentation reviewed to date; no new 

pertinent info noted relevant to current medical problems.








EXAM- 


HEENT:  no icterus, no gaze preference, pupils with normal reactivity noted, no 

nystagmus


NECK: no JVD visible, supple, carotids equal upstroke bilat/no bruit 


CHEST: decreased BS  at the bases, no wheezes audible


HEART: irregular, distant, S1S2, no rubs


ABD:  soft, obese, no distension, no focal  tenderness, no tympany, no guarding,

no organomegaly, BS hypoactive.  


EXT:    + LE edema, no mottling;  no calf tenderness or palpable cords, distal 

pulses intact and symmetrical, no cyanosis, no mottling.


NEURO:   R sided hemiplegia, moves 4/5  LUE and LLE 


SKIN:   no rashes,  warm and dry





LABS: 


WBC= 17.8


HGB= 8.5


PLTs=  257K





Na= 150


K= 3.5


CL= 109


HCO3= 27


BUN/Cr=  51/2.7


BS= 164








IMPRESSION / MAJOR PROBLEMS NOW:  


1.     s/p Acute Hypoxemic Resp Failure 2 Aspiration PNA


2.     Anoxic Encephalopathy 2  unclear Seizure event (hypoglycemic) and 

prolonged down time (EMS reports 25 minutes, but may have been longer as in not 

seen till next morning according to sons accounts of events, as he lives with 

her); or prolonged post-ictal state versus other occult CNS injury / infection /

subacute CVA. 


3.      Superimposed Metabolic Encephalopathy from Acute Renal Failure


4.      Accelerated HTN


5.      DM II





PLAN:


1.   Though self-extubated on Mar 5, would still monitor resp status, may need 

assisted breathing support later. Try HFNC as she refuses BiPAP, unless decision

made to re-institute MV support.  No new Advance Directives noted.


2.   Hypernatremic, start D5W. Encourage PO water intake.


3.   Hold Lasix for now. 


4.   CT Chest shows prominent bibasilar pneumonic changes / infiltrate. On 

Cefepime / Flagyl / Vanco. 


5.   Physical Therapy

## 2019-03-07 NOTE — CP.PCM.PN
Subjective





- Date & Time of Evaluation


Date of Evaluation: 03/07/19


Time of Evaluation: 12:37





- Subjective


Subjective: 





Neuro Follow-Up Note:





Mrs. Omalley was evaluated this afternoon in the ICU.  No family at bedside 

during time of exam.  She has been downgraded to telemetry.  She remains 

extubated and is still awake, verbal, and able to follow commands.  Pt is still 

c/o generalized abd pain (about 5-6/10 now).  She still appears to have some 

SOB, however, she denies any respiratory complaints. Denies h/a, dizziness, 

visual changes, chest pain, palpitations, cough, n/v/d, paresthesias.








Objective





- Vital Signs/Intake and Output


Vital Signs (last 24 hours): 


                                        











Temp Pulse Resp BP Pulse Ox


 


 100.7 F H  73   33 H  177/78 H  94 L


 


 03/07/19 08:39  03/07/19 10:00  03/07/19 10:00  03/07/19 10:10  03/07/19 10:00








Intake and Output: 


                                        











 03/07/19 03/07/19





 06:59 18:59


 


Intake Total 339 580


 


Output Total 600 


 


Balance -261 580














- Medications


Medications: 


                               Current Medications





Acetaminophen (Tylenol 650mg/20.3ml Solution Ud)  650 mg PO Q6 PRN


   PRN Reason: Fever>100.4 F


   Last Admin: 03/07/19 07:39 Dose:  650 mg


Acetaminophen (Tylenol 325mg Tab)  650 mg PO Q6 PRN


   PRN Reason: Pain, moderate (4-7)


   Last Admin: 03/06/19 11:26 Dose:  650 mg


Albuterol/Ipratropium (Duoneb 3 Mg/0.5 Mg (3 Ml) Ud)  3 ml INH RQ6 [facility]


   Last Admin: 03/07/19 08:05 Dose:  3 ml


Dimethicone (Proshield Plus Skin Protectant)  1 applic TOP Q8 [facility]


   Last Admin: 03/07/19 08:25 Dose:  1 applic


Ferrous Sulfate (Feosol)  325 mg PO TID [facility]


   Last Admin: 03/07/19 12:34 Dose:  325 mg


Furosemide (Lasix)  40 mg IVP DAILY [facility]


   Last Admin: 03/07/19 10:10 Dose:  40 mg


Hydromorphone HCl (Dilaudid)  1 mg IVP Q4 PRN


   PRN Reason: Pain, moderate (4-7)


   Last Admin: 03/07/19 10:10 Dose:  1 mg


Vancomycin HCl 1 gm/ Sodium (Chloride)  250 mls @ 250 mls/hr IVPB MWF [facility]; 

Protocol


   Last Admin: 03/06/19 08:29 Dose:  250 mls/hr


Cefepime HCl 1 gm/ Sodium (Chloride)  100 mls @ 100 mls/hr IVPB Q12 [facility]; 

Protocol


   Last Admin: 03/07/19 08:21 Dose:  100 mls/hr


Metronidazole (Flagyl 500mg/100ml Ns)  100 mls @ 100 mls/hr IVPB Q8 [facility]; 

Protocol


   Last Admin: 03/07/19 08:19 Dose:  100 mls/hr


Insulin Human Regular (Humulin R)  0 units SC ACCU-CHECK [facility]; Protocol


   Last Admin: 03/07/19 12:35 Dose:  1 u


Insulin Lispro Protam/Lispro Human (Humalog Mix 75/25)  5 units SC BID [facility]; 

Protocol


   Last Admin: 03/07/19 08:20 Dose:  5 units


Ketorolac Tromethamine (Toradol)  30 mg IVP Q6 PRN


   PRN Reason: Pain, moderate (4-7)


   Last Admin: 03/06/19 22:44 Dose:  30 mg


Labetalol HCl (Trandate)  20 mg IVP Q4 PRN


   PRN Reason: Systolic Blood Pressure


   Last Admin: 03/07/19 02:16 Dose:  20 mg


Levetiracetam (Keppra)  500 mg PO BID [facility]


   Last Admin: 03/07/19 08:24 Dose:  500 mg


Metoprolol Tartrate (Lopressor)  50 mg PO Q12 [facility]


   Last Admin: 03/07/19 08:24 Dose:  50 mg


Ondansetron HCl (Zofran Inj)  4 mg IVP Q6 PRN


   PRN Reason: Nausea/Vomiting


   Last Admin: 03/05/19 01:04 Dose:  4 mg


Polyethylene Glycol (Miralax)  17 gm PO HS [facility]


   Last Admin: 03/06/19 21:04 Dose:  17 gm


Vitamin B Complex/Vit C/Folic Acid (Nephro-Yoseph)  1 tab PO DAILY [facility]


   Last Admin: 03/07/19 08:24 Dose:  1 tab











- Labs


Labs: 


                                        





                                 03/07/19 02:55 





                                 03/07/19 02:55 





                                        











PT  16.2 Seconds (9.8-13.1)  H  03/01/19  10:28    


 


INR  1.4   03/01/19  10:28    


 


APTT  35.6 Seconds (25.6-37.1)   03/01/19  10:28    














- Constitutional


Appears: Other (grimacing and c/o abd pain during time of exam)





- Head Exam


Head Exam: ATRAUMATIC, NORMAL INSPECTION, NORMOCEPHALIC





- Eye Exam


Eye Exam: EOMI, Normal appearance, PERRL


Pupil Exam: NORMAL ACCOMODATION, PERRL





- ENT Exam


ENT Exam: Mucous Membranes Moist





- Neck Exam


Neck Exam: Normal Inspection





- Respiratory Exam


Respiratory Exam: absent: NORMAL BREATHING PATTERN (sob noted)





- Cardiovascular Exam


Cardiovascular Exam: Irregular Rhythm





- GI/Abdominal Exam


GI & Abdominal Exam: Tenderness (to general abd area)





- Extremities Exam


Extremities Exam: absent: Calf Tenderness, Full ROM, Pedal Edema


Additional comments: 


RUE and RLE flaccid 2/2 previous CVA


Able to move LUE and LLE.














- Neurological Exam


Neurological Exam: Alert, Awake


Neuro motor strength exam: Left Upper Extremity: 3 ( 4/5), Right Upper 

Extremity: 0, Left Lower Extremity: 3 (plantar flexion 3/5), Right Lower 

Extremity: 0


Additional comments: 


Pt is awake, alert, verbal, follows commands.


Oriented to place.


RUE and RLE flaccid 2/2 previous CVA


Able to move LUE and LLE, still has slightly weakened 


Sensation intact b/l


No tremors


Toes down doing left side; plantar response difficult to elicit on right side.








- Psychiatric Exam


Psychiatric exam: Normal Mood





- Skin


Skin Exam: Normal Color





Assessment and Plan


(1) Toxic metabolic encephalopathy


Assessment & Plan: 


Imaging reviewed:





-Brain MRI (2/27/19): 1. No acute intracranial abnormality. 2. Cystic 

encephalomalacia and gliosis in the left corona radiata and basal ganglia, 

sequela of remote MCA territory infarction.  3. Mild chronic microangiopathic 

changes and mild age-related global parenchymal volume loss. Old lacunar 

infarctions in the left cerebellar hemisphere.   4. Pansinusitis, with a 

complicated retention cyst/polyp in the right maxillary sinus. Fluid in the 

sphenoid sinus may represent acute sinusitis in the appropriate clinical 

setting. Bilateral mastoid effusions.


-EEG (repeat; 2/26/19):  This is  an abnormal EEG record that demonstrate the 

presence of severe non specific diffuse disturbance of cortical activity, this 

is keeping with a diffuse gray matter dysfunction, these findings re not 

specific. These type of EEG is usually seen in toxic metabolic encephalopathies 

, hypoxic-ischemic brain injury among others, clinical correlation is 

recommended.  The EEG look similar to the previous video EEG study  of 2/23,  

2/24.  No seizures. 


Patient is not in status epilepticus.  


-CT Head (2/24/19): No acute intracranial abnormalities. No significant findings

to account for the clinical presentation. No significant interval change 

compared to the prior examination(s).


-CT Head (2/22/19): No acute intracranial abnormalities. No significant findings

to account for the clinical presentation.


Extensive postoperative changes described above.


-EEG (2/24/19): shows burst suppression per Dr. Patrick.


-ECHO (2/24/19) done: 50-55%








-Carotid and Vertebral U/S still pending--will f/u once it can be completed.  If

pt is able to tolerate MRA Head and Neck, that can be done as well.  Notify 


  neuro for any abnormal findings.


-Continue current medications and treatment of other acute medical issues. 


-Notify neuro team of any acute changes to pt's condition.  Otherwise, pt is 

continuing to improve neurologically.


-No further neuro recommendations at this time.  If family has concerns or 

questions, please feel free to contact me.  Please reconsult prn. 





Thank you for allowing us to participate in this pt's care.


Chrystal Dean DNP, APN


Case discussed with Dr. Patrick


Status: Acute

## 2019-03-07 NOTE — CT
Date of service: 



03/07/2019 



PROCEDURE:  CT Abdomen and Pelvis without intravenous contrast



HISTORY:

Small bowel obstruction 



COMPARISON:

02/24/2019.



TECHNIQUE:

CT scan of the abdomen and pelvis was performed without 

administration of intravenous contrast.  Oral contrast was not 

administered.  Coronal and sagittal reformatted images were obtained. 



Radiation dose:



Total exam DLP = 826.61  mGy-cm.



This CT exam was performed using one or more of the following dose 

reduction techniques: Automated exposure control, adjustment of the 

mA and/or kV according to patient size, and/or use of iterative 

reconstruction technique.



FINDINGS:



LOWER THORAX:

Large right and small left pleural effusions.  Confluent airspace 

disease in the right lower lobe which may represent pneumonia. 



LIVER:

Normal in size.  No gross lesion or ductal dilatation. 



GALLBLADDER AND BILE DUCTS:

Surgically absent. 



PANCREAS:

Normal in size. No gross lesion or ductal dilatation.



SPLEEN:

Normal in size. 



ADRENALS:

Normal in size. No discrete nodule. 



KIDNEYS AND URETERS:

Both kidneys are normal in size. No hydronephrosis or 

nephrolithiasis. 



VASCULATURE:

Normal in caliber. No aortic aneurysm. There are aortic 

atherosclerotic calcifications present.



BOWEL:

Evaluation of the bowel is limited in the absence of oral contrast.  

There is moderate dilatation of fluid-filled proximal small bowel 

loops.  There is a left spigelian hernia containing nonobstructed 

distal descending colon.  And the distal small bowel loops are 

decompressed. 



The stomach is distended.  The colon is unremarkable. 



APPENDIX:

Normal appendix. 



PERITONEUM:

No free fluid. No free air. 



LYMPH NODES:

No enlarged lymph nodes. 



BLADDER:

Indwelling Segura catheter with expected decompression. 



REPRODUCTIVE:

Enlarged lobular presumable fibroid uterus. 



BONES:

No acute fracture. Within normal limits for the patient's age. 



OTHER FINDINGS:

Small bilateral fat containing inguinal hernias.  There is a small 

sliding hiatal hernia.  There is diffuse anasarca. 



IMPRESSION:

Fluid-filled moderately dilated proximal small bowel loops without 

definite zone of transition could represent acute or developing small 

bowel obstruction.



Left lower abdominal spigelian hernia containing nonobstructed distal 

descending colon. 



A preliminary report was provided by [employer].

## 2019-03-07 NOTE — CT
Date of service: 



03/07/2019



PROCEDURE:  CT Chest without contrast



HISTORY:

respiratory distress



COMPARISON:

Plain radiograph from 03/05/2019.



TECHNIQUE:

Contiguous axial images were obtained through the chest without 

intravenous contrast enhancement. Sagittal and coronal 

reconstructions were performed.







Radiation dose:



Total exam DLP = 578.66 mGy-cm.



This CT exam was performed using one or more of the following dose 

reduction techniques: Automated exposure control, adjustment of the 

mA and/or kV according to patient size, and/or use of iterative 

reconstruction technique.



FINDINGS:



LUNGS:

There is patchy ground-glass attenuation with interlobular septal 

thickening in the lungs, worse on the right.  There is more confluent 

airspace disease in the posterior segment of the right upper lobe.  

There is also airspace disease in the right lower lobe.  There are no 

endobronchial lesions. 



MEDIASTINUM:

Unremarkable thoracic aorta. No aneurysm. There is mild cardiomegaly. 

 Main pulmonary artery is dilated and measures 3.8 cm.  No vascular 

congestion. No lymphadenopathy.



There are mild aortic atherosclerotic calcifications present.



PLEURA:

Large right and small left pleural effusions.  No pneumothorax. 



BONES:

No fracture. No destructive lesion. 



UPPER ABDOMEN:

Grossly unremarkable.



OTHER FINDINGS:

 Right IJV line terminates at the cavoatrial junction.  Status post 

aortic valve replacement.



IMPRESSION:

1.  Confluent airspace disease in the right lower lobe may represent 

pneumonia.



2.  Congestive heart failure, patchy ground-glass attenuation in both 

lungs with interlobular septal thickening and more confluent airspace 

disease in the posterior segment of the right upper lobe likely 

related to alveolar pulmonary edema and interstitial pulmonary edema. 



Large right and small left pleural effusions. 



3.  Mild cardiomegaly and pulmonary hypertension.

## 2019-03-08 LAB
ALBUMIN SERPL-MCNC: 3.2 G/DL (ref 3.5–5)
ALBUMIN/GLOB SERPL: 0.8 {RATIO} (ref 1–2.1)
ALT SERPL-CCNC: 189 U/L (ref 9–52)
ARTERIAL BLOOD GAS O2 SAT: 100.1 % (ref 95–98)
ARTERIAL BLOOD GAS PCO2: 35 MM/HG (ref 35–45)
ARTERIAL BLOOD GAS TCO2: 30.3 MMOL/L (ref 22–28)
ARTERIAL PATENCY WRIST A: YES
AST SERPL-CCNC: 86 U/L (ref 14–36)
BUN SERPL-MCNC: 57 MG/DL (ref 7–17)
CALCIUM SERPL-MCNC: 8.8 MG/DL (ref 8.4–10.2)
ERYTHROCYTE [DISTWIDTH] IN BLOOD BY AUTOMATED COUNT: 19.9 % (ref 11.5–14.5)
GFR NON-AFRICAN AMERICAN: 22
HCO3 BLDA-SCNC: 29.8 MMOL/L (ref 21–28)
HGB BLD-MCNC: 8.4 G/DL (ref 12–16)
INHALED O2 CONCENTRATION: 40 %
MCH RBC QN AUTO: 25.3 PG (ref 27–31)
MCHC RBC AUTO-ENTMCNC: 31.5 G/DL (ref 33–37)
MCV RBC AUTO: 80.3 FL (ref 81–99)
PH BLDA: 7.53 [PH] (ref 7.35–7.45)
PLATELET # BLD: 293 K/UL (ref 130–400)
PO2 BLDA: 99 MM/HG (ref 80–100)
RBC # BLD AUTO: 3.31 MIL/UL (ref 3.8–5.2)
WBC # BLD AUTO: 16.9 K/UL (ref 4.8–10.8)

## 2019-03-08 RX ADMIN — Medication SCH TAB: at 08:35

## 2019-03-08 RX ADMIN — IPRATROPIUM BROMIDE AND ALBUTEROL SULFATE SCH ML: .5; 3 SOLUTION RESPIRATORY (INHALATION) at 07:09

## 2019-03-08 RX ADMIN — IPRATROPIUM BROMIDE AND ALBUTEROL SULFATE SCH ML: .5; 3 SOLUTION RESPIRATORY (INHALATION) at 13:01

## 2019-03-08 RX ADMIN — POTASSIUM CHLORIDE SCH MLS/HR: 10 INJECTION, SOLUTION INTRAVENOUS at 15:33

## 2019-03-08 RX ADMIN — INSULIN LISPRO SCH UNITS: 100 INJECTION, SUSPENSION SUBCUTANEOUS at 19:57

## 2019-03-08 RX ADMIN — METRONIDAZOLE SCH MLS/HR: 500 INJECTION, SOLUTION INTRAVENOUS at 08:37

## 2019-03-08 RX ADMIN — ACETAMINOPHEN PRN MG: 160 SOLUTION ORAL at 15:36

## 2019-03-08 RX ADMIN — Medication SCH APPLIC: at 00:53

## 2019-03-08 RX ADMIN — POTASSIUM CHLORIDE SCH MLS/HR: 10 INJECTION, SOLUTION INTRAVENOUS at 11:51

## 2019-03-08 RX ADMIN — IPRATROPIUM BROMIDE AND ALBUTEROL SULFATE SCH ML: .5; 3 SOLUTION RESPIRATORY (INHALATION) at 01:06

## 2019-03-08 RX ADMIN — Medication SCH APPLIC: at 17:00

## 2019-03-08 RX ADMIN — Medication SCH: at 11:22

## 2019-03-08 RX ADMIN — Medication SCH APPLIC: at 08:38

## 2019-03-08 RX ADMIN — IPRATROPIUM BROMIDE AND ALBUTEROL SULFATE SCH ML: .5; 3 SOLUTION RESPIRATORY (INHALATION) at 19:03

## 2019-03-08 RX ADMIN — METRONIDAZOLE SCH MLS/HR: 500 INJECTION, SOLUTION INTRAVENOUS at 00:51

## 2019-03-08 RX ADMIN — METOPROLOL TARTRATE SCH MG: 5 INJECTION INTRAVENOUS at 17:14

## 2019-03-08 RX ADMIN — POTASSIUM CHLORIDE SCH MLS/HR: 10 INJECTION, SOLUTION INTRAVENOUS at 13:06

## 2019-03-08 RX ADMIN — METRONIDAZOLE SCH MLS/HR: 500 INJECTION, SOLUTION INTRAVENOUS at 17:08

## 2019-03-08 RX ADMIN — INSULIN LISPRO SCH UNITS: 100 INJECTION, SUSPENSION SUBCUTANEOUS at 08:37

## 2019-03-08 NOTE — RAD
Date of service: 



03/08/2019



HISTORY:

 PL;EURAL EFFUSION 



COMPARISON:

Portable chest 03/05/2019 3:54 a.m..



FINDINGS:



LUNGS:

Right central venous line unchanged in position with apparent right 

central venous dialysis catheter now removed.  Diffuse ground-glass 

opacity is seen bilaterally, somewhat more so on the right than left 

reflecting worsening bilateral infiltrates with mild but increased 

right pleural effusion.  No definite left pleural effusion.  No 

pneumothorax bilaterally.  Cardiomediastinal silhouette is stable 

including prosthetic cardiac valve.



PLEURA:

Pulmonary vascular pattern is somewhat obscured by infiltrates.



CARDIOVASCULAR:

Calcific atherosclerotic changes are seen related to the thoracic 

aorta.



 As above. 



OSSEOUS STRUCTURES:

No significant abnormalities.



VISUALIZED UPPER ABDOMEN:

Right upper quadrant surgical clips noted.



OTHER FINDINGS:

None.



IMPRESSION:

Worsening mixed alveolar and interstitial infiltrates, right greater 

than left.  Mild but increased right pleural effusion.

## 2019-03-08 NOTE — CP.PCM.PN
Subjective





- Date & Time of Evaluation


Date of Evaluation: 03/06/19


Time of Evaluation: 07:30





- Subjective


Subjective: 





Pt seen and assessed at bedside. Pt was recently extubated and is currently 

receiving a nebulizer treatment. Recent abdominal X-ray revealed ileus; the pt 

has been receiving Miralax and frequent bowel movements were noted. 





Review of Systems





- Review of Systems


All systems: reviewed and no additional remarkable complaints except


Review of Systems: 





occasional dyspnea, fatigue.





Objective





- Vital Signs/Intake and Output


Vital Signs (last 24 hours): 


                                        











Temp Pulse Resp BP Pulse Ox


 


 101.5 F H  69   25 H  155/76 H  98 


 


 03/07/19 22:00  03/07/19 22:00  03/07/19 22:00  03/07/19 22:00  03/07/19 22:00








Intake and Output: 


                                        











 03/07/19 03/08/19





 18:59 06:59


 


Intake Total 1052 384


 


Output Total 1400 300


 


Balance -348 84














- Medications


Medications: 


                               Current Medications





Acetaminophen (Tylenol 650mg/20.3ml Solution Ud)  650 mg PO Q6 PRN


   PRN Reason: Fever>100.4 F


   Last Admin: 03/07/19 20:45 Dose:  650 mg


Acetaminophen (Tylenol 325mg Tab)  650 mg PO Q6 PRN


   PRN Reason: Pain, moderate (4-7)


   Last Admin: 03/06/19 11:26 Dose:  650 mg


Albuterol/Ipratropium (Duoneb 3 Mg/0.5 Mg (3 Ml) Ud)  3 ml INH RQ6 [facility]


   Last Admin: 03/07/19 19:18 Dose:  3 ml


Dimethicone (Proshield Plus Skin Protectant)  1 applic TOP Q8 [facility]


   Last Admin: 03/08/19 00:53 Dose:  1 applic


Ferrous Sulfate (Feosol)  325 mg PO TID [facility]


   Last Admin: 03/07/19 16:54 Dose:  325 mg


Furosemide (Lasix)  40 mg IVP DAILY [facility]


   Last Admin: 03/07/19 10:10 Dose:  40 mg


Hydromorphone HCl (Dilaudid)  1 mg IVP Q4 PRN


   PRN Reason: Pain, moderate (4-7)


   Last Admin: 03/07/19 15:15 Dose:  1 mg


Vancomycin HCl 1 gm/ Sodium (Chloride)  250 mls @ 250 mls/hr IVPB MWF [facility]; 

Protocol


   Last Admin: 03/06/19 08:29 Dose:  250 mls/hr


Cefepime HCl 1 gm/ Sodium (Chloride)  100 mls @ 100 mls/hr IVPB Q12 [facility]; 

Protocol


   Last Admin: 03/07/19 20:20 Dose:  100 mls/hr


Metronidazole (Flagyl 500mg/100ml Ns)  100 mls @ 100 mls/hr IVPB Q8 [facility]; 

Protocol


   Last Admin: 03/08/19 00:51 Dose:  100 mls/hr


Insulin Human Regular (Humulin R)  0 units SC ACCU-CHECK [facility]; Protocol


   Last Admin: 03/07/19 22:22 Dose:  2 u


Insulin Lispro Protam/Lispro Human (Humalog Mix 75/25)  5 units SC BID [facility]; 

Protocol


   Last Admin: 03/07/19 16:54 Dose:  5 units


Ketorolac Tromethamine (Toradol)  30 mg IVP Q6 PRN


   PRN Reason: Pain, moderate (4-7)


   Last Admin: 03/06/19 22:44 Dose:  30 mg


Labetalol HCl (Trandate)  20 mg IVP Q4 PRN


   PRN Reason: Systolic Blood Pressure


   Last Admin: 03/07/19 02:16 Dose:  20 mg


Levetiracetam (Keppra)  500 mg PO BID [facility]


   Last Admin: 03/07/19 16:54 Dose:  500 mg


Metoprolol Tartrate (Lopressor)  50 mg PO Q12 [facility]


   Last Admin: 03/07/19 20:24 Dose:  50 mg


Ondansetron HCl (Zofran Inj)  4 mg IVP Q6 PRN


   PRN Reason: Nausea/Vomiting


   Last Admin: 03/05/19 01:04 Dose:  4 mg


Polyethylene Glycol (Miralax)  17 gm PO HS [facility]


   Last Admin: 03/07/19 21:03 Dose:  17 gm


Pregabalin (Lyrica)  100 mg PO Q8 [facility]


   Last Admin: 03/08/19 00:51 Dose:  100 mg


Vitamin B Complex/Vit C/Folic Acid (Nephro-Yoseph)  1 tab PO DAILY [facility]


   Last Admin: 03/07/19 08:24 Dose:  1 tab











- Labs


Labs: 


                                        





                                 03/07/19 02:55 





                                 03/07/19 02:55 





                                        











PT  16.2 Seconds (9.8-13.1)  H  03/01/19  10:28    


 


INR  1.4   03/01/19  10:28    


 


APTT  35.6 Seconds (25.6-37.1)   03/01/19  10:28    














- Head Exam


Head Exam: ATRAUMATIC, NORMAL INSPECTION, NORMOCEPHALIC





- Eye Exam


Eye Exam: EOMI, Normal appearance, PERRL





- ENT Exam


ENT Exam: Mucous Membranes Dry





- Neck Exam


Neck Exam: Normal Inspection





- Respiratory Exam


Respiratory Exam: Decreased Breath Sounds





- Cardiovascular Exam


Cardiovascular Exam: Irregular Rhythm


Additional comments: 





A-fib/flutter on cardiac monitor.





- GI/Abdominal Exam


GI & Abdominal Exam: Distended, Normal Bowel Sounds





- Extremities Exam


Extremities Exam: Normal Inspection, Tenderness





- Back Exam


Back Exam: NORMAL INSPECTION





- Neurological Exam


Neurological Exam: Alert, Awake





- Psychiatric Exam


Psychiatric exam: Normal Affect


Additional comments: 





occasionally shouts out loud spontaneously 





- Skin


Skin Exam: Dry, Normal Color, Warm





Assessment and Plan


(1) Encephalopathy


Assessment & Plan: 


1.) Encephalopathy





-Pt extubated and receiving nebulizer treatments.


-Ensure adequate oxygenation. 


-Mental status seems to have returned closer to baseline.


-Pt able to follow commands and give brief answers; mild slurred speech noted.


-No seizure activity observed. 


-consults input appreciated.


-continue current tx. 


Status: Acute

## 2019-03-08 NOTE — CP.PCM.PN
Subjective





- Date & Time of Evaluation


Date of Evaluation: 03/08/19


Time of Evaluation: 09:52





- Subjective


Subjective: 





Patient appears to be awake


Trying to verbalize


Vital signs noted to be stable





Objective





- Vital Signs/Intake and Output


Vital Signs (last 24 hours): 


                                        











Temp Pulse Resp BP Pulse Ox


 


 99.7 F H  70   24   164/73 H  100 


 


 03/08/19 08:00  03/08/19 08:36  03/08/19 08:00  03/08/19 08:36  03/08/19 08:00








Intake and Output: 


                                        











 03/08/19 03/08/19





 06:59 18:59


 


Intake Total 1080 


 


Output Total 1050 


 


Balance 30 














- Medications


Medications: 


                               Current Medications





Acetaminophen (Tylenol 650mg/20.3ml Solution Ud)  650 mg PO Q6 PRN


   PRN Reason: Fever>100.4 F


   Last Admin: 03/07/19 20:45 Dose:  650 mg


Acetaminophen (Tylenol 325mg Tab)  650 mg PO Q6 PRN


   PRN Reason: Pain, moderate (4-7)


   Last Admin: 03/06/19 11:26 Dose:  650 mg


Albuterol/Ipratropium (Duoneb 3 Mg/0.5 Mg (3 Ml) Ud)  3 ml INH RQ6 [facility]


   Last Admin: 03/08/19 07:09 Dose:  3 ml


Dimethicone (Proshield Plus Skin Protectant)  1 applic TOP Q8 [facility]


   Last Admin: 03/08/19 08:38 Dose:  1 applic


Ferrous Sulfate (Feosol)  325 mg PO TID [facility]


   Last Admin: 03/08/19 08:36 Dose:  325 mg


Furosemide (Lasix)  60 mg IVP BID [facility]


Hydromorphone HCl (Dilaudid)  1 mg IVP Q4 PRN


   PRN Reason: Pain, moderate (4-7)


   Last Admin: 03/07/19 15:15 Dose:  1 mg


Vancomycin HCl 1 gm/ Sodium (Chloride)  250 mls @ 250 mls/hr IVPB MWF [facility]; 

Protocol


   Last Admin: 03/08/19 08:39 Dose:  250 mls/hr


Cefepime HCl 1 gm/ Sodium (Chloride)  100 mls @ 100 mls/hr IVPB Q12 [facility]; P

rotocol


   Last Admin: 03/08/19 08:36 Dose:  100 mls/hr


Metronidazole (Flagyl 500mg/100ml Ns)  100 mls @ 100 mls/hr IVPB Q8 [facility]; 

Protocol


   Last Admin: 03/08/19 08:37 Dose:  100 mls/hr


Insulin Human Regular (Humulin R)  0 units SC ACCU-CHECK [facility]; Protocol


   Last Admin: 03/08/19 06:27 Dose:  1 u


Insulin Lispro Protam/Lispro Human (Humalog Mix 75/25)  5 units SC BID [facility]; 

Protocol


   Last Admin: 03/08/19 08:37 Dose:  5 units


Ketorolac Tromethamine (Toradol)  30 mg IVP Q6 PRN


   PRN Reason: Pain, moderate (4-7)


   Last Admin: 03/06/19 22:44 Dose:  30 mg


Labetalol HCl (Trandate)  20 mg IVP Q4 PRN


   PRN Reason: Systolic Blood Pressure


   Last Admin: 03/07/19 02:16 Dose:  20 mg


Levetiracetam (Keppra)  500 mg PO BID [facility]


   Last Admin: 03/08/19 08:36 Dose:  500 mg


Metoprolol Tartrate (Lopressor)  50 mg PO Q12 [facility]


   Last Admin: 03/08/19 08:36 Dose:  50 mg


Ondansetron HCl (Zofran Inj)  4 mg IVP Q6 PRN


   PRN Reason: Nausea/Vomiting


   Last Admin: 03/05/19 01:04 Dose:  4 mg


Pregabalin (Lyrica)  100 mg PO Q8 [facility]


   Last Admin: 03/08/19 00:51 Dose:  100 mg


Vitamin B Complex/Vit C/Folic Acid (Nephro-Yoseph)  1 tab PO DAILY [facility]


   Last Admin: 03/08/19 08:35 Dose:  1 tab











- Labs


Labs: 


                                        





                                 03/08/19 05:00 





                                 03/08/19 05:00 





                                        











PT  16.2 Seconds (9.8-13.1)  H  03/01/19  10:28    


 


INR  1.4   03/01/19  10:28    


 


APTT  35.6 Seconds (25.6-37.1)   03/01/19  10:28    














- Constitutional


Appears: No Acute Distress





- Eye Exam


Eye Exam: Conjunctival injection





- ENT Exam


ENT Exam: Mucous Membranes Moist





- Respiratory Exam


Respiratory Exam: Rhonchi, NORMAL BREATHING PATTERN.  absent: Chest Wall 

Tenderness





- Cardiovascular Exam


Cardiovascular Exam: absent: Gallop, JVD, Rubs





- GI/Abdominal Exam


GI & Abdominal Exam: Soft, Normal Bowel Sounds





- Extremities Exam


Extremities Exam: absent: Calf Tenderness





- Back Exam


Back Exam: absent: CVA tenderness (L), CVA tenderness (R)





- Neurological Exam


Neurological Exam: Awake





- Skin


Skin Exam: absent: Cyanosis





Assessment and Plan


(1) SHANITA (acute kidney injury)


Assessment & Plan: 


Respiratory failure/   patient extubated


Acute renal failure/acute kidney injury related to multifactorial including 

sepsis.


Diabetes mellitus and history of hypertension history of CVA.


 encephalopathy   patient improving mental status much better and more awake


hyperurecemia    corrected , DC allopurinol


Hyperphosphatemia    corrected DC calcium acetate


Shock liver improving liver function test


Nephrotic syndrome proteinuria with 7 gram proteinuria based on protein to 

creatinine ratio


Hypokalemia.  Given potassium chloride 


Hypomagnesemia


Hypernatremia sodium up 152


Recommendation


Repeat serum magnesium stat now if magnesium is still low need more intravenous 

magnesium.


Hypokalemia persisted need more potassium chloride


Kidney function stable


Antibiotics as per ICU team considering GFR


Continue D5W IV fluid


Mix all intravenous medication was D5W


Status: Acute   





(2) Elevated liver enzymes


Status: Acute   





(3) Elevated troponin level


Status: Acute   





(4) Leukocytosis


Status: Acute   





(5) Sepsis


Status: Acute

## 2019-03-08 NOTE — CP.CCUPN
CCU Subjective





- Physician Review


Subjective (Free Text): 


Overnight events reviewed. Overall appears sleepy but awake now and alert to 

verbal questions and commands. Daughter requested resumption of Lyrica and 

lidocaine patch, but Home meds reviewed and only patch medication noted was 

Duragesic. Given Lasix yesterday with approx. 2.4L urine output overnight. Still

has recurrent fever spikes to 102F.





 SBPs high up to 130-160 range, HR 60s.





No observed recurrent seizure activity, no documented further hypoglycemic e

pisodes. 





Other vitals and I/O's reviewed.





ROS:  No other pertinent negs or positives on 10+  system review





PMSFH:   All other Nursing and physician documentation reviewed to date; no new 

pertinent info noted relevant to current medical problems.








EXAM- 


HEENT:  no icterus, no gaze preference, pupils with normal reactivity noted, no 

nystagmus


NECK: no JVD visible, supple, carotids equal upstroke bilat/no bruit 


CHEST: decreased BS  at the bases, no wheezes audible


HEART: irregular, distant, S1S2, no rubs


ABD:  soft, obese, no distension, no focal  tenderness, no tympany, no guarding,

no organomegaly, BS hypoactive.  


EXT:    + LE edema, no mottling;  no calf tenderness or palpable cords, distal 

pulses intact and symmetrical, no cyanosis, no mottling.


NEURO:   R sided hemiplegia, moves 4/5  LUE and LLE 


SKIN:   no rashes,  warm and dry





LABS: 


WBC= 16.9


HGB= 8.4


PLTs=  293K





Na= 152


K= 3.1


CL= 114


HCO3= 29


BUN/Cr=  57/2.3


BS= 162








CXR (my interp):  diffuse bilat haziness with infiltrates, worse tna when she 

was intubated onMV.





IMPRESSION / MAJOR PROBLEMS NOW:  


1.     s/p Acute Hypoxemic Resp Failure 2 Aspiration PNA


2.     Anoxic Encephalopathy 2  unclear Seizure event (hypoglycemic) and 

prolonged down time (EMS reports 25 minutes, but may have been longer as in not 

seen till next morning according to sons accounts of events, as he lives with 

her); or prolonged post-ictal state versus other occult CNS injury / infection /

subacute CVA. 


3.      Superimposed Metabolic Encephalopathy from Acute Renal Failure


4.      Accelerated HTN


5.      DM II





PLAN:


1.   Though self-extubated on Mar 5, remains under close observation for any 

need to return to MV support given recent tachypnea. Try HFNC as she refuses 

BiPAP.  No new Advance Directives noted. Check ABG. 


2.   Hypernatremic, with Lasix. K supplemented.


3.   Lasix increased.


4.                     On Cefepime / Flagyl / Vanco. 


5.   Lyrica resumed at half dose given renal insufficiency. Hold on Duragesic 

patch for now.

## 2019-03-09 LAB
ALBUMIN SERPL-MCNC: 3 G/DL (ref 3.5–5)
ALBUMIN/GLOB SERPL: 0.7 {RATIO} (ref 1–2.1)
ALT SERPL-CCNC: 144 U/L (ref 9–52)
AST SERPL-CCNC: 61 U/L (ref 14–36)
BUN SERPL-MCNC: 49 MG/DL (ref 7–17)
CALCIUM SERPL-MCNC: 8.6 MG/DL (ref 8.4–10.2)
ERYTHROCYTE [DISTWIDTH] IN BLOOD BY AUTOMATED COUNT: 20.2 % (ref 11.5–14.5)
GFR NON-AFRICAN AMERICAN: 29
HGB BLD-MCNC: 8.5 G/DL (ref 12–16)
MCH RBC QN AUTO: 25.7 PG (ref 27–31)
MCHC RBC AUTO-ENTMCNC: 32.1 G/DL (ref 33–37)
MCV RBC AUTO: 79.8 FL (ref 81–99)
PLATELET # BLD: 317 K/UL (ref 130–400)
RBC # BLD AUTO: 3.29 MIL/UL (ref 3.8–5.2)
WBC # BLD AUTO: 12.7 K/UL (ref 4.8–10.8)

## 2019-03-09 RX ADMIN — POTASSIUM CHLORIDE SCH MEQ: 1.5 SOLUTION ORAL at 08:37

## 2019-03-09 RX ADMIN — IPRATROPIUM BROMIDE AND ALBUTEROL SULFATE SCH ML: .5; 3 SOLUTION RESPIRATORY (INHALATION) at 19:41

## 2019-03-09 RX ADMIN — IPRATROPIUM BROMIDE AND ALBUTEROL SULFATE SCH ML: .5; 3 SOLUTION RESPIRATORY (INHALATION) at 13:02

## 2019-03-09 RX ADMIN — INSULIN LISPRO SCH UNITS: 100 INJECTION, SUSPENSION SUBCUTANEOUS at 17:04

## 2019-03-09 RX ADMIN — Medication SCH APPLIC: at 09:40

## 2019-03-09 RX ADMIN — Medication SCH APPLIC: at 17:14

## 2019-03-09 RX ADMIN — METRONIDAZOLE SCH MLS/HR: 500 INJECTION, SOLUTION INTRAVENOUS at 17:01

## 2019-03-09 RX ADMIN — METRONIDAZOLE SCH MLS/HR: 500 INJECTION, SOLUTION INTRAVENOUS at 08:24

## 2019-03-09 RX ADMIN — METOPROLOL TARTRATE SCH MG: 5 INJECTION INTRAVENOUS at 01:11

## 2019-03-09 RX ADMIN — METOPROLOL TARTRATE SCH MG: 5 INJECTION INTRAVENOUS at 17:06

## 2019-03-09 RX ADMIN — IPRATROPIUM BROMIDE AND ALBUTEROL SULFATE SCH ML: .5; 3 SOLUTION RESPIRATORY (INHALATION) at 01:11

## 2019-03-09 RX ADMIN — POTASSIUM CHLORIDE SCH MEQ: 1.5 SOLUTION ORAL at 09:43

## 2019-03-09 RX ADMIN — Medication SCH APPLIC: at 01:27

## 2019-03-09 RX ADMIN — IPRATROPIUM BROMIDE AND ALBUTEROL SULFATE SCH ML: .5; 3 SOLUTION RESPIRATORY (INHALATION) at 07:11

## 2019-03-09 RX ADMIN — MICAFUNGIN SODIUM SCH MLS/HR: 20 INJECTION, POWDER, LYOPHILIZED, FOR SOLUTION INTRAVENOUS at 10:08

## 2019-03-09 RX ADMIN — METOPROLOL TARTRATE SCH MG: 5 INJECTION INTRAVENOUS at 08:35

## 2019-03-09 RX ADMIN — METRONIDAZOLE SCH MLS/HR: 500 INJECTION, SOLUTION INTRAVENOUS at 01:21

## 2019-03-09 RX ADMIN — INSULIN LISPRO SCH UNITS: 100 INJECTION, SUSPENSION SUBCUTANEOUS at 08:25

## 2019-03-09 NOTE — CP.PCM.PN
Subjective





- Date & Time of Evaluation


Date of Evaluation: 03/09/19


Time of Evaluation: 11:35





- Subjective


Subjective: 





RESPONDS TO TOUCH AND VERBAL COMMANDS


VSS


OFF VENT--ON HIGH FLOW O2





Objective





- Vital Signs/Intake and Output


Vital Signs (last 24 hours): 


                                        











Temp Pulse Resp BP Pulse Ox


 


 98.9 F   60   27 H  151/75 H  100 


 


 03/09/19 08:00  03/09/19 10:00  03/09/19 10:00  03/09/19 10:00  03/09/19 10:00








Intake and Output: 


                                        











 03/09/19 03/09/19





 06:59 18:59


 


Intake Total 1180 


 


Output Total 2300 


 


Balance -1120 














- Medications


Medications: 


                               Current Medications





Acetaminophen (Tylenol 325mg Tab)  650 mg PO Q6 PRN


   PRN Reason: Pain, moderate (4-7)


   Last Admin: 03/06/19 11:26 Dose:  650 mg


Acetaminophen (Tylenol 325mg Tab)  650 mg PO Q6 PRN


   PRN Reason: Temperature


   Last Admin: 03/08/19 15:55 Dose:  650 mg


Albuterol/Ipratropium (Duoneb 3 Mg/0.5 Mg (3 Ml) Ud)  3 ml INH RQ6 [facility]


   Last Admin: 03/09/19 07:11 Dose:  3 ml


Dimethicone (Proshield Plus Skin Protectant)  1 applic TOP Q8 [facility]


   Last Admin: 03/09/19 09:40 Dose:  1 applic


Ferrous Sulfate (Feosol)  325 mg PO TID [facility]


   Last Admin: 03/08/19 11:21 Dose:  Not Given


Furosemide (Lasix)  40 mg IVP Q12 [facility]


Hydromorphone HCl (Dilaudid)  1 mg IVP Q4 PRN


   PRN Reason: Pain, moderate (4-7)


   Last Admin: 03/08/19 15:54 Dose:  1 mg


Vancomycin HCl 1 gm/ Sodium (Chloride)  250 mls @ 250 mls/hr IVPB MWF [facility]; 

Protocol


   Last Admin: 03/08/19 08:39 Dose:  250 mls/hr


Metronidazole (Flagyl 500mg/100ml Ns)  100 mls @ 100 mls/hr IVPB Q8 [facility]; 

Protocol


   Last Admin: 03/09/19 08:24 Dose:  100 mls/hr


Meropenem 500 mg/ Sodium (Chloride)  100 mls @ 100 mls/hr IVPB Q8 [facility]; Protocol


   Last Admin: 03/09/19 09:44 Dose:  100 mls/hr


Levetiracetam 500 mg/ Dextrose  105 mls @ 210 mls/hr IVPB Q12 [facility]


   Last Admin: 03/09/19 10:09 Dose:  210 mls/hr


Micafungin Sodium 100 mg/ (Dextrose)  100 mls @ 100 mls/hr IVPB DAILY [facility]; 

Protocol


   Last Admin: 03/09/19 10:08 Dose:  100 mls/hr


Insulin Human Regular (Humulin R)  0 units SC ACCU-CHECK [facility]; Protocol


   Last Admin: 03/09/19 06:03 Dose:  Not Given


Insulin Lispro Protam/Lispro Human (Humalog Mix 75/25)  5 units SC BID [facility]; 

Protocol


   Last Admin: 03/09/19 08:25 Dose:  5 units


Ketorolac Tromethamine (Toradol)  30 mg IVP Q6 PRN


   PRN Reason: Pain, moderate (4-7)


   Last Admin: 03/06/19 22:44 Dose:  30 mg


Labetalol HCl (Trandate)  20 mg IVP Q4 PRN


   PRN Reason: Systolic Blood Pressure


   Last Admin: 03/07/19 02:16 Dose:  20 mg


Levetiracetam (Keppra)  500 mg PO BID [facility]


   Last Admin: 03/08/19 11:55 Dose:  Not Given


Metoprolol Tartrate (Lopressor)  50 mg PO Q12 [facility]


   Last Admin: 03/08/19 11:56 Dose:  Not Given


Metoprolol Tartrate (Lopressor)  5 mg IVP Q8 [facility]


   Last Admin: 03/09/19 08:35 Dose:  5 mg


Ondansetron HCl (Zofran Inj)  4 mg IVP Q6 PRN


   PRN Reason: Nausea/Vomiting


   Last Admin: 03/05/19 01:04 Dose:  4 mg


Pregabalin (Lyrica)  100 mg PO Q8 [facility]


   Last Admin: 03/08/19 10:12 Dose:  Not Given


Vitamin B Complex/Vit C/Folic Acid (Nephro-Yoseph)  1 tab PO DAILY [facility]


   Last Admin: 03/08/19 11:22 Dose:  Not Given











- Labs


Labs: 


                                        





                                 03/09/19 04:45 





                                 03/09/19 04:45 





                                        











PT  16.2 Seconds (9.8-13.1)  H  03/01/19  10:28    


 


INR  1.4   03/01/19  10:28    


 


APTT  35.6 Seconds (25.6-37.1)   03/01/19  10:28    














- Constitutional


Appears: Chronically Ill





- Head Exam


Head Exam: ATRAUMATIC, NORMAL INSPECTION, NORMOCEPHALIC





- Eye Exam


Eye Exam: EOMI, Normal appearance, PERRL


Pupil Exam: NORMAL ACCOMODATION, PERRL





- ENT Exam


ENT Exam: Mucous Membranes Moist, Normal Exam





- Neck Exam


Neck Exam: Full ROM, Normal Inspection.  absent: Lymphadenopathy





- Respiratory Exam


Respiratory Exam: Prolonged Expiratory Phase, Rales


Additional comments: 





ON HIGH FLOW O2





- Cardiovascular Exam


Cardiovascular Exam: REGULAR RHYTHM, +S1, +S2.  absent: Murmur





- GI/Abdominal Exam


GI & Abdominal Exam: Soft, Normal Bowel Sounds.  absent: Tenderness





- Rectal Exam


Rectal Exam: NORMAL INSPECTION





- Extremities Exam


Extremities Exam: Full ROM, Normal Capillary Refill, Normal Inspection.  absent:

Joint Swelling, Pedal Edema





- Back Exam


Back Exam: NORMAL INSPECTION





- Neurological Exam


Additional comments: 





EASILY AROUSABLE





- Psychiatric Exam


Psychiatric exam: Normal Affect, Normal Mood





- Skin


Skin Exam: Dry, Intact, Normal Color, Warm





Assessment and Plan





- Assessment and Plan (Free Text)


Assessment: 





RESPIRATORY FAILURE IMPROVED


PLEURAL EFFUSION PERSISTS


Plan: 





CXR IN AM


CONTINUE O2 AND ANTIBIOTIC RX

## 2019-03-10 LAB
ALBUMIN SERPL-MCNC: 3.2 G/DL (ref 3.5–5)
ALBUMIN/GLOB SERPL: 0.8 {RATIO} (ref 1–2.1)
ALT SERPL-CCNC: 105 U/L (ref 9–52)
AST SERPL-CCNC: 49 U/L (ref 14–36)
BUN SERPL-MCNC: 34 MG/DL (ref 7–17)
CALCIUM SERPL-MCNC: 8.1 MG/DL (ref 8.4–10.2)
ERYTHROCYTE [DISTWIDTH] IN BLOOD BY AUTOMATED COUNT: 22.9 % (ref 11.5–14.5)
GFR NON-AFRICAN AMERICAN: 38
HGB BLD-MCNC: 9.2 G/DL (ref 12–16)
MCH RBC QN AUTO: 25.6 PG (ref 27–31)
MCHC RBC AUTO-ENTMCNC: 32.3 G/DL (ref 33–37)
MCV RBC AUTO: 79.4 FL (ref 81–99)
PLATELET # BLD: 364 K/UL (ref 130–400)
RBC # BLD AUTO: 3.6 MIL/UL (ref 3.8–5.2)
WBC # BLD AUTO: 12.8 K/UL (ref 4.8–10.8)

## 2019-03-10 RX ADMIN — METRONIDAZOLE SCH MLS/HR: 500 INJECTION, SOLUTION INTRAVENOUS at 16:11

## 2019-03-10 RX ADMIN — METOPROLOL TARTRATE SCH MG: 5 INJECTION INTRAVENOUS at 17:37

## 2019-03-10 RX ADMIN — IPRATROPIUM BROMIDE AND ALBUTEROL SULFATE SCH ML: .5; 3 SOLUTION RESPIRATORY (INHALATION) at 01:00

## 2019-03-10 RX ADMIN — Medication SCH APPLIC: at 08:55

## 2019-03-10 RX ADMIN — METRONIDAZOLE SCH MLS/HR: 500 INJECTION, SOLUTION INTRAVENOUS at 10:09

## 2019-03-10 RX ADMIN — IPRATROPIUM BROMIDE AND ALBUTEROL SULFATE SCH ML: .5; 3 SOLUTION RESPIRATORY (INHALATION) at 19:11

## 2019-03-10 RX ADMIN — MICAFUNGIN SODIUM SCH MLS/HR: 20 INJECTION, POWDER, LYOPHILIZED, FOR SOLUTION INTRAVENOUS at 13:13

## 2019-03-10 RX ADMIN — POTASSIUM PHOSPHATE, MONOBASIC AND POTASSIUM PHOSPHATE, DIBASIC SCH MLS/HR: 224; 236 INJECTION, SOLUTION, CONCENTRATE INTRAVENOUS at 18:32

## 2019-03-10 RX ADMIN — Medication SCH APPLIC: at 03:25

## 2019-03-10 RX ADMIN — POTASSIUM PHOSPHATE, MONOBASIC AND POTASSIUM PHOSPHATE, DIBASIC SCH MLS/HR: 224; 236 INJECTION, SOLUTION, CONCENTRATE INTRAVENOUS at 16:14

## 2019-03-10 RX ADMIN — IPRATROPIUM BROMIDE AND ALBUTEROL SULFATE SCH ML: .5; 3 SOLUTION RESPIRATORY (INHALATION) at 07:15

## 2019-03-10 RX ADMIN — Medication SCH APPLIC: at 16:15

## 2019-03-10 RX ADMIN — METRONIDAZOLE SCH MLS/HR: 500 INJECTION, SOLUTION INTRAVENOUS at 03:24

## 2019-03-10 RX ADMIN — INSULIN LISPRO SCH UNITS: 100 INJECTION, SUSPENSION SUBCUTANEOUS at 17:34

## 2019-03-10 RX ADMIN — METOPROLOL TARTRATE SCH MG: 5 INJECTION INTRAVENOUS at 08:58

## 2019-03-10 RX ADMIN — METOPROLOL TARTRATE SCH MG: 5 INJECTION INTRAVENOUS at 01:50

## 2019-03-10 RX ADMIN — IPRATROPIUM BROMIDE AND ALBUTEROL SULFATE SCH ML: .5; 3 SOLUTION RESPIRATORY (INHALATION) at 13:54

## 2019-03-10 RX ADMIN — INSULIN LISPRO SCH UNITS: 100 INJECTION, SUSPENSION SUBCUTANEOUS at 09:03

## 2019-03-10 NOTE — CP.PCM.PN
Subjective





- Date & Time of Evaluation


Date of Evaluation: 03/10/19


Time of Evaluation: 11:40





- Subjective


Subjective: 





MORE AWAKE AND ALERT


RESPONDS APPROPRIATELY TO VERBAL COMMANDS


SOB IMPROVED


STILL ON HIGH FLOW O2





Objective





- Vital Signs/Intake and Output


Vital Signs (last 24 hours): 


                                        











Temp Pulse Resp BP Pulse Ox


 


 99.0 F   82   16   128/67   92 L


 


 03/10/19 09:51  03/10/19 08:58  03/10/19 11:04  03/10/19 09:01  03/10/19 08:16








Intake and Output: 


                                        











 03/10/19 03/10/19





 06:59 18:59


 


Intake Total  


 


Output Total  


 


Balance  














- Medications


Medications: 


                               Current Medications





Acetaminophen (Tylenol 325mg Tab)  650 mg PO Q6 PRN


   PRN Reason: Pain, moderate (4-7)


   Last Admin: 03/06/19 11:26 Dose:  650 mg


Acetaminophen (Tylenol 325mg Tab)  650 mg PO Q6 PRN


   PRN Reason: Temperature


   Last Admin: 03/10/19 08:51 Dose:  650 mg


Albuterol/Ipratropium (Duoneb 3 Mg/0.5 Mg (3 Ml) Ud)  3 ml INH RQ6 [facility]


   Last Admin: 03/10/19 01:00 Dose:  3 ml


Dimethicone (Proshield Plus Skin Protectant)  1 applic TOP Q8 [facility]


   Last Admin: 03/10/19 08:55 Dose:  1 applic


Ferrous Sulfate (Feosol)  325 mg PO TID [facility]


   Last Admin: 03/08/19 11:21 Dose:  Not Given


Furosemide (Lasix)  40 mg IVP Q12 [facility]


   Last Admin: 03/10/19 09:01 Dose:  40 mg


Vancomycin HCl 1 gm/ Sodium (Chloride)  250 mls @ 250 mls/hr IVPB MWF [facility]; 

Protocol


   Last Admin: 03/08/19 08:39 Dose:  250 mls/hr


Metronidazole (Flagyl 500mg/100ml Ns)  100 mls @ 100 mls/hr IVPB Q8 [facility]; 

Protocol


   Last Admin: 03/10/19 10:09 Dose:  100 mls/hr


Meropenem 500 mg/ Sodium (Chloride)  100 mls @ 100 mls/hr IVPB Q8 [facility]; Protocol


   Last Admin: 03/10/19 11:19 Dose:  100 mls/hr


Levetiracetam 500 mg/ Dextrose  105 mls @ 210 mls/hr IVPB Q12 [facility]


   Last Admin: 03/09/19 21:25 Dose:  210 mls/hr


Micafungin Sodium 100 mg/ (Dextrose)  100 mls @ 100 mls/hr IVPB DAILY [facility]; 

Protocol


   Last Admin: 03/09/19 10:08 Dose:  100 mls/hr


Insulin Human Regular (Humulin R)  0 units SC ACCU-CHECK [facility]; Protocol


   Last Admin: 03/10/19 09:02 Dose:  1 u


Insulin Lispro Protam/Lispro Human (Humalog Mix 75/25)  5 units SC BID [facility]; 

Protocol


   Last Admin: 03/10/19 09:03 Dose:  5 units


Ketorolac Tromethamine (Toradol)  30 mg IVP Q6 PRN


   PRN Reason: Pain, moderate (4-7)


   Last Admin: 03/06/19 22:44 Dose:  30 mg


Labetalol HCl (Trandate)  20 mg IVP Q4 PRN


   PRN Reason: Systolic Blood Pressure


   Last Admin: 03/07/19 02:16 Dose:  20 mg


Levetiracetam (Keppra)  500 mg PO BID [facility]


   Last Admin: 03/08/19 11:55 Dose:  Not Given


Metoprolol Tartrate (Lopressor)  50 mg PO Q12 [facility]


   Last Admin: 03/08/19 11:56 Dose:  Not Given


Metoprolol Tartrate (Lopressor)  5 mg IVP Q8 [facility]


   Last Admin: 03/10/19 08:58 Dose:  5 mg


Ondansetron HCl (Zofran Inj)  4 mg IVP Q6 PRN


   PRN Reason: Nausea/Vomiting


   Last Admin: 03/05/19 01:04 Dose:  4 mg


Pregabalin (Lyrica)  100 mg PO Q8 [facility]


   Last Admin: 03/08/19 10:12 Dose:  Not Given


Vitamin B Complex/Vit C/Folic Acid (Nephro-Yoseph)  1 tab PO DAILY [facility]


   Last Admin: 03/08/19 11:22 Dose:  Not Given











- Labs


Labs: 


                                        





                                 03/09/19 04:45 





                                 03/09/19 04:45 





                                        











PT  16.2 Seconds (9.8-13.1)  H  03/01/19  10:28    


 


INR  1.4   03/01/19  10:28    


 


APTT  35.6 Seconds (25.6-37.1)   03/01/19  10:28    














- Constitutional


Appears: No Acute Distress, Chronically Ill





- Head Exam


Head Exam: ATRAUMATIC, NORMAL INSPECTION, NORMOCEPHALIC





- Eye Exam


Eye Exam: EOMI, Normal appearance, PERRL


Pupil Exam: NORMAL ACCOMODATION, PERRL





- ENT Exam


ENT Exam: Mucous Membranes Moist, Normal Exam





- Neck Exam


Neck Exam: Full ROM, Normal Inspection.  absent: Lymphadenopathy





- Respiratory Exam


Respiratory Exam: Decreased Breath Sounds, Prolonged Expiratory Phase, Rales, 

NORMAL BREATHING PATTERN





- Cardiovascular Exam


Cardiovascular Exam: REGULAR RHYTHM, +S1, +S2.  absent: Murmur





- GI/Abdominal Exam


GI & Abdominal Exam: Soft, Normal Bowel Sounds.  absent: Tenderness





- Rectal Exam


Rectal Exam: NORMAL INSPECTION





- Extremities Exam


Extremities Exam: Full ROM, Normal Capillary Refill, Normal Inspection.  absent:

Joint Swelling, Pedal Edema





- Back Exam


Back Exam: NORMAL INSPECTION





- Neurological Exam


Neurological Exam: Alert, Awake, CN II-XII Intact, Oriented x3





- Psychiatric Exam


Psychiatric exam: Normal Affect, Normal Mood





- Skin


Skin Exam: Dry, Intact, Normal Color, Warm





Assessment and Plan





- Assessment and Plan (Free Text)


Assessment: 





RESPIRATORY FAILURE IMPROVED


CX--IMPROVEMENT OF PULMONARY INFILTERATES AND PLEURAL EFFUSIONS


Plan: 





CONTINUE CURRENT RX


WILL PROBABLY BENEFIT FROM SUBACUTE CARE PRIOR TO D/C HOME

## 2019-03-10 NOTE — CP.PCM.PN
Subjective





- Date & Time of Evaluation


Date of Evaluation: 03/09/19


Time of Evaluation: 11:00





- Subjective


Subjective: 


Patient is much more awake.


CT scan showed congestive changes and  infiltrate.


Has low grade fever.


 Has mild SOB.





Objective





- Vital Signs/Intake and Output


Vital Signs (last 24 hours): 


                                        











Temp Pulse Resp BP Pulse Ox


 


 98.2 F   67   18   157/71 H  100 


 


 03/10/19 16:02  03/10/19 17:37  03/10/19 16:02  03/10/19 17:37  03/10/19 16:02








Intake and Output: 


                                        











 03/10/19 03/10/19





 06:59 18:59


 


Intake Total  


 


Output Total  


 


Balance  














- Medications


Medications: 


                               Current Medications





Acetaminophen (Tylenol 325mg Tab)  650 mg PO Q6 PRN


   PRN Reason: Temperature


   Last Admin: 03/10/19 08:51 Dose:  650 mg


Acetaminophen (Tylenol 325mg Tab)  650 mg PO Q6 PRN


   PRN Reason: Pain <5


Albuterol/Ipratropium (Duoneb 3 Mg/0.5 Mg (3 Ml) Ud)  3 ml INH RQ6 [facility]


   Last Admin: 03/10/19 13:54 Dose:  3 ml


Dimethicone (Proshield Plus Skin Protectant)  1 applic TOP Q8 [facility]


   Last Admin: 03/10/19 16:15 Dose:  1 applic


Ferrous Sulfate (Feosol)  325 mg PO TID [facility]


   Last Admin: 03/08/19 11:21 Dose:  Not Given


Furosemide (Lasix)  40 mg IVP Q12 [facility]


   Last Admin: 03/10/19 09:01 Dose:  40 mg


Vancomycin HCl 1 gm/ Sodium (Chloride)  250 mls @ 250 mls/hr IVPB MWF [facility]; 

Protocol


   Last Admin: 03/08/19 08:39 Dose:  250 mls/hr


Metronidazole (Flagyl 500mg/100ml Ns)  100 mls @ 100 mls/hr IVPB Q8 [facility]; 

Protocol


   Last Admin: 03/10/19 16:11 Dose:  100 mls/hr


Meropenem 500 mg/ Sodium (Chloride)  100 mls @ 100 mls/hr IVPB Q8 [facility]; Protocol


   Last Admin: 03/10/19 11:19 Dose:  100 mls/hr


Levetiracetam 500 mg/ Dextrose  105 mls @ 210 mls/hr IVPB Q12 [facility]


   Last Admin: 03/10/19 12:35 Dose:  210 mls/hr


Micafungin Sodium 100 mg/ (Dextrose)  100 mls @ 100 mls/hr IVPB DAILY [facility]; 

Protocol


   Last Admin: 03/10/19 13:13 Dose:  100 mls/hr


Potassium Chloride (Potassium Chloride 20 Meq/100 Ml)  100 mls @ 50 mls/hr IVPB 

Q2 [facility]


   Stop: 03/10/19 19:59


   Last Admin: 03/10/19 16:14 Dose:  50 mls/hr


Insulin Human Regular (Humulin R)  0 units SC ACCU-CHECK [facility]; Protocol


   Last Admin: 03/10/19 17:35 Dose:  Not Given


Insulin Lispro Protam/Lispro Human (Humalog Mix 75/25)  5 units SC BID [facility]; 

Protocol


   Last Admin: 03/10/19 17:34 Dose:  5 units


Ketorolac Tromethamine (Toradol)  30 mg IVP Q6 PRN


   PRN Reason: Pain 5 or greater


Labetalol HCl (Trandate)  20 mg IVP Q4 PRN


   PRN Reason: Systolic Blood Pressure


   Last Admin: 03/07/19 02:16 Dose:  20 mg


Levetiracetam (Keppra)  500 mg PO BID [facility]


   Last Admin: 03/08/19 11:55 Dose:  Not Given


Metoprolol Tartrate (Lopressor)  50 mg PO Q12 [facility]


   Last Admin: 03/08/19 11:56 Dose:  Not Given


Metoprolol Tartrate (Lopressor)  5 mg IVP Q8 [facility]


   Last Admin: 03/10/19 17:37 Dose:  5 mg


Ondansetron HCl (Zofran Inj)  4 mg IVP Q6 PRN


   PRN Reason: Nausea/Vomiting


   Last Admin: 03/05/19 01:04 Dose:  4 mg


Potassium Chloride (K-Dur 20 Meq Er Tab)  20 meq PO DAILY [facility]


Pregabalin (Lyrica)  100 mg PO Q8 [facility]


   Last Admin: 03/08/19 10:12 Dose:  Not Given


Vitamin B Complex/Vit C/Folic Acid (Nephro-Yoseph)  1 tab PO DAILY [facility]


   Last Admin: 03/08/19 11:22 Dose:  Not Given











- Labs


Labs: 


                                        





                                 03/10/19 14:15 





                                 03/10/19 14:15 





                                        











PT  16.2 Seconds (9.8-13.1)  H  03/01/19  10:28    


 


INR  1.4   03/01/19  10:28    


 


APTT  35.6 Seconds (25.6-37.1)   03/01/19  10:28    














- Head Exam


Head Exam: NORMAL INSPECTION





- ENT Exam


ENT Exam: Mucous Membranes Moist





- Respiratory Exam


Respiratory Exam: Decreased Breath Sounds





- Cardiovascular Exam


Cardiovascular Exam: REGULAR RHYTHM





- GI/Abdominal Exam


GI & Abdominal Exam: Normal Bowel Sounds





- Neurological Exam


Neurological Exam: Awake





- Psychiatric Exam


Psychiatric exam: Normal Mood





Assessment and Plan


(1) SHANITA (acute kidney injury)


Status: Acute   





(2) Acute respiratory failure with hypoxia


Status: Acute   





(3) Aspiration pneumonia


Status: Acute   





(4) Elevated liver enzymes


Status: Acute   





(5) Sepsis


Status: Acute   





(6) Toxic metabolic encephalopathy


Status: Acute   





- Assessment and Plan (Free Text)


Plan: 





Cont meds


 Cont tx


Cont PT.


Resp therapy

## 2019-03-10 NOTE — RAD
Date of service: 



03/10/2019



PROCEDURE:  CHEST RADIOGRAPH, 1 VIEW



HISTORY:

RESPIRATORY FAILURE/PLEURAL EFFUSION



COMPARISON:

03/08/2019



FINDINGS:



LUNGS:

Bilateral infiltrates, unchanged.



PLEURA:

Right pleural effusion.



CARDIOVASCULAR:

 No aortic atherosclerotic calcification present. Status post CABG.



OSSEOUS STRUCTURES:

No significant abnormalities.



VISUALIZED UPPER ABDOMEN:

Normal.



OTHER FINDINGS:

Tubes and catheters unchanged. 



IMPRESSION:

CHF, not significant changed.

## 2019-03-10 NOTE — CP.PCM.PN
Subjective





- Date & Time of Evaluation


Date of Evaluation: 03/10/19


Time of Evaluation: 16:00





- Subjective


Subjective: 





Patient remains stable.


Noted improvement of renal function


 Has no fever.


Has better appetite.





Objective





- Vital Signs/Intake and Output


Vital Signs (last 24 hours): 


                                        











Temp Pulse Resp BP Pulse Ox


 


 98.2 F   67   18   157/71 H  100 


 


 03/10/19 16:02  03/10/19 17:37  03/10/19 16:02  03/10/19 17:37  03/10/19 16:02








Intake and Output: 


                                        











 03/10/19 03/10/19





 06:59 18:59


 


Intake Total  


 


Output Total  


 


Balance  














- Medications


Medications: 


                               Current Medications





Acetaminophen (Tylenol 325mg Tab)  650 mg PO Q6 PRN


   PRN Reason: Temperature


   Last Admin: 03/10/19 08:51 Dose:  650 mg


Acetaminophen (Tylenol 325mg Tab)  650 mg PO Q6 PRN


   PRN Reason: Pain <5


Albuterol/Ipratropium (Duoneb 3 Mg/0.5 Mg (3 Ml) Ud)  3 ml INH RQ6 [facility]


   Last Admin: 03/10/19 13:54 Dose:  3 ml


Dimethicone (Proshield Plus Skin Protectant)  1 applic TOP Q8 [facility]


   Last Admin: 03/10/19 16:15 Dose:  1 applic


Ferrous Sulfate (Feosol)  325 mg PO TID [facility]


   Last Admin: 03/08/19 11:21 Dose:  Not Given


Furosemide (Lasix)  40 mg IVP Q12 [facility]


   Last Admin: 03/10/19 09:01 Dose:  40 mg


Vancomycin HCl 1 gm/ Sodium (Chloride)  250 mls @ 250 mls/hr IVPB MWF [facility]; 

Protocol


   Last Admin: 03/08/19 08:39 Dose:  250 mls/hr


Metronidazole (Flagyl 500mg/100ml Ns)  100 mls @ 100 mls/hr IVPB Q8 [facility]; 

Protocol


   Last Admin: 03/10/19 16:11 Dose:  100 mls/hr


Meropenem 500 mg/ Sodium (Chloride)  100 mls @ 100 mls/hr IVPB Q8 [facility]; Protocol


   Last Admin: 03/10/19 11:19 Dose:  100 mls/hr


Levetiracetam 500 mg/ Dextrose  105 mls @ 210 mls/hr IVPB Q12 [facility]


   Last Admin: 03/10/19 12:35 Dose:  210 mls/hr


Micafungin Sodium 100 mg/ (Dextrose)  100 mls @ 100 mls/hr IVPB DAILY [facility]; 

Protocol


   Last Admin: 03/10/19 13:13 Dose:  100 mls/hr


Potassium Chloride (Potassium Chloride 20 Meq/100 Ml)  100 mls @ 50 mls/hr IVPB 

Q2 [facility]


   Stop: 03/10/19 19:59


   Last Admin: 03/10/19 16:14 Dose:  50 mls/hr


Insulin Human Regular (Humulin R)  0 units SC ACCU-CHECK [facility]; Protocol


   Last Admin: 03/10/19 17:35 Dose:  Not Given


Insulin Lispro Protam/Lispro Human (Humalog Mix 75/25)  5 units SC BID [facility]; 

Protocol


   Last Admin: 03/10/19 17:34 Dose:  5 units


Ketorolac Tromethamine (Toradol)  30 mg IVP Q6 PRN


   PRN Reason: Pain 5 or greater


Labetalol HCl (Trandate)  20 mg IVP Q4 PRN


   PRN Reason: Systolic Blood Pressure


   Last Admin: 03/07/19 02:16 Dose:  20 mg


Levetiracetam (Keppra)  500 mg PO BID [facility]


   Last Admin: 03/08/19 11:55 Dose:  Not Given


Metoprolol Tartrate (Lopressor)  50 mg PO Q12 [facility]


   Last Admin: 03/08/19 11:56 Dose:  Not Given


Metoprolol Tartrate (Lopressor)  5 mg IVP Q8 [facility]


   Last Admin: 03/10/19 17:37 Dose:  5 mg


Ondansetron HCl (Zofran Inj)  4 mg IVP Q6 PRN


   PRN Reason: Nausea/Vomiting


   Last Admin: 03/05/19 01:04 Dose:  4 mg


Potassium Chloride (K-Dur 20 Meq Er Tab)  20 meq PO DAILY [facility]


Pregabalin (Lyrica)  100 mg PO Q8 [facility]


   Last Admin: 03/08/19 10:12 Dose:  Not Given


Vitamin B Complex/Vit C/Folic Acid (Nephro-Yoseph)  1 tab PO DAILY [facility]


   Last Admin: 03/08/19 11:22 Dose:  Not Given











- Labs


Labs: 


                                        





                                 03/10/19 14:15 





                                 03/10/19 14:15 





                                        











PT  16.2 Seconds (9.8-13.1)  H  03/01/19  10:28    


 


INR  1.4   03/01/19  10:28    


 


APTT  35.6 Seconds (25.6-37.1)   03/01/19  10:28    














- Head Exam


Head Exam: NORMAL INSPECTION





- Eye Exam


Eye Exam: Normal appearance





- ENT Exam


ENT Exam: Mucous Membranes Moist





- Respiratory Exam


Respiratory Exam: Decreased Breath Sounds





- Cardiovascular Exam


Cardiovascular Exam: REGULAR RHYTHM





- GI/Abdominal Exam


GI & Abdominal Exam: Normal Bowel Sounds





- Neurological Exam


Neurological Exam: Awake





Assessment and Plan


(1) SHANITA (acute kidney injury)


Status: Acute   





(2) Acute respiratory failure with hypoxia


Status: Acute   





(3) Aspiration pneumonia


Status: Acute   





(4) Elevated liver enzymes


Status: Acute   





(5) Sepsis


Status: Acute   





(6) Toxic metabolic encephalopathy


Status: Acute   





- Assessment and Plan (Free Text)


Plan: 





Cont meds


Cont tx


Cont PT


 recheck labs in am


 DC quinones


Phys therapy





 check probnp in am

## 2019-03-10 NOTE — CP.PCM.PN
Subjective





- Date & Time of Evaluation


Date of Evaluation: 03/10/19


Time of Evaluation: 14:05





- Subjective


Subjective: 





renal note 


no events overnight








vitals reviewed


heent normal 


op moist


no jvd


s1s2 present


no resp distress


abd soft nt nd


ao times 3


no fnd








acute Respiratory failure


Acute renal failure/acute kidney injury related to multifactorial including 

sepsis.


Diabetes mellitus and history of hypertension history of CVA.


encephalopathy   patient improving mental status much better and more awake


hyperurecemia    resolved 


Hyperphosphatemia    corrected DC calcium acetate


Shock liver


Nephrotic syndrome with 7 gram proteinuria based on protein to creatinine ratio


Hypokalemia.  Given potassium chloride 


Hypomagnesemia


Hypernatremia resolved








renal function stable 


no labs today 


monitor uop


volume stable 


monitor phos levels








Objective





- Vital Signs/Intake and Output


Vital Signs (last 24 hours): 


                                        











Temp Pulse Resp BP Pulse Ox


 


 98.6 F   82   20   127/69   100 


 


 03/10/19 12:20  03/10/19 12:20  03/10/19 12:20  03/10/19 12:20  03/10/19 12:20








Intake and Output: 


                                        











 03/10/19 03/10/19





 06:59 18:59


 


Intake Total  


 


Output Total  


 


Balance  














- Medications


Medications: 


                               Current Medications





Acetaminophen (Tylenol 325mg Tab)  650 mg PO Q6 PRN


   PRN Reason: Pain, moderate (4-7)


   Last Admin: 03/06/19 11:26 Dose:  650 mg


Acetaminophen (Tylenol 325mg Tab)  650 mg PO Q6 PRN


   PRN Reason: Temperature


   Last Admin: 03/10/19 08:51 Dose:  650 mg


Albuterol/Ipratropium (Duoneb 3 Mg/0.5 Mg (3 Ml) Ud)  3 ml INH RQ6 [facility]


   Last Admin: 03/10/19 13:54 Dose:  3 ml


Dimethicone (Proshield Plus Skin Protectant)  1 applic TOP Q8 [facility]


   Last Admin: 03/10/19 08:55 Dose:  1 applic


Ferrous Sulfate (Feosol)  325 mg PO TID [facility]


   Last Admin: 03/08/19 11:21 Dose:  Not Given


Furosemide (Lasix)  40 mg IVP Q12 [facility]


   Last Admin: 03/10/19 09:01 Dose:  40 mg


Vancomycin HCl 1 gm/ Sodium (Chloride)  250 mls @ 250 mls/hr IVPB MWF [facility]; 

Protocol


   Last Admin: 03/08/19 08:39 Dose:  250 mls/hr


Metronidazole (Flagyl 500mg/100ml Ns)  100 mls @ 100 mls/hr IVPB Q8 [facility]; 

Protocol


   Last Admin: 03/10/19 10:09 Dose:  100 mls/hr


Meropenem 500 mg/ Sodium (Chloride)  100 mls @ 100 mls/hr IVPB Q8 [facility]; Protocol


   Last Admin: 03/10/19 11:19 Dose:  100 mls/hr


Levetiracetam 500 mg/ Dextrose  105 mls @ 210 mls/hr IVPB Q12 [facility]


   Last Admin: 03/10/19 12:35 Dose:  210 mls/hr


Micafungin Sodium 100 mg/ (Dextrose)  100 mls @ 100 mls/hr IVPB DAILY [facility]; 

Protocol


   Last Admin: 03/10/19 13:13 Dose:  100 mls/hr


Insulin Human Regular (Humulin R)  0 units SC ACCU-CHECK [facility]; Protocol


   Last Admin: 03/10/19 12:39 Dose:  3 u


Insulin Lispro Protam/Lispro Human (Humalog Mix 75/25)  5 units SC BID [facility]; 

Protocol


   Last Admin: 03/10/19 09:03 Dose:  5 units


Ketorolac Tromethamine (Toradol)  30 mg IVP Q6 PRN


   PRN Reason: Pain, moderate (4-7)


   Last Admin: 03/06/19 22:44 Dose:  30 mg


Labetalol HCl (Trandate)  20 mg IVP Q4 PRN


   PRN Reason: Systolic Blood Pressure


   Last Admin: 03/07/19 02:16 Dose:  20 mg


Levetiracetam (Keppra)  500 mg PO BID [facility]


   Last Admin: 03/08/19 11:55 Dose:  Not Given


Metoprolol Tartrate (Lopressor)  50 mg PO Q12 [facility]


   Last Admin: 03/08/19 11:56 Dose:  Not Given


Metoprolol Tartrate (Lopressor)  5 mg IVP Q8 [facility]


   Last Admin: 03/10/19 08:58 Dose:  5 mg


Ondansetron HCl (Zofran Inj)  4 mg IVP Q6 PRN


   PRN Reason: Nausea/Vomiting


   Last Admin: 03/05/19 01:04 Dose:  4 mg


Pregabalin (Lyrica)  100 mg PO Q8 [facility]


   Last Admin: 03/08/19 10:12 Dose:  Not Given


Vitamin B Complex/Vit C/Folic Acid (Nephro-Yoseph)  1 tab PO DAILY [facility]


   Last Admin: 03/08/19 11:22 Dose:  Not Given











- Labs


Labs: 


                                        





                                 03/09/19 04:45 





                                 03/09/19 04:45 





                                        











PT  16.2 Seconds (9.8-13.1)  H  03/01/19  10:28    


 


INR  1.4   03/01/19  10:28    


 


APTT  35.6 Seconds (25.6-37.1)   03/01/19  10:28

## 2019-03-10 NOTE — CP.PCM.PN
Subjective





- Date & Time of Evaluation


Date of Evaluation: 03/10/19


Time of Evaluation: 08:00





- Subjective


Subjective: 





improving in ICU


less fever


less cough


less sob 





Objective





- Vital Signs/Intake and Output


Vital Signs (last 24 hours): 


                                        











Temp Pulse Resp BP Pulse Ox


 


 98.6 F   82   20   127/69   100 


 


 03/10/19 12:20  03/10/19 12:20  03/10/19 12:20  03/10/19 12:20  03/10/19 12:20








Intake and Output: 


                                        











 03/10/19 03/10/19





 06:59 18:59


 


Intake Total  


 


Output Total  


 


Balance  














- Medications


Medications: 


                               Current Medications





Acetaminophen (Tylenol 325mg Tab)  650 mg PO Q6 PRN


   PRN Reason: Pain, moderate (4-7)


   Last Admin: 03/06/19 11:26 Dose:  650 mg


Acetaminophen (Tylenol 325mg Tab)  650 mg PO Q6 PRN


   PRN Reason: Temperature


   Last Admin: 03/10/19 08:51 Dose:  650 mg


Albuterol/Ipratropium (Duoneb 3 Mg/0.5 Mg (3 Ml) Ud)  3 ml INH RQ6 [facility]


   Last Admin: 03/10/19 01:00 Dose:  3 ml


Dimethicone (Proshield Plus Skin Protectant)  1 applic TOP Q8 [facility]


   Last Admin: 03/10/19 08:55 Dose:  1 applic


Ferrous Sulfate (Feosol)  325 mg PO TID [facility]


   Last Admin: 03/08/19 11:21 Dose:  Not Given


Furosemide (Lasix)  40 mg IVP Q12 [facility]


   Last Admin: 03/10/19 09:01 Dose:  40 mg


Vancomycin HCl 1 gm/ Sodium (Chloride)  250 mls @ 250 mls/hr IVPB MWF [facility]; 

Protocol


   Last Admin: 03/08/19 08:39 Dose:  250 mls/hr


Metronidazole (Flagyl 500mg/100ml Ns)  100 mls @ 100 mls/hr IVPB Q8 [facility]; 

Protocol


   Last Admin: 03/10/19 10:09 Dose:  100 mls/hr


Meropenem 500 mg/ Sodium (Chloride)  100 mls @ 100 mls/hr IVPB Q8 [facility]; Protocol


   Last Admin: 03/10/19 11:19 Dose:  100 mls/hr


Levetiracetam 500 mg/ Dextrose  105 mls @ 210 mls/hr IVPB Q12 [facility]


   Last Admin: 03/10/19 12:35 Dose:  210 mls/hr


Micafungin Sodium 100 mg/ (Dextrose)  100 mls @ 100 mls/hr IVPB DAILY [facility]; 

Protocol


   Last Admin: 03/09/19 10:08 Dose:  100 mls/hr


Insulin Human Regular (Humulin R)  0 units SC ACCU-CHECK [facility]; Protocol


   Last Admin: 03/10/19 12:39 Dose:  3 u


Insulin Lispro Protam/Lispro Human (Humalog Mix 75/25)  5 units SC BID [facility]; 

Protocol


   Last Admin: 03/10/19 09:03 Dose:  5 units


Ketorolac Tromethamine (Toradol)  30 mg IVP Q6 PRN


   PRN Reason: Pain, moderate (4-7)


   Last Admin: 03/06/19 22:44 Dose:  30 mg


Labetalol HCl (Trandate)  20 mg IVP Q4 PRN


   PRN Reason: Systolic Blood Pressure


   Last Admin: 03/07/19 02:16 Dose:  20 mg


Levetiracetam (Keppra)  500 mg PO BID [facility]


   Last Admin: 03/08/19 11:55 Dose:  Not Given


Metoprolol Tartrate (Lopressor)  50 mg PO Q12 [facility]


   Last Admin: 03/08/19 11:56 Dose:  Not Given


Metoprolol Tartrate (Lopressor)  5 mg IVP Q8 [facility]


   Last Admin: 03/10/19 08:58 Dose:  5 mg


Ondansetron HCl (Zofran Inj)  4 mg IVP Q6 PRN


   PRN Reason: Nausea/Vomiting


   Last Admin: 03/05/19 01:04 Dose:  4 mg


Pregabalin (Lyrica)  100 mg PO Q8 [facility]


   Last Admin: 03/08/19 10:12 Dose:  Not Given


Vitamin B Complex/Vit C/Folic Acid (Nephro-Yoseph)  1 tab PO DAILY [facility]


   Last Admin: 03/08/19 11:22 Dose:  Not Given











- Labs


Labs: 


                                        





                                 03/09/19 04:45 





                                 03/09/19 04:45 





                                        











PT  16.2 Seconds (9.8-13.1)  H  03/01/19  10:28    


 


INR  1.4   03/01/19  10:28    


 


APTT  35.6 Seconds (25.6-37.1)   03/01/19  10:28    














- Constitutional


Appears: Non-toxic, No Acute Distress, Chronically Ill





- Head Exam


Head Exam: NORMOCEPHALIC





- Eye Exam


Eye Exam: absent: Scleral icterus





- ENT Exam


ENT Exam: Mucous Membranes Dry





- Neck Exam


Neck Exam: absent: Lymphadenopathy





- Respiratory Exam


Respiratory Exam: Decreased Breath Sounds, Rhonchi





- Cardiovascular Exam


Cardiovascular Exam: REGULAR RHYTHM, +S1, +S2





- GI/Abdominal Exam


GI & Abdominal Exam: Distended, Soft





- Rectal Exam


Rectal Exam: Deferred





-  Exam


 Exam: NORMAL INSPECTION





- Extremities Exam


Extremities Exam: absent: Pedal Edema





- Back Exam


Back Exam: absent: CVA tenderness (L), CVA tenderness (R)





- Neurological Exam


Neurological Exam: Alert, Awake, CN II-XII Intact


Neuro motor strength exam: Left Upper Extremity: 4, Right Upper Extremity: 0, 

Left Lower Extremity: 4, Right Lower Extremity: 0





- Psychiatric Exam


Psychiatric exam: Depressed





- Skin


Skin Exam: Dry, Intact





Assessment and Plan


(1) Sepsis


Status: Acute   





(2) Change in mental state


Status: Acute   





(3) History of CVA with residual deficit


Status: Acute   





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


Status: Chronic   





(5) History of CVA (cerebrovascular accident)


Status: Chronic   





(6) NSTEMI (non-ST elevated myocardial infarction)


Status: Acute   





(7) SHANITA (acute kidney injury)


Status: Acute   





(8) Pansinusitis


Status: Acute   





(9) Pneumonia


Status: Acute   





(10) Nephrotic syndrome


Status: Acute   





- Assessment and Plan (Free Text)


Assessment: 





improved CHF/pneumonia


IV antibiotics renewed

## 2019-03-10 NOTE — CP.PCM.PN
Subjective





- Date & Time of Evaluation


Date of Evaluation: 03/08/19


Time of Evaluation: 10:30





- Subjective


Subjective: 





Patient is much more awake.


Has no fever.





Noted improvement in renal function


Has no fever.





Objective





- Vital Signs/Intake and Output


Vital Signs (last 24 hours): 


                                        











Temp Pulse Resp BP Pulse Ox


 


 98.2 F   67   18   157/71 H  100 


 


 03/10/19 16:02  03/10/19 17:37  03/10/19 16:02  03/10/19 17:37  03/10/19 16:02








Intake and Output: 


                                        











 03/10/19 03/10/19





 06:59 18:59


 


Intake Total  


 


Output Total  


 


Balance  














- Medications


Medications: 


                               Current Medications





Acetaminophen (Tylenol 325mg Tab)  650 mg PO Q6 PRN


   PRN Reason: Temperature


   Last Admin: 03/10/19 08:51 Dose:  650 mg


Acetaminophen (Tylenol 325mg Tab)  650 mg PO Q6 PRN


   PRN Reason: Pain <5


Albuterol/Ipratropium (Duoneb 3 Mg/0.5 Mg (3 Ml) Ud)  3 ml INH RQ6 [facility]


   Last Admin: 03/10/19 13:54 Dose:  3 ml


Dimethicone (Proshield Plus Skin Protectant)  1 applic TOP Q8 [facility]


   Last Admin: 03/10/19 16:15 Dose:  1 applic


Ferrous Sulfate (Feosol)  325 mg PO TID [facility]


   Last Admin: 03/08/19 11:21 Dose:  Not Given


Furosemide (Lasix)  40 mg IVP Q12 [facility]


   Last Admin: 03/10/19 09:01 Dose:  40 mg


Vancomycin HCl 1 gm/ Sodium (Chloride)  250 mls @ 250 mls/hr IVPB MWF [facility]; 

Protocol


   Last Admin: 03/08/19 08:39 Dose:  250 mls/hr


Metronidazole (Flagyl 500mg/100ml Ns)  100 mls @ 100 mls/hr IVPB Q8 [facility]; 

Protocol


   Last Admin: 03/10/19 16:11 Dose:  100 mls/hr


Meropenem 500 mg/ Sodium (Chloride)  100 mls @ 100 mls/hr IVPB Q8 [facility]; Protocol


   Last Admin: 03/10/19 11:19 Dose:  100 mls/hr


Levetiracetam 500 mg/ Dextrose  105 mls @ 210 mls/hr IVPB Q12 [facility]


   Last Admin: 03/10/19 12:35 Dose:  210 mls/hr


Micafungin Sodium 100 mg/ (Dextrose)  100 mls @ 100 mls/hr IVPB DAILY [facility]; 

Protocol


   Last Admin: 03/10/19 13:13 Dose:  100 mls/hr


Potassium Chloride (Potassium Chloride 20 Meq/100 Ml)  100 mls @ 50 mls/hr IVPB 

Q2 [facility]


   Stop: 03/10/19 19:59


   Last Admin: 03/10/19 16:14 Dose:  50 mls/hr


Insulin Human Regular (Humulin R)  0 units SC ACCU-CHECK [facility]; Protocol


   Last Admin: 03/10/19 17:35 Dose:  Not Given


Insulin Lispro Protam/Lispro Human (Humalog Mix 75/25)  5 units SC BID [facility]; 

Protocol


   Last Admin: 03/10/19 17:34 Dose:  5 units


Ketorolac Tromethamine (Toradol)  30 mg IVP Q6 PRN


   PRN Reason: Pain 5 or greater


Labetalol HCl (Trandate)  20 mg IVP Q4 PRN


   PRN Reason: Systolic Blood Pressure


   Last Admin: 03/07/19 02:16 Dose:  20 mg


Levetiracetam (Keppra)  500 mg PO BID [facility]


   Last Admin: 03/08/19 11:55 Dose:  Not Given


Metoprolol Tartrate (Lopressor)  50 mg PO Q12 [facility]


   Last Admin: 03/08/19 11:56 Dose:  Not Given


Metoprolol Tartrate (Lopressor)  5 mg IVP Q8 [facility]


   Last Admin: 03/10/19 17:37 Dose:  5 mg


Ondansetron HCl (Zofran Inj)  4 mg IVP Q6 PRN


   PRN Reason: Nausea/Vomiting


   Last Admin: 03/05/19 01:04 Dose:  4 mg


Potassium Chloride (K-Dur 20 Meq Er Tab)  20 meq PO DAILY [facility]


Pregabalin (Lyrica)  100 mg PO Q8 [facility]


   Last Admin: 03/08/19 10:12 Dose:  Not Given


Vitamin B Complex/Vit C/Folic Acid (Nephro-Yoseph)  1 tab PO DAILY [facility]


   Last Admin: 03/08/19 11:22 Dose:  Not Given











- Labs


Labs: 


                                        





                                 03/10/19 14:15 





                                 03/10/19 14:15 





                                        











PT  16.2 Seconds (9.8-13.1)  H  03/01/19  10:28    


 


INR  1.4   03/01/19  10:28    


 


APTT  35.6 Seconds (25.6-37.1)   03/01/19  10:28    














- Head Exam


Head Exam: NORMAL INSPECTION





- Eye Exam


Eye Exam: Normal appearance





- ENT Exam


ENT Exam: Mucous Membranes Moist





- Respiratory Exam


Respiratory Exam: Decreased Breath Sounds





- Cardiovascular Exam


Cardiovascular Exam: REGULAR RHYTHM





- GI/Abdominal Exam


GI & Abdominal Exam: Normal Bowel Sounds





- Neurological Exam


Neurological Exam: Awake





Assessment and Plan


(1) SHANITA (acute kidney injury)


Status: Acute   





(2) Acute respiratory failure with hypoxia


Status: Acute   





(3) Aspiration pneumonia


Status: Acute   





(4) Elevated liver enzymes


Status: Acute   





(5) Sepsis


Status: Acute   





(6) Toxic metabolic encephalopathy


Status: Acute   





- Assessment and Plan (Free Text)


Plan: 





Cont meds


Con ttx


 Cont  cautious fluids


Cont IV antibiotics

## 2019-03-11 LAB
BNP SERPL-MCNC: 4860 PG/ML (ref 0–900)
BUN SERPL-MCNC: 27 MG/DL (ref 7–17)
CALCIUM SERPL-MCNC: 8 MG/DL (ref 8.4–10.2)
GFR NON-AFRICAN AMERICAN: 46

## 2019-03-11 RX ADMIN — INSULIN LISPRO SCH UNITS: 100 INJECTION, SUSPENSION SUBCUTANEOUS at 09:12

## 2019-03-11 RX ADMIN — Medication SCH APPLIC: at 00:01

## 2019-03-11 RX ADMIN — IPRATROPIUM BROMIDE AND ALBUTEROL SULFATE SCH ML: .5; 3 SOLUTION RESPIRATORY (INHALATION) at 02:51

## 2019-03-11 RX ADMIN — METRONIDAZOLE SCH MLS/HR: 500 INJECTION, SOLUTION INTRAVENOUS at 16:50

## 2019-03-11 RX ADMIN — METRONIDAZOLE SCH MLS/HR: 500 INJECTION, SOLUTION INTRAVENOUS at 00:50

## 2019-03-11 RX ADMIN — POTASSIUM CHLORIDE SCH MLS/HR: 10 INJECTION, SOLUTION INTRAVENOUS at 12:18

## 2019-03-11 RX ADMIN — MICAFUNGIN SODIUM SCH MLS/HR: 20 INJECTION, POWDER, LYOPHILIZED, FOR SOLUTION INTRAVENOUS at 09:57

## 2019-03-11 RX ADMIN — METOPROLOL TARTRATE SCH MG: 5 INJECTION INTRAVENOUS at 16:50

## 2019-03-11 RX ADMIN — POTASSIUM CHLORIDE SCH MLS/HR: 10 INJECTION, SOLUTION INTRAVENOUS at 13:30

## 2019-03-11 RX ADMIN — IPRATROPIUM BROMIDE AND ALBUTEROL SULFATE SCH ML: .5; 3 SOLUTION RESPIRATORY (INHALATION) at 14:01

## 2019-03-11 RX ADMIN — METOPROLOL TARTRATE SCH MG: 5 INJECTION INTRAVENOUS at 09:28

## 2019-03-11 RX ADMIN — Medication SCH APPLIC: at 16:50

## 2019-03-11 RX ADMIN — METOPROLOL TARTRATE SCH MG: 5 INJECTION INTRAVENOUS at 00:15

## 2019-03-11 RX ADMIN — POTASSIUM CHLORIDE SCH MLS/HR: 10 INJECTION, SOLUTION INTRAVENOUS at 14:45

## 2019-03-11 RX ADMIN — IPRATROPIUM BROMIDE AND ALBUTEROL SULFATE SCH ML: .5; 3 SOLUTION RESPIRATORY (INHALATION) at 20:07

## 2019-03-11 RX ADMIN — POTASSIUM CHLORIDE SCH MEQ: 20 TABLET, EXTENDED RELEASE ORAL at 16:54

## 2019-03-11 RX ADMIN — METRONIDAZOLE SCH MLS/HR: 500 INJECTION, SOLUTION INTRAVENOUS at 12:16

## 2019-03-11 RX ADMIN — IPRATROPIUM BROMIDE AND ALBUTEROL SULFATE SCH ML: .5; 3 SOLUTION RESPIRATORY (INHALATION) at 07:34

## 2019-03-11 RX ADMIN — INSULIN LISPRO SCH UNITS: 100 INJECTION, SUSPENSION SUBCUTANEOUS at 16:53

## 2019-03-11 RX ADMIN — Medication SCH APPLIC: at 09:11

## 2019-03-11 RX ADMIN — ENOXAPARIN SODIUM SCH MG: 40 INJECTION SUBCUTANEOUS at 12:30

## 2019-03-11 NOTE — CP.PCM.PN
Subjective





- Date & Time of Evaluation


Date of Evaluation: 03/11/19


Time of Evaluation: 10:00





- Subjective


Subjective: 





patient seen and examined at bedside. no family at bedside


Interim events noted


available diagnostic data reviewed





Review of Systems unable to obtain due to status





Objective





Vital Signs Stable


- Constitutional


Appears: Chronically Ill





Head Exam: NORMAL INSPECTION





Respiratory Exam: CTABL





Cardiovascular Exam: +S1, +S2





GI & Abdominal Exam: Soft, tender





Neurological Exam: Alert, Awake





Skin Exam: Normal Color, Warm





Assessment and Plan





monitor vitals


monitor labs


Cont meds


Cont tx


ID, Cardio, Neuro,  Renal, Pulm following


consultants appreciated input


hypokalemia persists, replace, nephro on board


rest of plan as ordered








Objective





- Vital Signs/Intake and Output


Vital Signs (last 24 hours): 


                                        











Temp Pulse Resp BP Pulse Ox


 


 98.1 F   70   18   149/77   98 


 


 03/11/19 15:41  03/11/19 15:41  03/11/19 15:41  03/11/19 15:41  03/11/19 15:41











- Medications


Medications: 


                               Current Medications





Acetaminophen (Tylenol 325mg Tab)  650 mg PO Q6 PRN


   PRN Reason: Temperature


   Last Admin: 03/10/19 08:51 Dose:  650 mg


Acetaminophen (Tylenol 325mg Tab)  650 mg PO Q6 PRN


   PRN Reason: Pain <5


   Last Admin: 03/11/19 00:44 Dose:  650 mg


Albuterol/Ipratropium (Duoneb 3 Mg/0.5 Mg (3 Ml) Ud)  3 ml INH RQ6 [facility]


   Last Admin: 03/11/19 14:01 Dose:  3 ml


Dimethicone (Proshield Plus Skin Protectant)  1 applic TOP Q8 [facility]


   Last Admin: 03/11/19 16:50 Dose:  1 applic


Enoxaparin Sodium (Lovenox)  40 mg SC DAILY [facility]; Protocol


   Last Admin: 03/11/19 12:30 Dose:  40 mg


Ferrous Sulfate (Feosol)  325 mg PO TID [facility]


   Last Admin: 03/08/19 11:21 Dose:  Not Given


Furosemide (Lasix)  40 mg IVP Q12 [facility]


   Last Admin: 03/11/19 09:10 Dose:  40 mg


Vancomycin HCl 1 gm/ Sodium (Chloride)  250 mls @ 250 mls/hr IVPB MWF [facility]; 

Protocol


   Last Admin: 03/11/19 10:45 Dose:  250 mls/hr


Metronidazole (Flagyl 500mg/100ml Ns)  100 mls @ 100 mls/hr IVPB Q8 [facility]; 

Protocol


   Last Admin: 03/11/19 16:50 Dose:  100 mls/hr


Meropenem 500 mg/ Sodium (Chloride)  100 mls @ 100 mls/hr IVPB Q8 [facility]; Protocol


   Last Admin: 03/11/19 09:31 Dose:  100 mls/hr


Levetiracetam 500 mg/ Dextrose  105 mls @ 210 mls/hr IVPB Q12 [facility]


   Last Admin: 03/11/19 08:57 Dose:  210 mls/hr


Micafungin Sodium 100 mg/ (Dextrose)  100 mls @ 100 mls/hr IVPB DAILY [facility]; 

Protocol


   Last Admin: 03/11/19 09:57 Dose:  100 mls/hr


Insulin Human Regular (Humulin R)  0 units SC ACCU-CHECK [facility]; Protocol


   Last Admin: 03/11/19 16:53 Dose:  2 u


Insulin Lispro Protam/Lispro Human (Humalog Mix 75/25)  5 units SC BID [facility]; 

Protocol


   Last Admin: 03/11/19 16:53 Dose:  5 units


Ketorolac Tromethamine (Toradol)  30 mg IVP Q6 PRN


   PRN Reason: Pain 5 or greater


   Last Admin: 03/11/19 09:55 Dose:  30 mg


Labetalol HCl (Trandate)  20 mg IVP Q4 PRN


   PRN Reason: Systolic Blood Pressure


   Last Admin: 03/07/19 02:16 Dose:  20 mg


Levetiracetam (Keppra)  500 mg PO BID [facility]


   Last Admin: 03/08/19 11:55 Dose:  Not Given


Metoprolol Tartrate (Lopressor)  50 mg PO Q12 [facility]


   Last Admin: 03/08/19 11:56 Dose:  Not Given


Metoprolol Tartrate (Lopressor)  5 mg IVP Q8 [facility]


   Last Admin: 03/11/19 16:50 Dose:  5 mg


Ondansetron HCl (Zofran Inj)  4 mg IVP Q6 PRN


   PRN Reason: Nausea/Vomiting


   Last Admin: 03/05/19 01:04 Dose:  4 mg


Potassium Chloride (K-Dur 20 Meq Er Tab)  20 meq PO DAILY [facility]


   Last Admin: 03/11/19 16:54 Dose:  20 meq


Pregabalin (Lyrica)  100 mg PO Q8 [facility]


   Last Admin: 03/08/19 10:12 Dose:  Not Given


Vitamin B Complex/Vit C/Folic Acid (Nephro-Yoseph)  1 tab PO DAILY [facility]


   Last Admin: 03/08/19 11:22 Dose:  Not Given











- Labs


Labs: 


                                        





                                 03/10/19 14:15 





                                 03/11/19 04:55 





                                        











PT  16.2 Seconds (9.8-13.1)  H  03/01/19  10:28    


 


INR  1.4   03/01/19  10:28    


 


APTT  35.6 Seconds (25.6-37.1)   03/01/19  10:28    














Assessment and Plan


(1) Sepsis


Status: Acute   





(2) SHANITA (acute kidney injury)


Status: Acute   





(3) Abdominal pain


Status: Acute   





(4) Hypokalemia


Status: Acute   





(5) Respiratory failure


Status: Resolved

## 2019-03-11 NOTE — CP.PCM.PN
Subjective





- Date & Time of Evaluation


Date of Evaluation: 03/11/19


Time of Evaluation: 10:27





- Subjective


Subjective: 





Patient in bed opening her eyes and communicating better


Vital signs noted to be stable


Leukocytosis trending down





Objective





- Vital Signs/Intake and Output


Vital Signs (last 24 hours): 


                                        











Temp Pulse Resp BP Pulse Ox


 


 98.5 F   72   20   135/71   94 L


 


 03/11/19 07:46  03/11/19 07:46  03/11/19 07:46  03/11/19 09:10  03/11/19 07:46











- Medications


Medications: 


                               Current Medications





Acetaminophen (Tylenol 325mg Tab)  650 mg PO Q6 PRN


   PRN Reason: Temperature


   Last Admin: 03/10/19 08:51 Dose:  650 mg


Acetaminophen (Tylenol 325mg Tab)  650 mg PO Q6 PRN


   PRN Reason: Pain <5


   Last Admin: 03/11/19 00:44 Dose:  650 mg


Albuterol/Ipratropium (Duoneb 3 Mg/0.5 Mg (3 Ml) Ud)  3 ml INH RQ6 [facility]


   Last Admin: 03/11/19 07:34 Dose:  3 ml


Dimethicone (Proshield Plus Skin Protectant)  1 applic TOP Q8 [facility]


   Last Admin: 03/11/19 09:11 Dose:  1 applic


Ferrous Sulfate (Feosol)  325 mg PO TID [facility]


   Last Admin: 03/08/19 11:21 Dose:  Not Given


Furosemide (Lasix)  40 mg IVP Q12 [facility]


   Last Admin: 03/11/19 09:10 Dose:  40 mg


Vancomycin HCl 1 gm/ Sodium (Chloride)  250 mls @ 250 mls/hr IVPB MWF [facility]; 

Protocol


   Last Admin: 03/08/19 08:39 Dose:  250 mls/hr


Metronidazole (Flagyl 500mg/100ml Ns)  100 mls @ 100 mls/hr IVPB Q8 [facility]; 

Protocol


   Last Admin: 03/11/19 00:50 Dose:  100 mls/hr


Meropenem 500 mg/ Sodium (Chloride)  100 mls @ 100 mls/hr IVPB Q8 [facility]; Protocol


   Last Admin: 03/11/19 09:31 Dose:  100 mls/hr


Levetiracetam 500 mg/ Dextrose  105 mls @ 210 mls/hr IVPB Q12 [facility]


   Last Admin: 03/11/19 08:57 Dose:  210 mls/hr


Micafungin Sodium 100 mg/ (Dextrose)  100 mls @ 100 mls/hr IVPB DAILY [facility]; 

Protocol


   Last Admin: 03/11/19 09:57 Dose:  100 mls/hr


Potassium Chloride (Potassium Chloride 10 Meq/100 Ml)  100 mls @ 100 mls/hr IVPB

Q1 [facility]


   Stop: 03/11/19 12:59


Magnesium Sulfate 1 gm/ Sodium (Chloride)  102 mls @ 204 mls/hr IVPB ONCE ONE


   Stop: 03/11/19 10:51


Insulin Human Regular (Humulin R)  0 units SC ACCU-CHECK [facility]; Protocol


   Last Admin: 03/11/19 06:08 Dose:  1 u


Insulin Lispro Protam/Lispro Human (Humalog Mix 75/25)  5 units SC BID [facility]; 

Protocol


   Last Admin: 03/11/19 09:12 Dose:  5 units


Ketorolac Tromethamine (Toradol)  30 mg IVP Q6 PRN


   PRN Reason: Pain 5 or greater


   Last Admin: 03/11/19 09:55 Dose:  30 mg


Labetalol HCl (Trandate)  20 mg IVP Q4 PRN


   PRN Reason: Systolic Blood Pressure


   Last Admin: 03/07/19 02:16 Dose:  20 mg


Levetiracetam (Keppra)  500 mg PO BID [facility]


   Last Admin: 03/08/19 11:55 Dose:  Not Given


Metoprolol Tartrate (Lopressor)  50 mg PO Q12 [facility]


   Last Admin: 03/08/19 11:56 Dose:  Not Given


Metoprolol Tartrate (Lopressor)  5 mg IVP Q8 [facility]


   Last Admin: 03/11/19 09:28 Dose:  5 mg


Ondansetron HCl (Zofran Inj)  4 mg IVP Q6 PRN


   PRN Reason: Nausea/Vomiting


   Last Admin: 03/05/19 01:04 Dose:  4 mg


Potassium Chloride (K-Dur 20 Meq Er Tab)  20 meq PO DAILY [facility]


Pregabalin (Lyrica)  100 mg PO Q8 [facility]


   Last Admin: 03/08/19 10:12 Dose:  Not Given


Vitamin B Complex/Vit C/Folic Acid (Nephro-Yoseph)  1 tab PO DAILY [facility]


   Last Admin: 03/08/19 11:22 Dose:  Not Given











- Labs


Labs: 


                                        





                                 03/10/19 14:15 





                                 03/11/19 04:55 





                                        











PT  16.2 Seconds (9.8-13.1)  H  03/01/19  10:28    


 


INR  1.4   03/01/19  10:28    


 


APTT  35.6 Seconds (25.6-37.1)   03/01/19  10:28    














- Constitutional


Appears: No Acute Distress





- Eye Exam


Eye Exam: Conjunctival injection





- ENT Exam


ENT Exam: Mucous Membranes Moist





- Neck Exam


Neck Exam: absent: Lymphadenopathy





- Respiratory Exam


Respiratory Exam: NORMAL BREATHING PATTERN





- GI/Abdominal Exam


GI & Abdominal Exam: Soft, Normal Bowel Sounds





- Extremities Exam


Extremities Exam: absent: Calf Tenderness





- Back Exam


Back Exam: absent: CVA tenderness (L), CVA tenderness (R)





- Neurological Exam


Neurological Exam: Awake





- Skin


Skin Exam: absent: Cyanosis





Assessment and Plan


(1) SHANITA (acute kidney injury)


Assessment & Plan: 





Respiratory failure/   patient extubated


Acute renal failure/acute kidney injury related to multifactorial including 

sepsis.


Diabetes mellitus and history of hypertension history of CVA.


 encephalopathy   patient improving mental status much better and more awake


hyperurecemia    corrected , 


Hyperphosphatemia    corrected 


Shock liver improving liver function test


Nephrotic syndrome proteinuria with 7 gram proteinuria based on protein to 

creatinine ratio


Hypokalemia.  Given potassium chloride 


Hypomagnesemia


Hypernatremia corrected


Recommendation


Patient continues to have persistent hypokalemia need to repeat serum magnesium 

and continue to give magnesium supplement


Persistent hypokalemia continue to need potassium supplement and to correct 

serum magnesium


May function stable


Status: Acute   





(2) Elevated liver enzymes


Status: Acute   





(3) Elevated troponin level


Status: Acute   





(4) Leukocytosis


Status: Acute   





(5) Sepsis


Status: Acute

## 2019-03-12 LAB
BUN SERPL-MCNC: 19 MG/DL (ref 7–17)
CALCIUM SERPL-MCNC: 7.9 MG/DL (ref 8.4–10.2)
ERYTHROCYTE [DISTWIDTH] IN BLOOD BY AUTOMATED COUNT: 27.2 % (ref 11.5–14.5)
GFR NON-AFRICAN AMERICAN: 57
HGB BLD-MCNC: 9.4 G/DL (ref 12–16)
MCH RBC QN AUTO: 25.8 PG (ref 27–31)
MCHC RBC AUTO-ENTMCNC: 32 G/DL (ref 33–37)
MCV RBC AUTO: 80.6 FL (ref 81–99)
PLATELET # BLD: 360 K/UL (ref 130–400)
RBC # BLD AUTO: 3.65 MIL/UL (ref 3.8–5.2)
WBC # BLD AUTO: 10.2 K/UL (ref 4.8–10.8)

## 2019-03-12 RX ADMIN — IPRATROPIUM BROMIDE AND ALBUTEROL SULFATE SCH ML: .5; 3 SOLUTION RESPIRATORY (INHALATION) at 12:11

## 2019-03-12 RX ADMIN — POTASSIUM CHLORIDE SCH MEQ: 20 TABLET, EXTENDED RELEASE ORAL at 09:24

## 2019-03-12 RX ADMIN — POTASSIUM CHLORIDE SCH MLS/HR: 14.9 INJECTION, SOLUTION INTRAVENOUS at 15:08

## 2019-03-12 RX ADMIN — IPRATROPIUM BROMIDE AND ALBUTEROL SULFATE SCH: .5; 3 SOLUTION RESPIRATORY (INHALATION) at 07:52

## 2019-03-12 RX ADMIN — IPRATROPIUM BROMIDE AND ALBUTEROL SULFATE SCH ML: .5; 3 SOLUTION RESPIRATORY (INHALATION) at 19:21

## 2019-03-12 RX ADMIN — Medication SCH APPLIC: at 09:27

## 2019-03-12 RX ADMIN — POTASSIUM CHLORIDE SCH MLS/HR: 14.9 INJECTION, SOLUTION INTRAVENOUS at 10:51

## 2019-03-12 RX ADMIN — INSULIN LISPRO SCH UNITS: 100 INJECTION, SUSPENSION SUBCUTANEOUS at 09:23

## 2019-03-12 RX ADMIN — METOPROLOL TARTRATE SCH MG: 5 INJECTION INTRAVENOUS at 00:26

## 2019-03-12 RX ADMIN — METRONIDAZOLE SCH MLS/HR: 500 INJECTION, SOLUTION INTRAVENOUS at 09:23

## 2019-03-12 RX ADMIN — INSULIN LISPRO SCH UNITS: 100 INJECTION, SUSPENSION SUBCUTANEOUS at 17:40

## 2019-03-12 RX ADMIN — IPRATROPIUM BROMIDE AND ALBUTEROL SULFATE SCH: .5; 3 SOLUTION RESPIRATORY (INHALATION) at 13:40

## 2019-03-12 RX ADMIN — ENOXAPARIN SODIUM SCH MG: 40 INJECTION SUBCUTANEOUS at 09:26

## 2019-03-12 RX ADMIN — Medication SCH APPLIC: at 00:27

## 2019-03-12 RX ADMIN — POTASSIUM CHLORIDE SCH MLS/HR: 14.9 INJECTION, SOLUTION INTRAVENOUS at 12:34

## 2019-03-12 RX ADMIN — Medication SCH APPLIC: at 17:40

## 2019-03-12 RX ADMIN — MICAFUNGIN SODIUM SCH MLS/HR: 20 INJECTION, POWDER, LYOPHILIZED, FOR SOLUTION INTRAVENOUS at 09:27

## 2019-03-12 RX ADMIN — METRONIDAZOLE SCH MLS/HR: 500 INJECTION, SOLUTION INTRAVENOUS at 00:27

## 2019-03-12 RX ADMIN — IPRATROPIUM BROMIDE AND ALBUTEROL SULFATE SCH ML: .5; 3 SOLUTION RESPIRATORY (INHALATION) at 01:58

## 2019-03-12 RX ADMIN — METOPROLOL TARTRATE SCH MG: 5 INJECTION INTRAVENOUS at 09:25

## 2019-03-12 NOTE — CP.PCM.PN
Subjective





- Date & Time of Evaluation


Date of Evaluation: 03/12/19


Time of Evaluation: 11:27





- Subjective


Subjective: 





Patient trying to open eyes


Patient trying to verbalize


Vital signs noted to be stable.





Objective





- Vital Signs/Intake and Output


Vital Signs (last 24 hours): 


                                        











Temp Pulse Resp BP Pulse Ox


 


 98.1 F   68   18   124/72   97 


 


 03/12/19 07:51  03/12/19 09:25  03/12/19 07:51  03/12/19 09:25  03/12/19 07:51











- Medications


Medications: 


                               Current Medications





Acetaminophen (Tylenol 325mg Tab)  650 mg PO Q6 PRN


   PRN Reason: Temperature


   Last Admin: 03/10/19 08:51 Dose:  650 mg


Acetaminophen (Tylenol 325mg Tab)  650 mg PO Q6 PRN


   PRN Reason: Pain <5


   Last Admin: 03/12/19 00:22 Dose:  650 mg


Albuterol/Ipratropium (Duoneb 3 Mg/0.5 Mg (3 Ml) Ud)  3 ml INH RQ6 [facility]


   Last Admin: 03/12/19 07:52 Dose:  Not Given


Dimethicone (Proshield Plus Skin Protectant)  1 applic TOP Q8 [facility]


   Last Admin: 03/12/19 09:27 Dose:  1 applic


Enoxaparin Sodium (Lovenox)  40 mg SC DAILY [facility]; Protocol


   Last Admin: 03/12/19 09:26 Dose:  40 mg


Ferrous Sulfate (Feosol)  325 mg PO TID [facility]


   Last Admin: 03/08/19 11:21 Dose:  Not Given


Potassium Chloride (Potassium Cl 10meq/50ml Sterile Water)  50 mls @ 50 mls/hr 

IVPB Q1 [facility]


   Stop: 03/12/19 12:59


   Last Admin: 03/12/19 10:51 Dose:  50 mls/hr


Insulin Human Regular (Humulin R)  0 units SC ACCU-CHECK [facility]; Protocol


   Last Admin: 03/12/19 06:24 Dose:  Not Given


Insulin Lispro Protam/Lispro Human (Humalog Mix 75/25)  5 units SC BID [facility]; 

Protocol


   Last Admin: 03/12/19 09:23 Dose:  5 units


Ketorolac Tromethamine (Toradol)  30 mg IVP Q6 PRN


   PRN Reason: Pain 5 or greater


   Last Admin: 03/11/19 19:57 Dose:  30 mg


Levetiracetam (Keppra)  500 mg PO BID [facility]


   Last Admin: 03/08/19 11:55 Dose:  Not Given


Metoprolol Tartrate (Lopressor)  50 mg PO Q12 [facility]


   Last Admin: 03/08/19 11:56 Dose:  Not Given


Ondansetron HCl (Zofran Inj)  4 mg IVP Q6 PRN


   PRN Reason: Nausea/Vomiting


   Last Admin: 03/05/19 01:04 Dose:  4 mg


Potassium Chloride (K-Dur 20 Meq Er Tab)  20 meq PO DAILY [facility]


   Last Admin: 03/12/19 09:24 Dose:  20 meq











- Labs


Labs: 


                                        





                                 03/12/19 05:00 





                                 03/12/19 05:00 





                                        











PT  16.2 Seconds (9.8-13.1)  H  03/01/19  10:28    


 


INR  1.4   03/01/19  10:28    


 


APTT  35.6 Seconds (25.6-37.1)   03/01/19  10:28    














- Constitutional


Appears: No Acute Distress





- Eye Exam


Eye Exam: Conjunctival injection





- ENT Exam


ENT Exam: Mucous Membranes Moist





- Neck Exam


Neck Exam: absent: Lymphadenopathy





- Respiratory Exam


Respiratory Exam: NORMAL BREATHING PATTERN.  absent: Chest Wall Tenderness





- Cardiovascular Exam


Cardiovascular Exam: absent: Gallop, JVD, Rubs





- GI/Abdominal Exam


GI & Abdominal Exam: Soft, Normal Bowel Sounds





- Extremities Exam


Extremities Exam: absent: Calf Tenderness





- Back Exam


Back Exam: absent: CVA tenderness (L), CVA tenderness (R)





- Neurological Exam


Neurological Exam: Altered





- Skin


Skin Exam: absent: Cyanosis





Assessment and Plan


(1) SHANITA (acute kidney injury)


Assessment & Plan: 





Respiratory failure/   patient extubated


Acute renal failure/acute kidney injury related to multifactorial including 

sepsis.


Diabetes mellitus and history of hypertension history of CVA.


 encephalopathy   patient improving mental status much better and more awake


hyperurecemia    corrected , 


Hyperphosphatemia    corrected 


Shock liver improving liver function test


Nephrotic syndrome proteinuria with 7 gram proteinuria based on protein to 

creatinine ratio


Hypokalemia.  Given potassium chloride 


Hypomagnesemia


Hypernatremia corrected


Recommendation


Patient continues to have persistent hypokalemia need to repeat serum magnesium 

and continue to give magnesium supplement IV


Kidney function stable


Status: Acute   





(2) Elevated liver enzymes


Status: Acute   





(3) Elevated troponin level


Status: Acute   





(4) Leukocytosis


Status: Acute   





(5) Sepsis


Status: Acute

## 2019-03-12 NOTE — CP.PCM.PN
Subjective





- Date & Time of Evaluation


Date of Evaluation: 03/12/19


Time of Evaluation: 08:00





- Subjective


Subjective: 





afeb  alert


NAD


d/c IV antibiotics if ok with Dr Romero





Objective





- Vital Signs/Intake and Output


Vital Signs (last 24 hours): 


                                        











Temp Pulse Resp BP Pulse Ox


 


 98.1 F   68   18   124/72   97 


 


 03/12/19 07:51  03/12/19 09:25  03/12/19 07:51  03/12/19 09:25  03/12/19 07:51











- Medications


Medications: 


                               Current Medications





Acetaminophen (Tylenol 325mg Tab)  650 mg PO Q6 PRN


   PRN Reason: Temperature


   Last Admin: 03/10/19 08:51 Dose:  650 mg


Acetaminophen (Tylenol 325mg Tab)  650 mg PO Q6 PRN


   PRN Reason: Pain <5


   Last Admin: 03/12/19 00:22 Dose:  650 mg


Albuterol/Ipratropium (Duoneb 3 Mg/0.5 Mg (3 Ml) Ud)  3 ml INH RQ6 [facility]


   Last Admin: 03/12/19 07:52 Dose:  Not Given


Dimethicone (Proshield Plus Skin Protectant)  1 applic TOP Q8 [facility]


   Last Admin: 03/12/19 09:27 Dose:  1 applic


Enoxaparin Sodium (Lovenox)  40 mg SC DAILY [facility]; Protocol


   Last Admin: 03/12/19 09:26 Dose:  40 mg


Ferrous Sulfate (Feosol)  325 mg PO TID [facility]


   Last Admin: 03/08/19 11:21 Dose:  Not Given


Furosemide (Lasix)  40 mg IVP Q12 [facility]


   Last Admin: 03/12/19 09:25 Dose:  Not Given


Vancomycin HCl 1 gm/ Sodium (Chloride)  250 mls @ 250 mls/hr IVPB MWF [facility]; 

Protocol


   Last Admin: 03/11/19 10:45 Dose:  250 mls/hr


Metronidazole (Flagyl 500mg/100ml Ns)  100 mls @ 100 mls/hr IVPB Q8 [facility]; 

Protocol


   Last Admin: 03/12/19 09:23 Dose:  100 mls/hr


Meropenem 500 mg/ Sodium (Chloride)  100 mls @ 100 mls/hr IVPB Q8 [facility]; Protocol


   Last Admin: 03/12/19 09:26 Dose:  100 mls/hr


Levetiracetam 500 mg/ Dextrose  105 mls @ 210 mls/hr IVPB Q12 [facility]


   Last Admin: 03/12/19 09:24 Dose:  210 mls/hr


Micafungin Sodium 100 mg/ (Dextrose)  100 mls @ 100 mls/hr IVPB DAILY [facility]; 

Protocol


   Last Admin: 03/12/19 09:27 Dose:  100 mls/hr


Potassium Chloride (Potassium Cl 10meq/50ml Sterile Water)  50 mls @ 50 mls/hr 

IVPB Q1 [facility]


   Stop: 03/12/19 12:59


Insulin Human Regular (Humulin R)  0 units SC ACCU-CHECK [facility]; Protocol


   Last Admin: 03/12/19 06:24 Dose:  Not Given


Insulin Lispro Protam/Lispro Human (Humalog Mix 75/25)  5 units SC BID [facility]; 

Protocol


   Last Admin: 03/12/19 09:23 Dose:  5 units


Ketorolac Tromethamine (Toradol)  30 mg IVP Q6 PRN


   PRN Reason: Pain 5 or greater


   Last Admin: 03/11/19 19:57 Dose:  30 mg


Labetalol HCl (Trandate)  20 mg IVP Q4 PRN


   PRN Reason: Systolic Blood Pressure


   Last Admin: 03/07/19 02:16 Dose:  20 mg


Levetiracetam (Keppra)  500 mg PO BID [facility]


   Last Admin: 03/08/19 11:55 Dose:  Not Given


Metoprolol Tartrate (Lopressor)  50 mg PO Q12 [facility]


   Last Admin: 03/08/19 11:56 Dose:  Not Given


Metoprolol Tartrate (Lopressor)  5 mg IVP Q8 [facility]


   Last Admin: 03/12/19 09:25 Dose:  5 mg


Ondansetron HCl (Zofran Inj)  4 mg IVP Q6 PRN


   PRN Reason: Nausea/Vomiting


   Last Admin: 03/05/19 01:04 Dose:  4 mg


Potassium Chloride (K-Dur 20 Meq Er Tab)  20 meq PO DAILY [facility]


   Last Admin: 03/12/19 09:24 Dose:  20 meq


Pregabalin (Lyrica)  100 mg PO Q8 [facility]


   Last Admin: 03/08/19 10:12 Dose:  Not Given


Vitamin B Complex/Vit C/Folic Acid (Nephro-Yoseph)  1 tab PO DAILY [facility]


   Last Admin: 03/08/19 11:22 Dose:  Not Given











- Labs


Labs: 


                                        





                                 03/12/19 05:00 





                                 03/12/19 05:00 





                                        











PT  16.2 Seconds (9.8-13.1)  H  03/01/19  10:28    


 


INR  1.4   03/01/19  10:28    


 


APTT  35.6 Seconds (25.6-37.1)   03/01/19  10:28    














- Constitutional


Appears: Non-toxic, Chronically Ill





- Head Exam


Head Exam: NORMOCEPHALIC





- Eye Exam


Eye Exam: PERRL





- ENT Exam


ENT Exam: Mucous Membranes Dry





- Neck Exam


Neck Exam: Full ROM





- Respiratory Exam


Respiratory Exam: Decreased Breath Sounds





- Cardiovascular Exam


Cardiovascular Exam: REGULAR RHYTHM





- GI/Abdominal Exam


GI & Abdominal Exam: Distended





- Rectal Exam


Rectal Exam: Deferred





-  Exam


 Exam: NORMAL INSPECTION





- Extremities Exam


Extremities Exam: Pedal Edema





- Back Exam


Back Exam: NORMAL INSPECTION





- Neurological Exam


Neurological Exam: Alert





- Psychiatric Exam


Psychiatric exam: Normal Mood





- Skin


Skin Exam: Dry





Assessment and Plan


(1) Sepsis


Status: Acute   





(2) Change in mental state


Status: Acute   





(3) History of CVA with residual deficit


Status: Acute   





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


Status: Chronic   





(5) History of CVA (cerebrovascular accident)


Status: Chronic   





(6) NSTEMI (non-ST elevated myocardial infarction)


Status: Acute   





(7) SHANITA (acute kidney injury)


Status: Acute   





(8) Pansinusitis


Status: Acute   





(9) Pneumonia


Status: Acute   





(10) Nephrotic syndrome


Status: Acute   





- Assessment and Plan (Free Text)


Assessment: 





hold iv antibiotics  reculture

## 2019-03-12 NOTE — CP.PCM.PN
Subjective





- Date & Time of Evaluation


Date of Evaluation: 03/12/19


Time of Evaluation: 08:11





- Subjective


Subjective: 





AWAKE AND ALERT


VSS


NO SOB OR CHEST PAINS


RESPONDS TO VERBAL COMMANDS





Objective





- Vital Signs/Intake and Output


Vital Signs (last 24 hours): 


                                        











Temp Pulse Resp BP Pulse Ox


 


 98.1 F   65   18   124/72   97 


 


 03/12/19 07:51  03/12/19 07:51  03/12/19 07:51  03/12/19 07:51  03/12/19 07:51











- Medications


Medications: 


                               Current Medications





Acetaminophen (Tylenol 325mg Tab)  650 mg PO Q6 PRN


   PRN Reason: Temperature


   Last Admin: 03/10/19 08:51 Dose:  650 mg


Acetaminophen (Tylenol 325mg Tab)  650 mg PO Q6 PRN


   PRN Reason: Pain <5


   Last Admin: 03/12/19 00:22 Dose:  650 mg


Albuterol/Ipratropium (Duoneb 3 Mg/0.5 Mg (3 Ml) Ud)  3 ml INH RQ6 [facility]


   Last Admin: 03/12/19 07:52 Dose:  Not Given


Dimethicone (Proshield Plus Skin Protectant)  1 applic TOP Q8 [facility]


   Last Admin: 03/12/19 00:27 Dose:  1 applic


Enoxaparin Sodium (Lovenox)  40 mg SC DAILY [facility]; Protocol


   Last Admin: 03/11/19 12:30 Dose:  40 mg


Ferrous Sulfate (Feosol)  325 mg PO TID [facility]


   Last Admin: 03/08/19 11:21 Dose:  Not Given


Furosemide (Lasix)  40 mg IVP Q12 [facility]


   Last Admin: 03/11/19 20:15 Dose:  40 mg


Vancomycin HCl 1 gm/ Sodium (Chloride)  250 mls @ 250 mls/hr IVPB MWF [facility]; 

Protocol


   Last Admin: 03/11/19 10:45 Dose:  250 mls/hr


Metronidazole (Flagyl 500mg/100ml Ns)  100 mls @ 100 mls/hr IVPB Q8 [facility]; 

Protocol


   Last Admin: 03/12/19 00:27 Dose:  100 mls/hr


Meropenem 500 mg/ Sodium (Chloride)  100 mls @ 100 mls/hr IVPB Q8 [facility]; Protocol


   Last Admin: 03/12/19 00:28 Dose:  100 mls/hr


Levetiracetam 500 mg/ Dextrose  105 mls @ 210 mls/hr IVPB Q12 [facility]


   Last Admin: 03/11/19 20:14 Dose:  210 mls/hr


Micafungin Sodium 100 mg/ (Dextrose)  100 mls @ 100 mls/hr IVPB DAILY [facility]; 

Protocol


   Last Admin: 03/11/19 09:57 Dose:  100 mls/hr


Insulin Human Regular (Humulin R)  0 units SC ACCU-CHECK [facility]; Protocol


   Last Admin: 03/12/19 06:24 Dose:  Not Given


Insulin Lispro Protam/Lispro Human (Humalog Mix 75/25)  5 units SC BID [facility]; 

Protocol


   Last Admin: 03/11/19 16:53 Dose:  5 units


Ketorolac Tromethamine (Toradol)  30 mg IVP Q6 PRN


   PRN Reason: Pain 5 or greater


   Last Admin: 03/11/19 19:57 Dose:  30 mg


Labetalol HCl (Trandate)  20 mg IVP Q4 PRN


   PRN Reason: Systolic Blood Pressure


   Last Admin: 03/07/19 02:16 Dose:  20 mg


Levetiracetam (Keppra)  500 mg PO BID [facility]


   Last Admin: 03/08/19 11:55 Dose:  Not Given


Metoprolol Tartrate (Lopressor)  50 mg PO Q12 [facility]


   Last Admin: 03/08/19 11:56 Dose:  Not Given


Metoprolol Tartrate (Lopressor)  5 mg IVP Q8 [facility]


   Last Admin: 03/12/19 00:26 Dose:  5 mg


Ondansetron HCl (Zofran Inj)  4 mg IVP Q6 PRN


   PRN Reason: Nausea/Vomiting


   Last Admin: 03/05/19 01:04 Dose:  4 mg


Potassium Chloride (K-Dur 20 Meq Er Tab)  20 meq PO DAILY [facility]


   Last Admin: 03/11/19 16:54 Dose:  20 meq


Pregabalin (Lyrica)  100 mg PO Q8 [facility]


   Last Admin: 03/08/19 10:12 Dose:  Not Given


Vitamin B Complex/Vit C/Folic Acid (Nephro-Yoseph)  1 tab PO DAILY [facility]


   Last Admin: 03/08/19 11:22 Dose:  Not Given











- Labs


Labs: 


                                        





                                 03/12/19 05:00 





                                 03/12/19 05:00 





                                        











PT  16.2 Seconds (9.8-13.1)  H  03/01/19  10:28    


 


INR  1.4   03/01/19  10:28    


 


APTT  35.6 Seconds (25.6-37.1)   03/01/19  10:28    














- Constitutional


Appears: No Acute Distress, Chronically Ill





- Head Exam


Head Exam: ATRAUMATIC, NORMAL INSPECTION, NORMOCEPHALIC





- Eye Exam


Eye Exam: EOMI, Normal appearance, PERRL


Pupil Exam: NORMAL ACCOMODATION, PERRL





- ENT Exam


ENT Exam: Mucous Membranes Moist, Normal Exam





- Neck Exam


Neck Exam: Full ROM, Normal Inspection.  absent: Lymphadenopathy





- Respiratory Exam


Respiratory Exam: Decreased Breath Sounds, Prolonged Expiratory Phase, Rales, 

NORMAL BREATHING PATTERN





- Cardiovascular Exam


Cardiovascular Exam: REGULAR RHYTHM, +S1, +S2.  absent: Murmur





- GI/Abdominal Exam


GI & Abdominal Exam: Soft, Normal Bowel Sounds.  absent: Tenderness





- Rectal Exam


Rectal Exam: NORMAL INSPECTION





- Extremities Exam


Extremities Exam: Full ROM, Normal Capillary Refill, Normal Inspection.  absent:

Joint Swelling, Pedal Edema





- Back Exam


Back Exam: NORMAL INSPECTION





- Neurological Exam


Neurological Exam: Alert, Awake, CN II-XII Intact





- Psychiatric Exam


Psychiatric exam: Normal Affect, Normal Mood





- Skin


Skin Exam: Dry, Intact, Normal Color, Warm





Assessment and Plan





- Assessment and Plan (Free Text)


Assessment: 





SEPSIS IMPROVED


RESPIRATORY FAILURE AND PLEURAL EFFUSION IMPROVED





Plan: 





PLACE ON O2 VIA NC

## 2019-03-13 LAB
BUN SERPL-MCNC: 16 MG/DL (ref 7–17)
CALCIUM SERPL-MCNC: 8.1 MG/DL (ref 8.4–10.2)
ERYTHROCYTE [DISTWIDTH] IN BLOOD BY AUTOMATED COUNT: 27.5 % (ref 11.5–14.5)
GFR NON-AFRICAN AMERICAN: > 60
HGB BLD-MCNC: 9.3 G/DL (ref 12–16)
MCH RBC QN AUTO: 26 PG (ref 27–31)
MCHC RBC AUTO-ENTMCNC: 32.1 G/DL (ref 33–37)
MCV RBC AUTO: 81.1 FL (ref 81–99)
PLATELET # BLD: 346 K/UL (ref 130–400)
RBC # BLD AUTO: 3.56 MIL/UL (ref 3.8–5.2)
WBC # BLD AUTO: 9.1 K/UL (ref 4.8–10.8)

## 2019-03-13 RX ADMIN — ENOXAPARIN SODIUM SCH MG: 40 INJECTION SUBCUTANEOUS at 09:22

## 2019-03-13 RX ADMIN — IPRATROPIUM BROMIDE AND ALBUTEROL SULFATE SCH ML: .5; 3 SOLUTION RESPIRATORY (INHALATION) at 19:26

## 2019-03-13 RX ADMIN — IPRATROPIUM BROMIDE AND ALBUTEROL SULFATE SCH: .5; 3 SOLUTION RESPIRATORY (INHALATION) at 13:22

## 2019-03-13 RX ADMIN — Medication SCH APPLIC: at 17:42

## 2019-03-13 RX ADMIN — INSULIN LISPRO SCH UNITS: 100 INJECTION, SUSPENSION SUBCUTANEOUS at 17:47

## 2019-03-13 RX ADMIN — VANCOMYCIN HYDROCHLORIDE SCH MG: 500 INJECTION, POWDER, LYOPHILIZED, FOR SOLUTION INTRAVENOUS at 17:43

## 2019-03-13 RX ADMIN — POTASSIUM CHLORIDE SCH MEQ: 20 TABLET, EXTENDED RELEASE ORAL at 09:26

## 2019-03-13 RX ADMIN — Medication SCH MG: at 17:42

## 2019-03-13 RX ADMIN — POTASSIUM PHOSPHATE, MONOBASIC AND POTASSIUM PHOSPHATE, DIBASIC SCH MLS/HR: 224; 236 INJECTION, SOLUTION, CONCENTRATE INTRAVENOUS at 21:00

## 2019-03-13 RX ADMIN — VANCOMYCIN HYDROCHLORIDE SCH MG: 500 INJECTION, POWDER, LYOPHILIZED, FOR SOLUTION INTRAVENOUS at 22:26

## 2019-03-13 RX ADMIN — IPRATROPIUM BROMIDE AND ALBUTEROL SULFATE SCH ML: .5; 3 SOLUTION RESPIRATORY (INHALATION) at 08:03

## 2019-03-13 RX ADMIN — IPRATROPIUM BROMIDE AND ALBUTEROL SULFATE SCH ML: .5; 3 SOLUTION RESPIRATORY (INHALATION) at 01:01

## 2019-03-13 RX ADMIN — Medication SCH APPLIC: at 09:21

## 2019-03-13 RX ADMIN — INSULIN LISPRO SCH UNITS: 100 INJECTION, SUSPENSION SUBCUTANEOUS at 09:27

## 2019-03-13 RX ADMIN — POTASSIUM PHOSPHATE, MONOBASIC AND POTASSIUM PHOSPHATE, DIBASIC SCH MLS/HR: 224; 236 INJECTION, SOLUTION, CONCENTRATE INTRAVENOUS at 17:41

## 2019-03-13 RX ADMIN — Medication SCH APPLIC: at 01:37

## 2019-03-13 NOTE — CP.PCM.PN
Subjective





- Date & Time of Evaluation


Date of Evaluation: 03/13/19


Time of Evaluation: 11:59





- Subjective


Subjective: 





No new event reported








Patient in bed


Trying to communicate


Leukocytosis trending down and improving





Objective





- Vital Signs/Intake and Output


Vital Signs (last 24 hours): 


                                        











Temp Pulse Resp BP Pulse Ox


 


 98.1 F   65   18   150/78   100 


 


 03/13/19 08:00  03/13/19 09:23  03/13/19 08:00  03/13/19 09:23  03/13/19 08:00








Intake and Output: 


                                        











 03/13/19 03/13/19





 06:59 18:59


 


Output Total 600 


 


Balance -600 














- Medications


Medications: 


                               Current Medications





Acetaminophen (Tylenol 325mg Tab)  650 mg PO Q6 PRN


   PRN Reason: Temperature


   Last Admin: 03/10/19 08:51 Dose:  650 mg


Acetaminophen (Tylenol 325mg Tab)  650 mg PO Q6 PRN


   PRN Reason: Pain <5


   Last Admin: 03/12/19 00:22 Dose:  650 mg


Albuterol/Ipratropium (Duoneb 3 Mg/0.5 Mg (3 Ml) Ud)  3 ml INH RQ6 [facility]


   Last Admin: 03/13/19 08:03 Dose:  3 ml


Dimethicone (Proshield Plus Skin Protectant)  1 applic TOP Q8 [facility]


   Last Admin: 03/13/19 09:21 Dose:  1 applic


Enoxaparin Sodium (Lovenox)  40 mg SC DAILY [facility]; Protocol


   Last Admin: 03/13/19 09:22 Dose:  40 mg


Ferrous Sulfate (Feosol)  325 mg PO TID [facility]


   Last Admin: 03/08/19 11:21 Dose:  Not Given


Potassium Chloride (Potassium Chloride 20 Meq/100 Ml)  100 mls @ 50 mls/hr IVPB 

Q2 [facility]


   Stop: 03/13/19 13:59


Potassium Chloride (Potassium Chloride 20 Meq/100 Ml)  100 mls @ 50 mls/hr IVPB 

ONCE ONE


   Stop: 03/13/19 13:03


Insulin Human Regular (Humulin R)  0 units SC ACCU-CHECK [facility]; Protocol


   Last Admin: 03/13/19 07:04 Dose:  1 u


Insulin Lispro Protam/Lispro Human (Humalog Mix 75/25)  5 units SC BID [facility]; 

Protocol


   Last Admin: 03/13/19 09:27 Dose:  5 units


Ketorolac Tromethamine (Toradol)  30 mg IVP Q6 PRN


   PRN Reason: Pain 5 or greater


   Last Admin: 03/12/19 12:34 Dose:  30 mg


Levetiracetam (Keppra)  [facility]ID [facility]


   Last Admin: 03/13/19 09:25 Dose:  500 mg


Magnesium Oxide (Mag-Ox)  400 mg PO BID [facility]


Metoprolol Tartrate (Lopressor)  50 mg PO Q12 [facility]


   Last Admin: 03/13/19 09:23 Dose:  50 mg


Ondansetron HCl (Zofran Inj)  4 mg IVP Q6 PRN


   PRN Reason: Nausea/Vomiting


   Last Admin: 03/05/19 01:04 Dose:  4 mg


Potassium Chloride (K-Dur 20 Meq Er Tab)  20 meq PO DAILY [facility]


   Last Admin: 03/13/19 09:26 Dose:  20 meq











- Labs


Labs: 


                                        





                                 03/13/19 05:00 





                                 03/13/19 05:00 





                                        











PT  16.2 Seconds (9.8-13.1)  H  03/01/19  10:28    


 


INR  1.4   03/01/19  10:28    


 


APTT  35.6 Seconds (25.6-37.1)   03/01/19  10:28    














- Constitutional


Appears: No Acute Distress





- Eye Exam


Eye Exam: Conjunctival injection





- ENT Exam


ENT Exam: Mucous Membranes Moist





- Neck Exam


Neck Exam: absent: Lymphadenopathy





- Respiratory Exam


Respiratory Exam: NORMAL BREATHING PATTERN





- Cardiovascular Exam


Cardiovascular Exam: absent: Gallop, JVD, Rubs





- GI/Abdominal Exam


GI & Abdominal Exam: Soft, Normal Bowel Sounds





- Extremities Exam


Extremities Exam: absent: Calf Tenderness





- Back Exam


Back Exam: absent: CVA tenderness (L), CVA tenderness (R)





- Neurological Exam


Neurological Exam: Awake





Assessment and Plan


(1) SHANITA (acute kidney injury)


Assessment & Plan: 





Respiratory failure/   patient extubated


Acute renal failure/acute kidney injury related to multifactorial including 

sepsis.


Diabetes mellitus and history of hypertension history of CVA.


 encephalopathy   patient improving mental status much better and more awake


hyperurecemia    corrected , 


Hyperphosphatemia    corrected 


Shock liver improving liver function test


Nephrotic syndrome proteinuria with 7 gram proteinuria based on protein to 

creatinine ratio


Hypokalemia.  Given potassium chloride 


Hypomagnesemia


Hypernatremia corrected


Recommendation


Persistent hypokalemia because of the hypomagnesemia and persistent 

hypomagnesemia because of the rectal tube and rectal losses of magnesium and 

potassium


Patient need intravenous magnesium 2 g stat and oral at the same time


Patient need intravenous potassium chloride supplement 20 mEq x3 doses total of 

60 and repeat magnesium and potassium tomorrow she may need additional 2 g of 

magnesium by tomorrow as well


Status: Acute   





(2) Elevated liver enzymes


Status: Acute   





(3) Elevated troponin level


Status: Acute   





(4) Leukocytosis


Status: Acute   





(5) Sepsis


Status: Acute

## 2019-03-13 NOTE — CP.PCM.PN
Subjective





- Date & Time of Evaluation


Date of Evaluation: 03/13/19


Time of Evaluation: 09:00





- Subjective


Subjective: 





iv rx in progress





antibiotics on hold


c diff negative-  c/o abd pain


consider GI eval  - ? ischemic etiology 





Objective





- Vital Signs/Intake and Output


Vital Signs (last 24 hours): 


                                        











Temp Pulse Resp BP Pulse Ox


 


 98.5 F   57 L  18   143/78   100 


 


 03/13/19 12:00  03/13/19 13:00  03/13/19 12:00  03/13/19 12:00  03/13/19 12:00








Intake and Output: 


                                        











 03/13/19 03/13/19





 06:59 18:59


 


Output Total 600 


 


Balance -600 














- Medications


Medications: 


                               Current Medications





Acetaminophen (Tylenol 325mg Tab)  650 mg PO Q6 PRN


   PRN Reason: Temperature


   Last Admin: 03/10/19 08:51 Dose:  650 mg


Acetaminophen (Tylenol 325mg Tab)  650 mg PO Q6 PRN


   PRN Reason: Pain <5


   Last Admin: 03/12/19 00:22 Dose:  650 mg


Albuterol/Ipratropium (Duoneb 3 Mg/0.5 Mg (3 Ml) Ud)  3 ml INH RQ6 [facility]


   Last Admin: 03/13/19 13:22 Dose:  Not Given


Dimethicone (Proshield Plus Skin Protectant)  1 applic TOP Q8 [facility]


   Last Admin: 03/13/19 09:21 Dose:  1 applic


Enoxaparin Sodium (Lovenox)  40 mg SC DAILY [facility]; Protocol


   Last Admin: 03/13/19 09:22 Dose:  40 mg


Ferrous Sulfate (Feosol)  325 mg PO TID [facility]


   Last Admin: 03/08/19 11:21 Dose:  Not Given


Potassium Chloride (Potassium Chloride 20 Meq/100 Ml)  100 mls @ 50 mls/hr IVPB 

Q2 [facility]


   Stop: 03/13/19 13:59


Insulin Human Regular (Humulin R)  0 units SC ACCU-CHECK [facility]; Protocol


   Last Admin: 03/13/19 12:10 Dose:  Not Given


Insulin Lispro Protam/Lispro Human (Humalog Mix 75/25)  5 units SC BID [facility]; 

Protocol


   Last Admin: 03/13/19 09:27 Dose:  5 units


Ketorolac Tromethamine (Toradol)  30 mg IVP Q6 PRN


   PRN Reason: Pain 5 or greater


   Last Admin: 03/12/19 12:34 Dose:  30 mg


Levetiracetam (Keppra)  500 mg PO BID [facility]


   Last Admin: 03/13/19 09:25 Dose:  500 mg


Magnesium Oxide (Mag-Ox)  400 mg PO BID [facility]


Metoprolol Tartrate (Lopressor)  50 mg PO Q12 [facility]


   Last Admin: 03/13/19 09:23 Dose:  50 mg


Ondansetron HCl (Zofran Inj)  4 mg IVP Q6 PRN


   PRN Reason: Nausea/Vomiting


   Last Admin: 03/05/19 01:04 Dose:  4 mg


Potassium Chloride (K-Dur 20 Meq Er Tab)  20 meq PO DAILY [facility]


   Last Admin: 03/13/19 09:26 Dose:  20 meq











- Labs


Labs: 


                                        





                                 03/13/19 05:00 





                                 03/13/19 05:00 





                                        











PT  16.2 Seconds (9.8-13.1)  H  03/01/19  10:28    


 


INR  1.4   03/01/19  10:28    


 


APTT  35.6 Seconds (25.6-37.1)   03/01/19  10:28    














- Constitutional


Appears: Non-toxic, No Acute Distress





- Head Exam


Head Exam: ATRAUMATIC, NORMAL INSPECTION, NORMOCEPHALIC





- Eye Exam


Eye Exam: EOMI, Normal appearance, PERRL


Pupil Exam: NORMAL ACCOMODATION, PERRL





- ENT Exam


ENT Exam: Mucous Membranes Moist, Normal Exam





- Neck Exam


Neck Exam: Full ROM, Normal Inspection.  absent: Lymphadenopathy





- Respiratory Exam


Respiratory Exam: Clear to Ausculation Bilateral, NORMAL BREATHING PATTERN





- Cardiovascular Exam


Cardiovascular Exam: REGULAR RHYTHM, +S1, +S2.  absent: Murmur





- GI/Abdominal Exam


GI & Abdominal Exam: Distended, Guarding, Soft, Tenderness, Normal Bowel Sounds





- Rectal Exam


Rectal Exam: Deferred





-  Exam


 Exam: NORMAL INSPECTION





- Extremities Exam


Extremities Exam: Full ROM, Normal Capillary Refill, Normal Inspection.  absent:

Joint Swelling, Pedal Edema





- Back Exam


Back Exam: NORMAL INSPECTION





- Neurological Exam


Neurological Exam: Alert, Awake, CN II-XII Intact, Oriented x3


Neuro motor strength exam: Right Upper Extremity: 2/1, Right Lower Extremity: 

2/1





- Psychiatric Exam


Psychiatric exam: Normal Affect, Normal Mood





- Skin


Skin Exam: Dry, Intact, Normal Color, Warm





Assessment and Plan


(1) Sepsis


Status: Acute   





(2) Change in mental state


Status: Acute   





(3) History of CVA with residual deficit


Status: Acute   





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


Status: Chronic   





(5) History of CVA (cerebrovascular accident)


Status: Chronic   





(6) NSTEMI (non-ST elevated myocardial infarction)


Status: Acute   





(7) SHANITA (acute kidney injury)


Status: Acute   





(8) Pansinusitis


Status: Acute   





(9) Pneumonia


Status: Acute   





(10) Nephrotic syndrome


Status: Acute   





- Assessment and Plan (Free Text)


Assessment: 





off antibiotics


c diff neg


c/o abd pain


consider GI eval

## 2019-03-14 LAB
ALBUMIN SERPL-MCNC: 3.4 G/DL (ref 3.5–5)
ALBUMIN/GLOB SERPL: 0.8 {RATIO} (ref 1–2.1)
ALT SERPL-CCNC: 65 U/L (ref 9–52)
AST SERPL-CCNC: 46 U/L (ref 14–36)
BUN SERPL-MCNC: 11 MG/DL (ref 7–17)
CALCIUM SERPL-MCNC: 8.8 MG/DL (ref 8.4–10.2)
ERYTHROCYTE [DISTWIDTH] IN BLOOD BY AUTOMATED COUNT: 27.5 % (ref 11.5–14.5)
GFR NON-AFRICAN AMERICAN: > 60
HGB BLD-MCNC: 9.9 G/DL (ref 12–16)
MCH RBC QN AUTO: 26.6 PG (ref 27–31)
MCHC RBC AUTO-ENTMCNC: 32.9 G/DL (ref 33–37)
MCV RBC AUTO: 80.8 FL (ref 81–99)
PLATELET # BLD: 351 K/UL (ref 130–400)
RBC # BLD AUTO: 3.72 MIL/UL (ref 3.8–5.2)
WBC # BLD AUTO: 7.6 K/UL (ref 4.8–10.8)

## 2019-03-14 RX ADMIN — Medication SCH APPLIC: at 09:19

## 2019-03-14 RX ADMIN — INSULIN LISPRO SCH UNITS: 100 INJECTION, SUSPENSION SUBCUTANEOUS at 16:42

## 2019-03-14 RX ADMIN — POTASSIUM CHLORIDE SCH MEQ: 20 TABLET, EXTENDED RELEASE ORAL at 16:39

## 2019-03-14 RX ADMIN — ENOXAPARIN SODIUM SCH MG: 40 INJECTION SUBCUTANEOUS at 09:18

## 2019-03-14 RX ADMIN — IPRATROPIUM BROMIDE AND ALBUTEROL SULFATE SCH: .5; 3 SOLUTION RESPIRATORY (INHALATION) at 14:06

## 2019-03-14 RX ADMIN — IPRATROPIUM BROMIDE AND ALBUTEROL SULFATE SCH: .5; 3 SOLUTION RESPIRATORY (INHALATION) at 19:11

## 2019-03-14 RX ADMIN — INSULIN LISPRO SCH: 100 INJECTION, SUSPENSION SUBCUTANEOUS at 10:54

## 2019-03-14 RX ADMIN — Medication SCH APPLIC: at 16:34

## 2019-03-14 RX ADMIN — VANCOMYCIN HYDROCHLORIDE SCH MG: 500 INJECTION, POWDER, LYOPHILIZED, FOR SOLUTION INTRAVENOUS at 21:12

## 2019-03-14 RX ADMIN — IPRATROPIUM BROMIDE AND ALBUTEROL SULFATE SCH: .5; 3 SOLUTION RESPIRATORY (INHALATION) at 07:34

## 2019-03-14 RX ADMIN — VANCOMYCIN HYDROCHLORIDE SCH MG: 500 INJECTION, POWDER, LYOPHILIZED, FOR SOLUTION INTRAVENOUS at 04:26

## 2019-03-14 RX ADMIN — Medication SCH MG: at 16:34

## 2019-03-14 RX ADMIN — VANCOMYCIN HYDROCHLORIDE SCH MG: 500 INJECTION, POWDER, LYOPHILIZED, FOR SOLUTION INTRAVENOUS at 16:33

## 2019-03-14 RX ADMIN — Medication SCH APPLIC: at 00:42

## 2019-03-14 RX ADMIN — IPRATROPIUM BROMIDE AND ALBUTEROL SULFATE SCH: .5; 3 SOLUTION RESPIRATORY (INHALATION) at 01:00

## 2019-03-14 RX ADMIN — Medication SCH MG: at 09:18

## 2019-03-14 RX ADMIN — VANCOMYCIN HYDROCHLORIDE SCH MG: 500 INJECTION, POWDER, LYOPHILIZED, FOR SOLUTION INTRAVENOUS at 09:20

## 2019-03-14 RX ADMIN — POTASSIUM CHLORIDE SCH MEQ: 20 TABLET, EXTENDED RELEASE ORAL at 09:16

## 2019-03-14 NOTE — RAD
Date of service: 



03/14/2019



PROCEDURE:  CHEST RADIOGRAPH, 1 VIEW



HISTORY:

eval of effusions



COMPARISON:

03/10/2019.



FINDINGS:



LUNGS:

Interval improvement infiltrates particularly right lower lobe.



PLEURA:

Stable right pleural effusion.



CARDIOVASCULAR:

 No aortic atherosclerotic calcification present. 



Removal of support apparatus since the prior study: Right IJ venous 

access catheter. 



OSSEOUS STRUCTURES:

No significant abnormalities.



VISUALIZED UPPER ABDOMEN:

Normal.



OTHER FINDINGS:

None. 



IMPRESSION:

Improving infiltrates, likely asymmetrical pulmonary edema/pulmonary 

vascular congestion.

## 2019-03-14 NOTE — CP.PCM.PN
Subjective





- Date & Time of Evaluation


Date of Evaluation: 03/13/19


Time of Evaluation: 09:45





- Subjective


Subjective: 





HPI: Pt seen and assessed at bedside, reports no new complaints. Labs reviewed 

from this morning, K+ was 2.6, and magnesium was 1.0.











Subjective- Review of Systems: reviewed and no additional remarkable complaints.













Objective-





-Vital Signs Stable





-Appears: Non-toxic, No Acute Distress.





-Head Exam: NORMAL INSPECTION, normocephalic. 





-Eye Exam: Normal appearance, PERRLA, EOMI.





-Respiratory Exam: NORMAL BREATHING PATTERN, decreased breath sounds at bases. 





-Cardiovascular Exam: +S1, +S2





-GI & Abdominal Exam: Soft, non-tender, non-distended. 





-Neurological Exam: Alert, Awake.





-Psychiatric exam: Normal Affect, Normal Mood





-Skin Exam: Normal Color, Warm











Assessment/Impression/Plan





1.) Encephalopathy:


-mental status improving. 


-still has episodes of mild confusion.


-neuro checks 





2.) Hypokalemia:


-Potassium currently 2.6.


-Possibly secondary to diarrhea; pt currently has flexiseal in place, draining 

dark liquid stool. 


-Pt will receive 2 runs of 20 mEq IV potassium.


-Repeat CMP in the morning.





3.) Hypomagnesemia:


-Magnesium currently 1.0.


-Pt will receive 2 gram of magnesium sulfate.





4.) Acute Respiratory Failure:


-Respiratory status has improved.


-Pulmonology consult appreciated.











Objective





- Vital Signs/Intake and Output


Vital Signs (last 24 hours): 


                                        











Temp Pulse Resp BP Pulse Ox


 


 98.7 F   65   20   150/92 H  95 


 


 03/14/19 00:24  03/14/19 00:24  03/14/19 00:24  03/14/19 00:24  03/14/19 00:24











- Medications


Medications: 


                               Current Medications





Acetaminophen (Tylenol 325mg Tab)  650 mg PO Q6 PRN


   PRN Reason: Temperature


   Last Admin: 03/10/19 08:51 Dose:  650 mg


Acetaminophen (Tylenol 325mg Tab)  650 mg PO Q6 PRN


   PRN Reason: Pain <5


   Last Admin: 03/13/19 22:27 Dose:  650 mg


Albuterol/Ipratropium (Duoneb 3 Mg/0.5 Mg (3 Ml) Ud)  3 ml INH RQ6 [facility]


   Last Admin: 03/13/19 19:26 Dose:  3 ml


Dimethicone (Proshield Plus Skin Protectant)  1 applic TOP Q8 [facility]


   Last Admin: 03/14/19 00:42 Dose:  1 applic


Enoxaparin Sodium (Lovenox)  40 mg SC DAILY [facility]; Protocol


   Last Admin: 03/13/19 09:22 Dose:  40 mg


Ferrous Sulfate (Feosol)  325 mg PO TID [facility]


   Last Admin: 03/08/19 11:21 Dose:  Not Given


Insulin Human Regular (Humulin R)  0 units SC ACCU-CHECK [facility]; Protocol


   Last Admin: 03/13/19 22:25 Dose:  Not Given


Insulin Lispro Protam/Lispro Human (Humalog Mix 75/25)  5 units SC BID [facility]; Prot

ocol


   Last Admin: 03/13/19 17:47 Dose:  5 units


Ketorolac Tromethamine (Toradol)  30 mg IVP Q6 PRN


   PRN Reason: Pain 5 or greater


   Last Admin: 03/13/19 14:33 Dose:  30 mg


Levetiracetam (Keppra)  500 mg PO BID [facility]


   Last Admin: 03/13/19 17:47 Dose:  500 mg


Magnesium Oxide (Mag-Ox)  400 mg PO BID [facility]


   Last Admin: 03/13/19 17:42 Dose:  400 mg


Metoprolol Tartrate (Lopressor)  50 mg PO Q12 [facility]


   Last Admin: 03/13/19 22:25 Dose:  Not Given


Ondansetron HCl (Zofran Inj)  4 mg IVP Q6 PRN


   PRN Reason: Nausea/Vomiting


   Last Admin: 03/05/19 01:04 Dose:  4 mg


Potassium Chloride (K-Dur 20 Meq Er Tab)  20 meq PO DAILY [facility]


   Last Admin: 03/13/19 09:26 Dose:  20 meq


Vancomycin HCl (Vancocin (Oral/Rectal Use))  500 mg PO Q6 [facility]; Protocol


   Last Admin: 03/13/19 22:26 Dose:  500 mg











- Labs


Labs: 


                                        





                                 03/13/19 05:00 





                                 03/13/19 05:00 





                                        











PT  16.2 Seconds (9.8-13.1)  H  03/01/19  10:28    


 


INR  1.4   03/01/19  10:28    


 


APTT  35.6 Seconds (25.6-37.1)   03/01/19  10:28    














Assessment and Plan


(1) Encephalopathy


Status: Acute   





(2) Acute respiratory failure with hypoxia


Status: Acute   





(3) Hypokalemia


Status: Acute   





(4) Hypomagnesemia


Status: Acute

## 2019-03-14 NOTE — US
Date of service: 



03/14/2019



PROCEDURE:  Duplex ultrasound  of the carotid and vertebral arteries. 



HISTORY:

r/o lvo; unable to complete CTA or MRA



COMPARISON:

None available. 



TECHNIQUE:

Grayscale and duplex Doppler evaluation of the cervical carotid and 

vertebral arteries were performed. The common carotid, carotid 

bifurcations and cervical ICA and proximal ECA were evaluated.  The 

vertebral arteries were evaluated for gross patency and direction. 



FINDINGS:



RIGHT CAROTID ARTERIES:

Common Carotid Artery: Maximal flow velocity of  cm/s.



Carotid Bifurcation: Normal.



Internal Carotid Artery:Normal. Maximal flow velocity of  cm/s.



External Carotid Artery (proximal branches): Maximal flow velocity of 

 cm/s.



ICA/CCA Ratio: 



LEFT CAROTID ARTERIES:

Common Carotid Artery: Maximal flow velocity of  cm/s.



Carotid Bifurcation: Normal.



Internal Carotid Artery:Normal. Maximal flow velocity of  cm/s.



External Carotid Artery (proximal branches): Maximal flow velocity of 

 cm/s.



ICA/CCA Ratio: 



VERTEBRAL ARTERIES:

Right Vertebral Artery: Patent. Antegrade flow.



Left Vertebral Artery: Patent. Antegrade flow.



OTHER FINDINGS:

Atherosclerotic calcification present. 



IMPRESSION:

Right  ICA degree of stenosis: Less than 50%



Left    ICA degree of stenosis: Less than 50%



_____________________________________________________



Reference 



Internal Carotid Artery (ICA) Peak Systolic Velocity (PSV) for above:



1. Less than 50% stenosis less than 125 cm/s peak systolic velocity



2. 50-69% stenosis   125-230cm/s peak systolic velocity



3. Greater than 70% but less than near occlusion  greater than 230 

cm/s peak systolic velocity

## 2019-03-14 NOTE — CP.PCM.PN
Subjective





- Date & Time of Evaluation


Date of Evaluation: 03/14/19


Time of Evaluation: 10:30





- Subjective


Subjective: 





Patient awake somewhat


Not in acute respiratory distress


SHe is in bed


Vital signs noted





Objective





- Vital Signs/Intake and Output


Vital Signs (last 24 hours): 


                                        











Temp Pulse Resp BP Pulse Ox


 


 97.8 F   70   18   131/70   100 


 


 03/14/19 08:00  03/14/19 09:17  03/14/19 08:00  03/14/19 09:17  03/14/19 08:00








Intake and Output: 


                                        











 03/14/19 03/14/19





 06:59 18:59


 


Intake Total 720 


 


Balance 720 














- Medications


Medications: 


                               Current Medications





Acetaminophen (Tylenol 325mg Tab)  650 mg PO Q6 PRN


   PRN Reason: Temperature


   Last Admin: 03/10/19 08:51 Dose:  650 mg


Acetaminophen (Tylenol 325mg Tab)  650 mg PO Q6 PRN


   PRN Reason: Pain <5


   Last Admin: 03/13/19 22:27 Dose:  650 mg


Albuterol/Ipratropium (Duoneb 3 Mg/0.5 Mg (3 Ml) Ud)  3 ml INH RQ6 [facility]


   Last Admin: 03/14/19 07:34 Dose:  Not Given


Dimethicone (Proshield Plus Skin Protectant)  1 applic TOP Q8 [facility]


   Last Admin: 03/14/19 09:19 Dose:  1 applic


Enoxaparin Sodium (Lovenox)  40 mg SC DAILY [facility]; Protocol


   Last Admin: 03/14/19 09:18 Dose:  40 mg


Ferrous Sulfate (Feosol)  325 mg PO TID [facility]


   Last Admin: 03/08/19 11:21 Dose:  Not Given


Insulin Human Regular (Humulin R)  0 units SC ACCU-CHECK [facility]; Protocol


   Last Admin: 03/14/19 06:29 Dose:  1 u


Insulin Lispro Protam/Lispro Human (Humalog Mix 75/25)  5 units SC BID [facility]; 

Protocol


   Last Admin: 03/13/19 17:47 Dose:  5 units


Ketorolac Tromethamine (Toradol)  30 mg IVP Q6 PRN


   PRN Reason: Pain 5 or greater


   Last Admin: 03/14/19 04:26 Dose:  30 mg


Levetiracetam (Keppra)  500 mg PO BID [facility]


   Last Admin: 03/14/19 09:16 Dose:  500 mg


Magnesium Oxide (Mag-Ox)  400 mg PO BID [facility]


   Last Admin: 03/14/19 09:18 Dose:  400 mg


Metoprolol Tartrate (Lopressor)  50 mg PO Q12 [facility]


   Last Admin: 03/14/19 09:17 Dose:  50 mg


Ondansetron HCl (Zofran Inj)  4 mg IVP Q6 PRN


   PRN Reason: Nausea/Vomiting


   Last Admin: 03/05/19 01:04 Dose:  4 mg


Potassium Chloride (K-Dur 20 Meq Er Tab)  20 meq PO BID [facility]


   Last Admin: 03/14/19 09:16 Dose:  20 meq


Vancomycin HCl (Vancocin (Oral/Rectal Use))  500 mg PO Q6 [facility]; Protocol


   Last Admin: 03/14/19 09:20 Dose:  500 mg











- Labs


Labs: 


                                        





                                 03/14/19 05:45 





                                 03/14/19 05:45 





                                        











PT  16.2 Seconds (9.8-13.1)  H  03/01/19  10:28    


 


INR  1.4   03/01/19  10:28    


 


APTT  35.6 Seconds (25.6-37.1)   03/01/19  10:28    














- Constitutional


Appears: No Acute Distress





- Eye Exam


Eye Exam: Conjunctival injection





- ENT Exam


ENT Exam: Mucous Membranes Moist





- Neck Exam


Neck Exam: absent: Lymphadenopathy





- Respiratory Exam


Respiratory Exam: NORMAL BREATHING PATTERN.  absent: Chest Wall Tenderness





- Cardiovascular Exam


Cardiovascular Exam: absent: Gallop, Rubs





- GI/Abdominal Exam


GI & Abdominal Exam: Soft





- Extremities Exam


Extremities Exam: absent: Calf Tenderness





- Back Exam


Back Exam: absent: CVA tenderness (L), CVA tenderness (R)





- Neurological Exam


Neurological Exam: Awake





Assessment and Plan


(1) SHANITA (acute kidney injury)


Assessment & Plan: 


Status post acute kidney injury recovering


Status post intubation


Persistent hypomagnesemia and hypokalemia


Recommendation


Patient need additional 2 g of magnesium intravenously


And patient need additional potassium chloride intravenously perhaps 20 mEq x3 

runs total of 60 mEq


Status: Acute   





(2) Elevated liver enzymes


Status: Acute   





(3) Elevated troponin level


Status: Acute   





(4) Leukocytosis


Status: Acute   





(5) Sepsis


Status: Acute

## 2019-03-14 NOTE — CP.PCM.PN
Subjective





- Date & Time of Evaluation


Date of Evaluation: 03/12/19


Time of Evaluation: 11:20





- Subjective


Subjective: 





Patient feels a lot better


Noted drop of WBC to 10


Afebrile


CXR 2 days ago showed persistent   CHF chnages.





Objective





- Vital Signs/Intake and Output


Vital Signs (last 24 hours): 


                                        











Temp Pulse Resp BP Pulse Ox


 


 98.3 F   62   16   149/70   100 


 


 03/14/19 16:39  03/14/19 16:39  03/14/19 16:39  03/14/19 16:39  03/14/19 16:39








Intake and Output: 


                                        











 03/14/19 03/14/19





 06:59 18:59


 


Intake Total 720 


 


Balance 720 














- Medications


Medications: 


                               Current Medications





Acetaminophen (Tylenol 325mg Tab)  650 mg PO Q6 PRN


   PRN Reason: Temperature


   Last Admin: 03/10/19 08:51 Dose:  650 mg


Acetaminophen (Tylenol 325mg Tab)  650 mg PO Q6 PRN


   PRN Reason: Pain <5


   Last Admin: 03/13/19 22:27 Dose:  650 mg


Albuterol/Ipratropium (Duoneb 3 Mg/0.5 Mg (3 Ml) Ud)  3 ml INH RQ6 [facility]


   Last Admin: 03/14/19 14:06 Dose:  Not Given


Dimethicone (Proshield Plus Skin Protectant)  1 applic TOP Q8 [facility]


   Last Admin: 03/14/19 16:34 Dose:  1 applic


Ferrous Sulfate (Feosol)  325 mg PO TID [facility]


   Last Admin: 03/08/19 11:21 Dose:  Not Given


Insulin Human Regular (Humulin R)  0 units SC ACCU-CHECK [facility]; Protocol


   Last Admin: 03/14/19 16:36 Dose:  Not Given


Insulin Lispro Protam/Lispro Human (Humalog Mix 75/25)  5 units SC BID [facility]; 

Protocol


   Last Admin: 03/14/19 16:42 Dose:  5 units


Ketorolac Tromethamine (Toradol)  30 mg IVP Q6 PRN


   PRN Reason: Pain 5 or greater


   Last Admin: 03/14/19 11:19 Dose:  30 mg


Levetiracetam (Keppra)  500 mg PO BID [facility]


   Last Admin: 03/14/19 16:35 Dose:  500 mg


Magnesium Oxide (Mag-Ox)  400 mg PO BID [facility]


   Last Admin: 03/14/19 16:34 Dose:  400 mg


Metoprolol Tartrate (Lopressor)  25 mg PO Q12 [facility]


Ondansetron HCl (Zofran Inj)  4 mg IVP Q6 PRN


   PRN Reason: Nausea/Vomiting


   Last Admin: 03/05/19 01:04 Dose:  4 mg


Potassium Chloride (K-Dur 20 Meq Er Tab)  20 meq PO BID [facility]


   Last Admin: 03/14/19 16:39 Dose:  20 meq


Vancomycin HCl (Vancocin (Oral/Rectal Use))  500 mg PO Q6 [facility]; Protocol


   Last Admin: 03/14/19 16:33 Dose:  500 mg











- Labs


Labs: 


                                        





                                 03/14/19 05:45 





                                 03/14/19 05:45 





                                        











PT  16.2 Seconds (9.8-13.1)  H  03/01/19  10:28    


 


INR  1.4   03/01/19  10:28    


 


APTT  35.6 Seconds (25.6-37.1)   03/01/19  10:28    














- Head Exam


Head Exam: NORMAL INSPECTION





- Eye Exam


Eye Exam: Normal appearance





- ENT Exam


ENT Exam: Mucous Membranes Moist





- Respiratory Exam


Respiratory Exam: Decreased Breath Sounds





- Cardiovascular Exam


Cardiovascular Exam: REGULAR RHYTHM





- GI/Abdominal Exam


GI & Abdominal Exam: Normal Bowel Sounds





- Neurological Exam


Neurological Exam: Awake, Oriented x3





Assessment and Plan


(1) SHANITA (acute kidney injury)


Status: Acute   





(2) Acute respiratory failure with hypoxia


Status: Acute   





(3) Aspiration pneumonia


Status: Acute   





(4) Elevated liver enzymes


Status: Acute   





(5) Sepsis


Status: Acute   





(6) Toxic metabolic encephalopathy


Status: Acute   





- Assessment and Plan (Free Text)


Plan: 





Cont meds


 start phys therapy


 fluid restriction


diuretics


 ambulation


 subacute rehab

## 2019-03-14 NOTE — CP.PCM.PN
Subjective





- Date & Time of Evaluation


Date of Evaluation: 03/14/19


Time of Evaluation: 10:00





- Subjective


Subjective: 





patient seen and examined at bedside. no family at bedside


Interim events noted


available diagnostic data reviewed





Review of Systems unable to obtain due to status





Objective





Vital Signs Stable


- Constitutional


Appears: Chronically Ill





Head Exam: NORMAL INSPECTION





Respiratory Exam: CTABL





Cardiovascular Exam: +S1, +S2





GI & Abdominal Exam: Soft, mildly tender





Neurological Exam: Alert, Awake





Skin Exam: Normal Color, Warm





Assessment and Plan





monitor vitals


monitor labs


Cont meds


Cont tx


ID, Cardio, Neuro,  Renal, Pulm following


consultants appreciated input


hypokalemia persists, replace, nephro on board though improving


PT


rest of plan as ordered





Objective





- Vital Signs/Intake and Output


Vital Signs (last 24 hours): 


                                        











Temp Pulse Resp BP Pulse Ox


 


 98.2 F   55 L  18   155/75 H  99 


 


 03/14/19 12:00  03/14/19 12:00  03/14/19 12:00  03/14/19 12:00  03/14/19 12:00








Intake and Output: 


                                        











 03/14/19 03/14/19





 06:59 18:59


 


Intake Total 720 


 


Balance 720 














- Medications


Medications: 


                               Current Medications





Acetaminophen (Tylenol 325mg Tab)  650 mg PO Q6 PRN


   PRN Reason: Temperature


   Last Admin: 03/10/19 08:51 Dose:  650 mg


Acetaminophen (Tylenol 325mg Tab)  650 mg PO Q6 PRN


   PRN Reason: Pain <5


   Last Admin: 03/13/19 22:27 Dose:  650 mg


Albuterol/Ipratropium (Duoneb 3 Mg/0.5 Mg (3 Ml) Ud)  3 ml INH RQ6 [facility]


   Last Admin: 03/14/19 14:06 Dose:  Not Given


Dimethicone (Proshield Plus Skin Protectant)  1 applic TOP Q8 [facility]


   Last Admin: 03/14/19 09:19 Dose:  1 applic


Ferrous Sulfate (Feosol)  325 mg PO TID [facility]


   Last Admin: 03/08/19 11:21 Dose:  Not Given


Insulin Human Regular (Humulin R)  0 units SC ACCU-CHECK [facility]; Protocol


   Last Admin: 03/14/19 11:17 Dose:  Not Given


Insulin Lispro Protam/Lispro Human (Humalog Mix 75/25)  5 units SC BID [facility]; 

Protocol


   Last Admin: 03/14/19 10:54 Dose:  Not Given


Ketorolac Tromethamine (Toradol)  30 mg IVP Q6 PRN


   PRN Reason: Pain 5 or greater


   Last Admin: 03/14/19 11:19 Dose:  30 mg


Levetiracetam (Keppra)  500 mg PO BID [facility]


   Last Admin: 03/14/19 09:16 Dose:  500 mg


Magnesium Oxide (Mag-Ox)  400 mg PO BID [facility]


   Last Admin: 03/14/19 09:18 Dose:  400 mg


Metoprolol Tartrate (Lopressor)  25 mg PO Q12 [facility]


Ondansetron HCl (Zofran Inj)  4 mg IVP Q6 PRN


   PRN Reason: Nausea/Vomiting


   Last Admin: 03/05/19 01:04 Dose:  4 mg


Potassium Chloride (K-Dur 20 Meq Er Tab)  20 meq PO BID [facility]


   Last Admin: 03/14/19 09:16 Dose:  20 meq


Vancomycin HCl (Vancocin (Oral/Rectal Use))  500 mg PO Q6 [facility]; Protocol


   Last Admin: 03/14/19 09:20 Dose:  500 mg











- Labs


Labs: 


                                        





                                 03/14/19 05:45 





                                 03/14/19 05:45 





                                        











PT  16.2 Seconds (9.8-13.1)  H  03/01/19  10:28    


 


INR  1.4   03/01/19  10:28    


 


APTT  35.6 Seconds (25.6-37.1)   03/01/19  10:28    














Assessment and Plan


(1) Sepsis


Status: Acute   





(2) SHANITA (acute kidney injury)


Status: Acute   





(3) Abdominal pain


Status: Acute   





(4) Hypokalemia


Status: Acute   





(5) Respiratory failure


Status: Resolved

## 2019-03-14 NOTE — CP.PCM.PN
Subjective





- Date & Time of Evaluation


Date of Evaluation: 03/14/19


Time of Evaluation: 09:16





- Subjective


Subjective: 





NO SOB


OFF HIGH FLOW O2


APPETITE POOR





Objective





- Vital Signs/Intake and Output


Vital Signs (last 24 hours): 


                                        











Temp Pulse Resp BP Pulse Ox


 


 97.8 F   70   18   131/70   100 


 


 03/14/19 08:00  03/14/19 08:00  03/14/19 08:00  03/14/19 08:00  03/14/19 08:00








Intake and Output: 


                                        











 03/14/19 03/14/19





 06:59 18:59


 


Intake Total 720 


 


Balance 720 














- Medications


Medications: 


                               Current Medications





Acetaminophen (Tylenol 325mg Tab)  650 mg PO Q6 PRN


   PRN Reason: Temperature


   Last Admin: 03/10/19 08:51 Dose:  650 mg


Acetaminophen (Tylenol 325mg Tab)  650 mg PO Q6 PRN


   PRN Reason: Pain <5


   Last Admin: 03/13/19 22:27 Dose:  650 mg


Albuterol/Ipratropium (Duoneb 3 Mg/0.5 Mg (3 Ml) Ud)  3 ml INH RQ6 [facility]


   Last Admin: 03/14/19 07:34 Dose:  Not Given


Dimethicone (Proshield Plus Skin Protectant)  1 applic TOP Q8 [facility]


   Last Admin: 03/14/19 00:42 Dose:  1 applic


Enoxaparin Sodium (Lovenox)  40 mg SC DAILY [facility]; Protocol


   Last Admin: 03/13/19 09:22 Dose:  40 mg


Ferrous Sulfate (Feosol)  325 mg PO TID [facility]


   Last Admin: 03/08/19 11:21 Dose:  Not Given


Magnesium Sulfate (Magnesium Sulfate 2 Gm/50 Ml Water)  2 gm in 50 mls @ 50 

mls/hr IVPB ONCE ONE


   Stop: 03/14/19 09:59


Insulin Human Regular (Humulin R)  0 units SC ACCU-CHECK [facility]; Protocol


   Last Admin: 03/14/19 06:29 Dose:  1 u


Insulin Lispro Protam/Lispro Human (Humalog Mix 75/25)  5 units SC BID [facility]; 

Protocol


   Last Admin: 03/13/19 17:47 Dose:  5 units


Ketorolac Tromethamine (Toradol)  30 mg IVP Q6 PRN


   PRN Reason: Pain 5 or greater


   Last Admin: 03/14/19 04:26 Dose:  30 mg


Levetiracetam (Keppra)  500 mg PO BID [facility]


   Last Admin: 03/13/19 17:47 Dose:  500 mg


Magnesium Oxide (Mag-Ox)  400 mg PO BID [facility]


   Last Admin: 03/13/19 17:42 Dose:  400 mg


Metoprolol Tartrate (Lopressor)  50 mg PO Q12 [facility]


   Last Admin: 03/13/19 22:25 Dose:  Not Given


Ondansetron HCl (Zofran Inj)  4 mg IVP Q6 PRN


   PRN Reason: Nausea/Vomiting


   Last Admin: 03/05/19 01:04 Dose:  4 mg


Potassium Chloride (K-Dur 20 Meq Er Tab)  20 meq PO BID [facility]


Vancomycin HCl (Vancocin (Oral/Rectal Use))  500 mg PO Q6 [facility]; Protocol


   Last Admin: 03/14/19 04:26 Dose:  500 mg











- Labs


Labs: 


                                        





                                 03/14/19 05:45 





                                 03/14/19 05:45 





                                        











PT  16.2 Seconds (9.8-13.1)  H  03/01/19  10:28    


 


INR  1.4   03/01/19  10:28    


 


APTT  35.6 Seconds (25.6-37.1)   03/01/19  10:28    














- Constitutional


Appears: No Acute Distress





- Head Exam


Head Exam: ATRAUMATIC, NORMAL INSPECTION, NORMOCEPHALIC





- Eye Exam


Eye Exam: EOMI, Normal appearance, PERRL


Pupil Exam: NORMAL ACCOMODATION, PERRL





- ENT Exam


ENT Exam: Mucous Membranes Moist, Normal Exam





- Neck Exam


Neck Exam: Full ROM, Normal Inspection.  absent: Lymphadenopathy





- Respiratory Exam


Respiratory Exam: Decreased Breath Sounds, Prolonged Expiratory Phase, NORMAL 

BREATHING PATTERN





- Cardiovascular Exam


Cardiovascular Exam: REGULAR RHYTHM, +S1, +S2.  absent: Murmur





- GI/Abdominal Exam


GI & Abdominal Exam: Soft, Normal Bowel Sounds.  absent: Tenderness





- Rectal Exam


Rectal Exam: NORMAL INSPECTION





- Extremities Exam


Extremities Exam: Full ROM, Normal Capillary Refill, Normal Inspection.  absent:

Joint Swelling, Pedal Edema





- Back Exam


Back Exam: NORMAL INSPECTION





- Neurological Exam


Neurological Exam: Abnormal Gait, Alert, Awake, CN II-XII Intact, Oriented x3





- Psychiatric Exam


Psychiatric exam: Normal Affect, Normal Mood





- Skin


Skin Exam: Dry, Intact, Normal Color, Warm





Assessment and Plan





- Assessment and Plan (Free Text)


Assessment: 





SEPSIS


RESP FAILURE--IMPROVED


Plan: 





CONTINUE BCURRENT RX


PT EVAL

## 2019-03-15 LAB
BUN SERPL-MCNC: 10 MG/DL (ref 7–17)
CALCIUM SERPL-MCNC: 9 MG/DL (ref 8.4–10.2)
GFR NON-AFRICAN AMERICAN: > 60

## 2019-03-15 RX ADMIN — INSULIN LISPRO SCH UNITS: 100 INJECTION, SUSPENSION SUBCUTANEOUS at 09:10

## 2019-03-15 RX ADMIN — POTASSIUM CHLORIDE SCH MEQ: 20 TABLET, EXTENDED RELEASE ORAL at 09:09

## 2019-03-15 RX ADMIN — IPRATROPIUM BROMIDE AND ALBUTEROL SULFATE SCH ML: .5; 3 SOLUTION RESPIRATORY (INHALATION) at 01:53

## 2019-03-15 RX ADMIN — Medication SCH MG: at 09:07

## 2019-03-15 RX ADMIN — Medication SCH APPLIC: at 16:00

## 2019-03-15 RX ADMIN — VANCOMYCIN HYDROCHLORIDE SCH MG: 500 INJECTION, POWDER, LYOPHILIZED, FOR SOLUTION INTRAVENOUS at 09:05

## 2019-03-15 RX ADMIN — VANCOMYCIN HYDROCHLORIDE SCH MG: 500 INJECTION, POWDER, LYOPHILIZED, FOR SOLUTION INTRAVENOUS at 15:58

## 2019-03-15 RX ADMIN — IPRATROPIUM BROMIDE AND ALBUTEROL SULFATE SCH: .5; 3 SOLUTION RESPIRATORY (INHALATION) at 20:04

## 2019-03-15 RX ADMIN — IPRATROPIUM BROMIDE AND ALBUTEROL SULFATE SCH ML: .5; 3 SOLUTION RESPIRATORY (INHALATION) at 07:32

## 2019-03-15 RX ADMIN — Medication SCH MG: at 16:00

## 2019-03-15 RX ADMIN — POTASSIUM CHLORIDE SCH MEQ: 20 TABLET, EXTENDED RELEASE ORAL at 16:02

## 2019-03-15 RX ADMIN — INSULIN LISPRO SCH UNITS: 100 INJECTION, SUSPENSION SUBCUTANEOUS at 17:10

## 2019-03-15 RX ADMIN — Medication SCH APPLIC: at 00:32

## 2019-03-15 RX ADMIN — VANCOMYCIN HYDROCHLORIDE SCH MG: 500 INJECTION, POWDER, LYOPHILIZED, FOR SOLUTION INTRAVENOUS at 21:28

## 2019-03-15 RX ADMIN — Medication SCH: at 09:07

## 2019-03-15 RX ADMIN — IPRATROPIUM BROMIDE AND ALBUTEROL SULFATE SCH: .5; 3 SOLUTION RESPIRATORY (INHALATION) at 13:00

## 2019-03-15 RX ADMIN — VANCOMYCIN HYDROCHLORIDE SCH MG: 500 INJECTION, POWDER, LYOPHILIZED, FOR SOLUTION INTRAVENOUS at 04:05

## 2019-03-15 NOTE — RAD
Date of service: 



03/14/2019



PROCEDURE:  Modified barium swallow study.



HISTORY:

dysphagia



COMPARISON:

None available. 



TECHNIQUE:

Under fluoroscopic guidance, the patient ingested various 

consistencies of barium.  Examination was performed in conjunction 

with speech pathology department. 



FINDINGS:

There was mild delay in the oral phase.  No deep laryngeal 

penetration or aspiration was observed during this study.



The total fluoroscopic time was 99 seconds. 



IMPRESSION:

No deep laryngeal penetration or aspiration observed. Please refer to 

the detailed report and recommendations of the speech pathologist.

## 2019-03-15 NOTE — CP.PCM.PN
Subjective





- Date & Time of Evaluation


Date of Evaluation: 03/15/19


Time of Evaluation: 10:29





- Subjective


Subjective: 





no chest pains/sob





Objective





- Vital Signs/Intake and Output


Vital Signs (last 24 hours): 


                                        











Temp Pulse Resp BP Pulse Ox


 


 98.4 F   60   20   136/72   98 


 


 03/15/19 09:46  03/15/19 09:46  03/15/19 09:46  03/15/19 09:46  03/15/19 09:46











- Medications


Medications: 


                               Current Medications





Acetaminophen (Tylenol 325mg Tab)  650 mg PO Q6 PRN


   PRN Reason: Temperature


   Last Admin: 03/10/19 08:51 Dose:  650 mg


Acetaminophen (Tylenol 325mg Tab)  650 mg PO Q6 PRN


   PRN Reason: Pain <5


   Last Admin: 03/13/19 22:27 Dose:  650 mg


Albuterol/Ipratropium (Duoneb 3 Mg/0.5 Mg (3 Ml) Ud)  3 ml INH RQ6 [facility]


   Last Admin: 03/15/19 07:32 Dose:  3 ml


Dimethicone (Proshield Plus Skin Protectant)  1 applic TOP Q8 [facility]


   Last Admin: 03/15/19 09:07 Dose:  Not Given


Ferrous Sulfate (Feosol)  325 mg PO TID [facility]


   Last Admin: 03/08/19 11:21 Dose:  Not Given


Insulin Human Regular (Humulin R)  0 units SC ACCU-CHECK [facility]; Protocol


Insulin Lispro Protam/Lispro Human (Humalog Mix 75/25)  5 units SC BID [facility]; 

Protocol


   Last Admin: 03/15/19 09:10 Dose:  5 units


Ketorolac Tromethamine (Toradol)  30 mg IVP Q6 PRN


   PRN Reason: Pain 5 or greater


   Last Admin: 03/15/19 06:08 Dose:  30 mg


Levetiracetam (Keppra)  500 mg PO BID [facility]


   Last Admin: 03/15/19 09:09 Dose:  500 mg


Magnesium Oxide (Mag-Ox)  400 mg PO BID [facility]


   Last Admin: 03/15/19 09:07 Dose:  400 mg


Metoprolol Tartrate (Lopressor)  25 mg PO Q12 [facility]


   Last Admin: 03/15/19 09:08 Dose:  25 mg


Ondansetron HCl (Zofran Inj)  4 mg IVP Q6 PRN


   PRN Reason: Nausea/Vomiting


   Last Admin: 03/05/19 01:04 Dose:  4 mg


Potassium Chloride (K-Dur 20 Meq Er Tab)  20 meq PO BID [facility]


   Last Admin: 03/15/19 09:09 Dose:  20 meq


Vancomycin HCl (Vancocin (Oral/Rectal Use))  500 mg PO Q6 [facility]; Protocol


   Last Admin: 03/15/19 09:05 Dose:  500 mg











- Labs


Labs: 


                                        





                                 03/14/19 05:45 





                                 03/15/19 04:55 





                                        











PT  16.2 Seconds (9.8-13.1)  H  03/01/19  10:28    


 


INR  1.4   03/01/19  10:28    


 


APTT  35.6 Seconds (25.6-37.1)   03/01/19  10:28    














- Constitutional


Appears: No Acute Distress, Chronically Ill





- Head Exam


Head Exam: ATRAUMATIC, NORMAL INSPECTION, NORMOCEPHALIC





- Eye Exam


Eye Exam: EOMI, Normal appearance, PERRL


Pupil Exam: NORMAL ACCOMODATION, PERRL





- ENT Exam


ENT Exam: Mucous Membranes Moist, Normal Exam





- Neck Exam


Neck Exam: Full ROM, Normal Inspection.  absent: Lymphadenopathy





- Respiratory Exam


Respiratory Exam: Clear to Ausculation Bilateral, NORMAL BREATHING PATTERN





- Cardiovascular Exam


Cardiovascular Exam: REGULAR RHYTHM, +S1, +S2.  absent: Murmur





- GI/Abdominal Exam


GI & Abdominal Exam: Soft, Normal Bowel Sounds.  absent: Tenderness





- Rectal Exam


Rectal Exam: NORMAL INSPECTION





- Extremities Exam


Extremities Exam: Full ROM, Normal Capillary Refill, Normal Inspection.  absent:

Joint Swelling, Pedal Edema





- Back Exam


Back Exam: NORMAL INSPECTION





- Neurological Exam


Neurological Exam: Alert, Awake, CN II-XII Intact, Oriented x3





- Psychiatric Exam


Psychiatric exam: Normal Affect, Normal Mood





- Skin


Skin Exam: Dry, Intact, Normal Color, Warm





Assessment and Plan





- Assessment and Plan (Free Text)


Assessment: 





respiratory failure improved


Plan: 





continue present rx

## 2019-03-15 NOTE — CP.PCM.PN
Subjective





- Date & Time of Evaluation


Date of Evaluation: 03/15/19


Time of Evaluation: 09:25





- Subjective


Subjective: 





Patient appears to be more awake and she is trying to eat and having Breakfast 

by herself.


Vital signs noted to be stable.





Objective





- Vital Signs/Intake and Output


Vital Signs (last 24 hours): 


                                        











Temp Pulse Resp BP Pulse Ox


 


 98.4 F   78   18   161/79 H  98 


 


 03/15/19 13:00  03/15/19 13:03  03/15/19 13:00  03/15/19 13:00  03/15/19 13:03











- Medications


Medications: 


                               Current Medications





Acetaminophen (Tylenol 325mg Tab)  650 mg PO Q6 PRN


   PRN Reason: Temperature


   Last Admin: 03/10/19 08:51 Dose:  650 mg


Acetaminophen (Tylenol 325mg Tab)  650 mg PO Q6 PRN


   PRN Reason: Pain <5


   Last Admin: 03/13/19 22:27 Dose:  650 mg


Al Hydrox/Mg Hydrox/Simethicone (Maalox Plus 30 Ml)  30 ml PO Q6 PRN


   PRN Reason: Indigestion / Heartburn


Albuterol/Ipratropium (Duoneb 3 Mg/0.5 Mg (3 Ml) Ud)  3 ml INH RQ6 [facility]


   Last Admin: 03/15/19 13:00 Dose:  Not Given


Dimethicone (Proshield Plus Skin Protectant)  1 applic TOP Q8 [facility]


   Last Admin: 03/15/19 09:07 Dose:  Not Given


Ferrous Sulfate (Feosol)  325 mg PO TID [facility]


   Last Admin: 03/08/19 11:21 Dose:  Not Given


Insulin Human Regular (Humulin R)  0 units SC ACCU-CHECK [facility]; Protocol


   Last Admin: 03/15/19 12:22 Dose:  Not Given


Insulin Lispro Protam/Lispro Human (Humalog Mix 75/25)  5 units SC BID [facility]; 

Protocol


   Last Admin: 03/15/19 09:10 Dose:  5 units


Ketorolac Tromethamine (Toradol)  30 mg IVP Q6 PRN


   PRN Reason: Pain 5 or greater


   Last Admin: 03/15/19 06:08 Dose:  30 mg


Levetiracetam (Keppra)  500 mg PO BID [facility]


   Last Admin: 03/15/19 09:09 Dose:  500 mg


Magnesium Oxide (Mag-Ox)  400 mg PO BID [facility]


   Last Admin: 03/15/19 09:07 Dose:  400 mg


Metoprolol Tartrate (Lopressor)  25 mg PO Q12 [facility]


   Last Admin: 03/15/19 09:08 Dose:  25 mg


Ondansetron HCl (Zofran Inj)  4 mg IVP Q6 PRN


   PRN Reason: Nausea/Vomiting


   Last Admin: 03/05/19 01:04 Dose:  4 mg


Potassium Chloride (K-Dur 20 Meq Er Tab)  20 meq PO BID [facility]


   Last Admin: 03/15/19 09:09 Dose:  20 meq


Vancomycin HCl (Vancocin (Oral/Rectal Use))  500 mg PO Q6 [facility]; Protocol


   Last Admin: 03/15/19 09:05 Dose:  500 mg











- Labs


Labs: 


                                        





                                 03/14/19 05:45 





                                 03/15/19 04:55 





                                        











PT  16.2 Seconds (9.8-13.1)  H  03/01/19  10:28    


 


INR  1.4   03/01/19  10:28    


 


APTT  35.6 Seconds (25.6-37.1)   03/01/19  10:28    














- Constitutional


Appears: No Acute Distress





- Eye Exam


Eye Exam: Conjunctival injection





- ENT Exam


ENT Exam: Mucous Membranes Moist





- Neck Exam


Neck Exam: absent: Lymphadenopathy





- GI/Abdominal Exam


GI & Abdominal Exam: Soft, Normal Bowel Sounds





- Extremities Exam


Extremities Exam: absent: Calf Tenderness





- Back Exam


Back Exam: absent: CVA tenderness (L), CVA tenderness (R)





- Neurological Exam


Neurological Exam: Altered, Awake





- Skin


Skin Exam: absent: Cyanosis





Assessment and Plan


(1) SHANITA (acute kidney injury)


Assessment & Plan: 


Respiratory failure/   patient extubated


Acute renal failure/acute kidney injury related to multifactorial including 

sepsis.  recovered


Diabetes mellitus and history of hypertension history of CVA.


 encephalopathy   patient improving mental status much better and more awake


hyperurecemia    corrected , 


Hyperphosphatemia    corrected 


S/P Shock liver improving liver function test


Nephrotic syndrome proteinuria with 7 gram proteinuria based on protein to 

creatinine ratio


Hypokalemia.  Given potassium chloride 


Hypomagnesemia


Hypernatremia corrected


Plan


Hypokalemia corrected this morning although patient continued to need potassium 

supplement


Hypomagnesemia improving patient continued to need magnesium may be 1 g 

intravenously today


And as per primary team with the rest of the management.


Status: Acute   





(2) Elevated liver enzymes


Status: Acute   





(3) Elevated troponin level


Status: Acute   





(4) Leukocytosis


Status: Acute   





(5) Sepsis


Status: Acute

## 2019-03-15 NOTE — CP.PCM.PN
Subjective





- Date & Time of Evaluation


Date of Evaluation: 03/15/19


Time of Evaluation: 08:00





- Subjective


Subjective: 





awake alert


feeding herself lunch with some difficulty


denies fever or diarrhea


abd pain less


no cough





Objective





- Vital Signs/Intake and Output


Vital Signs (last 24 hours): 


                                        











Temp Pulse Resp BP Pulse Ox


 


 98.4 F   78   18   161/79 H  98 


 


 03/15/19 13:00  03/15/19 13:03  03/15/19 13:00  03/15/19 13:00  03/15/19 13:03











- Medications


Medications: 


                               Current Medications





Acetaminophen (Tylenol 325mg Tab)  650 mg PO Q6 PRN


   PRN Reason: Temperature


   Last Admin: 03/10/19 08:51 Dose:  650 mg


Acetaminophen (Tylenol 325mg Tab)  650 mg PO Q6 PRN


   PRN Reason: Pain <5


   Last Admin: 03/13/19 22:27 Dose:  650 mg


Al Hydrox/Mg Hydrox/Simethicone (Maalox Plus 30 Ml)  30 ml PO Q6 PRN


   PRN Reason: Indigestion / Heartburn


Albuterol/Ipratropium (Duoneb 3 Mg/0.5 Mg (3 Ml) Ud)  3 ml INH RQ6 [facility]


   Last Admin: 03/15/19 13:00 Dose:  Not Given


Dimethicone (Proshield Plus Skin Protectant)  1 applic TOP Q8 [facility]


   Last Admin: 03/15/19 09:07 Dose:  Not Given


Ferrous Sulfate (Feosol)  325 mg PO TID [facility]


   Last Admin: 03/08/19 11:21 Dose:  Not Given


Insulin Human Regular (Humulin R)  0 units SC ACCU-CHECK [facility]; Protocol


   Last Admin: 03/15/19 12:22 Dose:  Not Given


Insulin Lispro Protam/Lispro Human (Humalog Mix 75/25)  5 units SC BID [facility]; 

Protocol


   Last Admin: 03/15/19 09:10 Dose:  5 units


Ketorolac Tromethamine (Toradol)  30 mg IVP Q6 PRN


   PRN Reason: Pain 5 or greater


   Last Admin: 03/15/19 06:08 Dose:  30 mg


Levetiracetam (Keppra)  500 mg PO BID [facility]


   Last Admin: 03/15/19 09:09 Dose:  500 mg


Magnesium Oxide (Mag-Ox)  400 mg PO BID [facility]


   Last Admin: 03/15/19 09:07 Dose:  400 mg


Metoprolol Tartrate (Lopressor)  25 mg PO Q12 [facility]


   Last Admin: 03/15/19 09:08 Dose:  25 mg


Ondansetron HCl (Zofran Inj)  4 mg IVP Q6 PRN


   PRN Reason: Nausea/Vomiting


   Last Admin: 03/05/19 01:04 Dose:  4 mg


Potassium Chloride (K-Dur 20 Meq Er Tab)  20 meq PO BID [facility]


   Last Admin: 03/15/19 09:09 Dose:  20 meq


Vancomycin HCl (Vancocin (Oral/Rectal Use))  500 mg PO Q6 [facility]; Protocol


   Last Admin: 03/15/19 09:05 Dose:  500 mg











- Labs


Labs: 


                                        





                                 03/14/19 05:45 





                                 03/15/19 04:55 





                                        











PT  16.2 Seconds (9.8-13.1)  H  03/01/19  10:28    


 


INR  1.4   03/01/19  10:28    


 


APTT  35.6 Seconds (25.6-37.1)   03/01/19  10:28    














- Constitutional


Appears: Non-toxic, Chronically Ill





- Head Exam


Head Exam: NORMOCEPHALIC





- Eye Exam


Eye Exam: absent: Scleral icterus





- ENT Exam


ENT Exam: Mucous Membranes Dry





- Neck Exam


Neck Exam: absent: Lymphadenopathy





- Respiratory Exam


Respiratory Exam: Decreased Breath Sounds





- Cardiovascular Exam


Cardiovascular Exam: REGULAR RHYTHM





- GI/Abdominal Exam


GI & Abdominal Exam: Distended





- Rectal Exam


Rectal Exam: Deferred





-  Exam


 Exam: NORMAL INSPECTION





- Extremities Exam


Extremities Exam: absent: Pedal Edema





- Back Exam


Back Exam: absent: CVA tenderness (L), CVA tenderness (R)





- Neurological Exam


Neurological Exam: Alert, Awake


Neuro motor strength exam: Left Upper Extremity: 0, Right Upper Extremity: 4, 

Left Lower Extremity: 0, Right Lower Extremity: 4





- Psychiatric Exam


Psychiatric exam: Depressed





- Skin


Skin Exam: Dry





Assessment and Plan


(1) Sepsis


Status: Acute   





(2) Change in mental state


Status: Acute   





(3) History of CVA with residual deficit


Status: Acute   





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


Status: Chronic   





(5) History of CVA (cerebrovascular accident)


Status: Chronic   





(6) NSTEMI (non-ST elevated myocardial infarction)


Status: Acute   





(7) SHANITA (acute kidney injury)


Status: Acute   





(8) Pansinusitis


Status: Acute   





(9) Pneumonia


Status: Acute   





(10) Nephrotic syndrome


Status: Acute   





- Assessment and Plan (Free Text)


Assessment: 





cont rx as ordered


may benefit from [facility]

## 2019-03-15 NOTE — CP.PCM.PN
Subjective





- Date & Time of Evaluation


Date of Evaluation: 03/15/19


Time of Evaluation: 10:40





- Subjective


Subjective: 





Patient is doing better


Had not had any PT due to low potassium


Noted hyperglycemia


No fever


WBC is now normal.


CXR showed improved infiltrate


 with persistent congestion.


Hgb a1c on 3/6 was 6.2





Objective





- Vital Signs/Intake and Output


Vital Signs (last 24 hours): 


                                        











Temp Pulse Resp BP Pulse Ox


 


 98.1 F   55 L  20   146/79   100 


 


 03/15/19 20:07  03/15/19 21:34  03/15/19 20:07  03/15/19 21:34  03/15/19 20:07











- Medications


Medications: 


                               Current Medications





Acetaminophen (Tylenol 325mg Tab)  650 mg PO Q6 PRN


   PRN Reason: Temperature


   Last Admin: 03/10/19 08:51 Dose:  650 mg


Acetaminophen (Tylenol 325mg Tab)  650 mg PO Q6 PRN


   PRN Reason: Pain <5


   Last Admin: 03/15/19 15:59 Dose:  650 mg


Al Hydrox/Mg Hydrox/Simethicone (Maalox Plus 30 Ml)  30 ml PO Q6 PRN


   PRN Reason: Indigestion / Heartburn


Albuterol/Ipratropium (Duoneb 3 Mg/0.5 Mg (3 Ml) Ud)  3 ml INH RQ6 [facility]


   Last Admin: 03/15/19 20:04 Dose:  Not Given


Dimethicone (Proshield Plus Skin Protectant)  1 applic TOP Q8 [facility]


   Last Admin: 03/15/19 16:00 Dose:  1 applic


Ferrous Sulfate (Feosol)  325 mg PO TID [facility]


   Last Admin: 03/08/19 11:21 Dose:  Not Given


Insulin Human Regular (Humulin R)  0 units SC ACCU-CHECK [facility]; Protocol


Levetiracetam (Keppra)  500 mg PO BID [facility]


   Last Admin: 03/15/19 16:01 Dose:  500 mg


Magnesium Oxide (Mag-Ox)  400 mg PO BID [facility]


   Last Admin: 03/15/19 16:00 Dose:  400 mg


Metoprolol Tartrate (Lopressor)  25 mg PO Q12 [facility]


   Last Admin: 03/15/19 21:34 Dose:  Not Given


Ondansetron HCl (Zofran Inj)  4 mg IVP Q6 PRN


   PRN Reason: Nausea/Vomiting


   Last Admin: 03/05/19 01:04 Dose:  4 mg


Potassium Chloride (K-Dur 20 Meq Er Tab)  20 meq PO BID [facility]


   Last Admin: 03/15/19 16:02 Dose:  20 meq


Vancomycin HCl (Vancocin (Oral/Rectal Use))  500 mg PO Q6 [facility]; Protocol


   Last Admin: 03/15/19 21:28 Dose:  500 mg











- Labs


Labs: 


                                        





                                 03/14/19 05:45 





                                 03/15/19 04:55 





                                        











PT  16.2 Seconds (9.8-13.1)  H  03/01/19  10:28    


 


INR  1.4   03/01/19  10:28    


 


APTT  35.6 Seconds (25.6-37.1)   03/01/19  10:28    














- Head Exam


Head Exam: NORMAL INSPECTION





- Eye Exam


Eye Exam: Normal appearance





- ENT Exam


ENT Exam: Mucous Membranes Moist





- Respiratory Exam


Respiratory Exam: Decreased Breath Sounds





- Cardiovascular Exam


Cardiovascular Exam: REGULAR RHYTHM





- GI/Abdominal Exam


GI & Abdominal Exam: Normal Bowel Sounds





- Neurological Exam


Neurological Exam: Awake





Assessment and Plan


(1) SHANITA (acute kidney injury)


Status: Acute   





(2) Acute respiratory failure with hypoxia


Status: Acute   





(3) Aspiration pneumonia


Status: Acute   





(4) Elevated liver enzymes


Status: Acute   





(5) Sepsis


Status: Acute   





(6) Toxic metabolic encephalopathy


Status: Acute   





(7) Pulmonary congestion


Status: Acute   





(8) Diabetes mellitus type 2 in obese


Status: Acute   





- Assessment and Plan (Free Text)


Plan: 





start metformin and januva


accucheck


 DC insulin 75.25


check probnp


ECHO


contoinue accucheck

## 2019-03-16 LAB
ALBUMIN SERPL-MCNC: 3.3 G/DL (ref 3.5–5)
ALBUMIN/GLOB SERPL: 0.8 {RATIO} (ref 1–2.1)
ALT SERPL-CCNC: 57 U/L (ref 9–52)
AST SERPL-CCNC: 41 U/L (ref 14–36)
BNP SERPL-MCNC: 1520 PG/ML (ref 0–900)
BUN SERPL-MCNC: 8 MG/DL (ref 7–17)
CALCIUM SERPL-MCNC: 9 MG/DL (ref 8.4–10.2)
ERYTHROCYTE [DISTWIDTH] IN BLOOD BY AUTOMATED COUNT: 26.7 % (ref 11.5–14.5)
GFR NON-AFRICAN AMERICAN: > 60
HGB BLD-MCNC: 9.3 G/DL (ref 12–16)
MCH RBC QN AUTO: 26.2 PG (ref 27–31)
MCHC RBC AUTO-ENTMCNC: 32.1 G/DL (ref 33–37)
MCV RBC AUTO: 81.8 FL (ref 81–99)
PLATELET # BLD: 322 K/UL (ref 130–400)
RBC # BLD AUTO: 3.53 MIL/UL (ref 3.8–5.2)
WBC # BLD AUTO: 6.4 K/UL (ref 4.8–10.8)

## 2019-03-16 RX ADMIN — POTASSIUM CHLORIDE SCH MEQ: 20 TABLET, EXTENDED RELEASE ORAL at 17:20

## 2019-03-16 RX ADMIN — Medication SCH MG: at 21:20

## 2019-03-16 RX ADMIN — Medication SCH APPLIC: at 09:12

## 2019-03-16 RX ADMIN — VANCOMYCIN HYDROCHLORIDE SCH MG: 500 INJECTION, POWDER, LYOPHILIZED, FOR SOLUTION INTRAVENOUS at 09:21

## 2019-03-16 RX ADMIN — IPRATROPIUM BROMIDE AND ALBUTEROL SULFATE SCH ML: .5; 3 SOLUTION RESPIRATORY (INHALATION) at 02:40

## 2019-03-16 RX ADMIN — VANCOMYCIN HYDROCHLORIDE SCH MG: 500 INJECTION, POWDER, LYOPHILIZED, FOR SOLUTION INTRAVENOUS at 05:00

## 2019-03-16 RX ADMIN — IPRATROPIUM BROMIDE AND ALBUTEROL SULFATE SCH ML: .5; 3 SOLUTION RESPIRATORY (INHALATION) at 19:17

## 2019-03-16 RX ADMIN — Medication SCH MG: at 09:09

## 2019-03-16 RX ADMIN — VANCOMYCIN HYDROCHLORIDE SCH MG: 500 INJECTION, POWDER, LYOPHILIZED, FOR SOLUTION INTRAVENOUS at 17:21

## 2019-03-16 RX ADMIN — IPRATROPIUM BROMIDE AND ALBUTEROL SULFATE SCH: .5; 3 SOLUTION RESPIRATORY (INHALATION) at 07:54

## 2019-03-16 RX ADMIN — Medication SCH APPLIC: at 01:00

## 2019-03-16 RX ADMIN — Medication SCH APPLIC: at 17:21

## 2019-03-16 RX ADMIN — POTASSIUM CHLORIDE SCH MEQ: 20 TABLET, EXTENDED RELEASE ORAL at 09:09

## 2019-03-16 RX ADMIN — VANCOMYCIN HYDROCHLORIDE SCH MG: 500 INJECTION, POWDER, LYOPHILIZED, FOR SOLUTION INTRAVENOUS at 21:21

## 2019-03-16 RX ADMIN — IPRATROPIUM BROMIDE AND ALBUTEROL SULFATE SCH: .5; 3 SOLUTION RESPIRATORY (INHALATION) at 13:06

## 2019-03-16 NOTE — CP.PCM.PN
Subjective





- Date & Time of Evaluation


Date of Evaluation: 03/16/19


Time of Evaluation: 16:07





- Subjective


Subjective: 





Nephrology Consultation Note





Assessment: stable


Acute renal failure/acute kidney injury related to multifactorial including 

sepsis required dialysis: RESOLVED


Diabetes mellitus and history of hypertension history of CVA.


7 gram proteinuria on dipstick


Hypokalemia, Hypomagnesemia





Plan


Hypertension control with meds as ordered. Maintain hemodynamics stable. Avoid 

hypotension. Patient not on ACEI/ARB due to recent SHANITA. will add if proteinuria 

persistent. 


Monitor Input/Output, daily weights and renal function with basic metabolic p

kerry


supplement lytes as needed


repeat UA, urine pr/cr and alb/cr





Dose meds/antibiotics for GFR >60


Glycemic control


Further work up for as per primary team





Thanks for allowing me to participate in care of your patient. Will follow 

patient with you. Please call if any Qs.


Dr Stevo Sanders


Office: [phone] Cell: [phone] Fax: [phone]





Subjective: Noted events overnight. Patients feels better. no urine complaints. 

SOB better





Physical Examination:      


General Appearance: comfortable no acute distress


Vitals reviewed and noted as below


Head; Atraumatic, normocephalic


Neck; supple no lymphadenopathy, no thyromegaly or bruit


Lungs: Normal respiratory rate/effort. Breath sounds bilateral reduced at bases


Heart: Normal rate. s1s2 normal. No rub or gallop. 


Extremities: no edema. No varicose veins


Neurological: Patient is awake alert follow commands. Rt hand contractures


Skin: Warm and dry. Normal turgor. No rash. Palpitation: Normal elasticity for 

age


Abdomen: Abdomen is soft. Bowel sounds +. There is no abdominal tenderness, no 

guarding/rigidity no organomegaly


Psych: limite dinsight. normal affect


MSK: no joint tenderness or swelling. Digits and nails normal, no deformity


: kidney or bladder not palpable





Labs/imaging reviewed.


Past medical history, past surgical history, family history, social history, 

allergy reviewed and noted as below


Family hx: no hx of CKD. Rest non-contributory 








Objective





- Vital Signs/Intake and Output


Vital Signs (last 24 hours): 


                                        











Temp Pulse Resp BP Pulse Ox


 


 98.4 F   51 L  18   156/81 H  98 


 


 03/16/19 13:00  03/16/19 13:00  03/16/19 13:00  03/16/19 13:00  03/16/19 13:00








Intake and Output: 


                                        











 03/16/19 03/16/19





 06:59 18:59


 


Intake Total  20


 


Balance  20














- Medications


Medications: 


                               Current Medications





Acetaminophen (Tylenol 325mg Tab)  650 mg PO Q6 PRN


   PRN Reason: Temperature


   Last Admin: 03/10/19 08:51 Dose:  650 mg


Acetaminophen (Tylenol 325mg Tab)  650 mg PO Q6 PRN


   PRN Reason: Pain <5


   Last Admin: 03/16/19 05:36 Dose:  650 mg


Al Hydrox/Mg Hydrox/Simethicone (Maalox Plus 30 Ml)  30 ml PO Q6 PRN


   PRN Reason: Indigestion / Heartburn


Albuterol/Ipratropium (Duoneb 3 Mg/0.5 Mg (3 Ml) Ud)  3 ml INH RQ6 [facility]


   Last Admin: 03/16/19 13:06 Dose:  Not Given


Dimethicone (Proshield Plus Skin Protectant)  1 applic TOP Q8 [facility]


   Last Admin: 03/16/19 09:12 Dose:  1 applic


Ferrous Sulfate (Feosol)  325 mg PO TID [facility]


   Last Admin: 03/08/19 11:21 Dose:  Not Given


Furosemide (Lasix)  20 mg PO DAILY [facility]


   Last Admin: 03/16/19 09:10 Dose:  20 mg


Insulin Human Regular (Humulin R)  0 units SC ACCU-CHECK [facility]; Protocol


   Last Admin: 03/16/19 13:31 Dose:  2 u


Levetiracetam (Keppra)  500 mg PO BID [facility]


   Last Admin: 03/16/19 09:00 Dose:  500 mg


Magnesium Oxide (Mag-Ox)  400 mg PO BID [facility]


   Last Admin: 03/16/19 09:09 Dose:  400 mg


Metformin HCl (Glucophage)  1,000 mg PO BIDWM [facility]


   Last Admin: 03/16/19 09:13 Dose:  1,000 mg


Metoprolol Tartrate (Lopressor)  25 mg PO Q12 [facility]


   Last Admin: 03/16/19 09:11 Dose:  25 mg


Ondansetron HCl (Zofran Inj)  4 mg IVP Q6 PRN


   PRN Reason: Nausea/Vomiting


   Last Admin: 03/05/19 01:04 Dose:  4 mg


Potassium Chloride (K-Dur 20 Meq Er Tab)  20 meq PO BID [facility]


   Last Admin: 03/16/19 09:09 Dose:  20 meq


Sitagliptin Phosphate (Januvia)  100 mg PO DAILY [facility]


   Last Admin: 03/16/19 09:08 Dose:  100 mg


Vancomycin HCl (Vancocin (Oral/Rectal Use))  500 mg PO Q6 [facility]; Protocol


   Last Admin: 03/16/19 09:21 Dose:  500 mg











- Labs


Labs: 


                                        





                                 03/16/19 05:30 





                                 03/16/19 05:30 





                                        











PT  16.2 Seconds (9.8-13.1)  H  03/01/19  10:28    


 


INR  1.4   03/01/19  10:28    


 


APTT  35.6 Seconds (25.6-37.1)   03/01/19  10:28

## 2019-03-17 LAB
ALBUMIN SERPL-MCNC: 4.1 G/DL (ref 3.5–5)
ALBUMIN/GLOB SERPL: 0.9 {RATIO} (ref 1–2.1)
ALT SERPL-CCNC: 49 U/L (ref 9–52)
AST SERPL-CCNC: 40 U/L (ref 14–36)
BUN SERPL-MCNC: 8 MG/DL (ref 7–17)
CALCIUM SERPL-MCNC: 9.4 MG/DL (ref 8.4–10.2)
GFR NON-AFRICAN AMERICAN: > 60

## 2019-03-17 RX ADMIN — Medication SCH APPLIC: at 09:32

## 2019-03-17 RX ADMIN — IPRATROPIUM BROMIDE AND ALBUTEROL SULFATE SCH: .5; 3 SOLUTION RESPIRATORY (INHALATION) at 13:05

## 2019-03-17 RX ADMIN — VANCOMYCIN HYDROCHLORIDE SCH MG: 500 INJECTION, POWDER, LYOPHILIZED, FOR SOLUTION INTRAVENOUS at 21:40

## 2019-03-17 RX ADMIN — IPRATROPIUM BROMIDE AND ALBUTEROL SULFATE SCH: .5; 3 SOLUTION RESPIRATORY (INHALATION) at 07:46

## 2019-03-17 RX ADMIN — VANCOMYCIN HYDROCHLORIDE SCH MG: 500 INJECTION, POWDER, LYOPHILIZED, FOR SOLUTION INTRAVENOUS at 09:32

## 2019-03-17 RX ADMIN — POTASSIUM CHLORIDE SCH MEQ: 20 TABLET, EXTENDED RELEASE ORAL at 17:51

## 2019-03-17 RX ADMIN — IPRATROPIUM BROMIDE AND ALBUTEROL SULFATE SCH ML: .5; 3 SOLUTION RESPIRATORY (INHALATION) at 19:56

## 2019-03-17 RX ADMIN — Medication SCH APPLIC: at 18:53

## 2019-03-17 RX ADMIN — Medication SCH MG: at 17:51

## 2019-03-17 RX ADMIN — Medication SCH APPLIC: at 00:49

## 2019-03-17 RX ADMIN — POTASSIUM CHLORIDE SCH MEQ: 20 TABLET, EXTENDED RELEASE ORAL at 09:28

## 2019-03-17 RX ADMIN — IPRATROPIUM BROMIDE AND ALBUTEROL SULFATE SCH: .5; 3 SOLUTION RESPIRATORY (INHALATION) at 02:50

## 2019-03-17 RX ADMIN — VANCOMYCIN HYDROCHLORIDE SCH MG: 500 INJECTION, POWDER, LYOPHILIZED, FOR SOLUTION INTRAVENOUS at 17:51

## 2019-03-17 RX ADMIN — Medication SCH: at 18:05

## 2019-03-17 RX ADMIN — Medication SCH MG: at 09:28

## 2019-03-17 RX ADMIN — VANCOMYCIN HYDROCHLORIDE SCH MG: 500 INJECTION, POWDER, LYOPHILIZED, FOR SOLUTION INTRAVENOUS at 04:38

## 2019-03-17 NOTE — CP.PCM.PN
Subjective





- Date & Time of Evaluation


Date of Evaluation: 03/17/19


Time of Evaluation: 08:00





- Subjective


Subjective: 





afeb


less sob


nad 





Objective





- Vital Signs/Intake and Output


Vital Signs (last 24 hours): 


                                        











Temp Pulse Resp BP Pulse Ox


 


 97.9 F   68   18   136/79   96 


 


 03/17/19 11:55  03/17/19 11:55  03/17/19 11:55  03/17/19 11:55  03/17/19 11:55











- Medications


Medications: 


                               Current Medications





Acetaminophen (Tylenol 325mg Tab)  650 mg PO Q6 PRN


   PRN Reason: Temperature


   Last Admin: 03/10/19 08:51 Dose:  650 mg


Acetaminophen (Tylenol 325mg Tab)  650 mg PO Q6 PRN


   PRN Reason: Pain <5


   Last Admin: 03/16/19 05:36 Dose:  650 mg


Al Hydrox/Mg Hydrox/Simethicone (Maalox Plus 30 Ml)  30 ml PO Q6 PRN


   PRN Reason: Indigestion / Heartburn


Albuterol/Ipratropium (Duoneb 3 Mg/0.5 Mg (3 Ml) Ud)  3 ml INH RQ6 [facility]


   Last Admin: 03/17/19 13:05 Dose:  Not Given


Dimethicone (Proshield Plus Skin Protectant)  1 applic TOP Q8 [facility]


   Last Admin: 03/17/19 09:32 Dose:  1 applic


Ferrous Sulfate (Feosol)  325 mg PO TID [facility]


   Last Admin: 03/08/19 11:21 Dose:  Not Given


Furosemide (Lasix)  20 mg PO DAILY [facility]


   Last Admin: 03/17/19 09:31 Dose:  20 mg


Insulin Human Regular (Humulin R)  0 units SC ACCU-CHECK [facility]; Protocol


   Last Admin: 03/17/19 13:05 Dose:  Not Given


Levetiracetam (Keppra)  500 mg PO BID [facility]


   Last Admin: 03/17/19 09:29 Dose:  500 mg


Losartan Potassium (Cozaar)  50 mg PO DAILY [facility]


   Last Admin: 03/17/19 11:13 Dose:  50 mg


Magnesium Oxide (Mag-Ox)  400 mg PO BID [facility]


   Last Admin: 03/17/19 09:28 Dose:  400 mg


Metformin HCl (Glucophage)  1,000 mg PO BIDWM [facility]


   Last Admin: 03/17/19 09:27 Dose:  1,000 mg


Metoprolol Tartrate (Lopressor)  25 mg PO Q12 [facility]


   Last Admin: 03/17/19 09:32 Dose:  Not Given


Ondansetron HCl (Zofran Inj)  4 mg IVP Q6 PRN


   PRN Reason: Nausea/Vomiting


   Last Admin: 03/17/19 02:40 Dose:  4 mg


Potassium Chloride (K-Dur 20 Meq Er Tab)  20 meq PO BID [facility]


   Last Admin: 03/17/19 09:28 Dose:  20 meq


Sitagliptin Phosphate (Januvia)  100 mg PO DAILY [facility]


   Last Admin: 03/17/19 09:28 Dose:  100 mg


Vancomycin HCl (Vancocin (Oral/Rectal Use))  500 mg PO Q6 [facility]; Protocol


   Last Admin: 03/17/19 09:32 Dose:  500 mg











- Labs


Labs: 


                                        





                                 03/16/19 05:30 





                                 03/16/19 05:30 





                                        











PT  16.2 Seconds (9.8-13.1)  H  03/01/19  10:28    


 


INR  1.4   03/01/19  10:28    


 


APTT  35.6 Seconds (25.6-37.1)   03/01/19  10:28    














- Constitutional


Appears: Non-toxic, Chronically Ill





- Head Exam


Head Exam: NORMOCEPHALIC





- Eye Exam


Eye Exam: absent: Scleral icterus





- ENT Exam


ENT Exam: Mucous Membranes Dry





- Neck Exam


Neck Exam: absent: Lymphadenopathy





- Respiratory Exam


Respiratory Exam: Decreased Breath Sounds





- Cardiovascular Exam


Cardiovascular Exam: REGULAR RHYTHM





- GI/Abdominal Exam


GI & Abdominal Exam: Distended, Soft





- Rectal Exam


Rectal Exam: Deferred





-  Exam


 Exam: NORMAL INSPECTION





- Extremities Exam


Extremities Exam: absent: Pedal Edema





- Back Exam


Back Exam: absent: CVA tenderness (L), CVA tenderness (R)





- Neurological Exam


Neurological Exam: Alert, Awake


Neuro motor strength exam: Left Upper Extremity: 4, Right Upper Extremity: 0, 

Left Lower Extremity: 4, Right Lower Extremity: 0





- Psychiatric Exam


Psychiatric exam: Depressed





- Skin


Skin Exam: Dry





Assessment and Plan


(1) Sepsis


Status: Acute   





(2) Change in mental state


Status: Acute   





(3) History of CVA with residual deficit


Status: Acute   





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


Status: Chronic   





(5) History of CVA (cerebrovascular accident)


Status: Chronic   





(6) NSTEMI (non-ST elevated myocardial infarction)


Status: Acute   





(7) SHANITA (acute kidney injury)


Status: Acute   





(8) Pansinusitis


Status: Acute   





(9) Pneumonia


Status: Acute   





(10) Nephrotic syndrome


Status: Acute   





- Assessment and Plan (Free Text)


Assessment: 





cont rx as per dr Romero

## 2019-03-17 NOTE — CP.PCM.PN
Subjective





- Date & Time of Evaluation


Date of Evaluation: 03/17/19


Time of Evaluation: 10:31





- Subjective


Subjective: 





AWAKE AND ALERT


RESPONDS APPROPRIATELY TO VERBAL COMMANDS


VSS





Objective





- Vital Signs/Intake and Output


Vital Signs (last 24 hours): 


                                        











Temp Pulse Resp BP Pulse Ox


 


 97.6 F   50 L  18   165/78 H  100 


 


 03/17/19 07:46  03/17/19 09:32  03/17/19 07:46  03/17/19 09:32  03/17/19 07:46











- Medications


Medications: 


                               Current Medications





Acetaminophen (Tylenol 325mg Tab)  650 mg PO Q6 PRN


   PRN Reason: Temperature


   Last Admin: 03/10/19 08:51 Dose:  650 mg


Acetaminophen (Tylenol 325mg Tab)  650 mg PO Q6 PRN


   PRN Reason: Pain <5


   Last Admin: 03/16/19 05:36 Dose:  650 mg


Al Hydrox/Mg Hydrox/Simethicone (Maalox Plus 30 Ml)  30 ml PO Q6 PRN


   PRN Reason: Indigestion / Heartburn


Albuterol/Ipratropium (Duoneb 3 Mg/0.5 Mg (3 Ml) Ud)  3 ml INH RQ6 [facility]


   Last Admin: 03/17/19 07:46 Dose:  Not Given


Dimethicone (Proshield Plus Skin Protectant)  1 applic TOP Q8 [facility]


   Last Admin: 03/17/19 09:32 Dose:  1 applic


Ferrous Sulfate (Feosol)  325 mg PO TID [facility]


   Last Admin: 03/08/19 11:21 Dose:  Not Given


Furosemide (Lasix)  20 mg PO DAILY [facility]


   Last Admin: 03/17/19 09:31 Dose:  20 mg


Insulin Human Regular (Humulin R)  0 units SC ACCU-CHECK [facility]; Protocol


   Last Admin: 03/17/19 06:26 Dose:  2 u


Levetiracetam (Keppra)  500 mg PO BID [facility]


   Last Admin: 03/17/19 09:29 Dose:  500 mg


Magnesium Oxide (Mag-Ox)  400 mg PO BID [facility]


   Last Admin: 03/17/19 09:28 Dose:  400 mg


Metformin HCl (Glucophage)  1,000 mg PO BIDWM [facility]


   Last Admin: 03/17/19 09:27 Dose:  1,000 mg


Metoprolol Tartrate (Lopressor)  25 mg PO Q12 [facility]


   Last Admin: 03/17/19 09:32 Dose:  Not Given


Ondansetron HCl (Zofran Inj)  4 mg IVP Q6 PRN


   PRN Reason: Nausea/Vomiting


   Last Admin: 03/17/19 02:40 Dose:  4 mg


Potassium Chloride (K-Dur 20 Meq Er Tab)  20 meq PO BID [facility]


   Last Admin: 03/17/19 09:28 Dose:  20 meq


Sitagliptin Phosphate (Januvia)  100 mg PO DAILY [facility]


   Last Admin: 03/17/19 09:28 Dose:  100 mg


Vancomycin HCl (Vancocin (Oral/Rectal Use))  500 mg PO Q6 [facility]; Protocol


   Last Admin: 03/17/19 09:32 Dose:  500 mg











- Labs


Labs: 


                                        





                                 03/16/19 05:30 





                                 03/16/19 05:30 





                                        











PT  16.2 Seconds (9.8-13.1)  H  03/01/19  10:28    


 


INR  1.4   03/01/19  10:28    


 


APTT  35.6 Seconds (25.6-37.1)   03/01/19  10:28    














- Constitutional


Appears: Older Than Stated Age, Chronically Ill





- Head Exam


Head Exam: ATRAUMATIC, NORMAL INSPECTION, NORMOCEPHALIC





- Eye Exam


Eye Exam: EOMI, Normal appearance, PERRL


Pupil Exam: NORMAL ACCOMODATION, PERRL





- ENT Exam


ENT Exam: Mucous Membranes Moist, Normal Exam





- Neck Exam


Neck Exam: Full ROM, Normal Inspection.  absent: Lymphadenopathy





- Respiratory Exam


Respiratory Exam: Prolonged Expiratory Phase, Rales, NORMAL BREATHING PATTERN





- Cardiovascular Exam


Cardiovascular Exam: REGULAR RHYTHM, +S1, +S2.  absent: Murmur





- GI/Abdominal Exam


GI & Abdominal Exam: Soft, Normal Bowel Sounds.  absent: Tenderness





- Rectal Exam


Rectal Exam: NORMAL INSPECTION





- Extremities Exam


Extremities Exam: Full ROM, Normal Capillary Refill, Normal Inspection.  absent:

Joint Swelling, Pedal Edema





- Back Exam


Back Exam: NORMAL INSPECTION





- Neurological Exam


Neurological Exam: Alert, Awake, CN II-XII Intact, Oriented x3





- Psychiatric Exam


Psychiatric exam: Normal Affect, Normal Mood





- Skin


Skin Exam: Dry, Intact, Normal Color, Warm





Assessment and Plan





- Assessment and Plan (Free Text)


Assessment: 





RESPIRATORY FAILURE IMPROVED


SEPSIS-IMPROVED


Plan: 





REPEAT CXR IN AM


AGGRESSIVE PT/OT

## 2019-03-18 LAB
ALBUMIN SERPL-MCNC: 3.2 G/DL (ref 3.5–5)
ALBUMIN/GLOB SERPL: 0.7 {RATIO} (ref 1–2.1)
ALT SERPL-CCNC: 45 U/L (ref 9–52)
AST SERPL-CCNC: 33 U/L (ref 14–36)
BUN SERPL-MCNC: 7 MG/DL (ref 7–17)
CALCIUM SERPL-MCNC: 9.2 MG/DL (ref 8.4–10.2)
GFR NON-AFRICAN AMERICAN: > 60

## 2019-03-18 RX ADMIN — Medication SCH APPLIC: at 10:33

## 2019-03-18 RX ADMIN — IPRATROPIUM BROMIDE AND ALBUTEROL SULFATE SCH: .5; 3 SOLUTION RESPIRATORY (INHALATION) at 03:07

## 2019-03-18 RX ADMIN — IPRATROPIUM BROMIDE AND ALBUTEROL SULFATE SCH ML: .5; 3 SOLUTION RESPIRATORY (INHALATION) at 19:17

## 2019-03-18 RX ADMIN — VANCOMYCIN HYDROCHLORIDE SCH MG: 500 INJECTION, POWDER, LYOPHILIZED, FOR SOLUTION INTRAVENOUS at 17:44

## 2019-03-18 RX ADMIN — Medication SCH MG: at 17:47

## 2019-03-18 RX ADMIN — POTASSIUM CHLORIDE SCH MEQ: 20 TABLET, EXTENDED RELEASE ORAL at 17:46

## 2019-03-18 RX ADMIN — POTASSIUM CHLORIDE SCH MEQ: 20 TABLET, EXTENDED RELEASE ORAL at 10:20

## 2019-03-18 RX ADMIN — VANCOMYCIN HYDROCHLORIDE SCH MG: 500 INJECTION, POWDER, LYOPHILIZED, FOR SOLUTION INTRAVENOUS at 04:08

## 2019-03-18 RX ADMIN — Medication SCH MG: at 10:30

## 2019-03-18 RX ADMIN — IPRATROPIUM BROMIDE AND ALBUTEROL SULFATE SCH ML: .5; 3 SOLUTION RESPIRATORY (INHALATION) at 07:28

## 2019-03-18 RX ADMIN — IPRATROPIUM BROMIDE AND ALBUTEROL SULFATE SCH ML: .5; 3 SOLUTION RESPIRATORY (INHALATION) at 14:31

## 2019-03-18 RX ADMIN — Medication SCH APPLIC: at 17:47

## 2019-03-18 RX ADMIN — VANCOMYCIN HYDROCHLORIDE SCH MG: 500 INJECTION, POWDER, LYOPHILIZED, FOR SOLUTION INTRAVENOUS at 22:30

## 2019-03-18 RX ADMIN — Medication SCH APPLIC: at 01:30

## 2019-03-18 RX ADMIN — VANCOMYCIN HYDROCHLORIDE SCH MG: 500 INJECTION, POWDER, LYOPHILIZED, FOR SOLUTION INTRAVENOUS at 10:30

## 2019-03-18 NOTE — CP.PCM.PN
Subjective





- Date & Time of Evaluation


Date of Evaluation: 03/16/19


Time of Evaluation: 11:00





- Subjective


Subjective: 





patient seen and examined at bedside. no family at bedside


Improving, eating and able to converse


Interim events noted


available diagnostic data reviewed





Review of Systems All systems: reviewed and no additional remarkable complaints 

except mentioned above





Objective





Vital Signs Stable


- Constitutional


Appears: Chronically Ill





Head Exam: NORMAL INSPECTION





Respiratory Exam: CTABL





Cardiovascular Exam: +S1, +S2





GI & Abdominal Exam: Soft, mildly tender





Neurological Exam: Alert, Awake





Skin Exam: Normal Color, Warm





Assessment and Plan





monitor vitals


monitor labs


Cont meds


Cont tx


ID, Cardio, Neuro, Renal, Pulm following


consultants appreciated input


PT


rest of plan as ordered





Objective





- Vital Signs/Intake and Output


Vital Signs (last 24 hours): 


                                        











Temp Pulse Resp BP Pulse Ox


 


 98.2 F   73   20   131/85   96 


 


 03/18/19 09:05  03/18/19 10:29  03/18/19 09:05  03/18/19 10:29  03/18/19 09:05











- Medications


Medications: 


                               Current Medications





Acetaminophen (Tylenol 325mg Tab)  650 mg PO Q6 PRN


   PRN Reason: Temperature


   Last Admin: 03/10/19 08:51 Dose:  650 mg


Acetaminophen (Tylenol 325mg Tab)  650 mg PO Q6 PRN


   PRN Reason: Pain <5


   Last Admin: 03/17/19 21:48 Dose:  650 mg


Al Hydrox/Mg Hydrox/Simethicone (Maalox Plus 30 Ml)  30 ml PO Q6 PRN


   PRN Reason: Indigestion / Heartburn


Albuterol/Ipratropium (Duoneb 3 Mg/0.5 Mg (3 Ml) Ud)  3 ml INH RQ6 [facility]


   Last Admin: 03/18/19 07:28 Dose:  3 ml


Dimethicone (Proshield Plus Skin Protectant)  1 applic TOP Q8 [facility]


   Last Admin: 03/18/19 10:33 Dose:  1 applic


Ferrous Sulfate (Feosol)  325 mg PO TID [facility]


   Last Admin: 03/08/19 11:21 Dose:  Not Given


Furosemide (Lasix)  20 mg PO DAILY [facility]


   Last Admin: 03/18/19 10:27 Dose:  20 mg


Insulin Human Regular (Humulin R)  0 units SC ACCU-CHECK [facility]; Protocol


   Last Admin: 03/18/19 07:01 Dose:  Not Given


Levetiracetam (Keppra)  500 mg PO BID [facility]


   Last Admin: 03/18/19 10:28 Dose:  500 mg


Losartan Potassium (Cozaar)  50 mg PO DAILY [facility]


   Last Admin: 03/18/19 10:19 Dose:  50 mg


Magnesium Oxide (Mag-Ox)  400 mg PO BID [facility]


   Last Admin: 03/18/19 10:30 Dose:  400 mg


Metformin HCl (Glucophage)  1,000 mg PO BIDWM [facility]


   Last Admin: 03/18/19 10:18 Dose:  1,000 mg


Metoprolol Tartrate (Lopressor)  25 mg PO Q12 [facility]


   Last Admin: 03/18/19 10:29 Dose:  25 mg


Ondansetron HCl (Zofran Inj)  4 mg IVP Q6 PRN


   PRN Reason: Nausea/Vomiting


   Last Admin: 03/17/19 02:40 Dose:  4 mg


Potassium Chloride (K-Dur 20 Meq Er Tab)  20 meq PO BID [facility]


   Last Admin: 03/18/19 10:20 Dose:  20 meq


Sitagliptin Phosphate (Januvia)  100 mg PO DAILY [facility]


   Last Admin: 03/18/19 10:20 Dose:  100 mg


Vancomycin HCl (Vancocin (Oral/Rectal Use))  500 mg PO Q6 [facility]; Protocol


   Last Admin: 03/18/19 10:30 Dose:  500 mg











- Labs


Labs: 


                                        





                                 03/16/19 05:30 





                                 03/18/19 09:20 





                                        











PT  16.2 Seconds (9.8-13.1)  H  03/01/19  10:28    


 


INR  1.4   03/01/19  10:28    


 


APTT  35.6 Seconds (25.6-37.1)   03/01/19  10:28    














Assessment and Plan


(1) Anemia


Status: Acute   





(2) Abdominal pain


Status: Acute   





(3) SHANITA (acute kidney injury)


Status: Resolved

## 2019-03-18 NOTE — CP.PCM.PN
Subjective





- Date & Time of Evaluation


Date of Evaluation: 03/17/19


Time of Evaluation: 11:00





- Subjective


Subjective: 





patient seen and examined at bedside. no family at bedside


States improvement


Interim events noted


available diagnostic data reviewed





Review of Systems All systems: reviewed and no additional remarkable complaints 

except mentioned above





Objective





Vital Signs Stable


- Constitutional


Appears: Chronically Ill





Head Exam: NORMAL INSPECTION





Respiratory Exam: CTABL





Cardiovascular Exam: +S1, +S2





GI & Abdominal Exam: Soft, mildly tender





Neurological Exam: Alert, Awake





Skin Exam: Normal Color, Warm





Assessment and Plan





monitor vitals


monitor labs


Cont meds


Cont tx


ID, Cardio, Neuro, Renal, Pulm following


consultants appreciated input


PT


rest of plan as ordered





Objective





- Vital Signs/Intake and Output


Vital Signs (last 24 hours): 


                                        











Temp Pulse Resp BP Pulse Ox


 


 98.2 F   73   20   131/85   96 


 


 03/18/19 09:05  03/18/19 10:29  03/18/19 09:05  03/18/19 10:29  03/18/19 09:05











- Medications


Medications: 


                               Current Medications





Acetaminophen (Tylenol 325mg Tab)  650 mg PO Q6 PRN


   PRN Reason: Temperature


   Last Admin: 03/10/19 08:51 Dose:  650 mg


Acetaminophen (Tylenol 325mg Tab)  650 mg PO Q6 PRN


   PRN Reason: Pain <5


   Last Admin: 03/17/19 21:48 Dose:  650 mg


Al Hydrox/Mg Hydrox/Simethicone (Maalox Plus 30 Ml)  30 ml PO Q6 PRN


   PRN Reason: Indigestion / Heartburn


Albuterol/Ipratropium (Duoneb 3 Mg/0.5 Mg (3 Ml) Ud)  3 ml INH RQ6 [facility]


   Last Admin: 03/18/19 07:28 Dose:  3 ml


Dimethicone (Proshield Plus Skin Protectant)  1 applic TOP Q8 [facility]


   Last Admin: 03/18/19 10:33 Dose:  1 applic


Ferrous Sulfate (Feosol)  325 mg PO TID [facility]


   Last Admin: 03/08/19 11:21 Dose:  Not Given


Furosemide (Lasix)  20 mg PO DAILY [facility]


   Last Admin: 03/18/19 10:27 Dose:  20 mg


Insulin Human Regular (Humulin R)  0 units SC ACCU-CHECK [facility]; Protocol


   Last Admin: 03/18/19 07:01 Dose:  Not Given


Levetiracetam (Keppra)  500 mg PO BID [facility]


   Last Admin: 03/18/19 10:28 Dose:  500 mg


Losartan Potassium (Cozaar)  50 mg PO DAILY [facility]


   Last Admin: 03/18/19 10:19 Dose:  50 mg


Magnesium Oxide (Mag-Ox)  400 mg PO BID [facility]


   Last Admin: 03/18/19 10:30 Dose:  400 mg


Metformin HCl (Glucophage)  1,000 mg PO BIDWM [facility]


   Last Admin: 03/18/19 10:18 Dose:  1,000 mg


Metoprolol Tartrate (Lopressor)  25 mg PO Q12 [facility]


   Last Admin: 03/18/19 10:29 Dose:  25 mg


Ondansetron HCl (Zofran Inj)  4 mg IVP Q6 PRN


   PRN Reason: Nausea/Vomiting


   Last Admin: 03/17/19 02:40 Dose:  4 mg


Potassium Chloride (K-Dur 20 Meq Er Tab)  20 meq PO BID [facility]


   Last Admin: 03/18/19 10:20 Dose:  20 meq


Sitagliptin Phosphate (Januvia)  100 mg PO DAILY [facility]


   Last Admin: 03/18/19 10:20 Dose:  100 mg


Vancomycin HCl (Vancocin (Oral/Rectal Use))  500 mg PO Q6 [facility]; Protocol


   Last Admin: 03/18/19 10:30 Dose:  500 mg











- Labs


Labs: 


                                        





                                 03/16/19 05:30 





                                 03/18/19 09:20 





                                        











PT  16.2 Seconds (9.8-13.1)  H  03/01/19  10:28    


 


INR  1.4   03/01/19  10:28    


 


APTT  35.6 Seconds (25.6-37.1)   03/01/19  10:28    














Assessment and Plan


(1) Anemia


Status: Acute   





(2) Abdominal pain


Status: Acute   





(3) SHANITA (acute kidney injury)


Status: Resolved

## 2019-03-18 NOTE — CP.PCM.PN
Subjective





- Date & Time of Evaluation


Date of Evaluation: 03/18/19


Time of Evaluation: 11:05





- Subjective


Subjective: 





Patient more awake feeling much better she appears to be eating.  Vital signs 

stable





Objective





- Vital Signs/Intake and Output


Vital Signs (last 24 hours): 


                                        











Temp Pulse Resp BP Pulse Ox


 


 98.2 F   73   20   131/85   96 


 


 03/18/19 09:05  03/18/19 10:29  03/18/19 09:05  03/18/19 10:29  03/18/19 09:05











- Medications


Medications: 


                               Current Medications





Acetaminophen (Tylenol 325mg Tab)  650 mg PO Q6 PRN


   PRN Reason: Temperature


   Last Admin: 03/10/19 08:51 Dose:  650 mg


Acetaminophen (Tylenol 325mg Tab)  650 mg PO Q6 PRN


   PRN Reason: Pain <5


   Last Admin: 03/17/19 21:48 Dose:  650 mg


Al Hydrox/Mg Hydrox/Simethicone (Maalox Plus 30 Ml)  30 ml PO Q6 PRN


   PRN Reason: Indigestion / Heartburn


Albuterol/Ipratropium (Duoneb 3 Mg/0.5 Mg (3 Ml) Ud)  3 ml INH RQ6 [facility]


   Last Admin: 03/18/19 07:28 Dose:  3 ml


Dimethicone (Proshield Plus Skin Protectant)  1 applic TOP Q8 [facility]


   Last Admin: 03/18/19 10:33 Dose:  1 applic


Ferrous Sulfate (Feosol)  325 mg PO TID [facility]


   Last Admin: 03/08/19 11:21 Dose:  Not Given


Furosemide (Lasix)  20 mg PO DAILY [facility]


   Last Admin: 03/18/19 10:27 Dose:  20 mg


Insulin Human Regular (Humulin R)  0 units SC ACCU-CHECK [facility]; Protocol


   Last Admin: 03/18/19 07:01 Dose:  Not Given


Levetiracetam (Keppra)  500 mg PO BID [facility]


   Last Admin: 03/18/19 10:28 Dose:  500 mg


Losartan Potassium (Cozaar)  50 mg PO DAILY [facility]


   Last Admin: 03/18/19 10:19 Dose:  50 mg


Magnesium Oxide (Mag-Ox)  400 mg PO BID [facility]


   Last Admin: 03/18/19 10:30 Dose:  400 mg


Metformin HCl (Glucophage)  1,000 mg PO BIDWM [facility]


   Last Admin: 03/18/19 10:18 Dose:  1,000 mg


Metoprolol Tartrate (Lopressor)  25 mg PO Q12 [facility]


   Last Admin: 03/18/19 10:29 Dose:  25 mg


Ondansetron HCl (Zofran Inj)  4 mg IVP Q6 PRN


   PRN Reason: Nausea/Vomiting


   Last Admin: 03/17/19 02:40 Dose:  4 mg


Potassium Chloride (K-Dur 20 Meq Er Tab)  20 meq PO BID [facility]


   Last Admin: 03/18/19 10:20 Dose:  20 meq


Sitagliptin Phosphate (Januvia)  100 mg PO DAILY [facility]


   Last Admin: 03/18/19 10:20 Dose:  100 mg


Vancomycin HCl (Vancocin (Oral/Rectal Use))  500 mg PO Q6 [facility]; Protocol


   Last Admin: 03/18/19 10:30 Dose:  500 mg











- Labs


Labs: 


                                        





                                 03/16/19 05:30 





                                 03/18/19 09:20 





                                        











PT  16.2 Seconds (9.8-13.1)  H  03/01/19  10:28    


 


INR  1.4   03/01/19  10:28    


 


APTT  35.6 Seconds (25.6-37.1)   03/01/19  10:28    














- Constitutional


Appears: No Acute Distress





- Eye Exam


Eye Exam: Conjunctival injection





- ENT Exam


ENT Exam: Mucous Membranes Moist





- Respiratory Exam


Respiratory Exam: NORMAL BREATHING PATTERN.  absent: Rhonchi





- Back Exam


Back Exam: absent: CVA tenderness (L), CVA tenderness (R)





- Neurological Exam


Neurological Exam: Alert





- Skin


Skin Exam: absent: Cyanosis





Assessment and Plan


(1) SHANITA (acute kidney injury)


Assessment & Plan: 


Respiratory failure/   patient extubated


Acute renal failure/acute kidney injury related to multifactorial including 

sepsis.  Required dialysis initially which has been resolved 


Diabetes mellitus and history of hypertension history of CVA.


 encephalopathy   patient improving mental status much better and more awake


hyperurecemia    corrected , 


Hyperphosphatemia    corrected 


S/P Shock liver improving liver function test


Nephrotic syndrome proteinuria with 7 gram proteinuria based on protein to 

creatinine ratio


Hypokalemia.  Given potassium chloride 


Hypomagnesemia


Hypernatremia corrected


Plan


Patient more awake


Hypomagnesemia persisted please give 2 g of magnesium intravenously


Serum potassium is okay for now but keep watching


Status: Acute   





(2) Elevated liver enzymes


Status: Acute   





(3) Elevated troponin level


Status: Acute   





(4) Leukocytosis


Status: Acute   





(5) Sepsis


Status: Acute

## 2019-03-18 NOTE — CP.PCM.PN
Subjective





- Date & Time of Evaluation


Date of Evaluation: 03/18/19


Time of Evaluation: 08:00





- Subjective


Subjective: 


seen on rounds


in nad





Objective





- Vital Signs/Intake and Output


Vital Signs (last 24 hours): 


                                        











Temp Pulse Resp BP Pulse Ox


 


 98.2 F   73   20   131/85   96 


 


 03/18/19 09:05  03/18/19 10:29  03/18/19 09:05  03/18/19 10:29  03/18/19 09:05











- Medications


Medications: 


                               Current Medications





Acetaminophen (Tylenol 325mg Tab)  650 mg PO Q6 PRN


   PRN Reason: Temperature


   Last Admin: 03/10/19 08:51 Dose:  650 mg


Acetaminophen (Tylenol 325mg Tab)  650 mg PO Q6 PRN


   PRN Reason: Pain <5


   Last Admin: 03/17/19 21:48 Dose:  650 mg


Al Hydrox/Mg Hydrox/Simethicone (Maalox Plus 30 Ml)  30 ml PO Q6 PRN


   PRN Reason: Indigestion / Heartburn


Albuterol/Ipratropium (Duoneb 3 Mg/0.5 Mg (3 Ml) Ud)  3 ml INH RQ6 [facility]


   Last Admin: 03/18/19 07:28 Dose:  3 ml


Dimethicone (Proshield Plus Skin Protectant)  1 applic TOP Q8 [facility]


   Last Admin: 03/18/19 10:33 Dose:  1 applic


Ferrous Sulfate (Feosol)  325 mg PO TID [facility]


   Last Admin: 03/08/19 11:21 Dose:  Not Given


Furosemide (Lasix)  20 mg PO DAILY [facility]


   Last Admin: 03/18/19 10:27 Dose:  20 mg


Insulin Human Regular (Humulin R)  0 units SC ACCU-CHECK [facility]; Protocol


   Last Admin: 03/18/19 07:01 Dose:  Not Given


Levetiracetam (Keppra)  500 mg PO BID [facility]


   Last Admin: 03/18/19 10:28 Dose:  500 mg


Losartan Potassium (Cozaar)  50 mg PO DAILY [facility]


   Last Admin: 03/18/19 10:19 Dose:  50 mg


Magnesium Oxide (Mag-Ox)  400 mg PO BID [facility]


   Last Admin: 03/18/19 10:30 Dose:  400 mg


Metformin HCl (Glucophage)  1,000 mg PO BIDWM [facility]


   Last Admin: 03/18/19 10:18 Dose:  1,000 mg


Metoprolol Tartrate (Lopressor)  25 mg PO Q12 [facility]


   Last Admin: 03/18/19 10:29 Dose:  25 mg


Ondansetron HCl (Zofran Inj)  4 mg IVP Q6 PRN


   PRN Reason: Nausea/Vomiting


   Last Admin: 03/17/19 02:40 Dose:  4 mg


Potassium Chloride (K-Dur 20 Meq Er Tab)  20 meq PO BID [facility]


   Last Admin: 03/18/19 10:20 Dose:  20 meq


Sitagliptin Phosphate (Januvia)  100 mg PO DAILY [facility]


   Last Admin: 03/18/19 10:20 Dose:  100 mg


Vancomycin HCl (Vancocin (Oral/Rectal Use))  500 mg PO Q6 [facility]; Protocol


   Last Admin: 03/18/19 10:30 Dose:  500 mg











- Labs


Labs: 


                                        





                                 03/16/19 05:30 





                                 03/18/19 09:20 





                                        











PT  16.2 Seconds (9.8-13.1)  H  03/01/19  10:28    


 


INR  1.4   03/01/19  10:28    


 


APTT  35.6 Seconds (25.6-37.1)   03/01/19  10:28    














- Constitutional


Appears: Non-toxic, Chronically Ill





- Head Exam


Head Exam: NORMOCEPHALIC





- Eye Exam


Eye Exam: PERRL


Pupil Exam: NORMAL ACCOMODATION





- ENT Exam


ENT Exam: Mucous Membranes Dry





- Neck Exam


Neck Exam: absent: Lymphadenopathy





- Respiratory Exam


Respiratory Exam: Decreased Breath Sounds





- Cardiovascular Exam


Cardiovascular Exam: REGULAR RHYTHM





- GI/Abdominal Exam


GI & Abdominal Exam: Distended, Soft





- Rectal Exam


Rectal Exam: Deferred





-  Exam


 Exam: NORMAL INSPECTION





- Extremities Exam


Extremities Exam: absent: Pedal Edema





- Back Exam


Back Exam: absent: CVA tenderness (L), CVA tenderness (R)





- Neurological Exam


Neurological Exam: Altered


Neuro motor strength exam: Left Upper Extremity: 3, Right Upper Extremity: 0, 

Left Lower Extremity: 3, Right Lower Extremity: 0





- Psychiatric Exam


Psychiatric exam: Depressed





Assessment and Plan


(1) Sepsis


Status: Acute   





(2) Change in mental state


Status: Acute   





(3) History of CVA with residual deficit


Status: Acute   





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


Status: Chronic   





(5) History of CVA (cerebrovascular accident)


Status: Chronic   





(6) NSTEMI (non-ST elevated myocardial infarction)


Status: Acute   





(7) SHANITA (acute kidney injury)


Status: Resolved   





(8) Pansinusitis


Status: Acute   





(9) Pneumonia


Status: Acute   





(10) Nephrotic syndrome


Status: Acute

## 2019-03-18 NOTE — CP.PCM.PN
Subjective





- Date & Time of Evaluation


Date of Evaluation: 03/18/19


Time of Evaluation: 10:00





- Subjective


Subjective: 





patient seen and examined at bedside. no family at bedside


no complaints offered


Interim events noted


available diagnostic data reviewed





Review of Systems All systems: reviewed and no additional remarkable complaints 

except mentioned above





Objective





Vital Signs Stable


- Constitutional


Appears: Chronically Ill





Head Exam: NORMAL INSPECTION





Respiratory Exam: CTABL





Cardiovascular Exam: +S1, +S2





GI & Abdominal Exam: Soft, mildly tender





Neurological Exam: Alert, Awake





Skin Exam: Normal Color, Warm





Assessment and Plan





monitor vitals


monitor labs


Cont meds


Cont tx


ID, Cardio, Neuro, Renal, Pulm following


consultants appreciated input


PT/dispo planning   


rest of plan as ordered





Objective





- Vital Signs/Intake and Output


Vital Signs (last 24 hours): 


                                        











Temp Pulse Resp BP Pulse Ox


 


 98.2 F   73   20   131/85   96 


 


 03/18/19 09:05  03/18/19 10:29  03/18/19 09:05  03/18/19 10:29  03/18/19 09:05











- Medications


Medications: 


                               Current Medications





Acetaminophen (Tylenol 325mg Tab)  650 mg PO Q6 PRN


   PRN Reason: Temperature


   Last Admin: 03/10/19 08:51 Dose:  650 mg


Acetaminophen (Tylenol 325mg Tab)  650 mg PO Q6 PRN


   PRN Reason: Pain <5


   Last Admin: 03/17/19 21:48 Dose:  650 mg


Al Hydrox/Mg Hydrox/Simethicone (Maalox Plus 30 Ml)  30 ml PO Q6 PRN


   PRN Reason: Indigestion / Heartburn


Albuterol/Ipratropium (Duoneb 3 Mg/0.5 Mg (3 Ml) Ud)  3 ml INH RQ6 [facility]


   Last Admin: 03/18/19 07:28 Dose:  3 ml


Dimethicone (Proshield Plus Skin Protectant)  1 applic TOP Q8 [facility]


   Last Admin: 03/18/19 10:33 Dose:  1 applic


Ferrous Sulfate (Feosol)  325 mg PO TID [facility]


   Last Admin: 03/08/19 11:21 Dose:  Not Given


Furosemide (Lasix)  20 mg PO DAILY [facility]


   Last Admin: 03/18/19 10:27 Dose:  20 mg


Insulin Human Regular (Humulin R)  0 units SC ACCU-CHECK [facility]; Protocol


   Last Admin: 03/18/19 07:01 Dose:  Not Given


Levetiracetam (Keppra)  500 mg PO BID [facility]


   Last Admin: 03/18/19 10:28 Dose:  500 mg


Losartan Potassium (Cozaar)  50 mg PO DAILY [facility]


   Last Admin: 03/18/19 10:19 Dose:  50 mg


Magnesium Oxide (Mag-Ox)  400 mg PO BID [facility]


   Last Admin: 03/18/19 10:30 Dose:  400 mg


Metformin HCl (Glucophage)  1,000 mg PO BIDWM [facility]


   Last Admin: 03/18/19 10:18 Dose:  1,000 mg


Metoprolol Tartrate (Lopressor)  25 mg PO Q12 [facility]


   Last Admin: 03/18/19 10:29 Dose:  25 mg


Ondansetron HCl (Zofran Inj)  4 mg IVP Q6 PRN


   PRN Reason: Nausea/Vomiting


   Last Admin: 03/17/19 02:40 Dose:  4 mg


Potassium Chloride (K-Dur 20 Meq Er Tab)  20 meq PO BID [facility]


   Last Admin: 03/18/19 10:20 Dose:  20 meq


Sitagliptin Phosphate (Januvia)  100 mg PO DAILY [facility]


   Last Admin: 03/18/19 10:20 Dose:  100 mg


Vancomycin HCl (Vancocin (Oral/Rectal Use))  500 mg PO Q6 [facility]; Protocol


   Last Admin: 03/18/19 10:30 Dose:  500 mg











- Labs


Labs: 


                                        





                                 03/16/19 05:30 





                                 03/18/19 09:20 





                                        











PT  16.2 Seconds (9.8-13.1)  H  03/01/19  10:28    


 


INR  1.4   03/01/19  10:28    


 


APTT  35.6 Seconds (25.6-37.1)   03/01/19  10:28    














Assessment and Plan


(1) Anemia


Status: Acute   





(2) Abdominal pain


Status: Acute   





(3) SHANITA (acute kidney injury)


Status: Resolved

## 2019-03-19 RX ADMIN — VANCOMYCIN HYDROCHLORIDE SCH MG: 500 INJECTION, POWDER, LYOPHILIZED, FOR SOLUTION INTRAVENOUS at 16:29

## 2019-03-19 RX ADMIN — IPRATROPIUM BROMIDE AND ALBUTEROL SULFATE SCH ML: .5; 3 SOLUTION RESPIRATORY (INHALATION) at 07:54

## 2019-03-19 RX ADMIN — POTASSIUM CHLORIDE SCH MEQ: 20 TABLET, EXTENDED RELEASE ORAL at 08:54

## 2019-03-19 RX ADMIN — Medication SCH MG: at 08:55

## 2019-03-19 RX ADMIN — VANCOMYCIN HYDROCHLORIDE SCH: 500 INJECTION, POWDER, LYOPHILIZED, FOR SOLUTION INTRAVENOUS at 03:39

## 2019-03-19 RX ADMIN — IPRATROPIUM BROMIDE AND ALBUTEROL SULFATE SCH: .5; 3 SOLUTION RESPIRATORY (INHALATION) at 02:04

## 2019-03-19 RX ADMIN — Medication SCH APPLIC: at 16:33

## 2019-03-19 RX ADMIN — VANCOMYCIN HYDROCHLORIDE SCH MG: 500 INJECTION, POWDER, LYOPHILIZED, FOR SOLUTION INTRAVENOUS at 21:34

## 2019-03-19 RX ADMIN — IPRATROPIUM BROMIDE AND ALBUTEROL SULFATE SCH ML: .5; 3 SOLUTION RESPIRATORY (INHALATION) at 19:10

## 2019-03-19 RX ADMIN — Medication SCH APPLIC: at 09:00

## 2019-03-19 RX ADMIN — Medication SCH: at 01:55

## 2019-03-19 RX ADMIN — VANCOMYCIN HYDROCHLORIDE SCH MG: 500 INJECTION, POWDER, LYOPHILIZED, FOR SOLUTION INTRAVENOUS at 09:00

## 2019-03-19 RX ADMIN — POTASSIUM CHLORIDE SCH MEQ: 20 TABLET, EXTENDED RELEASE ORAL at 16:28

## 2019-03-19 RX ADMIN — Medication SCH MG: at 16:29

## 2019-03-19 RX ADMIN — IPRATROPIUM BROMIDE AND ALBUTEROL SULFATE SCH ML: .5; 3 SOLUTION RESPIRATORY (INHALATION) at 13:33

## 2019-03-19 NOTE — CP.PCM.PN
Subjective





- Date & Time of Evaluation


Date of Evaluation: 03/19/19


Time of Evaluation: 09:29





- Subjective


Subjective: 





Nephrology Consultation Note





Assessment: stable


Acute renal failure/acute kidney injury related to multifactorial including 

sepsis required dialysis: RESOLVED


Diabetes mellitus and history of hypertension history of CVA.


7 gram proteinuria on dipstick


Hypokalemia, Hypomagnesemia





Plan


Hypertension control with meds as ordered. Maintain hemodynamics stable.  on 

losartan


Monitor Input/Output, daily weights and renal function with basic metabolic 

panel


will review labs when available 


+ nephrotic range proteinuria - ? diabetic will need follow up w/ us in regards 

to this 


Office: [phone]  Fax: [phone]








Subjective: denies n/v wants t go home 





Physical Examination:      


General Appearance: comfortable no acute distress


Vitals reviewed and noted as below


Head; Atraumatic, normocephalic


Neck; supple no lymphadenopathy, no thyromegaly or bruit


Lungs: Normal respiratory rate/effort. Breath sounds bilateral reduced at bases


Heart: Normal rate. s1s2 normal. No rub or gallop. 


Extremities: no edema. No varicose veins


Neurological: Patient is awake alert follow commands. Rt hand contractures


Skin: Warm and dry. Normal turgor. No rash. Palpitation: Normal elasticity for 

age


Abdomen: Abdomen is soft. Bowel sounds +. There is no abdominal tenderness, no 

guarding/rigidity no organomegaly


Psych: limite dinsight. normal affect


MSK: no joint tenderness or swelling. Digits and nails normal, no deformity


: kidney or bladder not palpable





Labs/imaging reviewed.


Past medical history, past surgical history, family history, social history, 

allergy reviewed and noted as below


Family hx: no hx of CKD. Rest non-contributory 








Objective





- Vital Signs/Intake and Output


Vital Signs (last 24 hours): 


                                        











Temp Pulse Resp BP Pulse Ox


 


 97.4 F L  63   18   141/85   98 


 


 03/19/19 08:00  03/19/19 08:57  03/19/19 08:00  03/19/19 08:57  03/19/19 08:00











- Medications


Medications: 


                               Current Medications





Acetaminophen (Tylenol 325mg Tab)  650 mg PO Q6 PRN


   PRN Reason: Temperature


   Last Admin: 03/10/19 08:51 Dose:  650 mg


Acetaminophen (Tylenol 325mg Tab)  650 mg PO Q6 PRN


   PRN Reason: Pain <5


   Last Admin: 03/19/19 01:59 Dose:  650 mg


Al Hydrox/Mg Hydrox/Simethicone (Maalox Plus 30 Ml)  30 ml PO Q6 PRN


   PRN Reason: Indigestion / Heartburn


Albuterol/Ipratropium (Duoneb 3 Mg/0.5 Mg (3 Ml) Ud)  3 ml INH RQ6 [facility]


   Last Admin: 03/19/19 07:54 Dose:  3 ml


Dimethicone (Proshield Plus Skin Protectant)  1 applic TOP Q8 [facility]


   Last Admin: 03/19/19 09:00 Dose:  1 applic


Ferrous Sulfate (Feosol)  325 mg PO TID [facility]


   Last Admin: 03/08/19 11:21 Dose:  Not Given


Furosemide (Lasix)  20 mg PO DAILY [facility]


   Last Admin: 03/19/19 08:55 Dose:  20 mg


Guaifenesin/Dextromethorphan (Robitussin Dm)  10 ml PO Q6 PRN


   PRN Reason: Cough


   Last Admin: 03/18/19 23:00 Dose:  10 ml


Insulin Human Regular (Humulin R)  0 units SC ACCU-CHECK [facility]; Protocol


   Last Admin: 03/19/19 06:03 Dose:  Not Given


Levetiracetam (Keppra)  500 mg PO BID [facility]


   Last Admin: 03/19/19 08:56 Dose:  500 mg


Losartan Potassium (Cozaar)  50 mg PO DAILY [facility]


   Last Admin: 03/19/19 08:57 Dose:  50 mg


Magnesium Oxide (Mag-Ox)  400 mg PO BID [facility]


   Last Admin: 03/19/19 08:55 Dose:  400 mg


Metformin HCl (Glucophage)  1,000 mg PO BIDWM [facility]


   Last Admin: 03/19/19 08:54 Dose:  1,000 mg


Metoprolol Tartrate (Lopressor)  25 mg PO Q12 [facility]


   Last Admin: 03/19/19 08:56 Dose:  25 mg


Ondansetron HCl (Zofran Inj)  4 mg IVP Q6 PRN


   PRN Reason: Nausea/Vomiting


   Last Admin: 03/17/19 02:40 Dose:  4 mg


Potassium Chloride (K-Dur 20 Meq Er Tab)  20 meq PO BID [facility]


   Last Admin: 03/19/19 08:54 Dose:  20 meq


Sitagliptin Phosphate (Januvia)  100 mg PO DAILY [facility]


   Last Admin: 03/19/19 08:54 Dose:  100 mg


Vancomycin HCl (Vancocin (Oral/Rectal Use))  500 mg PO Q6 [facility]; Protocol


   Last Admin: 03/19/19 09:00 Dose:  500 mg











- Labs


Labs: 


                                        





                                 03/16/19 05:30 





                                 03/18/19 09:20 





                                        











PT  16.2 Seconds (9.8-13.1)  H  03/01/19  10:28    


 


INR  1.4   03/01/19  10:28    


 


APTT  35.6 Seconds (25.6-37.1)   03/01/19  10:28

## 2019-03-20 VITALS
HEART RATE: 60 BPM | TEMPERATURE: 98.2 F | OXYGEN SATURATION: 97 % | SYSTOLIC BLOOD PRESSURE: 112 MMHG | DIASTOLIC BLOOD PRESSURE: 75 MMHG

## 2019-03-20 VITALS — RESPIRATION RATE: 20 BRPM

## 2019-03-20 RX ADMIN — Medication SCH MG: at 10:22

## 2019-03-20 RX ADMIN — VANCOMYCIN HYDROCHLORIDE SCH MG: 500 INJECTION, POWDER, LYOPHILIZED, FOR SOLUTION INTRAVENOUS at 10:27

## 2019-03-20 RX ADMIN — Medication SCH: at 01:00

## 2019-03-20 RX ADMIN — IPRATROPIUM BROMIDE AND ALBUTEROL SULFATE SCH ML: .5; 3 SOLUTION RESPIRATORY (INHALATION) at 13:39

## 2019-03-20 RX ADMIN — POTASSIUM CHLORIDE SCH MEQ: 20 TABLET, EXTENDED RELEASE ORAL at 10:13

## 2019-03-20 RX ADMIN — VANCOMYCIN HYDROCHLORIDE SCH: 500 INJECTION, POWDER, LYOPHILIZED, FOR SOLUTION INTRAVENOUS at 04:03

## 2019-03-20 RX ADMIN — IPRATROPIUM BROMIDE AND ALBUTEROL SULFATE SCH: .5; 3 SOLUTION RESPIRATORY (INHALATION) at 07:53

## 2019-03-20 RX ADMIN — Medication SCH: at 10:27

## 2019-03-20 RX ADMIN — IPRATROPIUM BROMIDE AND ALBUTEROL SULFATE SCH: .5; 3 SOLUTION RESPIRATORY (INHALATION) at 01:00

## 2019-03-20 NOTE — CP.PCM.PN
Subjective





- Date & Time of Evaluation


Date of Evaluation: 03/20/19


Time of Evaluation: 10:16





- Subjective


Subjective: 








Nephrology Consultation Note





Assessment: stable


Acute renal failure/acute kidney injury related to multifactorial including 

sepsis required dialysis: RESOLVED


Diabetes mellitus and history of hypertension history of CVA.


7 gram proteinuria on dipstick


Hypokalemia, Hypomagnesemia





Plan


Hypertension control with meds as ordered. 


Monitor Input/Output


will review labs when available 


+ nephrotic range proteinuria - ? diabetic will need follow up w/ us in regards 

to this 


Office: [phone]  Fax: [phone]








Subjective: denies n/v 





Physical Examination:      


General Appearance: comfortable no acute distress


Vitals reviewed and noted as below


Head; Atraumatic, normocephalic


Neck; supple no jvd


Lungs: Normal respiratory rate/effort. Breath sounds bilateral reduced at bases


Heart: Normal rate. s1s2 normal. No rub or gallop. 


Extremities: no edema. No varicose veins


Neurological: Patient is awake alert follow commands. Rt hand contractures


Skin: Warm and dry. Normal turgor. No rash.


Abdomen: Abdomen is soft. Bowel sounds +. There is no abdominal tenderness, no 

guarding/rigidity no organomegaly


Psych: limited insight. normal affect


MSK: no joint tenderness or swelling. 





Labs/imaging reviewed.


Family hx: no hx of CKD. Rest non-contributory 





Objective





- Vital Signs/Intake and Output


Vital Signs (last 24 hours): 


                                        











Temp Pulse Resp BP Pulse Ox


 


 98.9 F   61   20   146/76   98 


 


 03/20/19 08:29  03/20/19 08:29  03/20/19 08:29  03/20/19 08:29  03/20/19 08:29











- Medications


Medications: 


                               Current Medications





Acetaminophen (Tylenol 325mg Tab)  650 mg PO Q6 PRN


   PRN Reason: Temperature


   Last Admin: 03/10/19 08:51 Dose:  650 mg


Acetaminophen (Tylenol 325mg Tab)  650 mg PO Q6 PRN


   PRN Reason: Pain <5


   Last Admin: 03/19/19 01:59 Dose:  650 mg


Al Hydrox/Mg Hydrox/Simethicone (Maalox Plus 30 Ml)  30 ml PO Q6 PRN


   PRN Reason: Indigestion / Heartburn


Albuterol/Ipratropium (Duoneb 3 Mg/0.5 Mg (3 Ml) Ud)  3 ml INH RQ6 [facility]


   Last Admin: 03/20/19 07:53 Dose:  Not Given


Ferrous Sulfate (Feosol)  325 mg PO TID [facility]


   Last Admin: 03/08/19 11:21 Dose:  Not Given


Furosemide (Lasix)  20 mg PO DAILY [facility]


   Last Admin: 03/19/19 08:55 Dose:  20 mg


Furosemide (Lasix)  20 mg PO DAILY [facility]


   Last Admin: 03/19/19 16:30 Dose:  Not Given


Guaifenesin/Dextromethorphan (Robitussin Dm)  10 ml PO Q6 PRN


   PRN Reason: Cough


   Last Admin: 03/18/19 23:00 Dose:  10 ml


Insulin Human Regular (Humulin R)  0 units SC ACCU-CHECK [facility]; Protocol


   Last Admin: 03/19/19 23:00 Dose:  Not Given


Levetiracetam (Keppra)  500 mg PO BID [facility]


   Last Admin: 03/19/19 16:31 Dose:  500 mg


Losartan Potassium (Cozaar)  50 mg PO DAILY [facility]


   Last Admin: 03/19/19 08:57 Dose:  50 mg


Magnesium Oxide (Mag-Ox)  400 mg PO BID [facility]


   Last Admin: 03/19/19 16:29 Dose:  400 mg


Metformin HCl (Glucophage)  1,000 mg PO BIDWM [facility]


   Last Admin: 03/19/19 16:28 Dose:  1,000 mg


Metoprolol Tartrate (Lopressor)  25 mg PO Q12 [facility]


   Last Admin: 03/19/19 21:34 Dose:  25 mg


Ondansetron HCl (Zofran Inj)  4 mg IVP Q6 PRN


   PRN Reason: Nausea/Vomiting


   Last Admin: 03/17/19 02:40 Dose:  4 mg


Potassium Chloride (K-Dur 20 Meq Er Tab)  20 meq PO BID [facility]


   Last Admin: 03/19/19 16:28 Dose:  20 meq


Sitagliptin Phosphate (Januvia)  100 mg PO DAILY [facility]


   Last Admin: 03/19/19 08:54 Dose:  100 mg


Vancomycin HCl (Vancocin (Oral/Rectal Use))  500 mg PO Q6 [facility]; Protocol


   Last Admin: 03/20/19 04:03 Dose:  Not Given











- Labs


Labs: 


                                        





                                 03/16/19 05:30 





                                 03/18/19 09:20 





                                        











PT  16.2 Seconds (9.8-13.1)  H  03/01/19  10:28    


 


INR  1.4   03/01/19  10:28    


 


APTT  35.6 Seconds (25.6-37.1)   03/01/19  10:28

## 2019-03-20 NOTE — CP.PCM.PN
Subjective





- Date & Time of Evaluation


Date of Evaluation: 03/20/19


Time of Evaluation: 09:55





- Subjective


Subjective: 





AWAKE AND ALERTNO SOB


LUNGS-BETTER AERATION


CXR-IMPROVEMENT OF PLEURAL RXN AND EFFUSION


FOR POSSIBLE TRANSFER TO REHAB/SUBACUTE CARE


WILL CONTINUE CURRENT THERAPY





Objective





- Vital Signs/Intake and Output


Vital Signs (last 24 hours): 


                                        











Temp Pulse Resp BP Pulse Ox


 


 98.9 F   61   20   146/76   98 


 


 03/20/19 08:29  03/20/19 08:29  03/20/19 08:29  03/20/19 08:29  03/20/19 08:29











- Medications


Medications: 


                               Current Medications





Acetaminophen (Tylenol 325mg Tab)  650 mg PO Q6 PRN


   PRN Reason: Temperature


   Last Admin: 03/10/19 08:51 Dose:  650 mg


Acetaminophen (Tylenol 325mg Tab)  650 mg PO Q6 PRN


   PRN Reason: Pain <5


   Last Admin: 03/19/19 01:59 Dose:  650 mg


Al Hydrox/Mg Hydrox/Simethicone (Maalox Plus 30 Ml)  30 ml PO Q6 PRN


   PRN Reason: Indigestion / Heartburn


Albuterol/Ipratropium (Duoneb 3 Mg/0.5 Mg (3 Ml) Ud)  3 ml INH RQ6 [facility]


   Last Admin: 03/20/19 07:53 Dose:  Not Given


Ferrous Sulfate (Feosol)  325 mg PO TID [facility]


   Last Admin: 03/08/19 11:21 Dose:  Not Given


Furosemide (Lasix)  20 mg PO DAILY [facility]


   Last Admin: 03/19/19 08:55 Dose:  20 mg


Furosemide (Lasix)  20 mg PO DAILY [facility]


   Last Admin: 03/19/19 16:30 Dose:  Not Given


Guaifenesin/Dextromethorphan (Robitussin Dm)  10 ml PO Q6 PRN


   PRN Reason: Cough


   Last Admin: 03/18/19 23:00 Dose:  10 ml


Insulin Human Regular (Humulin R)  0 units SC ACCU-CHECK [facility]; Protocol


   Last Admin: 03/19/19 23:00 Dose:  Not Given


Levetiracetam (Keppra)  500 mg PO BID [facility]


   Last Admin: 03/19/19 16:31 Dose:  500 mg


Losartan Potassium (Cozaar)  50 mg PO DAILY [facility]


   Last Admin: 03/19/19 08:57 Dose:  50 mg


Magnesium Oxide (Mag-Ox)  400 mg PO BID [facility]


   Last Admin: 03/19/19 16:29 Dose:  400 mg


Metformin HCl (Glucophage)  1,000 mg PO BIDWM [facility]


   Last Admin: 03/19/19 16:28 Dose:  1,000 mg


Metoprolol Tartrate (Lopressor)  25 mg PO Q12 [facility]


   Last Admin: 03/19/19 21:34 Dose:  25 mg


Ondansetron HCl (Zofran Inj)  4 mg IVP Q6 PRN


   PRN Reason: Nausea/Vomiting


   Last Admin: 03/17/19 02:40 Dose:  4 mg


Potassium Chloride (K-Dur 20 Meq Er Tab)  20 meq PO BID [facility]


   Last Admin: 03/19/19 16:28 Dose:  20 meq


Sitagliptin Phosphate (Januvia)  100 mg PO DAILY [facility]


   Last Admin: 03/19/19 08:54 Dose:  100 mg


Vancomycin HCl (Vancocin (Oral/Rectal Use))  500 mg PO Q6 [facility]; Protocol


   Last Admin: 03/20/19 04:03 Dose:  Not Given











- Labs


Labs: 


                                        





                                 03/16/19 05:30 





                                 03/18/19 09:20 





                                        











PT  16.2 Seconds (9.8-13.1)  H  03/01/19  10:28    


 


INR  1.4   03/01/19  10:28    


 


APTT  35.6 Seconds (25.6-37.1)   03/01/19  10:28

## 2019-03-24 NOTE — CP.PCM.PN
Subjective





- Date & Time of Evaluation


Date of Evaluation: 03/19/19


Time of Evaluation: 10:00





- Subjective


Subjective: 





patient is doing a lot better .


Was able to participate with therapy


Has no SOB


No chest pain


CXR on 3/17 showed decrease in pleural effusion.





Objective





- Vital Signs/Intake and Output


Vital Signs (last 24 hours): 


                                        











Temp Pulse Resp BP Pulse Ox


 


 98.2 F   60   20   112/75   97 


 


 03/20/19 12:25  03/20/19 12:25  03/20/19 12:25  03/20/19 12:25  03/20/19 12:25











- Labs


Labs: 


                                        





                                 03/16/19 05:30 





                                 03/18/19 09:20 





                                        











PT  16.2 Seconds (9.8-13.1)  H  03/01/19  10:28    


 


INR  1.4   03/01/19  10:28    


 


APTT  35.6 Seconds (25.6-37.1)   03/01/19  10:28    














- Head Exam


Head Exam: NORMAL INSPECTION





- Eye Exam


Eye Exam: Normal appearance





- ENT Exam


ENT Exam: Mucous Membranes Moist





- Respiratory Exam


Respiratory Exam: Decreased Breath Sounds





- Cardiovascular Exam


Cardiovascular Exam: REGULAR RHYTHM





- GI/Abdominal Exam


GI & Abdominal Exam: Soft





Assessment and Plan


(1) Acute respiratory failure with hypoxia


Status: Acute   





(2) Aspiration pneumonia


Status: Acute   





(3) Elevated liver enzymes


Status: Acute   





(4) Sepsis


Status: Acute   





(5) Toxic metabolic encephalopathy


Status: Acute   





(6) Pulmonary congestion


Status: Acute   





(7) Diabetes mellitus type 2 in obese


Status: Acute   





- Assessment and Plan (Free Text)


Plan: 





Conntinue meds


 Cont PT


 For subacute rehab eval


Cont all tx.


Lasix low dose

## 2019-03-26 NOTE — PQF
PROVIDER RESPONSE TEXT:



Provider was unable to determine a response for this query.



REVIEWER QUERY TEXT:



Rule Out Sepsis Clarification



Sepsis is documented in the Medical Record. Please clarify if known source:





The patient's Clinical Indicators include:

Sepsis source unclear at this time is documented in medical record.





Query created by: Dinorah Patterson on 3/22/2019 12:20 PM















Electronically signed by:  Mansoor Duran 3/26/2019 9:09 PM

## 2019-03-26 NOTE — PQF
PROVIDER RESPONSE TEXT:



No CKD. Patient had Acute Kidney Injury



REVIEWER QUERY TEXT:



Kidney Disease, Chronic CKD Stage



Does pt have a diagnosis of Chronic Kidney Disease (CKD) ?.  Please specify the disease stage (includ
es probable or suspected)

Such as:

-- Chronic kidney disease Stage 1

-- Chronic kidney disease Stage 2

-- Chronic kidney disease Stage 3

-- Chronic kidney disease Stage 4

-- Chronic kidney disease Stage 5

-- Chronic kidney disease Stage 5, requiring dialysis

-- End Stage Renal Disease

-- Other, please specify



Stages are defined by the [employer] as follows:

CKD Stage I  GFR >= 90 ml / min per 1.73 m2 and persistent albuminuria

CKD Stage 2 GFR between 60 and 89 with persistent albuminuria

CKD Stage 3 GFR between 30 and 59

CKD Stage 4 GFR between 15 and 29

CKD Stage 5 GFR between <15 or End Stage Renal Disease



The patient's Clinical Indicators include:

SHANITA stage 3 documented 02/23 consult by Dr. Montes De Oca

ESRD documented 03/6 progress note by Dr. Romero





Query created by: Dinorah Patterson on 3/22/2019 12:07 PM





Electronically signed by:  Mansoor Duran 3/26/2019 9:09 PM

## 2019-03-26 NOTE — CP.PCM.DIS
Provider





- Provider


Date of Admission: 


02/23/19 15:10





Attending physician: 


Mansoor Duran MD





Consults: 








02/23/19 14:38


Critical Care Consult Stat 


   Comment: 


   Consulting Provider: Ming Montes De Oca


   Consulting Physician: Ming Montes De Oca


   Reason for Consult: code sepsis





02/23/19 18:55


Neurology Consult Routine 


   Comment: 


   Consulting Provider: Gutierrez Patrick


   Consulting Physician: Gutierrez Patrick


   Reason for Consult: AMS





02/23/19 20:21


Cardiology Consult Routine 


   Comment: 


   Consulting Provider: Bartolo Aranda


   Consulting Physician: Bartolo Aranda


   Reason for Consult: A Fib/ Elevated Troponin





02/24/19 07:55


Gastroenterology Consult Routine 


   Comment: 


   Consulting Provider: Dax Miranda


   Consulting Physician: Dax Miranda


   Reason for Consult: Elevated LFT's


Infectious Disease Consult Routine 


   Comment: 


   Consulting Provider: Navin Anderson


   Consulting Physician: Navin Anderson


   Reason for Consult: Sepsis





02/24/19 10:23


Wound Care [Nursing Referral for Wound Care] Routine 


   Comment: 


   Physician Instructions: 


   Reason For Exam: POA ulcer r butt





02/24/19 10:27


Pastoral Care Referral Routine 


   Comment: 


   Physician Instructions: 


   Reason For Exam: No POA on admission





02/24/19 12:44


Nephrology Consult Routine 


   Comment: 


   Consulting Provider: Diego Robbins


   Consulting Physician: Diego Robbins


   Reason for Consult: isamar





02/28/19 09:42


Pulmonology Consult Routine 


   Comment: 


   Consulting Provider: Librado Romero


   Consulting Physician: Librado Romero


   Reason for Consult: vented











Time Spent in preparation of Discharge (in minutes): 30





Diagnosis





- Discharge Diagnosis


(1) Encephalopathy


Status: Acute   





(2) Acute respiratory failure with hypoxia


Status: Acute   Priority: High   





(3) Hypokalemia


Status: Acute   





(4) Hypomagnesemia


Status: Acute   





Hospital Course





- Lab Results


Lab Results: 


                                  Micro Results





03/10/19 14:15   Blood   Blood Culture - Final


                            NO GROWTH AFTER 5 DAYS


03/10/19 14:15   Blood   Gram Stain - Final


                            TEST NOT PERFORMED


03/11/19 06:43   Urine Random   Urine Culture - Final


                            No Growth (<1,000 CFU/ML)


03/10/19 15:45   Naris   MRSA Culture (Admit) - Final


                            MRSA NOT DETECTED


02/28/19 14:30   Sputum Induced   Gram Stain - Final


02/28/19 14:30   Sputum Induced   Sputum Culture - Final


                            No growth.


02/23/19 14:15   Blood   Blood Culture - Final


                            NO GROWTH AFTER 5 DAYS


02/23/19 14:15   Blood   Gram Stain - Final


                            TEST NOT PERFORMED


02/23/19 14:40   Blood   Blood Culture - Final


                            NO GROWTH AFTER 5 DAYS


02/23/19 14:40   Blood   Gram Stain - Final


                            TEST NOT PERFORMED


02/24/19 09:48   Stool   Ova and Parasite Concentrate Exam - Final


02/23/19 14:50   Stool   Stool Culture - Final


                            NO SALMONELLA, SHIGELLA OR CAMPYLOBACTER ISOLATED.


02/24/19 15:50   Naris   MRSA Culture (Admit) - Final


                            MRSA NOT DETECTED


02/23/19 14:50   Urine,Segura   Urine Culture - Final


                            No Growth (<1,000 CFU/ML)





                             Most Recent Lab Values











WBC  6.4 K/uL (4.8-10.8)   03/16/19  05:30    


 


RBC  3.53 Mil/uL (3.80-5.20)  L  03/16/19  05:30    


 


Hgb  9.3 g/dL (12.0-16.0)  L  03/16/19  05:30    


 


Hct  28.9 % (34.0-47.0)  L  03/16/19  05:30    


 


MCV  81.8 fl (81.0-99.0)   03/16/19  05:30    


 


MCH  26.2 pg (27.0-31.0)  L  03/16/19  05:30    


 


MCHC  32.1 g/dL (33.0-37.0)  L  03/16/19  05:30    


 


RDW  26.7 % (11.5-14.5)  H  03/16/19  05:30    


 


Plt Count  322 K/uL (130-400)   03/16/19  05:30    


 


MPV  10.2 fl (7.2-11.7)   03/07/19  02:55    


 


Neut % (Auto)  88.9 % (50.0-75.0)  H  03/07/19  02:55    


 


Lymph % (Auto)  2.5 % (20.0-40.0)  L  03/07/19  02:55    


 


Mono % (Auto)  7.9 % (0.0-10.0)   03/07/19  02:55    


 


Eos % (Auto)  0.6 % (0.0-4.0)   03/07/19  02:55    


 


Baso % (Auto)  0.1 % (0.0-2.0)   03/07/19  02:55    


 


Neut # (Auto)  15.9 K/uL (1.8-7.0)  H  03/07/19  02:55    


 


Lymph # (Auto)  0.4 K/uL (1.0-4.3)  L  03/07/19  02:55    


 


Mono # (Auto)  1.4 K/uL (0.0-0.8)  H  03/07/19  02:55    


 


Eos # (Auto)  0.1 K/uL (0.0-0.7)   03/07/19  02:55    


 


Baso # (Auto)  0.0 K/uL (0.0-0.2)   03/07/19  02:55    


 


Neutrophils % (Manual)  90 % (42-75)  H  03/07/19  02:55    


 


Lymphocytes % (Manual)  2 % (20-50)  L  03/07/19  02:55    


 


Reactive Lymphs %  1 % (0-0)  H  02/27/19  06:31    


 


Monocytes % (Manual)  7 % (0-10)   03/07/19  02:55    


 


Eosinophils % (Manual)  1 % (0-7)   03/07/19  02:55    


 


Nucleated RBC %  1 % (0-0)  H  02/27/19  06:31    


 


Platelet Estimate  Normal  (NORMAL)   03/07/19  02:55    


 


Plt Clumps, EDTA  Present   02/27/19  06:31    


 


Large Platelets  Present   02/23/19  13:48    


 


Hypochromasia (manual)  Slight   03/07/19  02:55    


 


Poikilocytosis (manual  Slight   02/23/19  13:48    


 


Anisocytosis (manual)  Slight   03/07/19  02:55    


 


Microcytosis (manual)  Slight   02/27/19  06:31    


 


Macrocytosis (manual)  Slight   02/23/19  13:48    


 


Target Cells  Slight   02/24/19  04:50    


 


Tear Drop Cells  Slight   02/23/19  13:48    


 


Ovalocytes  Slight   03/07/19  02:55    


 


Stomatocytes  Slight   03/07/19  02:55    


 


Ester Cells  Slight   02/24/19  04:50    


 


PT  16.2 Seconds (9.8-13.1)  H  03/01/19  10:28    


 


INR  1.4   03/01/19  10:28    


 


APTT  35.6 Seconds (25.6-37.1)   03/01/19  10:28    


 


pCO2  35 mm/Hg (35-45)   03/08/19  09:29    


 


pO2  99 mm/Hg ()   03/08/19  09:29    


 


HCO3  29.8 mmol/L (21-28)  H  03/08/19  09:29    


 


ABG pH  7.53  (7.35-7.45)  H  03/08/19  09:29    


 


ABG Total CO2  30.3 mmol/L (22-28)  H  03/08/19  09:29    


 


ABG O2 Saturation  100.1 % (95-98)  H  03/08/19  09:29    


 


ABG O2 Content  13.5 ML/dL (15-23)  L  03/05/19  05:00    


 


ABG Base Excess  6.2 mmol/L (-2.0-3.0)  H  03/08/19  09:29    


 


ABG Hemoglobin  9.9 g/dL (11.7-17.4)  L  03/05/19  05:00    


 


ABG Carboxyhemoglobin  2.0 % (0.5-1.5)  H  03/05/19  05:00    


 


POC ABG HHb (Measured)  0.4 % (0.0-5.0)   03/05/19  05:00    


 


ABG Methemoglobin  1.8 % (0.0-3.0)   03/05/19  05:00    


 


ABG O2 Capacity  13.6 mL/dL (16-24)  L  03/05/19  05:00    


 


Efrem Test  Yes   03/08/19  09:29    


 


ABG Potassium  3.2 mmol/L (3.6-5.2)  L  03/08/19  09:29    


 


VBG pH  7.15  (7.32-7.43)  L*  02/23/19  16:48    


 


VBG pCO2  60 mmHg (40-60)   02/23/19  16:48    


 


VBG HCO3  16.6 mmol/L  02/23/19  16:48    


 


VBG Total CO2  22.7 mmol/L (22-28)   02/23/19  16:48    


 


VBG O2 Sat (Calc)  54.4 % (40-65)   02/23/19  16:48    


 


VBG Base Excess  -8.7 mmol/L (0.0-2.0)  L  02/23/19  16:48    


 


VBG Potassium  4.2 mmol/L (3.6-5.2)   02/23/19  16:48    


 


A-a O2 Difference  142.0 mm/Hg  03/08/19  09:29    


 


Hgb O2 Saturation  95.7 % (95.0-98.0)   03/05/19  05:00    


 


Sodium  150.0 mmol/L (132-148)  H  03/08/19  09:29    


 


Chloride  119.0 mmol/L ()  H  03/08/19  09:29    


 


Glucose  153 mg/dL ()  H  03/08/19  09:29    


 


Lactate  1.3 mmol/L (0.7-2.1)   03/08/19  09:29    


 


Vent Mode  Hfnc   03/08/19  09:29    


 


Mechanical Rate  14   03/04/19  04:00    


 


FiO2  40.0 %  03/08/19  09:29    


 


Tidal Volume  450   03/04/19  04:00    


 


PEEP  5   03/04/19  14:32    


 


Pressure Support  10   03/04/19  14:32    


 


Blood Gas Comments  Ra 21   02/23/19  13:36    


 


Crit Value Called To  Jane villegas rn   02/23/19  23:08    


 


Crit Value Called By  Cuong   02/23/19  23:08    


 


Crit Value Read Back  Y   02/23/19  23:08    


 


Blood Gas Notified Time  2320   02/23/19  23:08    


 


Sodium  142 mmol/l (132-148)   03/18/19  09:20    


 


Potassium  3.8 MMOL/L (3.6-5.0)   03/18/19  09:20    


 


Chloride  105 mmol/L ()   03/18/19  09:20    


 


Carbon Dioxide  28 mmol/L (22-30)   03/18/19  09:20    


 


Anion Gap  13  (10-20)   03/18/19  09:20    


 


BUN  7 mg/dl (7-17)   03/18/19  09:20    


 


Creatinine  0.8 mg/dl (0.7-1.2)   03/18/19  09:20    


 


Est GFR ( Amer)  > 60   03/18/19  09:20    


 


Est GFR (Non-Af Amer)  > 60   03/18/19  09:20    


 


POC Glucose (mg/dL)  121 mg/dL ()  H  03/20/19  15:55    


 


Random Glucose  129 mg/dL ()  H  03/18/19  09:20    


 


Hemoglobin A1c  6.2 % (4.2-6.5)   03/06/19  04:20    


 


Lactic Acid  1.4 mmol/L (0.7-2.1)   02/27/19  16:16    


 


Uric Acid  6.3 mg/Dl (2.2-7.5)   03/05/19  05:00    


 


Calcium  9.2 mg/dL (8.4-10.2)   03/18/19  09:20    


 


Phosphorus  3.4 mg/dl (2.5-4.5)   03/18/19  09:20    


 


Magnesium  1.9 MG/DL (1.6-2.3)   03/18/19  15:02    


 


Iron  29 ug/dL ()  L  03/06/19  04:20    


 


TIBC  268 ug/dL (250-450)   03/06/19  04:20    


 


% Saturation  11 % (20-55)  L  03/06/19  04:20    


 


Total Bilirubin  1.0 mg/dl (0.2-1.3)   03/18/19  09:20    


 


Direct Bilirubin  0.6 mg/ml (0.0-0.4)  H  03/01/19  10:45    


 


AST  33 U/L (14-36)   03/18/19  09:20    


 


ALT  45 U/L (9-52)   03/18/19  09:20    


 


Alkaline Phosphatase  103 U/L ()   03/18/19  09:20    


 


Ammonia  30 umo/L (11-51)   02/24/19  16:23    


 


Total Creatine Kinase  52 U/L ()   03/01/19  06:00    


 


Troponin I  3.6000 ng/mL (0.00-0.120)  H*  02/24/19  04:50    


 


NT-Pro-B Natriuret Pep  1520 pg/ml (0-900)  H  03/16/19  05:30    


 


Total Protein  7.7 G/DL (6.3-8.2)   03/18/19  09:20    


 


Albumin  3.2 g/dL (3.5-5.0)  L D 03/18/19  09:20    


 


Globulin  4.5 gm/dL (2.2-3.9)  H  03/18/19  09:20    


 


Albumin/Globulin Ratio  0.7  (1.0-2.1)  L  03/18/19  09:20    


 


Triglycerides  97 mg/DL (0-149)   03/06/19  04:20    


 


Cholesterol  106 mg/dL (0-199)   03/06/19  04:20    


 


LDL Cholesterol Direct  57 mg/dL (0-129)   03/06/19  04:20    


 


HDL Cholesterol  33 MG/DL (30-70)   03/06/19  04:20    


 


Vitamin B12  901 pg/mL (239-931)   03/06/19  04:20    


 


Procalcitonin  2.01 NG/ML (0.19-0.49)  H  03/05/19  05:00    


 


TSH 3rd Generation  5.80 mIU/ML (0.46-4.68)  H  03/10/19  14:15    


 


Arterial Blood Potassium  3.2 mmol/L (3.6-5.2)  L  03/08/19  09:29    


 


Venous Blood Potassium  4.2 mmol/L (3.6-5.2)   02/23/19  16:48    


 


Urine Color  Negrita  (YELLOW)   02/23/19  14:50    


 


Urine Clarity  Turbid  (Clear)   02/23/19  14:50    


 


Urine pH  6.0  (5.0-8.0)   02/23/19  14:50    


 


Ur Specific Gravity  1.013  (1.003-1.030)   02/23/19  14:50    


 


Urine Protein  100 mg/dL (NEGATIVE)   02/23/19  14:50    


 


Urine Glucose (UA)  Neg mg/dL (NEGATIVE)   02/23/19  14:50    


 


Urine Ketones  Negative mg/dL (NEGATIVE)   02/23/19  14:50    


 


Urine Blood  Moderate  (NEGATIVE)   02/23/19  14:50    


 


Urine Nitrate  Negative  (NEGATIVE)   02/23/19  14:50    


 


Urine Bilirubin  Negative  (NEGATIVE)   02/23/19  14:50    


 


Urine Urobilinogen  4.0 mg/dL (0.2-1.0)  H  02/23/19  14:50    


 


Ur Leukocyte Esterase  Neg Tabitha/uL (Negative)   02/23/19  14:50    


 


Urine RBC (Auto)  6 /hpf (0-3)  H  02/23/19  14:50    


 


Urine Microscopic WBC  4 /hpf (0-5)   02/23/19  14:50    


 


Ur Squamous Epith Cells  2 /hpf (0-5)   02/23/19  14:50    


 


Amorphous Sediment  Few /ul (<OCC)  H  02/23/19  14:50    


 


Urine Bacteria  Occ  (<OCC)  H  02/23/19  14:50    


 


Hyaline Casts  11-20 /hpf (0-2)  H  02/23/19  14:50    


 


Urine Myoglobin  TNP   03/01/19  11:00    


 


Urine Osmolality  330 mosm/kg (300-1000)   03/01/19  11:35    


 


Ur Random Creatinine  52.4 mg/dL  03/01/19  11:35    


 


U Random Total Protein  377.0 mg/dL (0.0-12.0)  H  03/01/19  11:35    


 


Ur Random Sodium  58 mmol/L  02/25/19  09:51    


 


Random Vancomycin  10.6 ug/mL  02/28/19  04:50    


 


Urine Opiates Screen  Negative  (NEGATIVE)   02/23/19  14:50    


 


Urine Methadone Screen  Negative  (NEGATIVE)   02/23/19  14:50    


 


Ur Barbiturates Screen  Negative  (NEGATIVE)   02/23/19  14:50    


 


Ur Phencyclidine Scrn  Negative  (NEGATIVE)   02/23/19  14:50    


 


Ur Amphetamines Screen  Negative  (NEGATIVE)   02/23/19  14:50    


 


U Benzodiazepines Scrn  Negative  (NEGATIVE)   02/23/19  14:50    


 


U Oth Cocaine Metabols  Negative  (NEGATIVE)   02/23/19  14:50    


 


U Cannabinoids Screen  Negative  (NEGATIVE)   02/23/19  14:50    


 


Alcohol, Quantitative  < 10 mg/dl (0-10)   02/23/19  13:48    


 


CHETNA Screen  Negative  (Negative)   03/01/19  11:35    


 


ANCA Screen  Negative  (NEGATIVE)   03/02/19  04:00    


 


c-ANCA Titer  TNP   03/02/19  04:00    


 


Proteinase 3 (PR3)  <1.0 AI (<1.0)   03/02/19  04:00    


 


p-ANCA Titer  TNP   03/02/19  04:00    


 


Atypical p-ANCA Titer  TNP   03/02/19  04:00    


 


Myeloperoxidase Ab  <1.0 AI (<1.0)   03/02/19  04:00    


 


Glomerular Base Mem IgG  <1.0 AI (<1.0)   03/02/19  04:00    


 


RPR  Nonreactive  (NONREACTIVE)   02/25/19  05:14    


 


C. difficile Ag & Toxin  Negative  (NEGATIVE)   03/09/19  13:42    


 


E. histolytica Antigen  TNP   02/24/19  19:01    


 


Enterovirus Source  Stool   02/24/19  19:01    


 


Enterovirus RNA (PCR)  Not detected  (Not Detected)   02/24/19  19:01    


 


Hepatitis A IgM Ab  Negative  (NEGATIVE)   02/24/19  16:23    


 


Hep Bs Antigen  Negative  (NEGATIVE)   02/24/19  16:23    


 


Hep Bs Antibody, Quant  <5 mIU/mL (>or=10)  L  02/28/19  12:20    


 


Hep B Core IgM Ab  Negative  (NEGATIVE)   02/24/19  16:23    


 


Hepatitis C Antibody  Negative  (NEGATIVE)   02/24/19  16:23    


 


HIV 1&2 Antibody Screen  Negative  (NEGATIVE)   02/24/19  16:23    


 


Influenza Typ A,B (EIA)  Negative for flu a/b  (NEGATIVE)   02/24/19  15:50    


 


H.influenzae Type B Ag  Negative  (NEGATIVE)   02/24/19  15:30    


 


Ur L.pneumophila Ag  Negative  (NEGATIVE)   03/08/19  08:37    


 


N.meningitidis ACY/W135  Negative  (NEGATIVE)   02/24/19  15:30    


 


N.meningi B/E.coli K1 Ag  Negative  (NEGATIVE)   02/24/19  15:30    


 


Group B Strep Antigen  Negative  (NEGATIVE)   02/24/19  15:30    


 


S. pneumoniae Antigen  Negative  (NEGATIVE)   02/24/19  15:30    


 


Beta-(1,3)-D-Glucan  109 pg/mL (<60)  H  03/02/19  04:40    


 


B-(1,3)-D-Glucan Intrp  Positive  H  03/02/19  04:40    














- Hospital Course


Hospital Course: 





The pt was admitted for sepsis. She developed respiratory failure and 

encephalopathy, requiring mechanical intubation. She was extubated and showed 

improvement, then was stable for transfer to the telemetry unit. Clinically she 

improved and was discharged to [facility]. 





Discharge Exam





- Head Exam


Head Exam: NORMAL INSPECTION, NORMOCEPHALIC





- Eye Exam


Eye Exam: EOMI, Normal appearance, PERRL


Pupil Exam: NORMAL ACCOMODATION, PERRL





- ENT Exam


ENT Exam: Mucous Membranes Moist





- Neck Exam


Neck exam: Full Rom





- Respiratory Exam


Respiratory Exam: Decreased Breath Sounds





- Cardiovascular Exam


Cardiovascular Exam: REGULAR RHYTHM, +S1, +S2





- GI/Abdominal Exam


GI & Abdominal Exam: Normal Bowel Sounds, Tenderness, Unremarkable





- Extremities Exam


Extremities exam: normal inspection





- Back Exam


Back exam: NORMAL INSPECTION





- Neurological Exam


Neurological exam: Alert





- Psychiatric Exam


Psychiatric exam: Normal Mood





- Skin


Skin Exam: Dry, Normal Color, Warm





Discharge Plan





- Follow Up Plan


Condition: CRITICAL


Disposition: TRANSF TO SNF


Instructions:  Sepsis in Adults, Sepsis (DC), Sepsis (GEN), Leukocytosis (DC), 

Leukocytosis (GEN)


Additional Instructions: 


Needs assistance w/ all daily activities. Fall Precaution. Right side flaccid. 

Skin Care.


Referrals: 


Bartolo Aranda MD [Staff Provider] - 


Navin Anderson MD [Staff Provider] - 


Gutierrez Patrick MD [Medical Doctor] - 


Dax Miranda MD [Staff Provider] - 


Librado Romero MD [Staff Provider] -

## 2022-11-23 ENCOUNTER — PREP FOR PROCEDURE (OUTPATIENT)
Dept: ADMINISTRATIVE | Age: 62
End: 2022-11-23

## 2022-12-04 RX ORDER — SODIUM CHLORIDE 0.9 % (FLUSH) 0.9 %
5-40 SYRINGE (ML) INJECTION PRN
Status: CANCELLED | OUTPATIENT
Start: 2022-12-04

## 2022-12-04 RX ORDER — SODIUM CHLORIDE 9 MG/ML
INJECTION, SOLUTION INTRAVENOUS PRN
Status: CANCELLED | OUTPATIENT
Start: 2022-12-04

## 2022-12-04 RX ORDER — SODIUM CHLORIDE 0.9 % (FLUSH) 0.9 %
5-40 SYRINGE (ML) INJECTION EVERY 12 HOURS SCHEDULED
Status: CANCELLED | OUTPATIENT
Start: 2022-12-04

## 2023-01-11 RX ORDER — AMLODIPINE BESYLATE 10 MG/1
10 TABLET ORAL DAILY
COMMUNITY

## 2023-01-11 RX ORDER — PSEUDOEPHEDRINE HCL 30 MG
100 TABLET ORAL 2 TIMES DAILY
COMMUNITY
Start: 2019-07-03

## 2023-01-11 RX ORDER — CALCITRIOL 0.25 UG/1
0.25 CAPSULE, LIQUID FILLED ORAL
COMMUNITY

## 2023-01-11 RX ORDER — ASPIRIN 81 MG/1
81 TABLET ORAL EVERY MORNING
COMMUNITY
Start: 2019-01-05

## 2023-01-11 RX ORDER — LEVOTHYROXINE SODIUM 0.12 MG/1
150 TABLET ORAL EVERY MORNING
COMMUNITY
Start: 2015-06-16

## 2023-01-11 RX ORDER — ALBUTEROL SULFATE 90 UG/1
2 AEROSOL, METERED RESPIRATORY (INHALATION)
COMMUNITY
Start: 2021-11-16

## 2023-01-11 RX ORDER — GABAPENTIN 100 MG/1
100 CAPSULE ORAL 3 TIMES DAILY
COMMUNITY

## 2023-01-11 RX ORDER — HYOSCYAMINE SULFATE 0.12 MG/1
0.12 TABLET SUBLINGUAL EVERY 6 HOURS PRN
COMMUNITY

## 2023-01-11 RX ORDER — CARVEDILOL 25 MG/1
12.5 TABLET ORAL 2 TIMES DAILY
COMMUNITY
Start: 2015-06-22

## 2023-01-11 RX ORDER — ATORVASTATIN CALCIUM 40 MG/1
40 TABLET, FILM COATED ORAL
COMMUNITY
Start: 2015-06-22

## 2023-01-11 RX ORDER — CINACALCET 30 MG/1
30 TABLET, FILM COATED ORAL
COMMUNITY

## 2023-01-11 RX ORDER — FUROSEMIDE 80 MG
80 TABLET ORAL 2 TIMES DAILY
COMMUNITY

## 2023-01-11 RX ORDER — FAMOTIDINE 20 MG/1
20 TABLET, FILM COATED ORAL 2 TIMES DAILY
COMMUNITY

## 2023-01-11 RX ORDER — ACETAMINOPHEN 500 MG
1000 TABLET ORAL PRN
COMMUNITY

## 2023-01-13 RX ORDER — SERTRALINE HYDROCHLORIDE 100 MG/1
100 TABLET, FILM COATED ORAL DAILY
COMMUNITY

## 2023-01-13 RX ORDER — UMECLIDINIUM BROMIDE AND VILANTEROL TRIFENATATE 62.5; 25 UG/1; UG/1
POWDER RESPIRATORY (INHALATION)
COMMUNITY

## 2023-01-13 RX ORDER — LISINOPRIL 20 MG/1
10 TABLET ORAL DAILY
COMMUNITY

## 2023-01-13 RX ORDER — OMEPRAZOLE 20 MG/1
20 CAPSULE, DELAYED RELEASE ORAL DAILY
COMMUNITY

## 2023-01-13 RX ORDER — FERRIC CITRATE 210 MG/1
TABLET, COATED ORAL
COMMUNITY

## 2023-01-13 NOTE — PERIOP NOTE
Patient unable to finish PAT phone assessment. Unable to verify medications at this time and requests call back this afternoon.

## 2023-01-13 NOTE — PERIOP NOTE
Patient verified name, , and procedure. Type: 1a; abbreviated assessment per anesthesia guidelines    Labs per anesthesia: K+ DOS  Summary sent for anesthesiologist to review anesthesia event from 2017. Anesthesia note from event included for review. Chart flagged for charge nurse. Instructed pt that they will be notified the day before their procedure by the GI Lab for time of arrival if their procedure is [address] and Pre-op for [place] cases. Arrival times should be called by 5 pm. If no phone is received the patient should contact their respective hospital. The GI lab telephone number is [phone] and ES Pre-op is [phone]. Follow diet and prep instructions per office including NPO status. If patient has NOT received instructions from office patient is advised to call surgeon office, verbalizes understanding. Bath or shower the night before and the am of surgery with non-moisturizing soap. No lotions, oils, powders, cologne on skin. No make up, eye make up or jewelry. Wear loose fitting comfortable, clean clothing. Must have adult present in building the entire time . Medications for the day of procedure sertraline, omeprazole,Anoro Ellipta, aspirin 81 mg, carvedilol, levothyroxine patient to hold Vitamins and supplements, NSAIDS. May continue renal vitamins but do not take on the morning of procedure. Do not take lisinopril on the morning of procedure per anesthesia guidelines. The following discharge instructions reviewed with patient: medication given during procedure may cause drowsiness for several hours, therefore, do not drive or operate machinery for remainder of the day. You may not drink alcohol on the day of your procedure, please resume regular diet and activity unless otherwise directed. You may experience abdominal distention for several hours that is relieved by the passage of gas.  Contact your physician if you have any of the following: fever or chills, severe abdominal pain or excessive amount of bleeding or a large amount when having a bowel movement.  Occasional specks of blood with bowel movement would not be unusual.

## 2023-01-16 RX ORDER — SODIUM CHLORIDE, SODIUM LACTATE, POTASSIUM CHLORIDE, CALCIUM CHLORIDE 600; 310; 30; 20 MG/100ML; MG/100ML; MG/100ML; MG/100ML
INJECTION, SOLUTION INTRAVENOUS CONTINUOUS
Status: CANCELLED | OUTPATIENT
Start: 2023-01-16

## 2023-01-16 RX ORDER — MIDAZOLAM HYDROCHLORIDE 2 MG/2ML
2 INJECTION, SOLUTION INTRAMUSCULAR; INTRAVENOUS
Status: CANCELLED | OUTPATIENT
Start: 2023-01-16 | End: 2023-01-17

## 2023-01-16 RX ORDER — LIDOCAINE HYDROCHLORIDE 10 MG/ML
1 INJECTION, SOLUTION INFILTRATION; PERINEURAL
Status: CANCELLED | OUTPATIENT
Start: 2023-01-16 | End: 2023-01-17

## 2023-01-16 NOTE — PROGRESS NOTES
RN called Pt to confirm appointment time of 21 [phone], arrival time 900, location,  requirement, and instructions for registration at the hospital.  Pt verbalized understanding.

## 2023-01-17 ENCOUNTER — HOSPITAL ENCOUNTER (OUTPATIENT)
Age: 63
Setting detail: OUTPATIENT SURGERY
Discharge: HOME | End: 2023-01-17
Attending: INTERNAL MEDICINE | Admitting: INTERNAL MEDICINE
Payer: MEDICARE

## 2023-01-17 ENCOUNTER — ANESTHESIA (OUTPATIENT)
Dept: ENDOSCOPY | Age: 63
End: 2023-01-17
Payer: MEDICARE

## 2023-01-17 ENCOUNTER — ANESTHESIA EVENT (OUTPATIENT)
Dept: ENDOSCOPY | Age: 63
End: 2023-01-17
Payer: MEDICARE

## 2023-01-17 VITALS
BODY MASS INDEX: 23.56 KG/M2 | OXYGEN SATURATION: 93 % | HEART RATE: 68 BPM | SYSTOLIC BLOOD PRESSURE: 147 MMHG | TEMPERATURE: 98.6 F | DIASTOLIC BLOOD PRESSURE: 65 MMHG | RESPIRATION RATE: 16 BRPM | HEIGHT: 60 IN | WEIGHT: 120 LBS

## 2023-01-17 LAB — POTASSIUM BLD-SCNC: 3.3 MMOL/L (ref 3.5–5.1)

## 2023-01-17 PROCEDURE — 88312 SPECIAL STAINS GROUP 1: CPT

## 2023-01-17 PROCEDURE — 7100000011 HC PHASE II RECOVERY - ADDTL 15 MIN: Performed by: INTERNAL MEDICINE

## 2023-01-17 PROCEDURE — 84132 ASSAY OF SERUM POTASSIUM: CPT

## 2023-01-17 PROCEDURE — 2709999900 HC NON-CHARGEABLE SUPPLY: Performed by: INTERNAL MEDICINE

## 2023-01-17 PROCEDURE — 2500000003 HC RX 250 WO HCPCS

## 2023-01-17 PROCEDURE — 88305 TISSUE EXAM BY PATHOLOGIST: CPT

## 2023-01-17 PROCEDURE — 3700000001 HC ADD 15 MINUTES (ANESTHESIA): Performed by: INTERNAL MEDICINE

## 2023-01-17 PROCEDURE — 7100000010 HC PHASE II RECOVERY - FIRST 15 MIN: Performed by: INTERNAL MEDICINE

## 2023-01-17 PROCEDURE — 6360000002 HC RX W HCPCS

## 2023-01-17 PROCEDURE — 3700000000 HC ANESTHESIA ATTENDED CARE: Performed by: INTERNAL MEDICINE

## 2023-01-17 PROCEDURE — 2580000003 HC RX 258

## 2023-01-17 PROCEDURE — 3609012400 HC EGD TRANSORAL BIOPSY SINGLE/MULTIPLE: Performed by: INTERNAL MEDICINE

## 2023-01-17 PROCEDURE — 3609009300 HC COLONOSCOPY BIOPSY/STOMA: Performed by: INTERNAL MEDICINE

## 2023-01-17 RX ORDER — SODIUM CHLORIDE 0.9 % (FLUSH) 0.9 %
5-40 SYRINGE (ML) INJECTION PRN
Status: DISCONTINUED | OUTPATIENT
Start: 2023-01-17 | End: 2023-01-17 | Stop reason: HOSPADM

## 2023-01-17 RX ORDER — PROPOFOL 10 MG/ML
INJECTION, EMULSION INTRAVENOUS PRN
Status: DISCONTINUED | OUTPATIENT
Start: 2023-01-17 | End: 2023-01-17 | Stop reason: SDUPTHER

## 2023-01-17 RX ORDER — GLYCOPYRROLATE 0.2 MG/ML
INJECTION INTRAMUSCULAR; INTRAVENOUS PRN
Status: DISCONTINUED | OUTPATIENT
Start: 2023-01-17 | End: 2023-01-17 | Stop reason: SDUPTHER

## 2023-01-17 RX ORDER — PROPOFOL 10 MG/ML
INJECTION, EMULSION INTRAVENOUS CONTINUOUS PRN
Status: DISCONTINUED | OUTPATIENT
Start: 2023-01-17 | End: 2023-01-17 | Stop reason: SDUPTHER

## 2023-01-17 RX ORDER — LIDOCAINE HYDROCHLORIDE 20 MG/ML
INJECTION, SOLUTION EPIDURAL; INFILTRATION; INTRACAUDAL; PERINEURAL PRN
Status: DISCONTINUED | OUTPATIENT
Start: 2023-01-17 | End: 2023-01-17 | Stop reason: SDUPTHER

## 2023-01-17 RX ORDER — SODIUM CHLORIDE 0.9 % (FLUSH) 0.9 %
5-40 SYRINGE (ML) INJECTION EVERY 12 HOURS SCHEDULED
Status: DISCONTINUED | OUTPATIENT
Start: 2023-01-17 | End: 2023-01-17 | Stop reason: HOSPADM

## 2023-01-17 RX ORDER — SODIUM CHLORIDE 9 MG/ML
INJECTION, SOLUTION INTRAVENOUS PRN
Status: DISCONTINUED | OUTPATIENT
Start: 2023-01-17 | End: 2023-01-17 | Stop reason: HOSPADM

## 2023-01-17 RX ORDER — PHENYLEPHRINE HYDROCHLORIDE 10 MG/ML
INJECTION INTRAVENOUS PRN
Status: DISCONTINUED | OUTPATIENT
Start: 2023-01-17 | End: 2023-01-17 | Stop reason: SDUPTHER

## 2023-01-17 RX ORDER — ONDANSETRON 2 MG/ML
INJECTION INTRAMUSCULAR; INTRAVENOUS PRN
Status: DISCONTINUED | OUTPATIENT
Start: 2023-01-17 | End: 2023-01-17 | Stop reason: SDUPTHER

## 2023-01-17 RX ORDER — SODIUM CHLORIDE, SODIUM LACTATE, POTASSIUM CHLORIDE, CALCIUM CHLORIDE 600; 310; 30; 20 MG/100ML; MG/100ML; MG/100ML; MG/100ML
INJECTION, SOLUTION INTRAVENOUS CONTINUOUS PRN
Status: DISCONTINUED | OUTPATIENT
Start: 2023-01-17 | End: 2023-01-17 | Stop reason: SDUPTHER

## 2023-01-17 RX ORDER — SODIUM CHLORIDE 9 MG/ML
INJECTION, SOLUTION INTRAVENOUS CONTINUOUS
Status: DISCONTINUED | OUTPATIENT
Start: 2023-01-17 | End: 2023-01-17 | Stop reason: HOSPADM

## 2023-01-17 RX ADMIN — PROPOFOL 40 MG: 10 INJECTION, EMULSION INTRAVENOUS at 11:01

## 2023-01-17 RX ADMIN — GLYCOPYRROLATE 0.1 MG: 0.2 INJECTION INTRAMUSCULAR; INTRAVENOUS at 10:57

## 2023-01-17 RX ADMIN — PROPOFOL 140 MCG/KG/MIN: 10 INJECTION, EMULSION INTRAVENOUS at 11:04

## 2023-01-17 RX ADMIN — PROPOFOL 60 MG: 10 INJECTION, EMULSION INTRAVENOUS at 11:00

## 2023-01-17 RX ADMIN — LIDOCAINE HYDROCHLORIDE 100 MG: 20 INJECTION, SOLUTION EPIDURAL; INFILTRATION; INTRACAUDAL; PERINEURAL at 11:00

## 2023-01-17 RX ADMIN — PHENYLEPHRINE HYDROCHLORIDE 200 MCG: 10 INJECTION INTRAVENOUS at 11:11

## 2023-01-17 RX ADMIN — SODIUM CHLORIDE, SODIUM LACTATE, POTASSIUM CHLORIDE, AND CALCIUM CHLORIDE: 600; 310; 30; 20 INJECTION, SOLUTION INTRAVENOUS at 10:53

## 2023-01-17 RX ADMIN — PHENYLEPHRINE HYDROCHLORIDE 200 MCG: 10 INJECTION INTRAVENOUS at 11:30

## 2023-01-17 RX ADMIN — ONDANSETRON 4 MG: 2 INJECTION INTRAMUSCULAR; INTRAVENOUS at 10:57

## 2023-01-17 ASSESSMENT — PAIN - FUNCTIONAL ASSESSMENT: PAIN_FUNCTIONAL_ASSESSMENT: NONE - DENIES PAIN

## 2023-01-17 NOTE — PROGRESS NOTES
's pre-procedural visit and prayer with patient as requested.     Jerome Rivera MDiv, BS  Board Certified  Telephone Encounter by Katya Caldera CMA at 07/05/18 04:18 PM     Author:  Katya Caldera CMA Service:  (none) Author Type:  Certified Medical Assistant     Filed:  07/05/18 04:18 PM Encounter Date:  7/2/2018 Status:  Signed     :  Katya Caldera CMA (Certified Medical Assistant)            Waiting for MD response.[JM1.1M]         Revision History        User Key Date/Time User Provider Type Action    > [email or web address] 07/05/18 04:18 PM Katya Caldera CMA Certified Medical Assistant Sign    M - Manual

## 2023-01-17 NOTE — DISCHARGE INSTRUCTIONS
Gastrointestinal Esophagogastroduodenoscopy (EGD) - Upper Exam Discharge Instructions    1. Call Dr. Karine Bhardwaj at [phone] for any problems or questions. 2. Contact the doctor's office for follow up appointment as directed. 3. Medication may cause drowsiness for several hours, therefore:  Do not drive or operate machinery for remainder of the day. No alcohol today. Do not make any important or legal decisions for 24 hours. Do not sign any legal documents for 24 hours. 5. Ordinarily, you may resume regular diet and activity after exam unless otherwise specified by your physician. 6. For mild soreness in your throat you may use Cepacol throat lozenges or warm salt-water gargles as needed. Any additional instructions:    1. Follow up with office for pathology results  3. Avoid NSAIDs (ex. Ibuprofen, aleve, goody powder)  4. Continue Pantoprazole 40 mg daily        Gastrointestinal Colonoscopy/Flexible Sigmoidoscopy - Lower Exam Discharge Instructions    Call Dr. Karine Bhardwaj at [phone] for any problems or questions. Contact the doctors office for follow up appointment as directed. Medication may cause drowsiness for several hours, therefore:  Do not drive or operate machinery for reminder of the day. No alcohol today. Do not make any important or legal decisions for 24 hours. Do not sign any legal documents for 24 hours. Ordinarily, you may resume regular diet and activity after exam unless otherwise specified by your physician. Because of air put into your colon during exam, you may experience some abdominal distension, relieved by the passage of gas, for several hours. Contact your physician if you have any of the following:  Excessive amount of bleeding - large amount when having a bowel movement. Occasional specks of blood with bowel movement would not be unusual.  Severe abdominal pain  Fever or Chills        Any additional instructions:    1.  Increase hydration and fiber  2. Follow up with office for pathology results  3. Repeat colonoscopy in 5 years given history      High-Fiber Diet: Care Instructions  Overview     A high-fiber diet may help you relieve constipation and feel less bloated. Your doctor and dietitian will help you make a high-fiber eating plan based on your personal needs. The plan will include the things you like to eat. It will also make sure that you get 25 to 35 grams of fiber a day. Before you make changes to the way you eat, be sure to talk with your doctor or dietitian. Follow-up care is a key part of your treatment and safety. Be sure to make and go to all appointments, and call your doctor if you are having problems. It's also a good idea to know your test results and keep a list of the medicines you take. How can you care for yourself at home? You can increase how much fiber you get if you eat more of certain foods. These foods include:  Whole-grain breads and cereals. Fruits, such as pears, apples, and peaches. Eat the skins and peels if you can. Vegetables, such as broccoli, cabbage, spinach, carrots, asparagus, and squash. Starchy vegetables. These include potatoes with skins, kidney beans, and lima beans. Take a fiber supplement every day if your doctor recommends it. Examples are Benefiber, Citrucel, FiberCon, and Metamucil. Ask your doctor how much to take. Drink plenty of fluids. If you have kidney, heart, or liver disease and have to limit fluids, talk with your doctor before you increase the amount of fluids you drink. Where can you learn more? Go to http://www.woods.com/ and enter S086 to learn more about \"High-Fiber Diet: Care Instructions. \"  Current as of: May 9, 2022               Content Version: 13.5  © 2006-2022 [employer]. Care instructions adapted under license by [employer] [facility] ([facility]).  If you have questions about a medical condition or this instruction, always ask your healthcare professional. [employer] disclaims any warranty or liability for your use of this information. High-Fiber Diet: Care Instructions  Overview     A high-fiber diet may help you relieve constipation and feel less bloated. Your doctor and dietitian will help you make a high-fiber eating plan based on your personal needs. The plan will include the things you like to eat. It will also make sure that you get 25 to 35 grams of fiber a day. Before you make changes to the way you eat, be sure to talk with your doctor or dietitian. Follow-up care is a key part of your treatment and safety. Be sure to make and go to all appointments, and call your doctor if you are having problems. It's also a good idea to know your test results and keep a list of the medicines you take. How can you care for yourself at home? You can increase how much fiber you get if you eat more of certain foods. These foods include:  Whole-grain breads and cereals. Fruits, such as pears, apples, and peaches. Eat the skins and peels if you can. Vegetables, such as broccoli, cabbage, spinach, carrots, asparagus, and squash. Starchy vegetables. These include potatoes with skins, kidney beans, and lima beans. Take a fiber supplement every day if your doctor recommends it. Examples are Benefiber, Citrucel, FiberCon, and Metamucil. Ask your doctor how much to take. Drink plenty of fluids. If you have kidney, heart, or liver disease and have to limit fluids, talk with your doctor before you increase the amount of fluids you drink. Where can you learn more? Go to http://www.woods.com/ and enter S872 to learn more about \"High-Fiber Diet: Care Instructions. \"  Current as of: May 9, 2022               Content Version: 13.5  © 2006-2022 [employer]. Care instructions adapted under license by [facility] ([facility]).  If you have questions about a medical condition or this instruction, always ask your healthcare professional. [employer] disclaims any warranty or liability for your use of this information.     of polyps

## 2023-01-17 NOTE — OP NOTE
COLONOSCOPY      DATE of PROCEDURE: 1/17/2023    PT NAME: Jaimee Brown  [id number]    PHYSICIAN:  Cruzito Michaels MD    MEDICATION: MAC  INSTRUMENT:CFHQ 190 L  SPECIAL PROCEDURE: Colonoscopy with biopsies, Colonoscopy surveillance  INDICATIONS: Surveillance, History of Colon Polyps  BLOOD LOSS: None  SPECIMENS: Right and Left Colon Biopsies    PROCEDURE: After obtaining informed consent, the patient was placed in the left lateral position and sedated. A digital rectal exam was performed. The colonoscope was inserted into the rectum and passed under direct vision to the cecum where the ileocecal valve and appendiceal orifice were identified. The colonoscope was withdrawn with careful evaluation of the mucosa. Retroflexion was performed in the rectum. The prep was good. The patient was taken to the recovery area in stable condition. FINDINGS:  ANUS:   Anal exam reveals no masses or hemorrhoids, sphincter tone is normal.  RECTUM: Rectal exam including retroflexion of the rectum reveals no masses. Small internal hemorrhoids noted on retroflexion. There is mild erythema which could be just prep effect. Note that right and left colon biopsies were taken using cold forceps and sent for histology. SIGMOID COLON: The mucosa is normal with good vascular pattern and without ulcer  or polyps. Mild diverticulosis. DESCENDING COLON: The mucosa is normal with good vascular pattern and without ulcers or diverticula. SPLENIC FLEXURE: The splenic flexure is normal.  TRANSVERSE COLON: The mucosa is normal with good vascular pattern and without ulcers. HEPATIC FLEXURE: The hepatic flexure is normal.  ASCENDING COLON: The mucosa is normal with good vascular pattern and without ulcers, diverticula or polyps. CECUM: The appendiceal orifice appears normal. The ileocecal valve appears normal.  There is mild erythema noted in the cecum which could be prep effect vs. Barotrauma vs. Less likely true colitis. Note that right and left colon biopsies were taken using cold forceps and sent for histology. Mild melanosis coli noted. ASSESSMENT:  1. Erythema in the cecum and rectum which could be prep effect vs. Barotrauma vs. Less likely true colitis  2. Mild diverticulosis  3. Small internal hemorrhoids  4. Melanosis coli    PLAN:  1. Increase hydration and fiber  2. Follow up pathology  3. Repeat colonoscopy in 5 years given hx of polyps  4.  Clinic follow up in 3 months    Negro Pascual MD  [facility]

## 2023-01-17 NOTE — H&P
ESOPHAGOGASTRODUODENOSCOPY        DATE of PROCEDURE: 1/17/2023    PT NAME: Jeanie Kaminski  [id number]    PHYSICIAN:  Stanley Stewart MD    MEDICATION:  MAC    INSTRUMENT: GIFQ    PROCEDURE:  EGD with biopsies, EGD diagnostic    INDICATIONS: GERD, Epigastric Pain, Nausea and Vomiting    FINDINGS:  OROPHARYNX: Cords and pyriform recesses normal.  ESOPHAGUS: The esophagus is normal. The proximal, mid and distal portions are normal. The Z-Line is intact. GEJ is located at about 38 cm from the incisors. STOMACH: The fundus on antegrade and retroflex views is normal. The body, antrum and pylorus is normal.  Multiple biopsies were taken from the antrum, incisura and body of the stomach using cold forceps to rule out H. pylori. DUODENUM: The bulb has erythema and edema concerning for a moderate duodenitis and second portions are normal.  Multiple biopsies were taken from the duodenitis using cold forceps and sent for histology. ASSESSMENT:  1. Moderate duodenitis    PLAN:  1. Follow up pathology  2. Treat for H. Pylori if positive  3. Avoid NSAIDs  4. Continue Pantoprazole 40 mg daily  5.  Proceed with colonoscopy    Stanley Stewart MD  [facility]

## 2023-01-17 NOTE — H&P
Preoperative H&P    Patient is here today for an EGD for evaluation of chronic GERD on Pantoprazole 40 mg daily, epigastric pain, nausea and vomiting. She is also here for a surveillance colonoscopy for history of polyps. See my clinic note below for full details. /60   Pulse 65   Temp 98.3 °F (36.8 °C) (Oral)   Resp 16   Ht 5' (1.524 m)   Wt 120 lb (54.4 kg)   SpO2 94%   BMI 23.44 kg/m²   GEN: Lying in bed in NAD  RESP: comfortable on room air  CV: RRR  ABD: soft, NT, ND  NEURO: awake and interactive  PSYCH: normal mood    Endoscopy and risks explained to the patient. Risks including reaction to sedation, cardiopulmonary events, infection, bleeding, perforation, requirement for surgery if complications should occur, death were explained to patient who expressed understanding and agreed to proceed with endoscopy. Cruzito Michaels MD   [facility]                        Patient:   Shannon Jason  YOB: 1960   Date:                        11/22/2022 3:30 PM   Historian:                   Patient  Visit Type:                  Office Visit  Provider:   Cruzito Michaels MD  Primary Care Provider: Meet Romero MD    This 57year old female presents for Nausea, Vomiting, GERD, Epigastric Pain and Hx of Colon Polyp. History of Present Illness:  1. Nausea, Vomiting, GERD   2.  Epigastric Pain   3. Hx of Colon Polyp     Mrs. Shannon Jason is a 57year old female, previously followed by Dr. Pearl Ayala, with PMH of ESRD on hemodialysis, Hx left nephrectomy and HTN who presents to re-establish care with us for abdominal pain, nausea and vomiting. She was last seen in clinic by Halle Javier PA-C on 4/24/19. See that note for full details. Her abdominal pain was better with use of Pantoprazole then. She was also using MiraLax for constipation.  S ER evaluation 4/11/19 with CT that reported findings of enteritis that were reportedly stable on f/u CT the next day. EGD 18 during [address] admit for ABLA, melena that revealed bulbar duodenitis without fresh/old blood, small clean based ulcer with path neg for h.pylori. Her last Colonoscopy was 3/2018 with Dr. Dalal She for personal history of large flat tubular adenoma polyp requiring EMR. This colonoscopy revealed a small tubular adenoma and a 2 year surveillance colonoscopy. Today, says that she still has reflux issues and constipation. She can go up to 3 days without a BM. She admits that she doesn't take MiraLax like she is supposed to. When she gets worsening constipation, she will have abdominal cramping, nausea, vomiting and worsening reflux. She doesn't take any NSAIDs. She is still on Pantoprazole 40 mg daily. PROBLEM LIST:     Problem Description Onset Date Chronic Clinical Status Notes   Peritoneal dialysis status 2017        Past Medical/Surgical History:   (Detailed)  Disease/disorder Onset Date Management Date Comments   Hypertension       Hypothyroidism       Chronic Kidney Disease on Peritoneal Dialysis since   Dr Alton Valdez, S Ren[facility]  Due to Strep throat infection age 13. Nephrectomy (L) from \"badly diseased\"        Depression         Hernia repair Dr Brenda Ruffin     on dialysis    [facility] 2022 -   Kidney disease         Hysterectomy       Nephrectomy     Anemia       Colon polyps         colonoscopy       Family History:  (Detailed)  Relationship Family Member Name  Age at Death Condition Onset Age Cause of Death       No family history of GI Cancers  N   Sister    Cancer, bone  N       Social History:  (Detailed)  Tobacco use reviewed. Preferred language is [place]. Smoking status: Former smoker. SMOKING STATUS  Type Smoking Status Usage Per Day Years Used Total Pack Years    Former smoker        ALCOHOL  There is no history of alcohol use.    CAFFEINE  The patient uses caffeine: coffee - 1 cup a day.    Current Medications:  Medication Generic Name Directions   amlodipine 10 mg tablet amlodipine besylate take 1 tablet by oral route  every day   Aspir-81 81 mg tablet,delayed release aspirin take 1 tablet by oral route  every day   atorvastatin 40 mg tablet atorvastatin calcium take 1 tablet by oral route  every day   calcitriol 0.5 mcg capsule calcitriol take 1 capsule by oral route  every day   carvedilol 25 mg tablet carvedilol take 1 tablet by oral route 2 times every day with food   Claritin 10 mg tablet loratadine take 1 tablet by oral route  every day   hyoscyamine 0.125 mg sublingual tablet hyoscyamine sulfate take 1 Tablet by Sublingual route 4 times every day as needed for pain/cramping   levothyroxine 125 mcg tablet levothyroxine sodium take 1 tablet by oral route  every day   lidocaine-prilocaine 2.5 %-2.5 % topical cream lidocaine/prilocaine    losartan 100 mg tablet losartan potassium take 1 tablet by oral route  every day   metolazone 10 mg tablet metolazone take 1 tablet by oral route 2 times every day   pantoprazole 40 mg tablet,delayed release pantoprazole sodium take 1 tablet by oral route  every day   Pepcid AC 10 mg tablet famotidine take 1 tablet by oral route  every day as needed   sertraline 100 mg tablet sertraline HCl take 1 Tablet by oral route  every day   sevelamer carbonate SEVELAMER CARBONATE take 1 tablet by oral route 3 times every day with food   Tylenol Extra Strength 500 mg tablet acetaminophen take 2 Tablet by oral route  as needed   Vitamin D3 2,000 unit tablet cholecalciferol (vitamin D3) TAKE 1 TABLET DAILY. Allergies:  Ingredient Reaction (Severity) Medication Name Comment   CEPHALEXIN MONOHYDRATE Unknown KEFLEX    CODEINE      LATEX   FACIAL SWELLING   PENICILLINS Unknown         Review of Systems:  System Neg/Pos Details   Constitutional Positive Weight loss (Amount: 16.00 lbs. ( kgs.)). Constitutional Negative Fatigue and Fever.    Constitutional Comments Since last clinic visit. ENMT Negative Hearing deficit, Hoarseness and Sleep apnea. Eyes Negative Vision changes. Respiratory Negative Cough and Dyspnea. Cardio Negative Chest pain and Irregular heartbeat/palpitations. GI Negative Jaundice. GI Comments See HPI.  Negative Dysuria, Urinary difficulty and Urinary frequency. Endocrine Negative Cold intolerance, Heat intolerance and Weight gain. Neuro Negative Confusion/disorientation, Dizziness, Headache and Seizures. Psych Negative Anxiety, Depression and Panic attacks. Integumentary Negative Itching skin and Rash. MS Negative Joint pain and Myalgia. Hema/Lymph Negative Anemia and Easy bleeding. Reproductive Negative Breast lumps. Vital Signs:  BP mm/Hg Pulse Resp Pulse Ox Temp F Ht (Total in.) Weight (lbs.) Weight (oz.) BMI   124/58 72 16   60.00 118.20  23.08     Physical Exam:  General: No acute distress. HEENT: Pupils equal, round. No scleral icterus. Oropharynx clear. Respiratory: Clear to ausculation bilaterally  Cardiovascular: RRR, Normal S1 and S2  Abdomen: Soft, nontender, nondistended. + normoactive bowel sounds. No rebound or guarding. Prior surgical scars. Extremities: No lower extremity edema. Fistula in right arm. Skin: Warm and dry. Neuro: Alert and oriented to time, place, and person. Interactive. Psych: Normal mood. Assessment/Plan:  # Detail Type Description    1. Assessment Epigastric abdominal pain (R10.13). Plan Orders She is to schedule a follow-up visit with Stanley Stewart MD to be determined after testing/procedure. 2. Assessment Nausea and vomiting, unspecified vomiting type (R11.2). 3. Assessment Chronic GERD (K21.9). 4. Assessment History of colon polyps (Z86.010). 5. Assessment Chronic constipation (K59.09). 6. Assessment End stage renal failure on dialysis (N18.6). 7. Assessment Dependence on renal dialysis (Z99.2).     Provider Plan   Epigastric Pain, Nausea, Vomiting and GERD  - Has had chronic history of GI symptoms previously followed by Dr. Chen Root  - Avoid NSAIDs  - Continue Pantoprazole 40 mg daily  - Will plan for EGD for further evaluation    Chronic Constipation  - Advised on drinking 60 oz water daily  - High fiber diet  - Titrate MiraLax up to four doses daily to make sure she is having a soft and formed BM daily    History of Colon Polyps  - Over due for surveillance  - Will plan for her surveillance colonoscopy at the same time as the EGD  - We will have to do the procedures in the hospital setting  - We went over the procedures, risks, benefits, alternatives, need for a good bowel prep (2 day prep) and need for a  on the day of the procedure. ASA 3    Plan Orders Further evaluations ordered today include [place] W/WO Cytology [id number] to be performed, Next Lab Date is on and EGD W/WO Cytology to be performed on, Next Lab Date is 01/17/2023. The patient was checked out at 4:01 PM by Moises Otto. Elements of this note may have been dictated using speech recognition software. As a result, errors of speech recognition may have occurred.       Provider:   Soraya Burt  11/22/2022 4:47 PM   Document generated by: Derek Archer MD 11/22/2022    CC Providers:  Beba Lovell MD  [address] [place] [address], [address] [facility]  Pulmonary Medicine  [address] [place] [address] Fany Chowdhury    ___________________________________________________________________________________________________________________________    Electronically signed by Derek Archer MD on 11/22/2022 04:47 PM

## 2023-01-17 NOTE — ANESTHESIA PRE PROCEDURE
Department of Anesthesiology  Preprocedure Note       Name:  Candace Sorto   Age:  62 y.o.  :  1960                                          MRN:  [id number]         Date:  2023      Surgeon: Surgeon(s):  Connor Kerns MD    Procedure: Procedure(s):  EGD DIAGNOSTIC ONLY  COLONOSCOPY DIAGNOSTIC/ 23    Medications prior to admission:   Prior to Admission medications    Medication Sig Start Date End Date Taking? Authorizing Provider   lisinopril (PRINIVIL;ZESTRIL) 20 MG tablet Take 10 mg by mouth daily   Yes Historical Provider, MD   sertraline (ZOLOFT) 100 MG tablet Take 100 mg by mouth daily   Yes Historical Provider, MD   omeprazole (PRILOSEC) 20 MG delayed release capsule Take 20 mg by mouth daily   Yes Historical Provider, MD   ferric citrate (AURYXIA) 1  MG(Fe) TABS tablet Take by mouth 3 times daily (with meals)   Yes Historical Provider, MD   Umeclidinium-Vilanterol (ANORO ELLIPTA) 62.5-25 MCG/ACT AEPB Inhale into the lungs   Yes Historical Provider, MD   albuterol sulfate HFA (PROVENTIL;VENTOLIN;PROAIR) 108 (90 Base) MCG/ACT inhaler Inhale 2 puffs into the lungs 21  Yes Historical Provider, MD   aspirin 81 MG EC tablet Take 81 mg by mouth every morning 19  Yes Historical Provider, MD   atorvastatin (LIPITOR) 40 MG tablet Take 40 mg by mouth nightly 6/22/15  Yes Historical Provider, MD   axitinib (INLYTA) 5 MG tablet Take 5 mg by mouth 2 times daily  Patient not taking: Reported on 2023 10/6/22  Yes Historical Provider, MD   carvedilol (COREG) 25 MG tablet Take 12.5 mg by mouth 2 times daily 6/22/15  Yes Historical Provider, MD   docusate (COLACE, DULCOLAX) 100 MG CAPS Take 100 mg by mouth 2 times daily  Patient not taking: Reported on 2023 7/3/19  Yes Historical Provider, MD   levothyroxine (SYNTHROID) 125 MCG tablet Take 150 mcg by mouth every morning 6/16/15  Yes Historical Provider, MD   acetaminophen (TYLENOL) 500 MG tablet Take 1,000 mg by mouth as needed     Historical Provider, MD   amLODIPine (NORVASC) 10 MG tablet Take 10 mg by mouth daily  Patient not taking: Reported on 1/13/2023    Historical Provider, MD   calcitRIOL (ROCALTROL) 0.25 MCG capsule Take 0.25 mcg by mouth three times a week  Patient not taking: Reported on 1/13/2023    Historical Provider, MD   Cholecalciferol 50 MCG (2000 UT) CAPS Take 2,000 Units by mouth daily    Historical Provider, MD   cinacalcet (SENSIPAR) 30 MG tablet Take 30 mg by mouth  Patient not taking: Reported on 1/13/2023    Historical Provider, MD   famotidine (PEPCID) 20 MG tablet Take 20 mg by mouth 2 times daily  Patient not taking: Reported on 1/13/2023    Historical Provider, MD   furosemide (LASIX) 80 MG tablet Take 80 mg by mouth 2 times daily  Patient not taking: Reported on 1/13/2023    Historical Provider, MD   gabapentin (NEURONTIN) 100 MG capsule Take 100 mg by mouth 3 times daily. Patient not taking: Reported on 1/13/2023    Historical Provider, MD   Hyoscyamine Sulfate SL 0.125 MG SUBL Place 0.125 mg under the tongue every 6 hours as needed  Patient not taking: Reported on 1/13/2023    Historical Provider, MD       Current medications:    No current facility-administered medications for this encounter. Allergies: Allergies   Allergen Reactions    Latex Swelling    Penicillins Hives, Swelling and Rash       Problem List:    Patient Active Problem List   Diagnosis Code    Anemia D64.9    Renal failure N19    Thyroid disease E07.9    Hypertension I10    Arthritis M19.90    GERD (gastroesophageal reflux disease) K21.9    Hyperlipidemia E78.5       Past Medical History:        Diagnosis Date    Adverse effect of anesthesia 12/20/2017 12/20/17 creation of brachial cephalic fistula \"Pt SOB post block, decision made to induce GA/ intubate\". Pt states never had any other problems with anesthesia.     Anemia     Aortic valve stenosis     CKD (chronic kidney disease)     Depression     [facility]    ESRD (end stage renal disease) on dialysis ([facility] [address]     Heart murmur     followed by Dr. Miguel Jain; ECHO 10/6/22 EF 55-65% mild aortic valve calcification    Heartburn     Hypertension     Hypotension     PUD (peptic ulcer disease)     PUD, gastritis, gastric AVM    Pulmonary hypertension ([facility] [address]     7/9/21 RVSP 49 from [place]    Renal cell carcinoma ([facility] [address]     right lung and left kidney    Sleep apnea     Thyroid disease        Past Surgical History:        Procedure Laterality Date    AV FISTULA PLACEMENT      BREAST REDUCTION SURGERY      HERNIA REPAIR      HYSTERECTOMY (CERVIX STATUS UNKNOWN)      KIDNEY REMOVAL      OTHER SURGICAL HISTORY      bowel blockage surgery       Social History:    Social History     Tobacco Use    Smoking status: Every Day     Packs/day: 0.25     Types: Cigarettes    Smokeless tobacco: Never    Tobacco comments:     1 pack a week   Substance Use Topics    Alcohol use: Not Currently                                Ready to quit: Not Answered  Counseling given: Not Answered  Tobacco comments: 1 pack a week      Vital Signs (Current):   Vitals:    01/13/23 0839   Weight: 120 lb (54.4 kg)   Height: 5' (1.524 m)                                              BP Readings from Last 3 Encounters:   No data found for BP       NPO Status:                                                                                 BMI:   Wt Readings from Last 3 Encounters:   01/13/23 120 lb (54.4 kg)     Body mass index is 23.44 kg/m². CBC: No results found for: WBC, RBC, HGB, HCT, MCV, RDW, PLT    CMP: No results found for: NA, K, CL, CO2, BUN, CREATININE, GFRAA, AGRATIO, LABGLOM, GLUCOSE, GLU, PROT, CALCIUM, BILITOT, ALKPHOS, AST, ALT    POC Tests: No results for input(s): POCGLU, POCNA, POCK, POCCL, POCBUN, POCHEMO, POCHCT in the last 72 hours.     Coags: No results found for: PROTIME, INR, APTT    HCG (If Applicable): No results found for: PREGTESTUR, PREGSERUM, HCG, HCGQUANT     ABGs: No results found for: PHART, PO2ART, VMP4QHG, FWD5OFN, BEART, K5GXDQHY     Type & Screen (If Applicable):  No results found for: LABABO, LABRH    Drug/Infectious Status (If Applicable):  No results found for: HIV, HEPCAB    COVID-19 Screening (If Applicable): No results found for: COVID19        Anesthesia Evaluation  Patient summary reviewed  Airway: Mallampati: II  TM distance: >3 FB   Neck ROM: full  Mouth opening: > = 3 FB   Dental:    (+) partials      Pulmonary:normal exam    (+) sleep apnea: on noncompliant,                             Cardiovascular:Negative CV ROS  Exercise tolerance: good (>4 METS),   (+) hypertension: mild,             Echocardiogram reviewed               ROS comment: Echo 10/22 - ECHO 10/6/22 EF 55-65% mild aortic valve calcification     Neuro/Psych:   (+) depression/anxiety             GI/Hepatic/Renal:   (+) GERD: well controlled, renal disease: ESRD and dialysis,           Endo/Other:    (+) malignancy/cancer ( metastatic renal cell carcinoma). Abdominal:             Vascular: negative vascular ROS. Other Findings:           Anesthesia Plan      TIVA     ASA 3       Induction: intravenous. Anesthetic plan and risks discussed with patient.                         Girish Horowitz MD   1/17/2023

## 2023-01-17 NOTE — ANESTHESIA POSTPROCEDURE EVALUATION
Department of Anesthesiology  Postprocedure Note    Patient: Candace Sorto  MRN: [id number]  YOB: 1960  Date of evaluation: 1/17/2023      Procedure Summary     Date: 01/17/23 Room / Location: [facility] ENDO 04 / [facility] ENDOSCOPY    Anesthesia Start: 1053 Anesthesia Stop: 1138    Procedures:       EGD DIAGNOSTIC ONLY (Upper GI Region)      COLONOSCOPY BIOPSY/STOMA Diagnosis:       Dependence on renal dialysis (HCC)      (Dependence on renal dialysis (HCC) [Z99.2])    Surgeons: Connor Kerns MD Responsible Provider: Roderick Pillai Jr., MD    Anesthesia Type: TIVA, MAC ASA Status: 3          Anesthesia Type: TIVA, MAC    Niels Phase I: Niels Score: 10    Niels Phase II: Niels Score: 8      Anesthesia Post Evaluation    Patient location during evaluation: PACU  Patient participation: complete - patient participated  Level of consciousness: awake  Pain score: 0  Airway patency: patent  Nausea & Vomiting: no nausea and no vomiting  Complications: no  Cardiovascular status: blood pressure returned to baseline and hemodynamically stable  Respiratory status: acceptable, spontaneous ventilation and nonlabored ventilation  Hydration status: euvolemic  Multimodal analgesia pain management approach

## 2023-01-18 NOTE — OP NOTE
ESOPHAGOGASTRODUODENOSCOPY        DATE of PROCEDURE: 1/18/2023    PT NAME: Khoi Nunez  [id number]    PHYSICIAN:  Lisa Bustillos MD    MEDICATION:  MAC    INSTRUMENT: GIFQ    PROCEDURE:  EGD with biopsies, EGD diagnostic    INDICATIONS: Chronic GERD, Epigastric Pain, Nausea, Vomiting    FINDINGS:  OROPHARYNX: Cords and pyriform recesses normal.  ESOPHAGUS: The esophagus is normal. The proximal, mid and distal portions are normal. The Z-Line is intact. GEJ is located at about 38 cm from the incisors. STOMACH: The fundus on antegrade and retroflex views is normal. The body, antrum and pylorus is normal.  Multiple biopsies were taken from the antrum, incisura and body of the stomach using cold forceps to rule out H. pylori. DUODENUM: The bulb has mild duodenitis and second portions are normal.  Multiple biopsies were taken from the duodenitis using cold forceps and sent for histology. ASSESSMENT:  1. Mild duodenitis    PLAN:  1. Follow up pathology  2. Treat for H. Pylori if positive  3. Avoid NSAIDs  4. Continue Pantoprazole 40 mg daily  5.  Proceed with colonoscopy    Lisa Bustillos MD  [facility]

## 2024-11-02 NOTE — PN
DATE:  02/25/2019



CRITICAL CARE PROGRESS NOTE



LOCATION:  The patient in ICU, bed 4, room 24.



TIME SPENT:  45 minutes.



The patient is seen and evaluated at the bedside.  Discussed with family

members who were at the bedside.



SUBJECTIVE:  A 58-year-old female with a past medical history significant

for obesity, hypertension, diabetes mellitus type II, coronary artery

disease, status post coronary artery bypass graft at the time of surgery,

cerebrovascular accident with right hemiparesis, able to ambulate at home

with walker and cane, has been in her usual state until early Saturday,

noted to be unresponsive.  On EMS arrival, blood sugar low.  Given D50 and

Narcan for suspected heroin use.  Brought to emergency room, admitted to

ICU.  Initial CT head showed no acute event, chronic encephalomalacia.  CT

of abdomen showed proximal small bowel dilatation likely related to lower

abdominal wall hernia/rectus disruption on the left chest x-ray,

unremarkable.  Repeat CT head also unchanged, seen by neurology consult. 

Video-assisted EEG showed breast mass pressure.  No seizure episode since

admission.  Also no history reported for seizure; however, the patient

noted to have two bowel movements, unclear bladder incontinence overnight,

afebrile, normotensive, on oxygen supplement 3 liters nasal canula,

saturating over 98%.  This morning, lethargic but arousable.  On sleep, the

patient is noted to have signs and symptoms of obstructive sleep apnea.



PHYSICAL EXAMINATION:

CURRENT VITAL SIGNS:  Temperature 98.6, heart rate 73, blood pressure

114/64, mean arterial pressure 80, respiratory rate 11, oxygen saturation

99%, 3 L nasal canula.  Intake 2335, output 125, positive balance 2210. 

Examined weight 218 pounds.

HEAD, EYES, EARS, NOSE AND THROAT:  Pupils are 2 to 3 mm, reactive.  No

gaze preference.

NECK:  Supple.  Trachea central.

CHEST:  Bilateral breath sounds.  Conducted sound from upper airway.

HEART:  Rhythm irregular, distant.

ABDOMEN:  Bowel sounds are present, soft, nontender.

EXTREMITIES:  No clubbing or cyanosis.

NEUROLOGIC:  Lethargic, arousable.  Right hemiparesis.  Plantar reflexes

are equivocal.  Deep tendon reflex 2+ bilaterally.



CURRENT MEDICATIONS:  Include acyclovir 600 mg IV every 24 hours, Duoneb 3

mL via nebulizer every 6 hours, PhosLo 667 mg p.o. three times daily,

ceftriaxone 2 g IV daily, D5 half-normal at 75 mL per hour, Lovenox 100 mg

subcu daily, Keppra 500 mg IV every 12 hours, Flagyl 500 mg IV every 8

hours.



MICROBIOLOGY:  Urine culture no growth.  Blood culture no growth.  Chest

x-ray done this morning, right central venous catheter in place, mild left

perihilar infrahilar infiltrate.  No pleural effusion or pneumothorax.  No

aortic atherosclerotic calcification, prosthetic cardiac valve, and

sternotomy wires reiterated.  CT head on 02/24/2019, no acute intracranial

abnormality, no significant finding to account for the clinical

presentation, stable postoperative encephalomalacia change, particularly

left hemispheric with ipsilateral dilatation of the left lateral ventricle.



IMPRESSION:

1.  Neurology:  Altered mental status, hypoglycemia on arrival by emergency

medical service, unclear history of seizure or prolonged hypoglycemia

given, history of cerebrovascular accident with right hemiparesis.  No

acute abnormality noted in the current CT.  Video-electroencephalography

showed some burst suppression.  Leucocytosis reactive and/or due to

infarction.  Acute renal failure.  Elevated liver enzymes suggesting shocky

liver.  Suggest possible sepsis.  Source unclear.

2.  Pulmonary:  History of obstructive sleep apnea, history of asthma. 

Chest x-ray, perihilar congestion, no obvious sign of aspiration.

3.  Cardiac:  History of atrial fibrillation, on Lovenox 100 mg

subcutaneous daily, rate controlled.

4.  Hematology:  Leukocytosis trending down.  Hemoglobin and hematocrit are

stable.  Platelet count within normal limits.  Coagulopathy with PT/INR

27.5 and 2.4.  ABG, pH acceptable.

5.  Gastrointestinal:  Elevated liver enzymes, could be secondary to acute

ischemic hepatitis.  No prior history of hepatitis.  We will order

hepatitis B and C serology.

6.  Endocrinology:  History of diabetes mellitus type 2.  Current blood

sugar is 112.  Hypoglycemia on presentation, has been on metformin, on

hold.  Monitor sugar to prevent recurrent hypoglycemic events.  Toxicology,

drug screen negative.  Unclear detail about the heroin use.  Keep head of

bed 30-degree up.  Continue ceftriaxone, metronidazole and _____

empirically to treat any possible central nervous system infarction.

7.  Renal failure, acute-on-chronic:  Followed by renal consult.  On D5

half-normal as recommended by renal consult.  Closely monitor improvement

of blood urea nitrogen and creatinine.  Head of bed 30-degree up.  Continue

oxygen supplement.  Continue Lovenox 100 mg subcutaneous daily empirically.







__________________________________________

David Mcdonald MD





DD:  02/25/2019 14:11:16

DT:  02/25/2019 23:18:06

[facility] # [id number]
DATE:  02/26/2019



CRITICAL CARE PROGRESS NOTE



LOCATION:  The patient in ICU bed 424.



Time spent 35 minutes.  The patient is seen and evaluated at the bedside. 

Case discussed in multidisciplinary ICU rounds this morning.



SUBJECTIVE:  A 58-year-old female with medical history significant for

obesity, hypertension, diabetes mellitus type 2, status post mitral valve

replacement, status post CVA with right hemiparesis, able to ambulate at

home with walker and cane.  Admitted after being found unresponsive at home

with low blood sugar and no response to Narcan.  Initial CT head showed no

acute event, but for chronic encephalomalacia.  CT abdomen showed proximal

small bowel dilatation, likely related to lower abdominal wall hernia or

rectus disruption on the left.  Chest x-ray unremarkable.  Repeat CT head

also with no significant changes.  EEG showed burst suppression consistent

with anoxic injury.  No seizure episode since admission.  History of

obstructive sleep apnea, not on BiPAP at home.  Overnight, normotensive,

afebrile.  On telemetry, atrial fibrillation, on Lovenox 100 mg subcu

daily.



This morning, more lethargic compared to yesterday but with brief episodes

of staring, no further nausea or vomiting noted.  Maintained saturation

over 94%.



PHYSICAL EXAMINATION:

VITAL SIGNS:  Temperature 99.1, heart rate 80 and irregular, blood pressure

108/75, mean arterial pressure 86, respiratory rate 11 to 14, saturation

100% oxygen supplement 2 L nasal cannula.  Intake 2250, output 150,

currently 200 since 7 o'clock.  Weight 238 pounds.

HEAD, EYES, EARS, NOSE AND THROAT:  Pupils are in midline.  No gaze

preference.  Reactive to light.  Equal and round.

NECK:  Supple.  Right IJ and TLC in place.

CHEST:  Bilateral breath sounds.  Transmitted sounds from the upper airway.

LUNGS:  Otherwise without rhonchi or crepitations.

HEART:  Rhythm irregular.  No audible murmur.

ABDOMEN:  Obese, pendulous, soft, nontender.

EXTREMITIES:  Trace edema.

NEUROLOGIC:  Lethargic.  Responds to painful stimuli.  Moves right and left

arms to pain, localizes.  Less movement only left leg as well.



CURRENT MEDICATIONS:  Acyclovir 600 mg IV daily, albuterol with ipratropium

bromide 3 mg/0.5 mg in 3 mL every 6 hours p.r.n., PhosLo 667 mg p.o. three

times daily, ceftriaxone 2 g IV daily, Lovenox 100 mg subcu daily, Keppra

500 mg IV every 12 hours, Flagyl 500 mg every 8 hours.



LABORATORY DATA:  WBC 14.7, hemoglobin 10, hematocrit 31.2, platelet count

140.  WBC trending down.  PT 24.2, INR 2.1.  SMA-7:  Sodium 139, potassium

4.8, chloride 101, CO2 of 22, blood urea nitrogen 62, creatinine 5.6,

glucose 117, uric acid 11.7, calcium 7.6.  Total bilirubin 1.3, direct

bilirubin 0.9, AST 1644, ALT 2497, alkaline phosphatase 139, total protein

6.8, albumin 3.1.  Urinalysis:  Microscopic wbc's 4, rbc's 6.  Toxicology

negative.  Alcohol level less than 10.  Serology, hepatitis B surface

antigen negative, core antibody negative, hepatitis C antibody negative,

influenza A and B negative, HIV-1 and HIV-2 antibody screen negative. 

Neisseria meningitidis A, C, Y/W135 negative.  Group B strep antigen

negative.  Streptococcus antigen negative.  Microbiology:  MRSA negative. 

Ova and parasite not seen.  Blood culture, no growth.  Chest x-ray from

02/25/2019:  Mild left infrahilar infiltrate with none identified at the

right.  Right central venous catheter in position.  No pneumothorax.  Trace

left pleural effusion.  Prosthetic cardiac valve and sternotomy wires in

place.



IMPRESSION AND PLAN:

1.  Neurology:  Altered mental status less compared to yesterday. 

Hypoglycemia on arrival by emergency medical service.  Unclear history of

seizure or prolonged hypoglycemia.  History of previous cerebrovascular

accident with right hemiparesis.  No acute abnormality noted in the current

CT.  Video-assisted electroencephalogram showed burst suppression. 

Suspected septic hypoxic metabolic encephalopathy.  No clear evidence of

central nervous system infection.  Appreciate infectious disease followup. 

Recommendation for spinal tap.  Cannot complete now as the patient is on

Lovenox and coagulopathy with INR of 2.1; however, leukocytosis trending

down.  If persists, we will request for lumbar puncture after fresh frozen

plasma.

2.  Pulmonary:  History of obstructive sleep apnea and history of asthma. 

Continue bronchodilator.  Chest x-ray showed perihilar congestion.  No

obvious sign of aspiration.  Maintaining airway now.  Closely monitor for

further respiratory compromise.

3.  Cardiac:  History of atrial fibrillation, on Lovenox 100 mg

subcutaneously daily.  Rate remains controlled.  Given the history of

mitral valve replacement and multiple organ failure, would consider getting

a transesophageal echocardiogram.  We will discuss with infectious disease

consult.

4.  Hematology:  Leukocytosis trending down.  Hemoglobin and hematocrit are

stable.  Platelet count within normal limits.  Coagulopathy with a

prothrombin time and international normalized ratio at international

normalized ratio of 2.1.  Arterial blood gas with saturation over 94%.

5.  Gastroenterology:  Elevated liver enzymes, trending down.  No prior

history of hepatitis.  Hepatitis serology remain negative.

6.  Endocrinology:  History of diabetes mellitus type 2.  Current blood

sugar is in the acceptable range.  Hypoglycemia on presentation.  Has been

on metformin, now on hold.  Monitor sugar closely to prevent recurrent

hypoglycemia.  Toxicology drug screen negative.

7.  Renal:  Renal failure, acute on chronic.  Etiology unclear.  Followed

by a renal consult.  On D5 normal at 75 mL per hour.

8.  Keep head of bed 30 degrees up.  Closely monitor for airway protection.

Intubation as needed.  Continue Lovenox.  Consider feeding through

nasogastric tube.







__________________________________________

David Mcdonald MD





DD:  02/26/2019 14:25:36

DT:  02/26/2019 14:34:19

Job # [id number]
No

## (undated) DEVICE — SYRINGE MED 3ML CLR PLAS STD N CTRL LUERLOCK TIP DISP

## (undated) DEVICE — LUBE JELLY FOIL PACK 1.4 OZ: Brand: MEDLINE INDUSTRIES, INC.

## (undated) DEVICE — FORCEPS BX L240CM JAW DIA2.8MM L CAP W/ NDL MIC MESH TOOTH

## (undated) DEVICE — FORMALIN SOLUTION 20

## (undated) DEVICE — BLOCK BITE AD 60FR W/ VELC STRP ADDRESSES MOST PT AND

## (undated) DEVICE — GAUZE,SPONGE,4"X4",12PLY,WOVEN,NS,LF: Brand: MEDLINE

## (undated) DEVICE — KENDALL RADIOLUCENT FOAM MONITORING ELECTRODE RECTANGULAR SHAPE: Brand: KENDALL

## (undated) DEVICE — CONNECTOR TBNG OD5-7MM O2 END DISP

## (undated) DEVICE — AIRLIFE™ OXYGEN TUBING 7 FEET (2.1 M) CRUSH RESISTANT OXYGEN TUBING, VINYL TIPPED: Brand: AIRLIFE™

## (undated) DEVICE — SINGLE PORT MANIFOLD: Brand: NEPTUNE 2

## (undated) DEVICE — SYRINGE MED 10ML LUERLOCK TIP W/O SFTY DISP

## (undated) DEVICE — NEEDLE SYR 18GA L1.5IN RED PLAS HUB S STL BLNT FILL W/O

## (undated) DEVICE — CANNULA NSL ORAL AD FOR CAPNOFLEX CO2 O2 AIRLFE

## (undated) DEVICE — SYRINGE, LUER SLIP, STERILE, 60ML: Brand: MEDLINE

## (undated) DEVICE — YANKAUER,BULB TIP,W/O VENT,RIGID,STERILE: Brand: MEDLINE